# Patient Record
Sex: FEMALE | Race: WHITE | NOT HISPANIC OR LATINO
[De-identification: names, ages, dates, MRNs, and addresses within clinical notes are randomized per-mention and may not be internally consistent; named-entity substitution may affect disease eponyms.]

---

## 2017-07-19 ENCOUNTER — APPOINTMENT (OUTPATIENT)
Dept: ORTHOPEDIC SURGERY | Facility: CLINIC | Age: 67
End: 2017-07-19

## 2017-07-19 VITALS
SYSTOLIC BLOOD PRESSURE: 167 MMHG | DIASTOLIC BLOOD PRESSURE: 83 MMHG | WEIGHT: 175 LBS | BODY MASS INDEX: 31.4 KG/M2 | HEIGHT: 62.5 IN | HEART RATE: 65 BPM

## 2017-07-19 DIAGNOSIS — Z86.39 PERSONAL HISTORY OF OTHER ENDOCRINE, NUTRITIONAL AND METABOLIC DISEASE: ICD-10-CM

## 2017-07-19 DIAGNOSIS — Z87.39 PERSONAL HISTORY OF OTHER DISEASES OF THE MUSCULOSKELETAL SYSTEM AND CONNECTIVE TISSUE: ICD-10-CM

## 2017-07-19 DIAGNOSIS — Z86.79 PERSONAL HISTORY OF OTHER DISEASES OF THE CIRCULATORY SYSTEM: ICD-10-CM

## 2017-07-19 DIAGNOSIS — Z80.9 FAMILY HISTORY OF MALIGNANT NEOPLASM, UNSPECIFIED: ICD-10-CM

## 2017-07-19 DIAGNOSIS — G56.03 CARPAL TUNNEL SYNDROM,BILATERAL UPPER LIMBS: ICD-10-CM

## 2017-07-19 RX ORDER — ATORVASTATIN CALCIUM 10 MG/1
10 TABLET, FILM COATED ORAL
Qty: 90 | Refills: 0 | Status: ACTIVE | COMMUNITY
Start: 2017-06-03

## 2017-07-19 RX ORDER — CEFUROXIME AXETIL 500 MG/1
500 TABLET ORAL
Qty: 14 | Refills: 0 | Status: DISCONTINUED | COMMUNITY
Start: 2017-04-03

## 2017-07-19 RX ORDER — LEVOFLOXACIN 750 MG/1
750 TABLET, FILM COATED ORAL
Qty: 7 | Refills: 0 | Status: DISCONTINUED | COMMUNITY
Start: 2017-04-01

## 2017-07-19 RX ORDER — ALBUTEROL SULFATE 90 UG/1
108 (90 BASE) AEROSOL, METERED RESPIRATORY (INHALATION)
Qty: 18 | Refills: 0 | Status: DISCONTINUED | COMMUNITY
Start: 2017-04-03

## 2017-07-25 ENCOUNTER — EMERGENCY (EMERGENCY)
Facility: HOSPITAL | Age: 67
LOS: 1 days | Discharge: HOME | End: 2017-07-25
Admitting: INTERNAL MEDICINE
Payer: MEDICARE

## 2017-07-25 PROCEDURE — 99285 EMERGENCY DEPT VISIT HI MDM: CPT

## 2017-07-25 PROCEDURE — 74177 CT ABD & PELVIS W/CONTRAST: CPT | Mod: 26

## 2017-07-25 PROCEDURE — 93010 ELECTROCARDIOGRAM REPORT: CPT

## 2017-07-26 PROCEDURE — 93005 ELECTROCARDIOGRAM TRACING: CPT

## 2017-07-26 PROCEDURE — 99284 EMERGENCY DEPT VISIT MOD MDM: CPT | Mod: 25

## 2017-07-26 PROCEDURE — 96361 HYDRATE IV INFUSION ADD-ON: CPT

## 2017-07-26 PROCEDURE — 96375 TX/PRO/DX INJ NEW DRUG ADDON: CPT

## 2017-07-26 PROCEDURE — 85027 COMPLETE CBC AUTOMATED: CPT

## 2017-07-26 PROCEDURE — 87086 URINE CULTURE/COLONY COUNT: CPT

## 2017-07-26 PROCEDURE — 80076 HEPATIC FUNCTION PANEL: CPT

## 2017-07-26 PROCEDURE — 85730 THROMBOPLASTIN TIME PARTIAL: CPT

## 2017-07-26 PROCEDURE — 83690 ASSAY OF LIPASE: CPT

## 2017-07-26 PROCEDURE — 82150 ASSAY OF AMYLASE: CPT

## 2017-07-26 PROCEDURE — 81003 URINALYSIS AUTO W/O SCOPE: CPT

## 2017-07-26 PROCEDURE — 74177 CT ABD & PELVIS W/CONTRAST: CPT

## 2017-07-26 PROCEDURE — 82550 ASSAY OF CK (CPK): CPT

## 2017-07-26 PROCEDURE — 84484 ASSAY OF TROPONIN QUANT: CPT

## 2017-07-26 PROCEDURE — 80048 BASIC METABOLIC PNL TOTAL CA: CPT

## 2017-07-26 PROCEDURE — 96365 THER/PROPH/DIAG IV INF INIT: CPT | Mod: XU

## 2017-07-26 PROCEDURE — 85610 PROTHROMBIN TIME: CPT

## 2017-07-26 PROCEDURE — 82553 CREATINE MB FRACTION: CPT

## 2017-08-02 ENCOUNTER — APPOINTMENT (OUTPATIENT)
Dept: ORTHOPEDIC SURGERY | Facility: CLINIC | Age: 67
End: 2017-08-02

## 2017-10-29 ENCOUNTER — EMERGENCY (EMERGENCY)
Facility: HOSPITAL | Age: 67
LOS: 1 days | Discharge: HOME | End: 2017-10-29
Attending: EMERGENCY MEDICINE | Admitting: EMERGENCY MEDICINE
Payer: MEDICARE

## 2017-10-29 VITALS
DIASTOLIC BLOOD PRESSURE: 81 MMHG | WEIGHT: 169.98 LBS | HEART RATE: 87 BPM | SYSTOLIC BLOOD PRESSURE: 182 MMHG | OXYGEN SATURATION: 96 % | RESPIRATION RATE: 20 BRPM | TEMPERATURE: 98 F

## 2017-10-29 PROCEDURE — 99283 EMERGENCY DEPT VISIT LOW MDM: CPT | Mod: GC

## 2017-10-29 PROCEDURE — 99283 EMERGENCY DEPT VISIT LOW MDM: CPT

## 2017-10-29 RX ORDER — OXYCODONE HYDROCHLORIDE 5 MG/1
10 TABLET ORAL ONCE
Qty: 0 | Refills: 0 | Status: DISCONTINUED | OUTPATIENT
Start: 2017-10-29 | End: 2017-10-29

## 2017-10-29 RX ADMIN — OXYCODONE HYDROCHLORIDE 10 MILLIGRAM(S): 5 TABLET ORAL at 14:36

## 2017-10-29 NOTE — ED PROVIDER NOTE - PLAN OF CARE
Call your primary care doctor and pain management doctor today or tomorrow to schedule follow up appointment for within the next 3-5 days.  Continue taking your medications as prescribed.  Return to this Emergency Department if you experience worsening condition or for any other emergency.

## 2017-10-29 NOTE — ED PROVIDER NOTE - MEDICAL DECISION MAKING DETAILS
67F h/o chronic low back pain, p/w pain after running out of pain meds and after physical therapy. Will give 10mg Oxycodone now with a few percocet until she sees Pain Management. Imaging not warranted, as recent spine MRI (aug) and now new/different pain. Nate SANCHEZ: Patient is a 66 yo F with h/o chronic low back pain on 30 mg of oxycodone 4 times a day, presenting with request for pain medication after running out of medication. Patient has been taking additional pain pills secondary to increasing pain after being in physical therapy. No change in bladder or bowel habits. Denies radiation of pain at this time. No f/c/n/v. Patient has an appointment with her pain doctor in 2 days. Will give pain medication and limited number of pills until she can follow up. I-stop consistent with patient's history and use of pain medication as prescribed.

## 2017-10-29 NOTE — ED PROVIDER NOTE - OBJECTIVE STATEMENT
67F h/o Chronic back pain 2/2 lumbar spinal stenosis, Diverticulitis, HTN presents with worsening of her chronic lower back pain (for which she normally takes 30mg Oxycodone) after her new physical therapy which began last week after which she took some extra oxycodone pills for pain. She says that she ran out of the medication and will only see pain doctor on Tuesday. She denies any trauma, falls, fevers, chills, new numbness/tingling, urine/fecal incontinence.

## 2017-10-29 NOTE — ED PROVIDER NOTE - CARE PLAN
Principal Discharge DX:	Chronic bilateral low back pain with bilateral sciatica  Instructions for follow-up, activity and diet:	Call your primary care doctor and pain management doctor today or tomorrow to schedule follow up appointment for within the next 3-5 days.  Continue taking your medications as prescribed.  Return to this Emergency Department if you experience worsening condition or for any other emergency.

## 2017-10-29 NOTE — ED PROVIDER NOTE - EXTREMITY EXAM
Full strength, ROM, neuro intact. Straight leg positive L>R./no deformity, pain or tenderness, no restriction of movement

## 2017-10-29 NOTE — ED ADULT NURSE NOTE - OBJECTIVE STATEMENT
67 yr old female came in with back pain, which I a chronic condition but she ran out of pain meds no other issues. can walk no loss of urine or stool.

## 2019-05-22 PROBLEM — I10 ESSENTIAL (PRIMARY) HYPERTENSION: Chronic | Status: ACTIVE | Noted: 2017-10-29

## 2019-05-22 PROBLEM — M54.9 DORSALGIA, UNSPECIFIED: Chronic | Status: ACTIVE | Noted: 2017-10-29

## 2019-05-22 PROBLEM — K57.92 DIVERTICULITIS OF INTESTINE, PART UNSPECIFIED, WITHOUT PERFORATION OR ABSCESS WITHOUT BLEEDING: Chronic | Status: ACTIVE | Noted: 2017-10-29

## 2019-05-28 ENCOUNTER — APPOINTMENT (OUTPATIENT)
Dept: MRI IMAGING | Facility: HOSPITAL | Age: 69
End: 2019-05-28
Payer: MEDICARE

## 2019-05-28 ENCOUNTER — OUTPATIENT (OUTPATIENT)
Dept: OUTPATIENT SERVICES | Facility: HOSPITAL | Age: 69
LOS: 1 days | End: 2019-05-28
Payer: MEDICARE

## 2019-05-28 DIAGNOSIS — Z00.8 ENCOUNTER FOR OTHER GENERAL EXAMINATION: ICD-10-CM

## 2019-05-28 PROCEDURE — 72141 MRI NECK SPINE W/O DYE: CPT

## 2019-05-28 PROCEDURE — 70551 MRI BRAIN STEM W/O DYE: CPT

## 2019-05-28 PROCEDURE — 72141 MRI NECK SPINE W/O DYE: CPT | Mod: 26

## 2019-05-28 PROCEDURE — 70551 MRI BRAIN STEM W/O DYE: CPT | Mod: 26

## 2019-06-03 ENCOUNTER — APPOINTMENT (OUTPATIENT)
Dept: ORTHOPEDIC SURGERY | Facility: CLINIC | Age: 69
End: 2019-06-03
Payer: MEDICARE

## 2019-06-03 VITALS
DIASTOLIC BLOOD PRESSURE: 90 MMHG | SYSTOLIC BLOOD PRESSURE: 167 MMHG | BODY MASS INDEX: 31.94 KG/M2 | HEIGHT: 62.5 IN | WEIGHT: 178 LBS | HEART RATE: 80 BPM

## 2019-06-03 DIAGNOSIS — S14.129A CENTRAL CORD SYNDROME AT UNSPECIFIED LVL OF CERVICAL SPINAL CORD, INITIAL ENCOUNTER: ICD-10-CM

## 2019-06-03 PROCEDURE — 72050 X-RAY EXAM NECK SPINE 4/5VWS: CPT

## 2019-06-03 PROCEDURE — 99214 OFFICE O/P EST MOD 30 MIN: CPT

## 2019-06-03 NOTE — CONSULT LETTER
[Dear  ___] : Dear  [unfilled], [Consult Letter:] : I had the pleasure of evaluating your patient, [unfilled]. [FreeTextEntry2] : Dale Alvarado [FreeTextEntry1] : Thank you for this referral. I have enclosed my note for your review. Please feel free to contact my office if you have additional questions regarding this patient.\par \par Regards,\par Phong Adrian MD, FACS, FAAOS\par \par  of Orthopaedic Surgery\par [facility] [employer]\par Spinal Reconstruction Surgery\par Minimally Invasive Spinal Surgery\par [facility]

## 2019-06-03 NOTE — HISTORY OF PRESENT ILLNESS
[Worsening] : worsening [de-identified] : Patient is here today for evaluation on her bilateral arm numbness and bilateral hand numbness after pushing heavy case of water 3 weeks ago. Patient saw Neurologist had mri cervical spine xrays and emg ncv and referred to see spine orthopedist.\aristeo Works in an insurance agency, [employer] [de-identified] : typing

## 2019-06-03 NOTE — PHYSICAL EXAM
[Ataxic] : ataxic [Valentine's Sign] : positive Valentine's sign [Wide-Based] : wide-based [] : Motor: [___/5] : right ([unfilled]/5) [Motor Strength Upper Extremities] : left (5/5) [UE] : Sensory: Intact in bilateral upper extremities [3+] : left brachioradialis 3+ [Rad] : radial 2+ and symmetric bilaterally [Plantar Reflex Right Only] : absent on the right [Plantar Reflex Left Only] : absent on the left [DTR Reflexes Clonus Of Left Ankle (___ Beats)] : absent on the left [DTR Reflexes Clonus Of Right Ankle (___ Beats)] : absent on the right [de-identified] : 4 view cervical spine obtained today demonstrate no significant scoliosis the normal cervical lordosis. Grade 1 spinal listhesis seen at C4-5 and flexion with improvement in extension suggesting instability. Minimal anterolisthesis at C5-6 was stable between flexion-extension.\par __________________________\par \par EXAM: MR SPINE CERVICAL \par \par PROCEDURE DATE: 05/28/2019 \par \par INTERPRETATION: MRI cervical spine without contrast \par \par History bilateral upper extremity numbness \par \par There is no vertebral compression deformity or marrow edema or infiltration. \par \par The C2-3 disc is normal. There is moderate to severe facet arthropathy on \par the left resulting in moderate to severe foraminal stenosis without central \par canal stenosis or cord impingement \par \par The C3-4 disc is normal. There is severe facet arthropathy on the left \par resulting in severe foraminal stenosis without central canal stenosis or \par cord impingement \par \par The C4-5 disc space is maintained. There is mild anterolisthesis related to \par markedly severe bilateral facet arthropathy, worse on the right. There is \par uncovering of a diffuse dorsal disc bulge. These changes together result in \par mild ventral and dorsal spinal cord impingement with overall mild spinal \par stenosis. There is increased T2 signal in the cord at this level implying \par myelomalacia. There is severe bilateral foraminal stenosis \par \par The C5-6 disc space is maintained. There is minimal anterolisthesis related \par to severe degenerative change in the fused facets. Uncinate hypertrophy \par additionally contributes to moderate to severe bilateral foraminal stenosis, \par worse on the right without central canal stenosis or cord impingement \par \par The C6-7 disc is normal. There is uncinate and facet hypertrophy resulting \par in moderate left and mild right foraminal stenosis without central canal \par stenosis or cord impingement \par \par The C7-T1 disc is normal. There is moderate to severe bilateral facet \par arthropathy resulting in mild bilateral foraminal stenosis \par \par IMPRESSION: \par \par Spondylosis and degenerative spondylolisthesis notable for spinal stenosis, \par cord impingement and mild myelomalacia at C4-5. Multilevel foraminal \par stenosis as above without additional cord impingement \par \par JADE FONSECA M.D., ATTENDING RADIOLOGIST \par This document has been electronically signed. May 28 2019 2:37PM  [de-identified] : The pt is awake, alert and oriented to self, place and time, is comfortable and in no acute distress.positive Romberg sign noted. Can heel and toe walk without difficulty. Inspection of the neck, back and upper extremities bilaterally reveals no rashes or ecchymotic lesions. There is no obvious abnormal spinal curvature in the sagittal and coronal planes. No crepitus or instability noted with range of motion of bilateral upper extremities. No joint laxity noted in the upper and lower extremities bilaterally. No atrophy or abnormal movements noted in the upper or lower extremities. No tenderness over the cervical, thoracic, lumbar spine or in the paraspinal, or upper and lower extremity musculature. There is no swelling noted in the upper or lower extremities bilaterally. No cervical lymphadenopathy noted anteriorly.\par Cervical spine range of motion is pain free. Forward flexion is chin to chest, extension 30 degrees, rotation laterally 40 degrees bilaterally. Full range of motion of both shoulders. Negative Neer's sign and Hawkin's sign bilaterally. Negative Spurling's sign bilaterally. In the lumbar spine the patient can forward flex to mid tibia and extend 30°\par Negative Babinski sign and no clonus bilaterally in the upper or lower extremities.

## 2019-06-03 NOTE — DISCUSSION/SUMMARY
[Medication Risks Reviewed] : Medication risks reviewed [de-identified] : The patient presents with central cord syndrome with suggestion of instability at the C4-5 level with local myelomalacia seen at that level. I recommended surgical decompression and stabilization as soon as possible given the acute onset of symptoms a few weeks ago and some progression of neurologic deficit. She understands anterior cervical decompression fusion with stabilization at C4-5 right not be the only surgery she needs an posterior cervical decompression fusion and also have to be considered at this level.We will schedule surgery at her earliest convenience as soon as possible\par \par The patient was advised that based upon their clinical presentation and radiographic findings they meet inclusion criteria for spinal surgery to address their spinal pathology.\par The spectrum of treatments for their spinal condition were reviewed in detail. The goals, alternatives, risks and benefits of spinal surgery were reviewed in detail with the patient.  \par Benefits and risks of operative and nonoperative treatment for the patient's pathology were outlined at length with the patient.  Specifically, those risks are understood to be, but not limited to, bleeding, infection, fracture (both during surgery and after surgery), adjacent level disease, failure to heal (significantly increased by smoking), need for further surgery, failure of instrumentation (if used), recurrence of problem and symptoms, neurovascular injury, dural tears or leaks resulting in spinal fluid leakage and requiring additional invasive and non-invasive treatments, need for additional procedures, possible paralysis resulting in loss of use of arms, legs, bowel and bladder function as well as sexual dysfunction, and anesthetic risks including death. \par Available alternatives to surgery were also discussed with the patient. In addition, the patient further understands that there may be indicated need for somatosensory evoked potential monitoring, real-time EMG monitoring, and motor evoked potential monitoring during the procedure along with placement of needle electrodes. A neuromonitoring service will discuss the risks and benefits separately with the patient.\par The patient also understands that having a surgical procedure and being hospitalized carries risks in addition to the ones described above.\par \par The patient was advised that Dr. Adrian operates as a surgical team with his associate Dr. Taylor and/or with surgical Physician Assistants (PA)\par \par The patient was advised that they will require a medical preoperative risk evaluation by their PCP. Further medical subspecialty clearances such as cardiac may be indicated if felt needed by their PCP.\par \par The patient was advised he/she may call our office after careful consideration of their treatment options and further consultation with any other physician to begin the process of preoperative planning for elective spinal surgery at a time of their choosing. \par The patient verbalized an understanding of the procedure, its indications, and its common potential complications and attendant risks, and is willing to proceed. No guarantees were made with regard to a complete recovery. We will proceed in a timely manner after obtaining medical clearance.\par \par The patient was educated regarding their condition, treatment options as well as prescribed course of treatment. \par Risks and benefits as well as alternatives to the proposed treatment were also provided to the patient \par They were given the opportunity to have all their questions answered to their satisfaction.\par \par Vital signs were reviewed with the patient and the patient was instructed to followup with their primary care provider for further management.\par \par Healthy lifestyle recommendations were also made including a tobacco free lifestyle, proper diet, and weight control.

## 2019-06-03 NOTE — REASON FOR VISIT
[Follow-Up Visit] : a follow-up visit for [FreeTextEntry2] : Bilateral numbness bilateral arms and hands

## 2019-06-06 RX ORDER — ATORVASTATIN CALCIUM 80 MG/1
0 TABLET, FILM COATED ORAL
Qty: 0 | Refills: 0 | DISCHARGE

## 2019-06-06 RX ORDER — VERAPAMIL HCL 240 MG
0 CAPSULE, EXTENDED RELEASE PELLETS 24 HR ORAL
Qty: 0 | Refills: 0 | DISCHARGE

## 2019-06-06 RX ORDER — TIZANIDINE 4 MG/1
0 TABLET ORAL
Qty: 0 | Refills: 0 | DISCHARGE

## 2019-06-07 ENCOUNTER — OUTPATIENT (OUTPATIENT)
Dept: OUTPATIENT SERVICES | Facility: HOSPITAL | Age: 69
LOS: 1 days | End: 2019-06-07
Payer: MEDICARE

## 2019-06-07 VITALS
SYSTOLIC BLOOD PRESSURE: 135 MMHG | WEIGHT: 177.91 LBS | TEMPERATURE: 98 F | DIASTOLIC BLOOD PRESSURE: 77 MMHG | OXYGEN SATURATION: 98 % | HEIGHT: 62.5 IN

## 2019-06-07 DIAGNOSIS — Z98.891 HISTORY OF UTERINE SCAR FROM PREVIOUS SURGERY: Chronic | ICD-10-CM

## 2019-06-07 DIAGNOSIS — Z01.818 ENCOUNTER FOR OTHER PREPROCEDURAL EXAMINATION: ICD-10-CM

## 2019-06-07 DIAGNOSIS — S14.129A CENTRAL CORD SYNDROME AT UNSPECIFIED LEVEL OF CERVICAL SPINAL CORD, INITIAL ENCOUNTER: ICD-10-CM

## 2019-06-07 DIAGNOSIS — Z98.890 OTHER SPECIFIED POSTPROCEDURAL STATES: Chronic | ICD-10-CM

## 2019-06-07 DIAGNOSIS — M43.10 SPONDYLOLISTHESIS, SITE UNSPECIFIED: ICD-10-CM

## 2019-06-07 DIAGNOSIS — M50.30 OTHER CERVICAL DISC DEGENERATION, UNSPECIFIED CERVICAL REGION: ICD-10-CM

## 2019-06-07 DIAGNOSIS — G95.20 UNSPECIFIED CORD COMPRESSION: ICD-10-CM

## 2019-06-07 LAB
ALBUMIN SERPL ELPH-MCNC: 3.8 G/DL — SIGNIFICANT CHANGE UP (ref 3.3–5)
ALP SERPL-CCNC: 69 U/L — SIGNIFICANT CHANGE UP (ref 30–120)
ALT FLD-CCNC: 20 U/L DA — SIGNIFICANT CHANGE UP (ref 10–60)
ANION GAP SERPL CALC-SCNC: 5 MMOL/L — SIGNIFICANT CHANGE UP (ref 5–17)
APTT BLD: 25 SEC — LOW (ref 28.5–37)
AST SERPL-CCNC: 13 U/L — SIGNIFICANT CHANGE UP (ref 10–40)
BILIRUB SERPL-MCNC: 0.3 MG/DL — SIGNIFICANT CHANGE UP (ref 0.2–1.2)
BLD GP AB SCN SERPL QL: SIGNIFICANT CHANGE UP
BUN SERPL-MCNC: 38 MG/DL — HIGH (ref 7–23)
CALCIUM SERPL-MCNC: 9.3 MG/DL — SIGNIFICANT CHANGE UP (ref 8.4–10.5)
CHLORIDE SERPL-SCNC: 92 MMOL/L — LOW (ref 96–108)
CO2 SERPL-SCNC: 34 MMOL/L — HIGH (ref 22–31)
CREAT SERPL-MCNC: 0.9 MG/DL — SIGNIFICANT CHANGE UP (ref 0.5–1.3)
GLUCOSE SERPL-MCNC: 124 MG/DL — HIGH (ref 70–99)
HCT VFR BLD CALC: 41.5 % — SIGNIFICANT CHANGE UP (ref 34.5–45)
HGB BLD-MCNC: 14.1 G/DL — SIGNIFICANT CHANGE UP (ref 11.5–15.5)
INR BLD: 0.9 RATIO — SIGNIFICANT CHANGE UP (ref 0.88–1.16)
MCHC RBC-ENTMCNC: 30.1 PG — SIGNIFICANT CHANGE UP (ref 27–34)
MCHC RBC-ENTMCNC: 34 GM/DL — SIGNIFICANT CHANGE UP (ref 32–36)
MCV RBC AUTO: 88.7 FL — SIGNIFICANT CHANGE UP (ref 80–100)
NRBC # BLD: 0 /100 WBCS — SIGNIFICANT CHANGE UP (ref 0–0)
PLATELET # BLD AUTO: 439 K/UL — HIGH (ref 150–400)
POTASSIUM SERPL-MCNC: 4.8 MMOL/L — SIGNIFICANT CHANGE UP (ref 3.5–5.3)
POTASSIUM SERPL-SCNC: 4.8 MMOL/L — SIGNIFICANT CHANGE UP (ref 3.5–5.3)
PROT SERPL-MCNC: 7.3 G/DL — SIGNIFICANT CHANGE UP (ref 6–8.3)
PROTHROM AB SERPL-ACNC: 9.8 SEC — LOW (ref 10–12.9)
RBC # BLD: 4.68 M/UL — SIGNIFICANT CHANGE UP (ref 3.8–5.2)
RBC # FLD: 12.3 % — SIGNIFICANT CHANGE UP (ref 10.3–14.5)
SODIUM SERPL-SCNC: 131 MMOL/L — LOW (ref 135–145)
WBC # BLD: 18.65 K/UL — HIGH (ref 3.8–10.5)
WBC # FLD AUTO: 18.65 K/UL — HIGH (ref 3.8–10.5)

## 2019-06-07 PROCEDURE — 36415 COLL VENOUS BLD VENIPUNCTURE: CPT

## 2019-06-07 PROCEDURE — 86901 BLOOD TYPING SEROLOGIC RH(D): CPT

## 2019-06-07 PROCEDURE — 85610 PROTHROMBIN TIME: CPT

## 2019-06-07 PROCEDURE — 86900 BLOOD TYPING SEROLOGIC ABO: CPT

## 2019-06-07 PROCEDURE — 93010 ELECTROCARDIOGRAM REPORT: CPT

## 2019-06-07 PROCEDURE — G0463: CPT

## 2019-06-07 PROCEDURE — 80053 COMPREHEN METABOLIC PANEL: CPT

## 2019-06-07 PROCEDURE — 93005 ELECTROCARDIOGRAM TRACING: CPT

## 2019-06-07 PROCEDURE — 85730 THROMBOPLASTIN TIME PARTIAL: CPT

## 2019-06-07 PROCEDURE — 86850 RBC ANTIBODY SCREEN: CPT

## 2019-06-07 PROCEDURE — 85027 COMPLETE CBC AUTOMATED: CPT

## 2019-06-07 RX ORDER — OXYCODONE HYDROCHLORIDE 5 MG/1
0 TABLET ORAL
Qty: 0 | Refills: 0 | DISCHARGE

## 2019-06-07 NOTE — PROVIDER CONTACT NOTE (OTHER) - ASSESSMENT
The Spine Pre-Operative Education packet was given to the patient on 6/3/2019. The patient and I reviewed the information included in the packet. All her questions were answered and she gave a clear understanding of the instructions. She was advised to call the office at any time with further questions or concerns.

## 2019-06-07 NOTE — H&P PST ADULT - NSICDXPASTMEDICALHX_GEN_ALL_CORE_FT
PAST MEDICAL HISTORY:  Chronic back pain     Compressed cervical disc c4-c5    Diverticulitis     GERD (gastroesophageal reflux disease)     HTN (hypertension)     Hyperlipidemia

## 2019-06-07 NOTE — H&P PST ADULT - NSICDXPROBLEM_GEN_ALL_CORE_FT
PROBLEM DIAGNOSES  Problem: Cervical spinal cord compression  Assessment and Plan: anterior cervical decompression c4-c5 anterior cervical fusion and related procedures  medical clearances

## 2019-06-07 NOTE — H&P PST ADULT - HISTORY OF PRESENT ILLNESS
70 y/o female with neck pain for long time. C/O loss of sensation to hands, arm, with muscle weakness and loss of strength. Here for PST in NAD. On steroids at present that giving her some relief

## 2019-06-12 ENCOUNTER — TRANSCRIPTION ENCOUNTER (OUTPATIENT)
Age: 69
End: 2019-06-12

## 2019-06-12 NOTE — ASU PATIENT PROFILE, ADULT - PMH
Chronic back pain    Compressed cervical disc  c4-c5  Diverticulitis    GERD (gastroesophageal reflux disease)    HTN (hypertension)    Hyperlipidemia

## 2019-06-13 ENCOUNTER — APPOINTMENT (OUTPATIENT)
Dept: ORTHOPEDIC SURGERY | Facility: HOSPITAL | Age: 69
End: 2019-06-13

## 2019-06-13 ENCOUNTER — OUTPATIENT (OUTPATIENT)
Dept: OUTPATIENT SERVICES | Facility: HOSPITAL | Age: 69
LOS: 1 days | End: 2019-06-13
Payer: MEDICARE

## 2019-06-13 ENCOUNTER — RESULT REVIEW (OUTPATIENT)
Age: 69
End: 2019-06-13

## 2019-06-13 VITALS
OXYGEN SATURATION: 93 % | DIASTOLIC BLOOD PRESSURE: 62 MMHG | SYSTOLIC BLOOD PRESSURE: 122 MMHG | HEART RATE: 78 BPM | RESPIRATION RATE: 8 BRPM

## 2019-06-13 VITALS
OXYGEN SATURATION: 96 % | WEIGHT: 181.66 LBS | RESPIRATION RATE: 14 BRPM | DIASTOLIC BLOOD PRESSURE: 69 MMHG | SYSTOLIC BLOOD PRESSURE: 131 MMHG | TEMPERATURE: 98 F | HEART RATE: 84 BPM | HEIGHT: 62 IN

## 2019-06-13 DIAGNOSIS — M50.30 OTHER CERVICAL DISC DEGENERATION, UNSPECIFIED CERVICAL REGION: ICD-10-CM

## 2019-06-13 DIAGNOSIS — Z01.818 ENCOUNTER FOR OTHER PREPROCEDURAL EXAMINATION: ICD-10-CM

## 2019-06-13 DIAGNOSIS — M43.10 SPONDYLOLISTHESIS, SITE UNSPECIFIED: ICD-10-CM

## 2019-06-13 DIAGNOSIS — S14.129A CENTRAL CORD SYNDROME AT UNSPECIFIED LEVEL OF CERVICAL SPINAL CORD, INITIAL ENCOUNTER: ICD-10-CM

## 2019-06-13 DIAGNOSIS — Z98.891 HISTORY OF UTERINE SCAR FROM PREVIOUS SURGERY: Chronic | ICD-10-CM

## 2019-06-13 DIAGNOSIS — Z90.710 ACQUIRED ABSENCE OF BOTH CERVIX AND UTERUS: Chronic | ICD-10-CM

## 2019-06-13 DIAGNOSIS — Z98.890 OTHER SPECIFIED POSTPROCEDURAL STATES: Chronic | ICD-10-CM

## 2019-06-13 PROBLEM — M50.20 OTHER CERVICAL DISC DISPLACEMENT, UNSPECIFIED CERVICAL REGION: Chronic | Status: ACTIVE | Noted: 2019-06-07

## 2019-06-13 PROBLEM — K21.9 GASTRO-ESOPHAGEAL REFLUX DISEASE WITHOUT ESOPHAGITIS: Chronic | Status: ACTIVE | Noted: 2019-06-06

## 2019-06-13 PROBLEM — E78.5 HYPERLIPIDEMIA, UNSPECIFIED: Chronic | Status: ACTIVE | Noted: 2019-06-06

## 2019-06-13 LAB
CHLORIDE SERPL-SCNC: 95 MMOL/L — LOW (ref 96–108)
SODIUM SERPL-SCNC: 131 MMOL/L — LOW (ref 135–145)
WBC # BLD: 9.14 K/UL — SIGNIFICANT CHANGE UP (ref 3.8–10.5)
WBC # FLD AUTO: 9.14 K/UL — SIGNIFICANT CHANGE UP (ref 3.8–10.5)

## 2019-06-13 PROCEDURE — 22853 INSJ BIOMECHANICAL DEVICE: CPT | Mod: 82

## 2019-06-13 PROCEDURE — C1713: CPT

## 2019-06-13 PROCEDURE — 84295 ASSAY OF SERUM SODIUM: CPT

## 2019-06-13 PROCEDURE — 20937 SP BONE AGRFT MORSEL ADD-ON: CPT | Mod: 82

## 2019-06-13 PROCEDURE — 22551 ARTHRD ANT NTRBDY CERVICAL: CPT

## 2019-06-13 PROCEDURE — 88304 TISSUE EXAM BY PATHOLOGIST: CPT

## 2019-06-13 PROCEDURE — 76000 FLUOROSCOPY <1 HR PHYS/QHP: CPT

## 2019-06-13 PROCEDURE — 20931 SP BONE ALGRFT STRUCT ADD-ON: CPT

## 2019-06-13 PROCEDURE — 97161 PT EVAL LOW COMPLEX 20 MIN: CPT

## 2019-06-13 PROCEDURE — 85048 AUTOMATED LEUKOCYTE COUNT: CPT

## 2019-06-13 PROCEDURE — 22551 ARTHRD ANT NTRBDY CERVICAL: CPT | Mod: 82

## 2019-06-13 PROCEDURE — C1889: CPT

## 2019-06-13 PROCEDURE — 22845 INSERT SPINE FIXATION DEVICE: CPT | Mod: 82,59

## 2019-06-13 PROCEDURE — 36415 COLL VENOUS BLD VENIPUNCTURE: CPT

## 2019-06-13 PROCEDURE — 20939 BONE MARROW ASPIR BONE GRFG: CPT

## 2019-06-13 PROCEDURE — 88304 TISSUE EXAM BY PATHOLOGIST: CPT | Mod: 26

## 2019-06-13 PROCEDURE — 22845 INSERT SPINE FIXATION DEVICE: CPT | Mod: 59

## 2019-06-13 PROCEDURE — 20937 SP BONE AGRFT MORSEL ADD-ON: CPT

## 2019-06-13 PROCEDURE — 22853 INSJ BIOMECHANICAL DEVICE: CPT

## 2019-06-13 PROCEDURE — 82435 ASSAY OF BLOOD CHLORIDE: CPT

## 2019-06-13 RX ORDER — HYDROCODONE BITARTRATE 50 MG/1
0 CAPSULE, EXTENDED RELEASE ORAL
Qty: 0 | Refills: 0 | DISCHARGE

## 2019-06-13 RX ORDER — SODIUM CHLORIDE 9 MG/ML
1000 INJECTION, SOLUTION INTRAVENOUS
Refills: 0 | Status: DISCONTINUED | OUTPATIENT
Start: 2019-06-13 | End: 2019-06-14

## 2019-06-13 RX ORDER — APREPITANT 80 MG/1
40 CAPSULE ORAL ONCE
Refills: 0 | Status: COMPLETED | OUTPATIENT
Start: 2019-06-13 | End: 2019-06-13

## 2019-06-13 RX ORDER — IBUPROFEN 200 MG
3 TABLET ORAL
Qty: 0 | Refills: 0 | DISCHARGE

## 2019-06-13 RX ORDER — DIAZEPAM 5 MG
5 TABLET ORAL ONCE
Refills: 0 | Status: DISCONTINUED | OUTPATIENT
Start: 2019-06-13 | End: 2019-06-13

## 2019-06-13 RX ORDER — OXYCODONE HYDROCHLORIDE 5 MG/1
1 TABLET ORAL
Qty: 42 | Refills: 0
Start: 2019-06-13

## 2019-06-13 RX ORDER — TRANEXAMIC ACID 100 MG/ML
1000 INJECTION, SOLUTION INTRAVENOUS ONCE
Refills: 0 | Status: COMPLETED | OUTPATIENT
Start: 2019-06-13 | End: 2019-06-13

## 2019-06-13 RX ORDER — ACETAMINOPHEN 500 MG
1000 TABLET ORAL ONCE
Refills: 0 | Status: COMPLETED | OUTPATIENT
Start: 2019-06-13 | End: 2019-06-13

## 2019-06-13 RX ORDER — OXYCODONE HYDROCHLORIDE 5 MG/1
5 TABLET ORAL ONCE
Refills: 0 | Status: DISCONTINUED | OUTPATIENT
Start: 2019-06-13 | End: 2019-06-13

## 2019-06-13 RX ORDER — HYDROMORPHONE HYDROCHLORIDE 2 MG/ML
0.5 INJECTION INTRAMUSCULAR; INTRAVENOUS; SUBCUTANEOUS
Refills: 0 | Status: DISCONTINUED | OUTPATIENT
Start: 2019-06-13 | End: 2019-06-14

## 2019-06-13 RX ORDER — ONDANSETRON 8 MG/1
4 TABLET, FILM COATED ORAL ONCE
Refills: 0 | Status: DISCONTINUED | OUTPATIENT
Start: 2019-06-13 | End: 2019-06-14

## 2019-06-13 RX ORDER — TIZANIDINE 4 MG/1
2 TABLET ORAL
Qty: 30 | Refills: 0
Start: 2019-06-13 | End: 2019-06-21

## 2019-06-13 RX ORDER — CEFAZOLIN SODIUM 1 G
2000 VIAL (EA) INJECTION ONCE
Refills: 0 | Status: COMPLETED | OUTPATIENT
Start: 2019-06-13 | End: 2019-06-13

## 2019-06-13 RX ADMIN — HYDROMORPHONE HYDROCHLORIDE 0.5 MILLIGRAM(S): 2 INJECTION INTRAMUSCULAR; INTRAVENOUS; SUBCUTANEOUS at 14:57

## 2019-06-13 RX ADMIN — HYDROMORPHONE HYDROCHLORIDE 0.5 MILLIGRAM(S): 2 INJECTION INTRAMUSCULAR; INTRAVENOUS; SUBCUTANEOUS at 16:00

## 2019-06-13 RX ADMIN — SODIUM CHLORIDE 100 MILLILITER(S): 9 INJECTION, SOLUTION INTRAVENOUS at 16:15

## 2019-06-13 RX ADMIN — HYDROMORPHONE HYDROCHLORIDE 0.5 MILLIGRAM(S): 2 INJECTION INTRAMUSCULAR; INTRAVENOUS; SUBCUTANEOUS at 14:58

## 2019-06-13 RX ADMIN — HYDROMORPHONE HYDROCHLORIDE 0.5 MILLIGRAM(S): 2 INJECTION INTRAMUSCULAR; INTRAVENOUS; SUBCUTANEOUS at 14:27

## 2019-06-13 RX ADMIN — HYDROMORPHONE HYDROCHLORIDE 0.5 MILLIGRAM(S): 2 INJECTION INTRAMUSCULAR; INTRAVENOUS; SUBCUTANEOUS at 14:44

## 2019-06-13 RX ADMIN — APREPITANT 40 MILLIGRAM(S): 80 CAPSULE ORAL at 09:07

## 2019-06-13 RX ADMIN — Medication 5 MILLIGRAM(S): at 16:10

## 2019-06-13 RX ADMIN — HYDROMORPHONE HYDROCHLORIDE 0.5 MILLIGRAM(S): 2 INJECTION INTRAMUSCULAR; INTRAVENOUS; SUBCUTANEOUS at 14:15

## 2019-06-13 RX ADMIN — HYDROMORPHONE HYDROCHLORIDE 0.5 MILLIGRAM(S): 2 INJECTION INTRAMUSCULAR; INTRAVENOUS; SUBCUTANEOUS at 14:43

## 2019-06-13 RX ADMIN — HYDROMORPHONE HYDROCHLORIDE 0.5 MILLIGRAM(S): 2 INJECTION INTRAMUSCULAR; INTRAVENOUS; SUBCUTANEOUS at 15:45

## 2019-06-13 RX ADMIN — HYDROMORPHONE HYDROCHLORIDE 0.5 MILLIGRAM(S): 2 INJECTION INTRAMUSCULAR; INTRAVENOUS; SUBCUTANEOUS at 14:26

## 2019-06-13 NOTE — ASU DISCHARGE PLAN (ADULT/PEDIATRIC) - ASU DC SPECIAL INSTRUCTIONSFT
You have had a cervical spine fusion.  Some precautions are advised.    Wear your collar at all times unless told otherwise by your MD. Avoid twisting and bending of neck.  No heavy lifting.    DO NOT drive. You may be driven but wear your collar in the car.     You may start your diet with cool liquids and advance to a soft mechanical diet as tolerated.    In addition to your prescription pain medication you are advised to take 1000mg of acetaminophen (2 extra strength tylenol ) 3 times per day to reduce your need for narcotics.      The pain medications are likely to cause constipation.  This problem can be minimized by increasing your fruits and vegetables and fluid intake.  You may benefit from over-the-counter laxatives (such as senokot) and stool softeners.    Avoid getting your dressing wet- bath or careful showering is appropriate.  Change dressing if it become wet or loose using dry gauze and tape.  The steristrips should remain in place unless they are very loose at which time they can be removed.    Do not smoke or use tobacco products.  Do not use NSAID type drugs such as ibuprofen-(advil motrin) and naprosyn (aleve, naproxen). They can inhibit healing and stop the bones from fusing together.      Call your MD if you have  new numbness, weakness, severe headache, swelling or bleeding.

## 2019-06-13 NOTE — BRIEF OPERATIVE NOTE - NSICDXBRIEFPOSTOP_GEN_ALL_CORE_FT
POST-OP DIAGNOSIS:  Spondylolisthesis 13-Jun-2019 14:03:10 C4-5 Butch Corral  Central cord syndrome of cervical spinal cord 13-Jun-2019 14:02:48 C4-5 Butch Corral

## 2019-06-13 NOTE — BRIEF OPERATIVE NOTE - PRIMARY SURGEON
[FreeTextEntry1] : 10/17/18 - Left message returning call at 10AM.  A1c 5.7% - stable.  Lipids good. ASHISH Adrian

## 2019-06-13 NOTE — ASU DISCHARGE PLAN (ADULT/PEDIATRIC) - CARE PROVIDER_API CALL
Phong Adrian (MD)  Orthopaedic Surgery  [address], [address]  [place] [address]  Phone: [phone]  Fax: [phone]  Follow Up Time:

## 2019-06-13 NOTE — CHART NOTE - NSCHARTNOTEFT_GEN_A_CORE
Order received from ortho PA to provide Blanco J collar following surgery  Phila collar removed and replaced   Written and oral instructions given for was and care.  Follow up if needed        Tyson Stephens [employer]  [employer]

## 2019-06-13 NOTE — ASU DISCHARGE PLAN (ADULT/PEDIATRIC) - CALL YOUR DOCTOR IF YOU HAVE ANY OF THE FOLLOWING:
Fever greater than (need to indicate Fahrenheit or Celsius)/Bleeding that does not stop/Unable to urinate/Inability to tolerate liquids or foods

## 2019-06-13 NOTE — BRIEF OPERATIVE NOTE - NSICDXBRIEFPREOP_GEN_ALL_CORE_FT
PRE-OP DIAGNOSIS:  Spondylolisthesis 13-Jun-2019 14:02:02 C4-5 Butch Corral  Central cord syndrome of cervical spinal cord 13-Jun-2019 14:01:34 C4-5 Butch Corral

## 2019-06-18 ENCOUNTER — EMERGENCY (EMERGENCY)
Facility: HOSPITAL | Age: 69
LOS: 1 days | Discharge: ANOTHER HOSPITAL | End: 2019-06-18
Attending: EMERGENCY MEDICINE | Admitting: EMERGENCY MEDICINE
Payer: MEDICARE

## 2019-06-18 ENCOUNTER — INPATIENT (INPATIENT)
Facility: HOSPITAL | Age: 69
LOS: 2 days | Discharge: HOME | DRG: 552 | End: 2019-06-21
Attending: INTERNAL MEDICINE | Admitting: INTERNAL MEDICINE
Payer: MEDICARE

## 2019-06-18 VITALS
DIASTOLIC BLOOD PRESSURE: 77 MMHG | TEMPERATURE: 98 F | OXYGEN SATURATION: 96 % | RESPIRATION RATE: 16 BRPM | WEIGHT: 177.91 LBS | HEART RATE: 84 BPM | HEIGHT: 62.6 IN | SYSTOLIC BLOOD PRESSURE: 123 MMHG

## 2019-06-18 VITALS
RESPIRATION RATE: 18 BRPM | OXYGEN SATURATION: 98 % | TEMPERATURE: 98 F | SYSTOLIC BLOOD PRESSURE: 114 MMHG | HEART RATE: 86 BPM | DIASTOLIC BLOOD PRESSURE: 72 MMHG

## 2019-06-18 VITALS
HEART RATE: 94 BPM | WEIGHT: 177.91 LBS | RESPIRATION RATE: 18 BRPM | TEMPERATURE: 99 F | OXYGEN SATURATION: 95 % | DIASTOLIC BLOOD PRESSURE: 95 MMHG | SYSTOLIC BLOOD PRESSURE: 162 MMHG | HEIGHT: 62 IN

## 2019-06-18 DIAGNOSIS — Z90.710 ACQUIRED ABSENCE OF BOTH CERVIX AND UTERUS: Chronic | ICD-10-CM

## 2019-06-18 DIAGNOSIS — R10.32 LEFT LOWER QUADRANT PAIN: ICD-10-CM

## 2019-06-18 DIAGNOSIS — Z98.891 HISTORY OF UTERINE SCAR FROM PREVIOUS SURGERY: Chronic | ICD-10-CM

## 2019-06-18 LAB
ALBUMIN SERPL ELPH-MCNC: 3.3 G/DL — SIGNIFICANT CHANGE UP (ref 3.3–5)
ALP SERPL-CCNC: 76 U/L — SIGNIFICANT CHANGE UP (ref 40–120)
ALT FLD-CCNC: 27 U/L DA — SIGNIFICANT CHANGE UP (ref 10–45)
ANION GAP SERPL CALC-SCNC: 8 MMOL/L — SIGNIFICANT CHANGE UP (ref 5–17)
APTT BLD: 30.3 SEC — SIGNIFICANT CHANGE UP (ref 27.5–36.3)
AST SERPL-CCNC: 28 U/L — SIGNIFICANT CHANGE UP (ref 10–40)
BASOPHILS # BLD AUTO: 0.04 K/UL — SIGNIFICANT CHANGE UP (ref 0–0.2)
BASOPHILS NFR BLD AUTO: 0.5 % — SIGNIFICANT CHANGE UP (ref 0–2)
BILIRUB SERPL-MCNC: 0.5 MG/DL — SIGNIFICANT CHANGE UP (ref 0.2–1.2)
BUN SERPL-MCNC: 17 MG/DL — SIGNIFICANT CHANGE UP (ref 7–23)
CALCIUM SERPL-MCNC: 9.8 MG/DL — SIGNIFICANT CHANGE UP (ref 8.4–10.5)
CHLORIDE SERPL-SCNC: 94 MMOL/L — LOW (ref 96–108)
CO2 SERPL-SCNC: 29 MMOL/L — SIGNIFICANT CHANGE UP (ref 22–31)
CREAT SERPL-MCNC: 0.74 MG/DL — SIGNIFICANT CHANGE UP (ref 0.5–1.3)
EOSINOPHIL # BLD AUTO: 0.2 K/UL — SIGNIFICANT CHANGE UP (ref 0–0.5)
EOSINOPHIL NFR BLD AUTO: 2.4 % — SIGNIFICANT CHANGE UP (ref 0–6)
GLUCOSE SERPL-MCNC: 123 MG/DL — HIGH (ref 70–99)
HCT VFR BLD CALC: 37.9 % — SIGNIFICANT CHANGE UP (ref 34.5–45)
HGB BLD-MCNC: 12.8 G/DL — SIGNIFICANT CHANGE UP (ref 11.5–15.5)
IMM GRANULOCYTES NFR BLD AUTO: 0.6 % — SIGNIFICANT CHANGE UP (ref 0–1.5)
INR BLD: 1.02 RATIO — SIGNIFICANT CHANGE UP (ref 0.88–1.16)
LYMPHOCYTES # BLD AUTO: 1.94 K/UL — SIGNIFICANT CHANGE UP (ref 1–3.3)
LYMPHOCYTES # BLD AUTO: 23 % — SIGNIFICANT CHANGE UP (ref 13–44)
MCHC RBC-ENTMCNC: 29.7 PG — SIGNIFICANT CHANGE UP (ref 27–34)
MCHC RBC-ENTMCNC: 33.8 GM/DL — SIGNIFICANT CHANGE UP (ref 32–36)
MCV RBC AUTO: 87.9 FL — SIGNIFICANT CHANGE UP (ref 80–100)
MONOCYTES # BLD AUTO: 0.76 K/UL — SIGNIFICANT CHANGE UP (ref 0–0.9)
MONOCYTES NFR BLD AUTO: 9 % — SIGNIFICANT CHANGE UP (ref 2–14)
NEUTROPHILS # BLD AUTO: 5.44 K/UL — SIGNIFICANT CHANGE UP (ref 1.8–7.4)
NEUTROPHILS NFR BLD AUTO: 64.5 % — SIGNIFICANT CHANGE UP (ref 43–77)
NRBC # BLD: 0 /100 WBCS — SIGNIFICANT CHANGE UP (ref 0–0)
PLATELET # BLD AUTO: 294 K/UL — SIGNIFICANT CHANGE UP (ref 150–400)
POTASSIUM SERPL-MCNC: 4.4 MMOL/L — SIGNIFICANT CHANGE UP (ref 3.5–5.3)
POTASSIUM SERPL-SCNC: 4.4 MMOL/L — SIGNIFICANT CHANGE UP (ref 3.5–5.3)
PROT SERPL-MCNC: 7.7 G/DL — SIGNIFICANT CHANGE UP (ref 6–8.3)
PROTHROM AB SERPL-ACNC: 11.4 SEC — SIGNIFICANT CHANGE UP (ref 10–12.9)
RBC # BLD: 4.31 M/UL — SIGNIFICANT CHANGE UP (ref 3.8–5.2)
RBC # FLD: 12.3 % — SIGNIFICANT CHANGE UP (ref 10.3–14.5)
SODIUM SERPL-SCNC: 131 MMOL/L — LOW (ref 135–145)
WBC # BLD: 8.43 K/UL — SIGNIFICANT CHANGE UP (ref 3.8–10.5)
WBC # FLD AUTO: 8.43 K/UL — SIGNIFICANT CHANGE UP (ref 3.8–10.5)

## 2019-06-18 PROCEDURE — 36415 COLL VENOUS BLD VENIPUNCTURE: CPT

## 2019-06-18 PROCEDURE — 85610 PROTHROMBIN TIME: CPT

## 2019-06-18 PROCEDURE — 93971 EXTREMITY STUDY: CPT | Mod: 26,LT

## 2019-06-18 PROCEDURE — 99284 EMERGENCY DEPT VISIT MOD MDM: CPT

## 2019-06-18 PROCEDURE — 72125 CT NECK SPINE W/O DYE: CPT

## 2019-06-18 PROCEDURE — 72192 CT PELVIS W/O DYE: CPT

## 2019-06-18 PROCEDURE — 93971 EXTREMITY STUDY: CPT

## 2019-06-18 PROCEDURE — 99223 1ST HOSP IP/OBS HIGH 75: CPT | Mod: AI

## 2019-06-18 PROCEDURE — 99285 EMERGENCY DEPT VISIT HI MDM: CPT

## 2019-06-18 PROCEDURE — 72131 CT LUMBAR SPINE W/O DYE: CPT | Mod: 26

## 2019-06-18 PROCEDURE — 85730 THROMBOPLASTIN TIME PARTIAL: CPT

## 2019-06-18 PROCEDURE — 72131 CT LUMBAR SPINE W/O DYE: CPT

## 2019-06-18 PROCEDURE — 85027 COMPLETE CBC AUTOMATED: CPT

## 2019-06-18 PROCEDURE — 80053 COMPREHEN METABOLIC PANEL: CPT

## 2019-06-18 PROCEDURE — 72192 CT PELVIS W/O DYE: CPT | Mod: 26

## 2019-06-18 PROCEDURE — 99285 EMERGENCY DEPT VISIT HI MDM: CPT | Mod: 25

## 2019-06-18 PROCEDURE — 72125 CT NECK SPINE W/O DYE: CPT | Mod: 26

## 2019-06-18 RX ORDER — OXYCODONE HYDROCHLORIDE 5 MG/1
5 TABLET ORAL ONCE
Refills: 0 | Status: DISCONTINUED | OUTPATIENT
Start: 2019-06-18 | End: 2019-06-18

## 2019-06-18 RX ORDER — ALPRAZOLAM 0.25 MG
0.25 TABLET ORAL ONCE
Refills: 0 | Status: DISCONTINUED | OUTPATIENT
Start: 2019-06-18 | End: 2019-06-18

## 2019-06-18 RX ADMIN — Medication 0.25 MILLIGRAM(S): at 22:42

## 2019-06-18 RX ADMIN — OXYCODONE HYDROCHLORIDE 5 MILLIGRAM(S): 5 TABLET ORAL at 20:09

## 2019-06-18 RX ADMIN — OXYCODONE HYDROCHLORIDE 5 MILLIGRAM(S): 5 TABLET ORAL at 19:39

## 2019-06-18 NOTE — ED PROVIDER NOTE - PROGRESS NOTE DETAILS
ZORAIDA radiology Dr Rodriguez, regarding findings on CT pelvis.  Wrote the findings were on the Right, all findings are on the left side. He will change the results in the computer to the left side which is oscitant with her bone graft side.    US ng for DVT. CT's no acute findings. Pt unable to bear weight on her left side.   ZORAIDA Adrian who did the surgery, recommending pt be transferred to hospitalist over at [facility] where he will get neuro/ortho on board and possible additional imaging.    ZORAIDA cherry, will transfer patient at this time.   Pending call back from transfer center. Dr Sofie Carolina at [facility] accepted transfer to ED as transfer team unable to reach hospitalist for direct transfer. .

## 2019-06-18 NOTE — H&P ADULT - PROBLEM SELECTOR PLAN 5
IMPROVE VTE Individual Risk Assessment          RISK                                                          Points    [  ] Previous VTE                                                3  [  ] Thrombophilia                                             2  [  ] Lower limb paralysis                                    2        (unable to hold up >15 seconds)    [  ] Current Cancer                                             2         (within 6 months)  [ x ] Immobilization > 24 hrs (expected for pain)    1  [  ] ICU/CCU stay > 24 hours                            1  [ x ] Age > 60                                                    1    IMPROVE VTE Score 2.    **IMPROVE score of 2 in addition to the other risk factors not included in this scoring system, started on LMWH 40 mg sub Q daily for DVT prophylaxis.

## 2019-06-18 NOTE — ED ADULT NURSE NOTE - CHIEF COMPLAINT QUOTE
"I had cervical fusion by Dr. Adrian here at [facility] on 6/13/19. I have increased pain to my neck and low back and my arms and hands are still numb and I have been unable to bear weight since last night." Patient transferred her from [facility].

## 2019-06-18 NOTE — H&P ADULT - HISTORY OF PRESENT ILLNESS
This is a 68 y/o F with PMH of HTN, Dyslipidemia, Spinal stenosis, OA, GERD, and Obesity who was transferred from [facility] for intractable left hip & thigh pain. Patient had a cervical spinal fusion on Thursday, 6 days ago, was feeling better until last night when she found herself unable to weight bear because of severe left hip/groin pain when she stands, called her surgeon who recommended her to go to the ED, was seen at Health system ED, was transferee to [facility]. Patient also reports numbness in her both arms that she was having prior to the surgery, and pain in her upper back that she can't lye down. Denies numbness in lower extremities also denies any bladder or bowel control problems. This is a 70 y/o F with PMH of HTN, Dyslipidemia, Spinal stenosis, OA, GERD, and Obesity who was transferred from [facility] for intractable left hip pain. Patient had a cervical spinal fusion on Thursday, 6 days ago, was feeling better until last night when she found herself unable to weight bear because of severe left hip/groin pain when she stands, called her surgeon who recommended her to go to the ED, was seen at [facility] ED, was transferee to [facility]. Patient also reports numbness in her both arms that she was having prior to the surgery, and pain in her upper back that she can't lye down. Denies numbness in lower extremities also denies any bladder or bowel control problems.

## 2019-06-18 NOTE — H&P ADULT - PROBLEM SELECTOR PLAN 1
left hip & left groin, possibly related to the bone graft site, admitted to the hospital, pain control, orthopedics surgeon Dr. Adrian will see her in am.

## 2019-06-18 NOTE — ED ADULT TRIAGE NOTE - CHIEF COMPLAINT QUOTE
pt presents via ems for evaluation of left leg pain, numbness and difficulty weight-bearing since last night. pt reports having cervical fusion last thursday. denies any additional symptoms. PMHX: htn

## 2019-06-18 NOTE — ED ADULT NURSE NOTE - NSIMPLEMENTINTERV_GEN_ALL_ED
Implemented All Fall with Harm Risk Interventions:  Junior to call system. Call bell, personal items and telephone within reach. Instruct patient to call for assistance. Room bathroom lighting operational. Non-slip footwear when patient is off stretcher. Physically safe environment: no spills, clutter or unnecessary equipment. Stretcher in lowest position, wheels locked, appropriate side rails in place. Provide visual cue, wrist band, yellow gown, etc. Monitor gait and stability. Monitor for mental status changes and reorient to person, place, and time. Review medications for side effects contributing to fall risk. Reinforce activity limits and safety measures with patient and family. Provide visual clues: red socks.

## 2019-06-18 NOTE — ED ADULT NURSE NOTE - OBJECTIVE STATEMENT
pt reports she is s/p cervical spine fusion last thursday. she was home last night and was unable to bear weight on either leg. she was in touch with her surgeon who told her to go to the nearest ED. she was seen and examined @ [place] ED and transferred here

## 2019-06-18 NOTE — ED PROVIDER NOTE - CARE PLAN
Principal Discharge DX:	Left leg pain Principal Discharge DX:	Left leg pain  Assessment and plan of treatment:	You have accepted to be transferred to [facility] for continuation of care

## 2019-06-18 NOTE — ED PROVIDER NOTE - CLINICAL SUMMARY MEDICAL DECISION MAKING FREE TEXT BOX
pt with recent surgery, with left groin pain. Work up at other hospital negative and sent here TBA for further testing.

## 2019-06-18 NOTE — ED ADULT NURSE NOTE - INTEGUMENTARY WDL
Color consistent with ethnicity/race, warm, dry intact, resilient. saline lock in place to right A/C

## 2019-06-18 NOTE — ED ADULT NURSE NOTE - OBJECTIVE STATEMENT
Pt was BIBEMS for chief complaint of Difficulty baring weigh to Left Leg. Pt is s/p Cervical Spinal fusion for hx of spinal stenosis, surgery took place at [facility] 6/13/19. Pt notes that "A piece of bone was taken from my left hip and used for my neck surgery". Left hip surgical site clean dry intact, no purulent  drainage noted, no erythema or edema noted, dissipating hematoma noted superior to surgical site. Pt remains in C-Spine Collar, denies numbness, tingling, loss of sensation, loss of strength. All extremities mobile without limitation

## 2019-06-18 NOTE — H&P ADULT - ASSESSMENT
68 y/o F with PMH of HTN, Dyslipidemia, Spinal stenosis, OA, GERD, and Obesity transferred from [facility] for intractable left hip pain.

## 2019-06-18 NOTE — ED ADULT NURSE REASSESSMENT NOTE - NS ED NURSE REASSESS COMMENT FT1
Attempted to assist pt with ambulation to use restroom, pt unable to take more then 2 steps. Pt is unable to bear weight on LLE and complains of weakness and pain that radiated to left hip while standing and applying minimal weight to LLE. DUNIA Shepard at bedside and aware. Pt assisted back into stretched and place in comfortable position. Sidle rails up, call bell within reach,  at bedside, patient safety maintained, no s/s of distress noted at this time.

## 2019-06-18 NOTE — H&P ADULT - NSHPLABSRESULTS_GEN_ALL_CORE
-                          12.8   8.43  )-----------( 294      ( 18 Jun 2019 19:30 )             37.9            06-18    131<L>  |  94<L>  |  17  ----------------------------<  123<H>  4.4   |  29  |  0.74    Ca    9.8      18 Jun 2019 19:30    TPro  7.7  /  Alb  3.3  /  TBili  0.5  /  DBili  x   /  AST  28  /  ALT  27  /  AlkPhos  76  06-18        PT/INR - ( 18 Jun 2019 19:30 )   PT: 11.4 sec;   INR: 1.02 ratio        PTT - ( 18 Jun 2019 19:30 )  PTT:30.3 sec          CT OF THE BONY PELVIS WITHOUT CONTRAST.    CLINICAL INFORMATION: Recent cervical fusion with bone graft from the leg.  FINDINGS:  There is partially imaged advanced lower lumbar degenerative disc disease  and facet arthrosis. Please refer to the lumbar spine CT report performed   on  the same date. The graft there is mild bilateral hip osteoarthrosis with   mild joint space narrowing and small osteophyte formation. Mild   enthesopathic changes are seen at the greater trochanters bilaterally.   There is mild degeneration at the pubic symphysis.   There is a tubular appearing lucency within the anterior aspect of the   left iliac wing in the region of the sartorius origin with adjacent soft   tissue edema and intramuscular edema within the adjacent gluteus minimus   and gluteus medius muscles. There are punctate regions of adjacent soft   tissue mineralization. Adjacent subcutaneous soft tissue stranding/edema   is present as well. Correlation fora site of recent bone biopsy or bone   grafting is recommended as this does not have the typical appearance of   an acute avulsion injury of the iliac wing.  Mild aortobiiliac vascular calcification is present.  Colonic diverticulosis is present without diverticulitis.  IMPRESSION:  Tubular appearing lucency within the anterior aspect of the left iliac   wing in the region of the sartorius origin with adjacent soft tissue   edema and intramuscular edema within the adjacent gluteus minimus and   gluteus medius muscles. There are punctate regions of adjacent soft   tissue mineralization. Adjacent subcutaneous soft tissue stranding/edema   is present as well. Correlation for a site of recent bone biopsy or bone   grafting is recommended as this does not have the typical appearance of   an acute avulsion injury of the iliac wing        CT lumbar spine without contrast  Scoliosis and spondylosis without acute findings       CT CERVICAL SPINE    Postsurgical changes with residual spinal stenosis and underlying   spondylosis without acute findings       US DPLX LWR EXT VEINS LTD LT    No evidence of left lower extremity deep venous thrombosis.

## 2019-06-18 NOTE — ED PROVIDER NOTE - ATTENDING CONTRIBUTION TO CARE
[place] with DUNIA Ramirez. Pt here with c/o left lower ext/groin pain with weight bearing . Had recent cervical spinal fusion 1 week ago. Sent by surgeon with request for Lower extremity doppler to ro dvt, ct c spine, l/s spine and pelvis.  Pain controlled, took 2 oxycodone prior to visit. Pain worst only with weight bearance. Pt states that the bone graft was taken from the left side pelvis. However the CT reads right sided. Tried contacting Surgeon and Reading Radiologist to verify. I performed a face to face bedside interview with patient regarding history of present illness, review of symptoms and past medical history. I completed an independent physical exam.  I have discussed the patient's plan of care with Physician Assistant (PA). I agree with note as stated above, having amended the EMR as needed to reflect my findings.   This includes History of Present Illness, HIV, Past Medical/Surgical/Family/Social History, Allergies and Home Medications, Review of Systems, Physical Exam, and any Progress Notes during the time I functioned as the attending physician for this patient. Eval with DUNIA Ramirez. Pt here with c/o left lower ext/groin pain with weight bearing . Had recent cervical spinal fusion 1 week ago. Sent by surgeon with request for Lower extremity doppler to ro dvt, ct c spine, l/s spine and pelvis.  Pain controlled, took 2 oxycodone prior to visit. Pain worst only with weight bearance. Pt states that the bone graft was taken from the left side pelvis. However the CT reads right sided. Tried contacting Surgeon and Reading Radiologist to verify. Radiologist placed addendum with correction as it is LEFT side. Dr Adrian wants pt at [place] for MRI, neuro, and he will reassess and carrythrough. Transfer center contacted. DUNIA Ramirez will contact and Dr Stockton will receive signout as we are pending labs. I performed a face to face bedside interview with patient regarding history of present illness, review of symptoms and past medical history. I completed an independent physical exam.  I have discussed the patient's plan of care with Physician Assistant (PA). I agree with note as stated above, having amended the EMR as needed to reflect my findings.   This includes History of Present Illness, HIV, Past Medical/Surgical/Family/Social History, Allergies and Home Medications, Review of Systems, Physical Exam, and any Progress Notes during the time I functioned as the attending physician for this patient.

## 2019-06-18 NOTE — ED PROVIDER NOTE - NS_ ATTENDINGSCRIBEDETAILS _ED_A_ED_FT
Norbert Carolina MD - The scribe's documentation has been prepared under my direction and personally reviewed by me in its entirety. I confirm that the note above accurately reflects all work, treatment, procedures, and medical decision making performed by me.

## 2019-06-18 NOTE — H&P ADULT - NSICDXFAMILYHX_GEN_ALL_CORE_FT
FAMILY HISTORY:  FH: type 2 diabetes    Grandparent  Still living? Unknown  FH: breast cancer, Age at diagnosis: Age Unknown

## 2019-06-18 NOTE — H&P ADULT - NSHPREVIEWOFSYSTEMS_GEN_ALL_CORE
-    CONSTITUTIONAL: No fever, weight loss, or fatigue.  EYES: No eye pain, visual disturbances, or discharge.  ENMT:  No difficulty hearing, tinnitus, vertigo; No sinus or throat pain.  NECK: No pain or stiffness.	  RESPIRATORY: No cough, wheezing, or hemoptysis; No shortness of breath.  CARDIOVASCULAR: No chest pain, palpitations, dizziness, or leg swelling.  GASTROINTESTINAL: No abdominal pain, no nausea, vomiting, or hematemesis; No diarrhea or Change in bowel habits. No melena or hematochezia.  GENITOURINARY: No dysuria, frequency, hematuria, or incontinence.  NEUROLOGICAL: No headaches, no focal muscle weakness, (+) numbness both arms, no tremors.  SKIN: No itching, burning or rashes.  MUSCULOSKELETAL: No joint swelling or pain.  PSYCHIATRIC: No depression, anxiety, or agitation.  HEME/LYMPH: No easy bruising, bleeding gums, or nose bleed.  ALLERGY AND IMMUNOLOGIC: No hives or eczema.

## 2019-06-18 NOTE — ED ADULT TRIAGE NOTE - CHIEF COMPLAINT QUOTE
"I had cervical fusion by Dr. Adrian here at [place] on 6/13/19. I have increased pain to my neck and low back and my arms and hands are still numb and I have been unable to bear weight since last night." Patient transferred her from [facility].

## 2019-06-18 NOTE — ED ADULT NURSE REASSESSMENT NOTE - NS ED NURSE REASSESS COMMENT FT1
Received report from Vianey COTTO. Pt A&Ox3. Pt wearing spinal brace. Pt c/o neck pain 7/10. Ashley DUQUE notified, ordered Oxycodone 10mg & given. IV inserted into right AC, blood drawn & sent to lab. Pt is with family & eating dinner. Pt pending status on transfer. Safety maintained. Will continue to monitor. Received report from Celeste VANEGAS RN. Pt A&Ox3. Pt wearing spinal brace. Pt c/o neck pain 7/10. Ashley DUQUE notified, ordered Oxycodone 10mg & given. IV inserted into right AC, blood drawn & sent to lab. Pt is with family & eating dinner. Pt pending status on transfer. Safety maintained. Will continue to monitor.

## 2019-06-18 NOTE — ED PROVIDER NOTE - CLINICAL SUMMARY MEDICAL DECISION MAKING FREE TEXT BOX
Lt lower ext groin pain with weight bearing, recent cerivical spinal fusion 1 week ago, Sent by sg for le doppler to ro dvt, ct c spine, l/s spine and pelvis.  Pain controlled, took 2 oxycodone prior to visit.

## 2019-06-18 NOTE — ED PROVIDER NOTE - OBJECTIVE STATEMENT
68 yo female, pmh htn, high cholesterol, Gerd, spinal stenosis, recent cervical spinal fusion 1 week ago @ [facility] w/ Dr. Adrian. , presents to the ER co left lower extremity pain since yesterday, located  in the groin, which is present with weight bearing only.  Denies any numbness, tingling or weakness to the extremities, swelling, or pain, fevers or chills,  or pain to any other part of the LE, c pains, sob or other complaints .   Took 2 oxycodone prior to ER visit so pain controlled.  Dr Adrian called, requesting LE doppler, CT c-spine, L/S spine and pelvis. 68 yo female, pmh htn, high cholesterol, Gerd, spinal stenosis, recent cervical spinal fusion 1 week ago @ [facility] w/ Dr. Adrian. , presents to the ER co left lower extremity pain since yesterday, located  in the groin, which is present with weight bearing only.  Denies any numbness, tingling or weakness to the extremities, swelling, or pain, fevers or chills,  or pain to any other part of the LE, c pains, sob or other complaints .   Took 2 oxycodone prior to ER visit so pain controlled.  Dr Adrian called, requesting LE doppler, CT c-spine, L/S spine and pelvis.  Bone graft was done on left

## 2019-06-18 NOTE — H&P ADULT - NSHPPHYSICALEXAM_GEN_ALL_CORE
-    Vital Signs Last 24 Hrs  T(C): 36.8 (18 Jun 2019 22:00), Max: 37 (18 Jun 2019 14:30)  T(F): 98.3 (18 Jun 2019 22:00), Max: 98.6 (18 Jun 2019 14:30)  HR: 84 (18 Jun 2019 22:00) (84 - 94)  BP: 123/77 (18 Jun 2019 22:00) (114/72 - 162/95)  BP(mean): --  RR: 16 (18 Jun 2019 22:00) (16 - 18)  SpO2: 96% (18 Jun 2019 22:00) (95% - 98%)          PHYSICAL EXAM:  	  GENERAL: NAD, well-groomed, well-developed, obese.  HEAD:  Atraumatic, Norm cephalic.  EYES: PERRLA, conjunctiva clear.  ENMT: no nasal discharge, no dane-pharyngeal erythema or exudates, MMM.   NECK: Supple, No JVD, (+) surgical incision.  NERVOUS SYSTEM:  Alert & oriented X3, doesn't cooperate for a neuro exam because of pain, (stating that too many people already examined her).  CHEST/LUNG: Fair air entry B/L, no rales, rhonchi, or wheezing.  HEART: Normal S1 & S2, grade I/VI TOYA over 1st aortic area, no propagation, no extra sounds.  ABDOMEN: Soft, obese, non-tender, non-distended; bowel sounds present, no palpable masses or organomegaly.  EXTREMITIES:  No clubbing, cyanosis, or edema.  VASCULAR: 2+ radial, brachial pulses B/L.  SKIN: No rashes or lesions.  PSYCH: normal affect & behavior.

## 2019-06-18 NOTE — ED ADULT NURSE NOTE - FAMILY HISTORY
Grandparent  Still living? Unknown  FH: breast cancer, Age at diagnosis: Age Unknown     Father  Still living? Unknown  FH: type 2 diabetes, Age at diagnosis: Age Unknown

## 2019-06-19 DIAGNOSIS — Z29.9 ENCOUNTER FOR PROPHYLACTIC MEASURES, UNSPECIFIED: ICD-10-CM

## 2019-06-19 DIAGNOSIS — E78.5 HYPERLIPIDEMIA, UNSPECIFIED: ICD-10-CM

## 2019-06-19 DIAGNOSIS — K21.9 GASTRO-ESOPHAGEAL REFLUX DISEASE WITHOUT ESOPHAGITIS: ICD-10-CM

## 2019-06-19 DIAGNOSIS — I10 ESSENTIAL (PRIMARY) HYPERTENSION: ICD-10-CM

## 2019-06-19 DIAGNOSIS — E87.1 HYPO-OSMOLALITY AND HYPONATREMIA: ICD-10-CM

## 2019-06-19 DIAGNOSIS — R52 PAIN, UNSPECIFIED: ICD-10-CM

## 2019-06-19 LAB
ANION GAP SERPL CALC-SCNC: 2 MMOL/L — LOW (ref 5–17)
BUN SERPL-MCNC: 16 MG/DL — SIGNIFICANT CHANGE UP (ref 7–23)
CALCIUM SERPL-MCNC: 9.5 MG/DL — SIGNIFICANT CHANGE UP (ref 8.4–10.5)
CHLORIDE SERPL-SCNC: 95 MMOL/L — LOW (ref 96–108)
CO2 SERPL-SCNC: 32 MMOL/L — HIGH (ref 22–31)
CREAT SERPL-MCNC: 0.64 MG/DL — SIGNIFICANT CHANGE UP (ref 0.5–1.3)
GLUCOSE SERPL-MCNC: 120 MG/DL — HIGH (ref 70–99)
HCV AB S/CO SERPL IA: 0.3 S/CO — SIGNIFICANT CHANGE UP (ref 0–0.99)
HCV AB SERPL-IMP: SIGNIFICANT CHANGE UP
POTASSIUM SERPL-MCNC: 3.7 MMOL/L — SIGNIFICANT CHANGE UP (ref 3.5–5.3)
POTASSIUM SERPL-SCNC: 3.7 MMOL/L — SIGNIFICANT CHANGE UP (ref 3.5–5.3)
SODIUM SERPL-SCNC: 129 MMOL/L — LOW (ref 135–145)

## 2019-06-19 PROCEDURE — 99233 SBSQ HOSP IP/OBS HIGH 50: CPT

## 2019-06-19 RX ORDER — OXYCODONE HYDROCHLORIDE 5 MG/1
10 TABLET ORAL EVERY 4 HOURS
Refills: 0 | Status: DISCONTINUED | OUTPATIENT
Start: 2019-06-19 | End: 2019-06-21

## 2019-06-19 RX ORDER — HYDROMORPHONE HYDROCHLORIDE 2 MG/ML
0.5 INJECTION INTRAMUSCULAR; INTRAVENOUS; SUBCUTANEOUS
Refills: 0 | Status: DISCONTINUED | OUTPATIENT
Start: 2019-06-19 | End: 2019-06-21

## 2019-06-19 RX ORDER — LOSARTAN POTASSIUM 100 MG/1
100 TABLET, FILM COATED ORAL DAILY
Refills: 0 | Status: DISCONTINUED | OUTPATIENT
Start: 2019-06-19 | End: 2019-06-21

## 2019-06-19 RX ORDER — PANTOPRAZOLE SODIUM 20 MG/1
40 TABLET, DELAYED RELEASE ORAL
Refills: 0 | Status: DISCONTINUED | OUTPATIENT
Start: 2019-06-19 | End: 2019-06-21

## 2019-06-19 RX ORDER — DOCUSATE SODIUM 100 MG
100 CAPSULE ORAL THREE TIMES A DAY
Refills: 0 | Status: DISCONTINUED | OUTPATIENT
Start: 2019-06-19 | End: 2019-06-21

## 2019-06-19 RX ORDER — OXYCODONE HYDROCHLORIDE 5 MG/1
5 TABLET ORAL EVERY 4 HOURS
Refills: 0 | Status: DISCONTINUED | OUTPATIENT
Start: 2019-06-19 | End: 2019-06-21

## 2019-06-19 RX ORDER — ATORVASTATIN CALCIUM 80 MG/1
10 TABLET, FILM COATED ORAL AT BEDTIME
Refills: 0 | Status: DISCONTINUED | OUTPATIENT
Start: 2019-06-19 | End: 2019-06-21

## 2019-06-19 RX ORDER — GABAPENTIN 400 MG/1
300 CAPSULE ORAL AT BEDTIME
Refills: 0 | Status: DISCONTINUED | OUTPATIENT
Start: 2019-06-19 | End: 2019-06-21

## 2019-06-19 RX ORDER — ENOXAPARIN SODIUM 100 MG/ML
40 INJECTION SUBCUTANEOUS DAILY
Refills: 0 | Status: DISCONTINUED | OUTPATIENT
Start: 2019-06-19 | End: 2019-06-21

## 2019-06-19 RX ORDER — HYDROCHLOROTHIAZIDE 25 MG
25 TABLET ORAL DAILY
Refills: 0 | Status: DISCONTINUED | OUTPATIENT
Start: 2019-06-19 | End: 2019-06-19

## 2019-06-19 RX ORDER — LANOLIN ALCOHOL/MO/W.PET/CERES
5 CREAM (GRAM) TOPICAL AT BEDTIME
Refills: 0 | Status: DISCONTINUED | OUTPATIENT
Start: 2019-06-19 | End: 2019-06-21

## 2019-06-19 RX ADMIN — HYDROMORPHONE HYDROCHLORIDE 0.5 MILLIGRAM(S): 2 INJECTION INTRAMUSCULAR; INTRAVENOUS; SUBCUTANEOUS at 02:01

## 2019-06-19 RX ADMIN — HYDROMORPHONE HYDROCHLORIDE 0.5 MILLIGRAM(S): 2 INJECTION INTRAMUSCULAR; INTRAVENOUS; SUBCUTANEOUS at 14:26

## 2019-06-19 RX ADMIN — OXYCODONE HYDROCHLORIDE 10 MILLIGRAM(S): 5 TABLET ORAL at 06:18

## 2019-06-19 RX ADMIN — HYDROMORPHONE HYDROCHLORIDE 0.5 MILLIGRAM(S): 2 INJECTION INTRAMUSCULAR; INTRAVENOUS; SUBCUTANEOUS at 10:30

## 2019-06-19 RX ADMIN — Medication 25 MILLIGRAM(S): at 05:50

## 2019-06-19 RX ADMIN — HYDROMORPHONE HYDROCHLORIDE 0.5 MILLIGRAM(S): 2 INJECTION INTRAMUSCULAR; INTRAVENOUS; SUBCUTANEOUS at 14:57

## 2019-06-19 RX ADMIN — LOSARTAN POTASSIUM 100 MILLIGRAM(S): 100 TABLET, FILM COATED ORAL at 05:50

## 2019-06-19 RX ADMIN — HYDROMORPHONE HYDROCHLORIDE 0.5 MILLIGRAM(S): 2 INJECTION INTRAMUSCULAR; INTRAVENOUS; SUBCUTANEOUS at 22:03

## 2019-06-19 RX ADMIN — HYDROMORPHONE HYDROCHLORIDE 0.5 MILLIGRAM(S): 2 INJECTION INTRAMUSCULAR; INTRAVENOUS; SUBCUTANEOUS at 09:58

## 2019-06-19 RX ADMIN — HYDROMORPHONE HYDROCHLORIDE 0.5 MILLIGRAM(S): 2 INJECTION INTRAMUSCULAR; INTRAVENOUS; SUBCUTANEOUS at 21:45

## 2019-06-19 RX ADMIN — ATORVASTATIN CALCIUM 10 MILLIGRAM(S): 80 TABLET, FILM COATED ORAL at 21:40

## 2019-06-19 RX ADMIN — Medication 100 MILLIGRAM(S): at 13:26

## 2019-06-19 RX ADMIN — Medication 100 MILLIGRAM(S): at 05:50

## 2019-06-19 RX ADMIN — Medication 5 MILLIGRAM(S): at 21:40

## 2019-06-19 RX ADMIN — Medication 100 MILLIGRAM(S): at 21:40

## 2019-06-19 RX ADMIN — OXYCODONE HYDROCHLORIDE 10 MILLIGRAM(S): 5 TABLET ORAL at 05:48

## 2019-06-19 RX ADMIN — GABAPENTIN 300 MILLIGRAM(S): 400 CAPSULE ORAL at 21:40

## 2019-06-19 RX ADMIN — ENOXAPARIN SODIUM 40 MILLIGRAM(S): 100 INJECTION SUBCUTANEOUS at 11:32

## 2019-06-19 RX ADMIN — HYDROMORPHONE HYDROCHLORIDE 0.5 MILLIGRAM(S): 2 INJECTION INTRAMUSCULAR; INTRAVENOUS; SUBCUTANEOUS at 01:31

## 2019-06-19 RX ADMIN — Medication 24 MILLIGRAM(S): at 09:53

## 2019-06-19 RX ADMIN — PANTOPRAZOLE SODIUM 40 MILLIGRAM(S): 20 TABLET, DELAYED RELEASE ORAL at 05:50

## 2019-06-19 NOTE — CONSULT NOTE ADULT - SUBJECTIVE AND OBJECTIVE BOX
Patient is a 69y old  Female who presents with a chief complaint of Severe left groin pain. (2019 10:04)      HPI: This is a 68 y/o F with PMH of HTN, Dyslipidemia, Spinal stenosis, OA, GERD, and Obesity who was transferred from [facility] for intractable left hip pain. Patient had a cervical spinal fusion on Thursday, 6 days ago, was feeling better until last night when she found herself unable to weight bear because of severe left hip/groin pain when she stands, called her surgeon who recommended her to go to the ED, was seen at [facility] ED, was transferee to [facility]. Patient also reports numbness in her both arms that she was having prior to the surgery, and pain in her upper back that she can't lye down. Denies numbness in lower extremities also denies any bladder or bowel control problems. (2019 23:46)  No fall or LOC.  No bladder or bowel dysfunction.  Pt does have h/o chr Low back pain.    PAST MEDICAL & SURGICAL HISTORY:  Compressed cervical disc: c4-c5  GERD (gastroesophageal reflux disease)  Hyperlipidemia  HTN (hypertension)  Diverticulitis  Chronic back pain  H/O: hysterectomy: partial   H/O: : x ,    MEDICATIONS  (STANDING):  atorvastatin 10 milliGRAM(s) Oral at bedtime  docusate sodium 100 milliGRAM(s) Oral three times a day  enoxaparin Injectable 40 milliGRAM(s) SubCutaneous daily  gabapentin 300 milliGRAM(s) Oral at bedtime  losartan 100 milliGRAM(s) Oral daily  methylPREDNISolone   Oral   pantoprazole    Tablet 40 milliGRAM(s) Oral before breakfast    MEDICATIONS  (PRN):  HYDROmorphone  Injectable 0.5 milliGRAM(s) IV Push every 3 hours PRN Severe Pain (7 - 10)  melatonin 5 milliGRAM(s) Oral at bedtime PRN Insomnia  oxyCODONE    IR 10 milliGRAM(s) Oral every 4 hours PRN Moderate Pain (4 - 6)  oxyCODONE    IR 5 milliGRAM(s) Oral every 4 hours PRN Mild Pain (1 - 3)    Allergies    No Known Allergies    SOCIAL HISTORY:    Pt smokes about 4 cig/day  Pt drinks 2 glasses of wine daily          FAMILY HISTORY:  FH: breast cancer (Grandparent)  FH: type 2 diabetes      REVIEW OF SYSTEMS:    CONSTITUTIONAL: No fever  EYES: No eye pain,   ENMT:  No sinus or throat pain  NECK: No pain or stiffness  RESPIRATORY: No cough, No hemoptysis; No shortness of breath  CARDIOVASCULAR: No acute chest pain, palpitations,  or leg swelling  GASTROINTESTINAL: No abdominal pain. No nausea, vomiting, or hematemesis;  No melena or hematochezia.  GENITOURINARY: No  hematuria, or incontinence  MUSCULOSKELETAL: Had C Spine Sx a week ago. C/o left groin pain  SKIN: No itching, rashes, or lesions   LYMPH NODES: No enlarged glands  NEUROLOGICAL: No headaches, memory loss,   PSYCHIATRIC: No depression, anxiety, mood swings, or difficulty sleeping  ENDOCRINE: No heat or cold intolerance;   HEME/LYMPH: No easy bruising, or bleeding gums  Allergy/Immunology. No medication allergy. No seasonal allergies.    PHYSICAL EXAM:  Vital Signs Last 24 Hrs  T(F): 98 (19 @ 07:33)  HR: 77 (19 @ 07:33)  BP: 100/69 (19 @ 07:33)  RR: 17 (19 @ 07:33)    GENERAL: NAD, well-groomed, well-developed  HEAD:  Atraumatic, Normocephalic  EYES: EOMI, PERRLA, conjunctiva and sclera clear  NECK: Supple, No JVD, thyroid non-palpable    On Neurological Examination:    Mental Status - Pt is alert, awake, oriented X3. Higher functions are intact. Follows commands well and able to answer questions appropriately.    Speech -  Normal. Pt has no aphasia.    Cranial Nerves - Pupils 3 mm equal and reactive to light, extraocular eye movements intact. Pt has no visual field deficit. Pt has no facial asymmetry. Tongue - is in midline.    Motor Exam - 4 plus/5 all over, No drift. No shaking or tremors. Muscle tone - is normal all over. Moves all extremities equally. No asymmetry is seen.      Sensory Exam - Pt withdraws all extremities equally on stimulation. No asymmetry seen. No complaints of tingling, numbness.    Gait - Couldn't be tested sec to left groin pain, which only occurs with weight bearing.     Deep tendon Reflexes - 2 plus all over.    Coordination - Fine finger movements are normal on both sides. Finger to nose is also normal on both sides.      Neck Supple -  Yes.    LABS:                        12.8   8.43  )-----------( 294      ( 2019 19:30 )             37.9     -    129<L>  |  95<L>  |  16  ----------------------------<  120<H>  3.7   |  32<H>  |  0.64    Ca    9.5      2019 05:58    TPro  7.7  /  Alb  3.3  /  TBili  0.5  /  DBili  x   /  AST  28  /  ALT  27  /  AlkPhos  76  06    PT/INR - ( 2019 19:30 )   PT: 11.4 sec;   INR: 1.02 ratio      PTT - ( 2019 19:30 )  PTT:30.3 sec    RADIOLOGY & ADDITIONAL STUDIES:    < from: CT Pelvis Bony Only No Cont (19 @ 15:59) >    Tubular appearing lucency within the anterior aspect of the left iliac wing in the region of the sartorius origin with adjacent soft tissue edema and intramuscular edema within the adjacent gluteus minimus and gluteus medius muscles. There are punctate regions of adjacent soft tissue mineralization. Adjacent subcutaneous soft tissue stranding/edema is present as well.    < end of copied text >      < from: CT Lumbar Spine No Cont (19 @ 15:59) >  IMPRESSION:    Scoliosis and spondylosis without acute findings      < end of copied text >

## 2019-06-19 NOTE — PROGRESS NOTE ADULT - SUBJECTIVE AND OBJECTIVE BOX
SUBJECTIVE: Patient seen and examined. Resting comfortably in bed. No c/o pain at rest.  Pain (0-10):       OBJECTIVE:     Vital Signs Last 24 Hrs  T(C): 36.7 (19 Jun 2019 07:33), Max: 37 (18 Jun 2019 14:30)  T(F): 98 (19 Jun 2019 07:33), Max: 98.6 (18 Jun 2019 14:30)  HR: 77 (19 Jun 2019 07:33) (77 - 94)  BP: 100/69 (19 Jun 2019 07:33) (100/69 - 162/95)  RR: 17 (19 Jun 2019 07:33) (16 - 18)  SpO2: 97% (19 Jun 2019 07:33) (95% - 98%)               Spine          Dressing:  clean/dry/intact           Sensation:  intact to light touch           Motor exam:           Upper extremity                 Bi(C5)  WE(C6)  EE(C7)   FF(C8)                                                R         5/5        5/5        5/5       5/5                                               L          5/5        5/5        5/5       5/5          Lower extremity             HF(L2)   KE(L3)    TA(L4)   EHL(L5)  GS(S1)                                                 R        5/5        5/5        5/5       5/5         5/5                                               L         5/5        5/5       5/5       5/5          5/5           Calves supple and NT                                              2+ xx pulses          SCDs in place             LABS:                        12.8   8.43  )-----------( 294      ( 18 Jun 2019 19:30 )             37.9     06-19    129<L>  |  95<L>  |  16  ----------------------------<  120<H>  3.7   |  32<H>  |  0.64    Ca    9.5      19 Jun 2019 05:58    TPro  7.7  /  Alb  3.3  /  TBili  0.5  /  DBili  x   /  AST  28  /  ALT  27  /  AlkPhos  76  06-18    PT/INR - ( 18 Jun 2019 19:30 )   PT: 11.4 sec;   INR: 1.02 ratio         PTT - ( 18 Jun 2019 19:30 )  PTT:30.3 sec          A/P :  69y Female, stable,  s/p                POD #   -    Discussed with Dr. Adrian who reviewed all imaging studies.  Groin pain likely a result of L2/3 stenosis in combination with positional nerve irritation. Awaiting neurology consult.  -    Pain control: add medrol dosepak, Gabapentin 300mg qhs  -    DVT ppx: SCDs    -    Encourage Incentive Spirometry  -    Physical Therapy  -    Occupational Therapy  -    Weight bearing status: WBAT  -    Will obtain MRI lumbar spine as outpatient  -    Discussed with Dr. Paulson  -    Discharge Plan: SUBJECTIVE: Patient seen and examined. Resting comfortably in bed. No c/o pain at rest.  Pain (0-10):       OBJECTIVE:     Vital Signs Last 24 Hrs  T(C): 36.7 (19 Jun 2019 07:33), Max: 37 (18 Jun 2019 14:30)  T(F): 98 (19 Jun 2019 07:33), Max: 98.6 (18 Jun 2019 14:30)  HR: 77 (19 Jun 2019 07:33) (77 - 94)  BP: 100/69 (19 Jun 2019 07:33) (100/69 - 162/95)  RR: 17 (19 Jun 2019 07:33) (16 - 18)  SpO2: 97% (19 Jun 2019 07:33) (95% - 98%)           Cervical Spine          Dressing:  clean/dry/intact           Sensation:  grossly diminished to light touch in bilateral upper extremities         Left hip/pelvis: dressing removed, steristrips in place, nontender to palpation. No pain with active or passive hip ROM, no pain with axial loading         Motor exam:           Upper extremity  grossly 5-/5 motor bilaterally          Lower extremity             HF(L2)   KE(L3)    TA(L4)   EHL(L5)  GS(S1)                                       R        5/5        5/5        5/5       5/5         5/5                                     L         5/5        5/5       5/5       5/5          5/5           Calves supple and NT                                              2+ DP pulses          SCDs in place             LABS:                        12.8   8.43  )-----------( 294      ( 18 Jun 2019 19:30 )             37.9     06-19    129<L>  |  95<L>  |  16  ----------------------------<  120<H>  3.7   |  32<H>  |  0.64    Ca    9.5      19 Jun 2019 05:58    TPro  7.7  /  Alb  3.3  /  TBili  0.5  /  DBili  x   /  AST  28  /  ALT  27  /  AlkPhos  76  06-18    PT/INR - ( 18 Jun 2019 19:30 )   PT: 11.4 sec;   INR: 1.02 ratio         PTT - ( 18 Jun 2019 19:30 )  PTT:30.3 sec          A/P :  69y Female, with sudden onset of Left groin pain 5 days s/p Anterior Cervical Discectomy & Fusion C4/5 POD # 6  -    Discussed with Dr. Adrian who reviewed all imaging studies.  Groin pain likely a result of L2/3 stenosis in combination with positional nerve irritation.   -    Pain control: add medrol dosepak, Gabapentin 300mg qhs  -    DVT ppx: SCDs    -    Encourage Incentive Spirometry  -    Physical Therapy  -    Occupational Therapy  -    Weight bearing status: WBAT  -    Will obtain MRI lumbar spine, cervical spine (with contrast), pelvis  -    Discussed with Dr. Paulson  -    Discharge Plan: pending MRI results SUBJECTIVE: Patient seen and examined. Resting comfortably in bed. No c/o pain at rest. Was transferred from [facility] last night, unable to weight bear secondary to severe left groin pain. PA at [facility] discussed with Dr. Adrian who advised patient should be transferred to [place] for further evaluation.  Pain (0-10):  9 with WB      OBJECTIVE:     Vital Signs Last 24 Hrs  T(C): 36.7 (19 Jun 2019 07:33), Max: 37 (18 Jun 2019 14:30)  T(F): 98 (19 Jun 2019 07:33), Max: 98.6 (18 Jun 2019 14:30)  HR: 77 (19 Jun 2019 07:33) (77 - 94)  BP: 100/69 (19 Jun 2019 07:33) (100/69 - 162/95)  RR: 17 (19 Jun 2019 07:33) (16 - 18)  SpO2: 97% (19 Jun 2019 07:33) (95% - 98%)           Cervical Spine          Dressing:  clean/dry/intact           Sensation:  grossly diminished to light touch in bilateral upper extremities         Left hip/pelvis: dressing removed, steristrips in place, nontender to palpation. No pain with active or passive hip ROM, no pain with axial loading         Motor exam:           Upper extremity  grossly 5-/5 motor bilaterally          Lower extremity             HF(L2)   KE(L3)    TA(L4)   EHL(L5)  GS(S1)                                       R        5/5        5/5        5/5       5/5         5/5                                     L         5/5        5/5       5/5       5/5          5/5           Calves supple and NT                                              2+ DP pulses          SCDs in place             LABS:                        12.8   8.43  )-----------( 294      ( 18 Jun 2019 19:30 )             37.9     06-19    129<L>  |  95<L>  |  16  ----------------------------<  120<H>  3.7   |  32<H>  |  0.64    Ca    9.5      19 Jun 2019 05:58    TPro  7.7  /  Alb  3.3  /  TBili  0.5  /  DBili  x   /  AST  28  /  ALT  27  /  AlkPhos  76  06-18    PT/INR - ( 18 Jun 2019 19:30 )   PT: 11.4 sec;   INR: 1.02 ratio         PTT - ( 18 Jun 2019 19:30 )  PTT:30.3 sec          A/P :  69y Female, with sudden onset of Left groin pain 5 days s/p Anterior Cervical Discectomy & Fusion C4/5 POD # 6  -    Discussed with Dr. Adrian who reviewed all imaging studies.  Groin pain likely a result of L2/3 stenosis in combination with positional nerve irritation.   -    Pain control: add medrol dosepak, Gabapentin 300mg qhs  -    DVT ppx: SCDs    -    Encourage Incentive Spirometry  -    Physical Therapy  -    Occupational Therapy  -    Weight bearing status: WBAT  -    Will obtain MRI lumbar spine, cervical spine (with contrast), pelvis  -    Discussed with Dr. Paulson  -    Discharge Plan: pending MRI results

## 2019-06-19 NOTE — PROGRESS NOTE ADULT - PROBLEM SELECTOR PLAN 6
**IMPROVE score of 2 in addition to the other risk factors not included in this scoring system, started on LMWH 40 mg sub Q daily for DVT prophylaxis.

## 2019-06-19 NOTE — CONSULT NOTE ADULT - ASSESSMENT
Admitted with left Groin pain  CT Pelvis showed - Tubular appearing lucency within the anterior aspect of the left iliac wing in the region of the sartorius origin with adjacent soft tissue edema and intramuscular edema within the adjacent gluteus minimus and gluteus medius muscles. There are punctate regions of adjacent soft tissue mineralization. Adjacent subcutaneous soft tissue stranding/edema is present as well.  Ortho F/up.  Pain meds.  Steroids.  Malignancy w/up.  PT when pain is tolerable.  Fall/safety precautions.  Counseling was done for smoking cessation and not to drink on daily basis.  D/w Pt.   Would follow.

## 2019-06-19 NOTE — PROGRESS NOTE ADULT - PROBLEM SELECTOR PLAN 1
- Left Groin   - likely musculoskeletal  - still with significant pain with standing up   - medrol dose mustapha and gabapentin added per Ortho Spine recs  - PT/OT eval and treat  - Neuro eval

## 2019-06-20 ENCOUNTER — TRANSCRIPTION ENCOUNTER (OUTPATIENT)
Age: 69
End: 2019-06-20

## 2019-06-20 ENCOUNTER — OUTPATIENT (OUTPATIENT)
Dept: OUTPATIENT SERVICES | Facility: HOSPITAL | Age: 69
LOS: 1 days | End: 2019-06-20
Payer: COMMERCIAL

## 2019-06-20 DIAGNOSIS — N17.9 ACUTE KIDNEY FAILURE, UNSPECIFIED: ICD-10-CM

## 2019-06-20 DIAGNOSIS — Z90.710 ACQUIRED ABSENCE OF BOTH CERVIX AND UTERUS: Chronic | ICD-10-CM

## 2019-06-20 DIAGNOSIS — R10.32 LEFT LOWER QUADRANT PAIN: ICD-10-CM

## 2019-06-20 DIAGNOSIS — Z98.891 HISTORY OF UTERINE SCAR FROM PREVIOUS SURGERY: Chronic | ICD-10-CM

## 2019-06-20 LAB
ANION GAP SERPL CALC-SCNC: 8 MMOL/L — SIGNIFICANT CHANGE UP (ref 5–17)
BUN SERPL-MCNC: 35 MG/DL — HIGH (ref 7–23)
CALCIUM SERPL-MCNC: 9.4 MG/DL — SIGNIFICANT CHANGE UP (ref 8.4–10.5)
CHLORIDE SERPL-SCNC: 93 MMOL/L — LOW (ref 96–108)
CO2 SERPL-SCNC: 29 MMOL/L — SIGNIFICANT CHANGE UP (ref 22–31)
CREAT SERPL-MCNC: 1.52 MG/DL — HIGH (ref 0.5–1.3)
GLUCOSE SERPL-MCNC: 120 MG/DL — HIGH (ref 70–99)
HCT VFR BLD CALC: 36.4 % — SIGNIFICANT CHANGE UP (ref 34.5–45)
HGB BLD-MCNC: 12.2 G/DL — SIGNIFICANT CHANGE UP (ref 11.5–15.5)
MCHC RBC-ENTMCNC: 29.8 PG — SIGNIFICANT CHANGE UP (ref 27–34)
MCHC RBC-ENTMCNC: 33.5 GM/DL — SIGNIFICANT CHANGE UP (ref 32–36)
MCV RBC AUTO: 89 FL — SIGNIFICANT CHANGE UP (ref 80–100)
NRBC # BLD: 0 /100 WBCS — SIGNIFICANT CHANGE UP (ref 0–0)
PLATELET # BLD AUTO: 324 K/UL — SIGNIFICANT CHANGE UP (ref 150–400)
POTASSIUM SERPL-MCNC: 4 MMOL/L — SIGNIFICANT CHANGE UP (ref 3.5–5.3)
POTASSIUM SERPL-SCNC: 4 MMOL/L — SIGNIFICANT CHANGE UP (ref 3.5–5.3)
RBC # BLD: 4.09 M/UL — SIGNIFICANT CHANGE UP (ref 3.8–5.2)
RBC # FLD: 12.2 % — SIGNIFICANT CHANGE UP (ref 10.3–14.5)
SODIUM SERPL-SCNC: 130 MMOL/L — LOW (ref 135–145)
WBC # BLD: 9 K/UL — SIGNIFICANT CHANGE UP (ref 3.8–10.5)
WBC # FLD AUTO: 9 K/UL — SIGNIFICANT CHANGE UP (ref 3.8–10.5)

## 2019-06-20 PROCEDURE — 99232 SBSQ HOSP IP/OBS MODERATE 35: CPT | Mod: 24

## 2019-06-20 PROCEDURE — 72148 MRI LUMBAR SPINE W/O DYE: CPT | Mod: 26

## 2019-06-20 PROCEDURE — 72141 MRI NECK SPINE W/O DYE: CPT | Mod: 26

## 2019-06-20 PROCEDURE — 72148 MRI LUMBAR SPINE W/O DYE: CPT

## 2019-06-20 PROCEDURE — 72141 MRI NECK SPINE W/O DYE: CPT

## 2019-06-20 PROCEDURE — 72195 MRI PELVIS W/O DYE: CPT | Mod: 26

## 2019-06-20 PROCEDURE — 72195 MRI PELVIS W/O DYE: CPT

## 2019-06-20 PROCEDURE — 99233 SBSQ HOSP IP/OBS HIGH 50: CPT

## 2019-06-20 RX ORDER — SODIUM CHLORIDE 9 MG/ML
1000 INJECTION INTRAMUSCULAR; INTRAVENOUS; SUBCUTANEOUS
Refills: 0 | Status: DISCONTINUED | OUTPATIENT
Start: 2019-06-20 | End: 2019-06-21

## 2019-06-20 RX ADMIN — ENOXAPARIN SODIUM 40 MILLIGRAM(S): 100 INJECTION SUBCUTANEOUS at 13:43

## 2019-06-20 RX ADMIN — Medication 8 MILLIGRAM(S): at 21:31

## 2019-06-20 RX ADMIN — Medication 5 MILLIGRAM(S): at 22:16

## 2019-06-20 RX ADMIN — SODIUM CHLORIDE 75 MILLILITER(S): 9 INJECTION INTRAMUSCULAR; INTRAVENOUS; SUBCUTANEOUS at 13:45

## 2019-06-20 RX ADMIN — Medication 100 MILLIGRAM(S): at 21:30

## 2019-06-20 RX ADMIN — ATORVASTATIN CALCIUM 10 MILLIGRAM(S): 80 TABLET, FILM COATED ORAL at 21:30

## 2019-06-20 RX ADMIN — GABAPENTIN 300 MILLIGRAM(S): 400 CAPSULE ORAL at 21:30

## 2019-06-20 RX ADMIN — Medication 100 MILLIGRAM(S): at 13:42

## 2019-06-20 RX ADMIN — Medication 4 MILLIGRAM(S): at 06:04

## 2019-06-20 RX ADMIN — HYDROMORPHONE HYDROCHLORIDE 0.5 MILLIGRAM(S): 2 INJECTION INTRAMUSCULAR; INTRAVENOUS; SUBCUTANEOUS at 21:30

## 2019-06-20 RX ADMIN — PANTOPRAZOLE SODIUM 40 MILLIGRAM(S): 20 TABLET, DELAYED RELEASE ORAL at 06:04

## 2019-06-20 RX ADMIN — HYDROMORPHONE HYDROCHLORIDE 0.5 MILLIGRAM(S): 2 INJECTION INTRAMUSCULAR; INTRAVENOUS; SUBCUTANEOUS at 14:45

## 2019-06-20 RX ADMIN — HYDROMORPHONE HYDROCHLORIDE 0.5 MILLIGRAM(S): 2 INJECTION INTRAMUSCULAR; INTRAVENOUS; SUBCUTANEOUS at 22:05

## 2019-06-20 RX ADMIN — HYDROMORPHONE HYDROCHLORIDE 0.5 MILLIGRAM(S): 2 INJECTION INTRAMUSCULAR; INTRAVENOUS; SUBCUTANEOUS at 14:12

## 2019-06-20 RX ADMIN — HYDROMORPHONE HYDROCHLORIDE 0.5 MILLIGRAM(S): 2 INJECTION INTRAMUSCULAR; INTRAVENOUS; SUBCUTANEOUS at 18:12

## 2019-06-20 RX ADMIN — LOSARTAN POTASSIUM 100 MILLIGRAM(S): 100 TABLET, FILM COATED ORAL at 06:03

## 2019-06-20 RX ADMIN — HYDROMORPHONE HYDROCHLORIDE 0.5 MILLIGRAM(S): 2 INJECTION INTRAMUSCULAR; INTRAVENOUS; SUBCUTANEOUS at 18:45

## 2019-06-20 RX ADMIN — Medication 4 MILLIGRAM(S): at 17:38

## 2019-06-20 RX ADMIN — Medication 100 MILLIGRAM(S): at 06:03

## 2019-06-20 RX ADMIN — Medication 4 MILLIGRAM(S): at 13:42

## 2019-06-20 NOTE — PROGRESS NOTE ADULT - SUBJECTIVE AND OBJECTIVE BOX
Neurology Follow up note    NILENIRIA 69y Female with left groin pain    HPI: This is a 70 y/o F with PMH of HTN, Dyslipidemia, Spinal stenosis, OA, GERD, and Obesity who was transferred from [facility] for intractable left hip pain. Patient had a cervical spinal fusion on Thursday, 6 days ago, was feeling better until last night when she found herself unable to weight bear because of severe left hip/groin pain when she stands, called her surgeon who recommended her to go to the ED, was seen at [facility] ED, was transferee to [facility]. Patient also reports numbness in her both arms that she was having prior to the surgery, and pain in her upper back that she can't lye down. Denies numbness in lower extremities also denies any bladder or bowel control problems. (18 Jun 2019 23:46)    Interval History -    Patient is seen, chart was reviewed and case was discussed with the treatment team.  Lying on bed.  Has hard cervical collar in place.    Vital Signs Last 24 Hrs  T(C): 36.6 (20 Jun 2019 13:25), Max: 37.1 (20 Jun 2019 09:35)  T(F): 97.8 (20 Jun 2019 13:25), Max: 98.7 (20 Jun 2019 09:35)  HR: 80 (20 Jun 2019 13:25) (67 - 80)  BP: 104/68 (20 Jun 2019 13:25) (99/63 - 138/79)  BP(mean): --  RR: 17 (20 Jun 2019 13:25) (16 - 17)  SpO2: 96% (20 Jun 2019 13:25) (94% - 96%)    MEDICATIONS    atorvastatin 10 milliGRAM(s) Oral at bedtime  docusate sodium 100 milliGRAM(s) Oral three times a day  enoxaparin Injectable 40 milliGRAM(s) SubCutaneous daily  gabapentin 300 milliGRAM(s) Oral at bedtime  HYDROmorphone  Injectable 0.5 milliGRAM(s) IV Push every 3 hours PRN  losartan 100 milliGRAM(s) Oral daily  melatonin 5 milliGRAM(s) Oral at bedtime PRN  methylPREDNISolone 8 milliGRAM(s) Oral at bedtime  methylPREDNISolone 4 milliGRAM(s) Oral before breakfast  methylPREDNISolone 4 milliGRAM(s) Oral after lunch  methylPREDNISolone 4 milliGRAM(s) Oral after dinner  methylPREDNISolone   Oral   oxyCODONE    IR 10 milliGRAM(s) Oral every 4 hours PRN  oxyCODONE    IR 5 milliGRAM(s) Oral every 4 hours PRN  pantoprazole    Tablet 40 milliGRAM(s) Oral before breakfast  sodium chloride 0.9%. 1000 milliLiter(s) IV Continuous <Continuous>    Allergies    No Known Allergies    REVIEW OF SYSTEMS:    CONSTITUTIONAL: No fever  EYES: No eye pain,   ENMT:  No sinus or throat pain  NECK: No pain or stiffness  RESPIRATORY: No cough, No hemoptysis; No shortness of breath  CARDIOVASCULAR: No acute chest pain, palpitations,  or leg swelling  GASTROINTESTINAL: No abdominal pain. No nausea, vomiting, or hematemesis;  No melena or hematochezia.  GENITOURINARY: No  hematuria, or incontinence  MUSCULOSKELETAL: Had C Spine Sx a week ago. C/o left groin pain  SKIN: No itching, rashes, or lesions   LYMPH NODES: No enlarged glands  NEUROLOGICAL: No headaches, memory loss,   PSYCHIATRIC: No depression, anxiety, mood swings, or difficulty sleeping  ENDOCRINE: No heat or cold intolerance;   HEME/LYMPH: No easy bruising, or bleeding gums  Allergy/Immunology. No medication allergy. No seasonal allergies.    PHYSICAL EXAM:    GENERAL: NAD, well-groomed, well-developed  HEAD:  Atraumatic, Normocephalic  EYES: EOMI, PERRLA, conjunctiva and sclera clear  NECK: Supple, No JVD, thyroid non-palpable    On Neurological Examination:    Mental Status - Pt is alert, awake, oriented X3. Higher functions are intact. Follows commands well and able to answer questions appropriately.    Speech -  Normal. Pt has no aphasia.    Cranial Nerves - Pupils 3 mm equal and reactive to light, extraocular eye movements intact. Pt has no visual field deficit. Pt has no facial asymmetry. Tongue - is in midline.    Motor Exam - 4 plus/5 all over, No drift. No shaking or tremors. Muscle tone - is normal all over.      Sensory Exam - Pt withdraws all extremities equally on stimulation. No asymmetry seen. No complaints of tingling, numbness.    Gait - Pt is able to walk some what. Pain is better.     Deep tendon Reflexes - 2 plus all over.    Coordination - Fine finger movements are normal on both sides. Finger to nose is also normal on both sides.      Neck Supple -  Yes.    LABS:    CBC Full  -  ( 20 Jun 2019 07:53 )  WBC Count : 9.00 K/uL  RBC Count : 4.09 M/uL  Hemoglobin : 12.2 g/dL  Hematocrit : 36.4 %  Platelet Count - Automated : 324 K/uL  Mean Cell Volume : 89.0 fl  Mean Cell Hemoglobin : 29.8 pg  Mean Cell Hemoglobin Concentration : 33.5 gm/dL    06-20    130<L>  |  93<L>  |  35<H>  ----------------------------<  120<H>  4.0   |  29  |  1.52<H>    Ca    9.4      20 Jun 2019 07:53    TPro  7.7  /  Alb  3.3  /  TBili  0.5  /  DBili  x   /  AST  28  /  ALT  27  /  AlkPhos  76  06-18    RADIOLOGY & ADDITIONAL STUDIES:    < from: MR Pelvis No Cont (06.20.19 @ 11:07) >  There is edema and questionable fluid present in the soft tissues   surrounding the left iliac wing and lower left flank subcutaneous   tissues, seen   on image 16 of series 5 and image 9 of series 7. This might be secondary   to   prior trauma however infection/osteomyelitis in the iliac wing is in the   differential diagnosis. There appears to be a small amount of bone marrow   edema-like low T1 and high T2 signal within the marrow of the anterior   left   iliac wing on image 9 of series 7. This marrow edema is not confirmed on   the   coronal T2 sequence, series 10, however. A bone scan might be of   additional   diagnostic value if thought clinically indicated. Recommend clinical   correlation for any history of trauma to this left side iliac wing or   muscle   strain in the left gluteus minimus and iliacus musculature.     < end of copied text >      < from: MR Lumbar Spine No Cont (06.20.19 @ 11:41) >  IMPRESSION: Multilevel degenerative changes from L1/L2-L5/S1 as detailed   above, most severe at L4/L5.  At L4/L5, there is a posterior disc bulge   and left greater than right facet arthropathy resulting in very severe   narrowing of the left neural foramen with compression upon the exiting   left L4 nerve root. Additional findings as above.    < end of copied text >  < from: MR Lumbar Spine No Cont (06.20.19 @ 11:41) >  IMPRESSION: Multilevel degenerative changes from L1/L2-L5/S1 as detailed   above, most severe at L4/L5.  At L4/L5, there is a posterior disc bulge   and left greater than right facet arthropathy resulting in very severe   narrowing of the left neural foramen with compression upon the exiting   left L4 nerve root. Additional findings as above.    < end of copied text >    < from: MR Cervical Spine No Cont (06.20.19 @ 11:58) >  IMPRESSION: Status post ACDF at C4 and C5 with placement of an   intervertebral disc spacer. T2 hyperintense signal within the cervical   cord at the C4/C5 level, as seen on prior exam, compatible with stable   myelomalacia. Rest of the degenerative changes as above.    < end of copied text >    < from: CT Pelvis Bony Only No Cont (06.18.19 @ 15:59) >    Tubular appearing lucency within the anterior aspect of the left iliac wing in the region of the sartorius origin with adjacent soft tissue edema and intramuscular edema within the adjacent gluteus minimus and gluteus medius muscles. There are punctate regions of adjacent soft tissue mineralization. Adjacent subcutaneous soft tissue stranding/edema is present as well.    < end of copied text >    < from: CT Lumbar Spine No Cont (06.18.19 @ 15:59) >  IMPRESSION:    Scoliosis and spondylosis without acute findings      < end of copied text >

## 2019-06-20 NOTE — PROGRESS NOTE ADULT - SUBJECTIVE AND OBJECTIVE BOX
INTERVAL HPI/OVERNIGHT EVENTS:   Patient seen and examined.  Pain in left groin has improved, but still present with ambulation.  No fevers, chills, sweats, dizziness, HA, changes in vision, cp, palpitations, sob, persistent cough, n/v/d, abd pain, dysuria, or calf pain.   Has been urinating and having BMs regularly since her c-spine surgery last Thurs.     REVIEW OF SYSTEMS:  See HPI,  all others negative    PHYSICAL EXAM:  Vital Signs Last 24 Hrs  T(C): 37.1 (2019 09:35), Max: 37.1 (2019 09:35)  T(F): 98.7 (2019 09:35), Max: 98.7 (2019 09:35)  HR: 70 (2019 09:35) (67 - 81)  BP: 99/63 (2019 09:35) (99/63 - 138/79)  BP(mean): --  RR: 17 (2019 09:35) (16 - 17)  SpO2: 94% (2019 09:35) (94% - 98%)    GENERAL: NAD, well-groomed, well-developed, awake, alert, oriented x 3, fluent and coherent speech  EYES: EOMI, PERRLA, conjunctiva and sclera clear  ENMT: No tonsillar erythema, exudates, or enlargement; Moist mucous membranes, No lesions  NECK: Supple, No JVD, No Cervical LAD, No thyromegaly, No thyroid nodules felt, patient put on c-collar before trying to get up out of bed  NERVOUS SYSTEM:  Good concentration; Moving all 4 extremities; No gross sensory deficits, No facial droop  CHEST/LUNG: Clear to auscultation bilaterally; No rales, rhonchi, wheezing, or rubs  HEART: Regular rate and rhythm; No murmurs, rubs, or gallops  ABDOMEN: Soft, Nontender, obese, Bowel sounds present, No palpable masses or organomegaly, No bruits  EXTREMITIES:  2+ Peripheral Pulses, No clubbing, cyanosis, or edema  INCISION: dressing c/d/i on ant neck and LLQ (bone graft punch site)    Diagnostic Testin.2   9.00  )-----------( 324      ( 2019 07:53 )             36.4     06-20    130<L>  |  93<L>  |  35<H>  ----------------------------<  120<H>  4.0   |  29  |  1.52<H>    Ca    9.4      2019 07:53    TPro  7.7  /  Alb  3.3  /  TBili  0.5  /  DBili  x   /  AST  28  /  ALT  27  /  AlkPhos  76  06-18

## 2019-06-20 NOTE — DISCHARGE NOTE NURSING/CASE MANAGEMENT/SOCIAL WORK - NSDCDPATPORTLINK_GEN_ALL_CORE
You can access the [facility], offered by Health system, by registering with the following website: [email or web address]

## 2019-06-20 NOTE — PROGRESS NOTE ADULT - PROBLEM SELECTOR PLAN 1
- Left Groin   - likely musculoskeletal  - CT ABD findings noted, likely related to graft extraction site  - still with significant pain with standing up   - medrol dose mustapha and gabapentin added per Ortho Spine recs  - PT/OT   - Neuro eval appreciated  - MRI without contrast today

## 2019-06-20 NOTE — PROGRESS NOTE ADULT - PROBLEM SELECTOR PLAN 3
- IVF  - check urine osm  - ? dehydration, though patient denies decrease in PO intake, no contrast given recently, no obvious culprit meds

## 2019-06-20 NOTE — PHYSICAL THERAPY INITIAL EVALUATION ADULT - PERTINENT HX OF CURRENT PROBLEM, REHAB EVAL
transferred from [facility] for intractable left hip pain. Patient had a cervical spinal fusion on Thursday, 6 days ago, was feeling better until last night when she found herself unable to weight bear because of severe left hip/groin pain when she stands, called her surgeon who recommended her to go to the ED, was seen at [facility] ED, was transferee to [facility]

## 2019-06-21 ENCOUNTER — TRANSCRIPTION ENCOUNTER (OUTPATIENT)
Age: 69
End: 2019-06-21

## 2019-06-21 VITALS
SYSTOLIC BLOOD PRESSURE: 162 MMHG | TEMPERATURE: 98 F | OXYGEN SATURATION: 95 % | RESPIRATION RATE: 18 BRPM | DIASTOLIC BLOOD PRESSURE: 83 MMHG | HEART RATE: 81 BPM

## 2019-06-21 LAB
ANION GAP SERPL CALC-SCNC: 5 MMOL/L — SIGNIFICANT CHANGE UP (ref 5–17)
BUN SERPL-MCNC: 41 MG/DL — HIGH (ref 7–23)
CALCIUM SERPL-MCNC: 9 MG/DL — SIGNIFICANT CHANGE UP (ref 8.4–10.5)
CHLORIDE SERPL-SCNC: 99 MMOL/L — SIGNIFICANT CHANGE UP (ref 96–108)
CO2 SERPL-SCNC: 31 MMOL/L — SIGNIFICANT CHANGE UP (ref 22–31)
CREAT SERPL-MCNC: 1.2 MG/DL — SIGNIFICANT CHANGE UP (ref 0.5–1.3)
GLUCOSE SERPL-MCNC: 116 MG/DL — HIGH (ref 70–99)
HCT VFR BLD CALC: 35.1 % — SIGNIFICANT CHANGE UP (ref 34.5–45)
HGB BLD-MCNC: 11.7 G/DL — SIGNIFICANT CHANGE UP (ref 11.5–15.5)
MCHC RBC-ENTMCNC: 29.9 PG — SIGNIFICANT CHANGE UP (ref 27–34)
MCHC RBC-ENTMCNC: 33.3 GM/DL — SIGNIFICANT CHANGE UP (ref 32–36)
MCV RBC AUTO: 89.8 FL — SIGNIFICANT CHANGE UP (ref 80–100)
NRBC # BLD: 0 /100 WBCS — SIGNIFICANT CHANGE UP (ref 0–0)
OSMOLALITY UR: 302 MOSM/KG — SIGNIFICANT CHANGE UP (ref 50–1200)
PLATELET # BLD AUTO: 342 K/UL — SIGNIFICANT CHANGE UP (ref 150–400)
POTASSIUM SERPL-MCNC: 5.2 MMOL/L — SIGNIFICANT CHANGE UP (ref 3.5–5.3)
POTASSIUM SERPL-SCNC: 5.2 MMOL/L — SIGNIFICANT CHANGE UP (ref 3.5–5.3)
RBC # BLD: 3.91 M/UL — SIGNIFICANT CHANGE UP (ref 3.8–5.2)
RBC # FLD: 12.4 % — SIGNIFICANT CHANGE UP (ref 10.3–14.5)
SODIUM SERPL-SCNC: 135 MMOL/L — SIGNIFICANT CHANGE UP (ref 135–145)
WBC # BLD: 8.84 K/UL — SIGNIFICANT CHANGE UP (ref 3.8–10.5)
WBC # FLD AUTO: 8.84 K/UL — SIGNIFICANT CHANGE UP (ref 3.8–10.5)

## 2019-06-21 PROCEDURE — 36415 COLL VENOUS BLD VENIPUNCTURE: CPT

## 2019-06-21 PROCEDURE — 99285 EMERGENCY DEPT VISIT HI MDM: CPT

## 2019-06-21 PROCEDURE — 83935 ASSAY OF URINE OSMOLALITY: CPT

## 2019-06-21 PROCEDURE — 86803 HEPATITIS C AB TEST: CPT

## 2019-06-21 PROCEDURE — 80048 BASIC METABOLIC PNL TOTAL CA: CPT

## 2019-06-21 PROCEDURE — 85027 COMPLETE CBC AUTOMATED: CPT

## 2019-06-21 PROCEDURE — 99239 HOSP IP/OBS DSCHRG MGMT >30: CPT

## 2019-06-21 PROCEDURE — 97161 PT EVAL LOW COMPLEX 20 MIN: CPT

## 2019-06-21 RX ORDER — GABAPENTIN 400 MG/1
1 CAPSULE ORAL
Qty: 30 | Refills: 1
Start: 2019-06-21

## 2019-06-21 RX ORDER — OXYCODONE HYDROCHLORIDE 5 MG/1
1 TABLET ORAL
Qty: 20 | Refills: 0
Start: 2019-06-21

## 2019-06-21 RX ADMIN — OXYCODONE HYDROCHLORIDE 10 MILLIGRAM(S): 5 TABLET ORAL at 00:36

## 2019-06-21 RX ADMIN — PANTOPRAZOLE SODIUM 40 MILLIGRAM(S): 20 TABLET, DELAYED RELEASE ORAL at 06:03

## 2019-06-21 RX ADMIN — Medication 4 MILLIGRAM(S): at 12:02

## 2019-06-21 RX ADMIN — Medication 100 MILLIGRAM(S): at 06:03

## 2019-06-21 RX ADMIN — Medication 4 MILLIGRAM(S): at 06:06

## 2019-06-21 RX ADMIN — LOSARTAN POTASSIUM 100 MILLIGRAM(S): 100 TABLET, FILM COATED ORAL at 06:03

## 2019-06-21 RX ADMIN — ENOXAPARIN SODIUM 40 MILLIGRAM(S): 100 INJECTION SUBCUTANEOUS at 12:01

## 2019-06-21 RX ADMIN — OXYCODONE HYDROCHLORIDE 10 MILLIGRAM(S): 5 TABLET ORAL at 00:17

## 2019-06-21 NOTE — PROGRESS NOTE ADULT - PROBLEM SELECTOR PLAN 1
- Left Groin   - likely musculoskeletal + lumbar radiculopathy  - CT ABD findings noted, likely related to graft extraction site  - medrol dose mustapha and gabapentin added per Ortho Spine recs  - PT/OT   - Neuro f/u appreciated  - MRI results reviewed

## 2019-06-21 NOTE — DISCHARGE NOTE PROVIDER - NSDCCPCAREPLAN_GEN_ALL_CORE_FT
PRINCIPAL DISCHARGE DIAGNOSIS  Diagnosis: Groin pain, left  Assessment and Plan of Treatment: - musculoskeletal and also related to lumber nerve root compression  - gabpentin, medrol, Oxycodone PRN  - Taper oxycodone down and off as soon as possible to avoid risk of addiction, do not drive or operate machinery for at least 4 hours after taking a dose of oxycodone  - follow up with Dr. Adrian for follow-up of your neck surgery and to evaluation options for your lumbar nerve root compression      SECONDARY DISCHARGE DIAGNOSES  Diagnosis: LUCHO (acute kidney injury)  Assessment and Plan of Treatment: - drink at 6-8, 8 oz glasses of water daily  - avoid taking NSAIDS such as motrin/aleve/Advil, etc

## 2019-06-21 NOTE — PROGRESS NOTE ADULT - SUBJECTIVE AND OBJECTIVE BOX
INTERVAL HPI/OVERNIGHT EVENTS:   Patient seen and examined.  Pain in left groin has improved significantly, but still present with activity and certain movements at hip.  No fevers, chills, sweats, dizziness, HA, changes in vision, cp, palpitations, sob, persistent cough, n/v/d, abd pain, dysuria, or calf pain.   Has been urinating and having BMs regularly since her c-spine surgery last Thurs.  Eager to be discharged, able to ambulate to bathroom with walker.     REVIEW OF SYSTEMS:  See HPI,  all others negative    PHYSICAL EXAM:  Vital Signs Last 24 Hrs  T(C): 36.7 (2019 09:35), Max: 36.7 (2019 17:26)  T(F): 98 (2019 09:35), Max: 98.1 (2019 17:26)  HR: 81 (2019 09:35) (69 - 81)  BP: 162/83 (2019 09:35) (93/56 - 175/85)  BP(mean): --  RR: 18 (2019 09:35) (17 - 18)  SpO2: 95% (2019 09:35) (94% - 98%)    GENERAL: NAD, well-groomed, well-developed, awake, alert, oriented x 3, fluent and coherent speech  EYES: EOMI, PERRLA, conjunctiva and sclera clear  ENMT: No tonsillar erythema, exudates, or enlargement; Moist mucous membranes, No lesions  NECK: Supple, No JVD, No Cervical LAD, No thyromegaly, No thyroid nodules felt   NERVOUS SYSTEM:  Good concentration; Moving all 4 extremities; No gross sensory deficits, No facial droop  CHEST/LUNG: Clear to auscultation bilaterally; No rales, rhonchi, wheezing, or rubs  HEART: Regular rate and rhythm; No murmurs, rubs, or gallops  ABDOMEN: Soft, Nontender, obese, Bowel sounds present, No palpable masses or organomegaly, No bruits  EXTREMITIES:  2+ Peripheral Pulses, No clubbing, cyanosis, or edema  INCISION:  c/d/i on ant neck and LLQ (bone graft punch site)    Diagnostic Testin.7   8.84  )-----------( 342      ( 2019 08:03 )             35.1     06-    135  |  99  |  41<H>  ----------------------------<  116<H>  5.2   |  31  |  1.20    Ca    9.0      2019 08:03        < from: MR Lumbar Spine No Cont (19 @ 11:41) >  IMPRESSION: Multilevel degenerative changes from L1/L2-L5/S1 as detailed   above, most severe at L4/L5.  At L4/L5, there is a posterior disc bulge   and left greater than right facet arthropathy resulting in very severe   narrowing of the left neural foramen with compression upon the exiting   left L4 nerve root. Additional findings as above.      < end of copied text >    < from: MR Cervical Spine No Cont (19 @ 11:58) >  IMPRESSION: Status post ACDF at C4 and C5 with placement of an   intervertebral disc spacer. T2 hyperintense signal within the cervical   cord at the C4/C5 level, as seen on prior exam, compatible with stable   myelomalacia. Rest of the degenerative changes as above.      IMPRESSION:   1. There isedema and questionable fluid present in the soft tissues   surrounding the left iliac wing and lower left flank subcutaneous   tissues, seen   on image 16 of series 5 and image 9 of series 7. This might be secondary   to   prior trauma however infection/osteomyelitis in the iliac wing is in the   differential diagnosis. There appears to be a small amount of bone marrow   edema-like low T1 and high T2 signal within the marrow of the anterior   left   iliac wing on image 9 of series 7. This marrow edema is not confirmed on   the   coronal T2 sequence, series 10, however. A bone scan might be of   additional   diagnostic value if thought clinically indicated. Recommend clinical   correlation for any history of trauma to this left side iliac wingor   muscle   strain in the left gluteus minimus and iliacus musculature.     2. Chronic degenerative discovertebral disease in the lumbar spine with   levoscoliosis at L2/L3.     3. Both hip joints and the sacroiliac joints appear normal.    4. Moderately severe distal colonic diverticulosis is present, with no   definite   evidence of acute diverticulitis.      See final report for MRI of the pelvis below:    Clinical information: Left groin pain.    MRI of the pelvis is performed on a 1.5 Carmelina magnet utilizing   multiplanar multisequence technique.    There is a small amount of fluid and edema extending along the left iliac   fossa, along the iliacus musculature. In addition, there is interstitial   edema including increased signal intensity in both T1 and T2-weighted   images involving the left gluteus minimus musculature.  The intramuscular   skeletal architecture is preserved.  No localized, encapsulated fluid collection is noted.  Findings likely reflect changes of subacute degeneration.    There is probable tendinosis involving gluteus medius and minimus tendon   insertion on the greater trochanter. No full-thickness tear or   significant trochanteric bursitis is noted.    The sacroiliac joints are intact.    The contours of the bilateral femoral heads are preserved without   evidence of subchondral fracture or osteonecrosis.  No significant hip joint effusion is noted.    Common hamstring tendon insertions on the ischial tuberosity are intact.    There are multilevel degenerative changes of the visualized lower lumbar   spine, see separate dictated report for MRI of the lumbar spine from   2019.

## 2019-06-21 NOTE — PROGRESS NOTE ADULT - ASSESSMENT
Admitted with left Groin pain  CT Pelvis showed - Tubular appearing lucency within the anterior aspect of the left iliac wing in the region of the sartorius origin with adjacent soft tissue edema and intramuscular edema within the adjacent gluteus minimus and gluteus medius muscles. There are punctate regions of adjacent soft tissue mineralization. Adjacent subcutaneous soft tissue stranding/edema is present as well.  MRI C spine/L spine and Pelvis -  Reoports reviewed.  Ortho F/up.  Pain meds.  Steroids.  PT when pain is tolerable.  Fall/safety precautions.  Counseling was done for smoking cessation and not to drink on daily basis.  D/w Pt.   D/w covering nurse.  Pt is able to walk some what.  Would follow.
68 y/o F with PMH of HTN, Dyslipidemia, Spinal stenosis, OA, GERD, and Obesity transferred from [facility] for intractable left hip pain.
70 y/o F with PMH of HTN, Dyslipidemia, Spinal stenosis, OA, GERD, and Obesity transferred from [facility] for intractable left hip pain.
70 y/o F with PMH of HTN, Dyslipidemia, Spinal stenosis, OA, GERD, and Obesity transferred from [facility] for intractable left hip pain.

## 2019-06-21 NOTE — DISCHARGE NOTE PROVIDER - HOSPITAL COURSE
HPI:    This is a 70 y/o F with PMH of HTN, Dyslipidemia, Spinal stenosis, OA, GERD, and Obesity who was transferred from [facility] for intractable left hip pain. Patient had a cervical spinal fusion on Thursday, 6 days ago, was feeling better until last night when she found herself unable to weight bear because of severe left hip/groin pain when she stands, called her surgeon who recommended her to go to the ED, was seen at [facility] ED, was transferee to [facility]. Patient also reports numbness in her both arms that she was having prior to the surgery, and pain in her upper back that she can't lye down. Denies numbness in lower extremities also denies any bladder or bowel control problems. (18 Jun 2019 23:46)        Problem/Plan - 1:    ·  Problem: Intractable pain.  Plan: - Left Groin     - likely musculoskeletal + lumbar radiculopathy    - CT ABD findings noted, likely related to graft extraction site    - medrol dose mustapha and gabapentin added per Ortho Spine recs    - PT/OT     - Neuro f/u appreciated    - MRI results reviewed.          Problem/Plan - 2:    ·  Problem: Hyponatremia.  Plan: - resolved s/p IVF.          Problem/Plan - 3:    ·  Problem: LUCHO (acute kidney injury).  Plan: - ? dehydration, though patient denies decrease in PO intake, no contrast given recently, no obvious culprit meds    - resolved.

## 2019-06-21 NOTE — PROGRESS NOTE ADULT - REASON FOR ADMISSION
Severe left groin pain.

## 2019-06-21 NOTE — PROGRESS NOTE ADULT - PROBLEM SELECTOR PLAN 3
- ? dehydration, though patient denies decrease in PO intake, no contrast given recently, no obvious culprit meds  - resolved

## 2019-06-21 NOTE — DISCHARGE NOTE PROVIDER - CARE PROVIDER_API CALL
Phong Adrian (MD)  Orthopaedic Surgery  [address], [address]  [place] [address]  Phone: [phone]  Fax: [phone]  Follow Up Time:

## 2019-06-26 ENCOUNTER — APPOINTMENT (OUTPATIENT)
Dept: ORTHOPEDIC SURGERY | Facility: CLINIC | Age: 69
End: 2019-06-26
Payer: MEDICARE

## 2019-06-26 VITALS — HEIGHT: 62.5 IN | WEIGHT: 178 LBS | BODY MASS INDEX: 31.94 KG/M2

## 2019-06-26 DIAGNOSIS — M48.062 SPINAL STENOSIS, LUMBAR REGION WITH NEUROGENIC CLAUDICATION: ICD-10-CM

## 2019-06-26 DIAGNOSIS — S76.012A STRAIN OF MUSCLE, FASCIA AND TENDON OF LEFT HIP, INITIAL ENCOUNTER: ICD-10-CM

## 2019-06-26 PROCEDURE — 99024 POSTOP FOLLOW-UP VISIT: CPT

## 2019-06-26 PROCEDURE — 72040 X-RAY EXAM NECK SPINE 2-3 VW: CPT

## 2019-06-26 NOTE — HISTORY OF PRESENT ILLNESS
[___ Weeks Post Op] : [unfilled] weeks post op [10] : the patient reports pain that is 10/10 in severity [Clean/Dry/Intact] : clean, dry and intact [Slow Progress] : is progressing slowly [Negative Nicolas's] : maneuvers demonstrated a negative Nicolas's sign [Vascular Intact] : ~T peripheral vascular exam normal [Steri-Strips Removed & Replaced] : steri-strips removed and replaced [Adequate Pain Control] : has adequate pain control [No Sign of Infection] : is showing no signs of infection [No Work] : not to work [Limited ADLs] : to participate in activities of daily living with limitations [No Sports] : not to participate in sports [Limited Housework] : to do housework with limitations [Erythema] : not erythematous [de-identified] : ACDF 6/13/19 [Dehiscence] : not dehisced [Swelling] : not swollen [Discharge] : absent of discharge [de-identified] : Patient is here today for her first post operative office visit on her neck surgery. Overall patient states in a lot of left leg pain limited walking. [de-identified] : AP lateral image cervical spine demonstrates intracervical plate with interbody cage at C4-5 into position. The implant loosening or migration. Normal cervical lordosis and no significant scoliosis.\par \par EXAM: MR SPINE CERVICAL \par PROCEDURE DATE: 06/20/2019 \par \par INTERPRETATION: Exam Date: 6/20/2019 11:58 AM \par \par MR cervical without gadolinium \par \par CLINICAL INFORMATION: s/p ACDF \par \par TECHNIQUE: Sagittal T1-weighted,T2-weighted, and inversion recovery images, \par and axial T1 and T2-weighted gradient-echo images of the cervical spine were \par obtained. \par \par FINDINGS: Comparison is made to MRI cervical spine of 5/28/2019. \par \par Reidentified is ACDF at C4 and C5 with placement of an intervertebral disc \par spacer at C4/C5. \par \par At C2/C3, there is no significant degenerative disc disease. \par \par At C3/C4, there is left greater than right facet arthropathy resulting in \par narrowing of the left neural foramen without significant central spinal \par canal stenosis. \par \par At C4/C5, there is an intervertebral disc spacer present. There is bilateral \par facet arthropathy resulting in marked narrowing of the bilateral neural \par foramen and mild to moderate central spinal canal stenosis. T2 hyperintense \par signal within the cervical cord at the C4/C5 level, as seen on prior exam, \par compatible with stable myelomalacia. \par \par At C5/C6, is a posterior disc bulge and bilateral uncovertebral spurring and \par facet arthropathy resulting in narrowing of the bilateral neural foramen and \par mild central spinal canal stenosis. \par \par At C6/C7, there is a posterior disc bulge, uncovertebral spurring and facet \par arthropathy, resulting in mild narrowing of the bilateral neural foramen and \par no significant central spinal canal stenosis. \par \par At C7/T1, there is bilateral facet arthropathy resulting in mild narrowing \par of the bilateral neural foramen. \par \par The cervical cord maintains intact morphology No cord signal intensity \par abnormality or focal cord lesion is appreciated. The cervical medullary \par junction remains intact. \par \par \par IMPRESSION: Status post ACDF at C4 and C5 with placement of an \par intervertebral disc spacer. T2 hyperintense signal within the cervical cord \par at the C4/C5 level, as seen on prior exam, compatible with stable \par myelomalacia. Rest of the degenerative changes as above. \par \par ADWOA TOURE M.D., ATTENDING RADIOLOGIST \par This document has been electronically signed. Jun 20 2019 12:22PM \par \par _________\par \par \par \par \par EXAM: MR PELVIS \par \par \par *** ADDENDUM 06/21/2019 *** \par \par Additional clinical information of recent cervical fusion with bone donor \par graft is obtained. \par In addition, comparison with CT scanning of the pelvis 6/18/2019. \par \par There is slight irregularity at the anterior left iliac wing, corresponding \par to the bony irregularity noted on CT scan. \par There is adjacent edema extending along the left iliacus fossa and gluteus \par minimus musculature. \par Findings may reflect reactive changes related to recent bone graft donor \par site. \par \par \par *** END OF ADDENDUM 06/21/2019 *** \par \par \par PROCEDURE DATE: 06/20/2019 \par \par \par \par INTERPRETATION: [facility] RADI[facility]IST PRELIMINARY REPORT \par \par EXAM: \par  MR Pelvis Without Contrast \par \par EXAM DATE/TIME: \par  6/20/2019 10:29 AM \par \par CLINICAL HISTORY: \par  69 years old, female; Hip pain and pelvic pain; Bilateral; Patient HX: Pain \par pelvis hips S/P surg c/spine; Additional info: This is a [facility] PT \par 1 \par east. [phone] \par \par TECHNIQUE: \par  Imaging protocol: Magnetic resonance images of the pelvis without \par intravenous \par contrast. \par \par COMPARISON: \par  No relevant prior studies available. \par \par FINDINGS: \par  Intraperitoneal space: No free fluid. \par  Stomach and bowel: Moderately severe distal colonic diverticulosis is \par present, \par with no definite evidence of acute diverticulitis. \par  Reproductive: Prior hysterectomy. \par  Bones/joints: Chronic degenerative discovertebral disease in the lumbar \par spine \par with levoscoliosis at L2/L3. Both hip joints and the sacroiliac joints \par appear \par normal. The sacrococcygeal spinal alignment is normal. The presacral soft \par tissues are normal. \par  Soft tissues: There is edema and questionable fluid present in the soft \par tissues surrounding the left iliac wing and lower left flank subcutaneous \par tissues, seen on image 16 of series 5 and image 9 of series 7. This might be \par secondary to prior trauma however infection/osteomyelitis in the iliac wing \par is \par in the differential diagnosis. There appears to be a small amount of bone \par marrow edema-like low T1 and high T2 signal within the marrow of the \par anterior \par left iliac wing on image 9 of series 7. This marrow edema is not confirmed \par on \par the coronal T2 sequence, series 10, however. A bone scan might be of \par additional \par diagnostic value if thought clinically indicated. Recommend clinical \par correlation for any history of trauma to this left side iliac wing or muscle \par strain in the left gluteus minimus and iliacus musculature. \par \par IMPRESSION: \par 1. There is edema and questionable fluid present in the soft tissues \par surrounding the left iliac wing and lower left flank subcutaneous tissues, \par seen \par on image 16 of series 5 and image 9 of series 7. This might be secondary to \par prior trauma however infection/osteomyelitis in the iliac wing is in the \par differential diagnosis. There appears to be a small amount of bone marrow \par edema-like low T1 and high T2 signal within the marrow of the anterior left \par iliac wing on image 9 of series 7. This marrow edema is not confirmed on the \par coronal T2 sequence, series 10, however. A bone scan might be of additional \par diagnostic value if thought clinically indicated. Recommend clinical \par correlation for any history of trauma to this left side iliac wing or muscle \par strain in the left gluteus minimus and iliacus musculature. \par \par 2. Chronic degenerative discovertebral disease in the lumbar spine with \par levoscoliosis at L2/L3. \par \par 3. Both hip joints and the sacroiliac joints appear normal. \par \par 4. Moderately severe distal colonic diverticulosis is present, with no \par definite \par evidence of acute diverticulitis. \par \par \par See final report for MRI of the pelvis below: \par \par Clinical information: Left groin pain. \par \par MRI of the pelvis is performed on a 1.5 Carmelina magnet utilizing multiplanar \par multisequence technique. \par \par There is a small amount of fluid and edema extending along the left iliac \par fossa, along the iliacus musculature. In addition, there is interstitial \par edema including increased signal intensity in both T1 and T2-weighted images \par involving the left gluteus minimus musculature. The intramuscular skeletal \par architecture is preserved. \par No localized, encapsulated fluid collection is noted. \par Findings likely reflect changes of subacute degeneration. \par \par There is probable tendinosis involving gluteus medius and minimus tendon \par insertion on the greater trochanter. No full-thickness tear or significant \par trochanteric bursitis is noted. \par \par The sacroiliac joints are intact. \par \par The contours of the bilateral femoral heads are preserved without evidence \par of subchondral fracture or osteonecrosis. \par No significant hip joint effusion is noted. \par \par Common hamstring tendon insertions on the ischial tuberosity are intact. \par \par There are multilevel degenerative changes of the visualized lower lumbar \par spine, see separate dictated report for MRI of the lumbar spine from \par 6/20/2019. \par \par Impression: \par \par Findings as discussed, likely reflecting changes secondary to subacute \par muscle denervation. \par Myositis from infection is considered less likely. \par \par This study was preliminary reported by [facility]. \par \par \par \par \par \par \par \par \par \par \par \par ***Please see the addendum at the top of this report. It may contain \par additional important information or changes.**** \par \par \par \par \par JAMES GARCIA M.D., ATTENDING RADIOLOGIST \par This document has been electronically signed. Jun 21 2019 8:37AM \par Addend: JAMES GARCIA M.D., ATTENDING RADIOLOGIST \par This addendum was electronically signed on: Jun 21 2019 9:06AM.  [de-identified] : Special Tests: positive Valentine's sign. \par Motor: Elbow Flexion (C5, C6) - right (4/5) and left (4/5). Extension at Elbow (C6, C7, C8) - right (5/5) and left (5/5). Extension at Wrist (C6, C7, C8) - right (4/5) and left (4/5).  (C7, C8, T1) - right (4/5) and left (4/5). Finger ABduction (C8, T1) - right (5/5) and left (5/5). Opposition of Thumb C8, T1) - right (5/5) and left (5/5). Sensory: Intact in bilateral upper extremities. DTRs: right biceps 3+, left biceps 3+, right triceps 3+, left triceps 3+, right brachioradialis 3+ and left brachioradialis 3+. Babinski reflex is absent on the right and absent on the left. Ankle Clonus is absent on the right and absent on the left. Pulses: radial 2+ and symmetric bilaterally. \par Seen with her . Seen with a rolling walker. No groin pain with internal/external rotation bilaterally. Negative straight leg raise bilaterally.\par  [de-identified] : The patient reports acute onset of left leg pain along the groin and difficulty walking approximately 4 days after anterior cervical decompression discectomy and fusion at the C4-5 level. Her acute presentation is likely related to the changes seen on the MRI and CT scan of the pelvis with area of the bone graft site and edema seen within the gluteus medius. She does have severe spinal stenosis at the L4-5 level and lesser so also L3-4 and L2-3. However her pain pattern is suggestive of musculoskeletal and pelvic pain rather than a lumbar radicular pain because she does not have any radicular symptoms or deficits on exam. She does not have nerve tension signs in his 5 out of 5 motor strength in left lower extremity with intact reflexes. Based on her clinical evaluation I. recommended observation for her pelvic pain. If her symptoms persist consideration can be made for lumbar decompression to I suspect that her primary etiology is her pelvis rather than the lumbar spine at this time.\par \par Recommended followup in 4 weeks for repeat x-rays of the cervical spine. If there is improvement of her pelvic pain and ambulation in the lower extremities we may consider surgical intervention for the cervical spine the posterior compression and instrumentation C4-5 level given the residual posterior based stenosis and cord compression at that level.\par \par The patient was educated regarding their condition, treatment options as well as prescribed course of treatment. \par Risks and benefits as well as alternatives to the proposed treatment were also provided to the patient \par They were given the opportunity to have all their questions answered to their satisfaction.\par \par Vital signs were reviewed with the patient and the patient was instructed to followup with their primary care provider for further management.\par \par Healthy lifestyle recommendations were also made including a tobacco free lifestyle, proper diet, and weight control.\par \par Prescribed her Naprosyn at this time for symptomatic relief.

## 2019-07-01 ENCOUNTER — CHART COPY (OUTPATIENT)
Age: 69
End: 2019-07-01

## 2019-07-08 ENCOUNTER — OUTPATIENT (OUTPATIENT)
Dept: OUTPATIENT SERVICES | Facility: HOSPITAL | Age: 69
LOS: 1 days | End: 2019-07-08
Payer: MEDICARE

## 2019-07-08 VITALS
HEIGHT: 62 IN | OXYGEN SATURATION: 99 % | SYSTOLIC BLOOD PRESSURE: 148 MMHG | RESPIRATION RATE: 16 BRPM | WEIGHT: 181.44 LBS | TEMPERATURE: 98 F | DIASTOLIC BLOOD PRESSURE: 81 MMHG | HEART RATE: 82 BPM

## 2019-07-08 DIAGNOSIS — Z98.890 OTHER SPECIFIED POSTPROCEDURAL STATES: Chronic | ICD-10-CM

## 2019-07-08 DIAGNOSIS — M41.50 OTHER SECONDARY SCOLIOSIS, SITE UNSPECIFIED: ICD-10-CM

## 2019-07-08 DIAGNOSIS — Z87.39 PERSONAL HISTORY OF OTHER DISEASES OF THE MUSCULOSKELETAL SYSTEM AND CONNECTIVE TISSUE: Chronic | ICD-10-CM

## 2019-07-08 DIAGNOSIS — E66.9 OBESITY, UNSPECIFIED: Chronic | ICD-10-CM

## 2019-07-08 DIAGNOSIS — Z01.818 ENCOUNTER FOR OTHER PREPROCEDURAL EXAMINATION: ICD-10-CM

## 2019-07-08 DIAGNOSIS — M48.062 SPINAL STENOSIS, LUMBAR REGION WITH NEUROGENIC CLAUDICATION: ICD-10-CM

## 2019-07-08 DIAGNOSIS — Z98.891 HISTORY OF UTERINE SCAR FROM PREVIOUS SURGERY: Chronic | ICD-10-CM

## 2019-07-08 DIAGNOSIS — M43.10 SPONDYLOLISTHESIS, SITE UNSPECIFIED: ICD-10-CM

## 2019-07-08 DIAGNOSIS — M50.90 CERVICAL DISC DISORDER, UNSPECIFIED, UNSPECIFIED CERVICAL REGION: ICD-10-CM

## 2019-07-08 DIAGNOSIS — Z90.710 ACQUIRED ABSENCE OF BOTH CERVIX AND UTERUS: Chronic | ICD-10-CM

## 2019-07-08 LAB
ALBUMIN SERPL ELPH-MCNC: 3.9 G/DL — SIGNIFICANT CHANGE UP (ref 3.3–5)
ALP SERPL-CCNC: 126 U/L — HIGH (ref 30–120)
ALT FLD-CCNC: 21 U/L DA — SIGNIFICANT CHANGE UP (ref 10–60)
ANION GAP SERPL CALC-SCNC: 4 MMOL/L — LOW (ref 5–17)
APTT BLD: 29.1 SEC — SIGNIFICANT CHANGE UP (ref 28.5–37)
AST SERPL-CCNC: 17 U/L — SIGNIFICANT CHANGE UP (ref 10–40)
BILIRUB SERPL-MCNC: 0.4 MG/DL — SIGNIFICANT CHANGE UP (ref 0.2–1.2)
BLD GP AB SCN SERPL QL: SIGNIFICANT CHANGE UP
BUN SERPL-MCNC: 23 MG/DL — SIGNIFICANT CHANGE UP (ref 7–23)
CALCIUM SERPL-MCNC: 9.7 MG/DL — SIGNIFICANT CHANGE UP (ref 8.4–10.5)
CHLORIDE SERPL-SCNC: 92 MMOL/L — LOW (ref 96–108)
CO2 SERPL-SCNC: 31 MMOL/L — SIGNIFICANT CHANGE UP (ref 22–31)
CREAT SERPL-MCNC: 0.88 MG/DL — SIGNIFICANT CHANGE UP (ref 0.5–1.3)
GLUCOSE SERPL-MCNC: 136 MG/DL — HIGH (ref 70–99)
HCT VFR BLD CALC: 35.7 % — SIGNIFICANT CHANGE UP (ref 34.5–45)
HGB BLD-MCNC: 12.4 G/DL — SIGNIFICANT CHANGE UP (ref 11.5–15.5)
INR BLD: 1 RATIO — SIGNIFICANT CHANGE UP (ref 0.88–1.16)
MCHC RBC-ENTMCNC: 30 PG — SIGNIFICANT CHANGE UP (ref 27–34)
MCHC RBC-ENTMCNC: 34.7 GM/DL — SIGNIFICANT CHANGE UP (ref 32–36)
MCV RBC AUTO: 86.4 FL — SIGNIFICANT CHANGE UP (ref 80–100)
NRBC # BLD: 0 /100 WBCS — SIGNIFICANT CHANGE UP (ref 0–0)
PLATELET # BLD AUTO: 374 K/UL — SIGNIFICANT CHANGE UP (ref 150–400)
POTASSIUM SERPL-MCNC: 5.2 MMOL/L — SIGNIFICANT CHANGE UP (ref 3.5–5.3)
POTASSIUM SERPL-SCNC: 5.2 MMOL/L — SIGNIFICANT CHANGE UP (ref 3.5–5.3)
PROT SERPL-MCNC: 7.5 G/DL — SIGNIFICANT CHANGE UP (ref 6–8.3)
PROTHROM AB SERPL-ACNC: 10.9 SEC — SIGNIFICANT CHANGE UP (ref 10–12.9)
RBC # BLD: 4.13 M/UL — SIGNIFICANT CHANGE UP (ref 3.8–5.2)
RBC # FLD: 13.1 % — SIGNIFICANT CHANGE UP (ref 10.3–14.5)
SODIUM SERPL-SCNC: 127 MMOL/L — LOW (ref 135–145)
WBC # BLD: 9.09 K/UL — SIGNIFICANT CHANGE UP (ref 3.8–10.5)
WBC # FLD AUTO: 9.09 K/UL — SIGNIFICANT CHANGE UP (ref 3.8–10.5)

## 2019-07-08 PROCEDURE — 85730 THROMBOPLASTIN TIME PARTIAL: CPT

## 2019-07-08 PROCEDURE — 80053 COMPREHEN METABOLIC PANEL: CPT

## 2019-07-08 PROCEDURE — G0463: CPT

## 2019-07-08 PROCEDURE — 36415 COLL VENOUS BLD VENIPUNCTURE: CPT

## 2019-07-08 PROCEDURE — 85610 PROTHROMBIN TIME: CPT

## 2019-07-08 PROCEDURE — 86900 BLOOD TYPING SEROLOGIC ABO: CPT

## 2019-07-08 PROCEDURE — 85027 COMPLETE CBC AUTOMATED: CPT

## 2019-07-08 PROCEDURE — 86901 BLOOD TYPING SEROLOGIC RH(D): CPT

## 2019-07-08 PROCEDURE — 86850 RBC ANTIBODY SCREEN: CPT

## 2019-07-08 RX ORDER — ATORVASTATIN CALCIUM 80 MG/1
10 TABLET, FILM COATED ORAL
Qty: 0 | Refills: 0 | DISCHARGE

## 2019-07-08 RX ORDER — LANOLIN ALCOHOL/MO/W.PET/CERES
2 CREAM (GRAM) TOPICAL
Qty: 0 | Refills: 0 | DISCHARGE

## 2019-07-08 NOTE — H&P PST ADULT - NSICDXPROBLEM_GEN_ALL_CORE_FT
PROBLEM DIAGNOSES  Problem: Disorder of intervertebral disc of cervical spine  Assessment and Plan: Posterior Cervical Laminectomy/Fusion C4-5. Posterior Cervical Instrumentation,  Allogrraft/Autograft and all Related Procedures on 7/23/19. PROBLEM DIAGNOSES  Problem: Disorder of intervertebral disc of cervical spine  Assessment and Plan: Posterior Cervical Laminectomy/Fusion C4-5. Posterior Cervical Instrumentation,  Allograft/Autograft and all Related Procedures on 7/23/19.

## 2019-07-08 NOTE — H&P PST ADULT - NSICDXFAMILYHX_GEN_ALL_CORE_FT
FAMILY HISTORY:  FH: breast cancer  FH: type 2 diabetes FAMILY HISTORY:  Family history of breast cancer in mother, mother-   Family hx of alcoholism, mother -  FH: prostate cancer, brother- A- age 78  FH: type 2 diabetes, brother- A- age 73  FH: type 2 diabetes, father-

## 2019-07-08 NOTE — H&P PST ADULT - HISTORY OF PRESENT ILLNESS
68yo female patient with 68yo female patient who states that since 5/5/19 she was moving a heavy case of water and the next day she had numbness in both arms. She saw a neurologist who did an MRI and advised her to see an orthopedic surgeon. She has an Anterior Cervical Laminectomy in June of this year. She still has numbness in both arms and she was told that a Posterior Cervical Laminectomy is going to be necessary. She has very slight degree of pain in this area. She is taking Naproxen for pain in her left him s/p bone graft with relief. She presents today for PSTs. 70yo female patient who states that since 5/5/19 she was moving a heavy case of water and the next day she had numbness in both arms. She saw a neurologist who did an MRI and advised her to see an orthopedic surgeon. She has an Anterior Cervical Laminectomy in June of this year. She still has numbness in both arms and she was told that a Posterior Cervical Laminectomy is going to be necessary. She has very slight degree of pain in this area, not requiring medication.  She is taking Naproxen for pain in her left hip s/p bone graft,  with relief. She presents today for PSTs.

## 2019-07-08 NOTE — H&P PST ADULT - VENOUS THROMBOEMBOLISM FOR WOMEN ONLY
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

## 2019-07-08 NOTE — H&P PST ADULT - ASSESSMENT
70yo female patient scheduled for surgery on 7/23/19. She will obtain Medical Clearance from her PMD. She will be NPO as per Anesthesia and will take Prilosec and Valsartan/HCTZ on AM of surgery with a sip of water. All pre-op instructions reviewed with patient. 68yo female patient scheduled for surgery on 7/23/19. She will obtain Medical Clearance from her PMD. She will be NPO as per Anesthesia and will take Prilosec and Valsartan/HCTZ on AM of surgery with a sip of water. She will hold Naprosyn starting tomorrow.  All pre-op instructions reviewed with patient.

## 2019-07-11 ENCOUNTER — CHART COPY (OUTPATIENT)
Age: 69
End: 2019-07-11

## 2019-07-17 ENCOUNTER — APPOINTMENT (OUTPATIENT)
Dept: ORTHOPEDIC SURGERY | Facility: CLINIC | Age: 69
End: 2019-07-17

## 2019-07-18 NOTE — PROVIDER CONTACT NOTE (OTHER) - ASSESSMENT
The spine pre-operative education packet was mailed to the patient on 7/2/19. The patient reviewed the information included in the packet. She called the office and we reviewed the information. All her questions were answered and she gave a clear understanding of the instructions. She was advised to call the office at any time with further questions or concerns.

## 2019-07-19 RX ORDER — APREPITANT 80 MG/1
40 CAPSULE ORAL ONCE
Refills: 0 | Status: COMPLETED | OUTPATIENT
Start: 2019-07-23 | End: 2019-07-23

## 2019-07-19 RX ORDER — CEFAZOLIN SODIUM 1 G
2000 VIAL (EA) INJECTION ONCE
Refills: 0 | Status: COMPLETED | OUTPATIENT
Start: 2019-07-23 | End: 2019-07-23

## 2019-07-19 RX ORDER — ACETAMINOPHEN 500 MG
1000 TABLET ORAL ONCE
Refills: 0 | Status: COMPLETED | OUTPATIENT
Start: 2019-07-23 | End: 2019-07-23

## 2019-07-22 ENCOUNTER — TRANSCRIPTION ENCOUNTER (OUTPATIENT)
Age: 69
End: 2019-07-22

## 2019-07-23 ENCOUNTER — APPOINTMENT (OUTPATIENT)
Dept: ORTHOPEDIC SURGERY | Facility: HOSPITAL | Age: 69
End: 2019-07-23

## 2019-07-23 ENCOUNTER — RESULT REVIEW (OUTPATIENT)
Age: 69
End: 2019-07-23

## 2019-07-23 ENCOUNTER — INPATIENT (INPATIENT)
Facility: HOSPITAL | Age: 69
LOS: 0 days | Discharge: HOME | DRG: 472 | End: 2019-07-24
Attending: ORTHOPAEDIC SURGERY | Admitting: ORTHOPAEDIC SURGERY
Payer: MEDICARE

## 2019-07-23 VITALS
HEIGHT: 68.9 IN | TEMPERATURE: 98 F | DIASTOLIC BLOOD PRESSURE: 72 MMHG | OXYGEN SATURATION: 97 % | WEIGHT: 181.44 LBS | SYSTOLIC BLOOD PRESSURE: 154 MMHG | HEART RATE: 81 BPM | RESPIRATION RATE: 24 BRPM

## 2019-07-23 DIAGNOSIS — Z98.890 OTHER SPECIFIED POSTPROCEDURAL STATES: Chronic | ICD-10-CM

## 2019-07-23 DIAGNOSIS — E66.9 OBESITY, UNSPECIFIED: Chronic | ICD-10-CM

## 2019-07-23 DIAGNOSIS — Z87.39 PERSONAL HISTORY OF OTHER DISEASES OF THE MUSCULOSKELETAL SYSTEM AND CONNECTIVE TISSUE: Chronic | ICD-10-CM

## 2019-07-23 DIAGNOSIS — M43.10 SPONDYLOLISTHESIS, SITE UNSPECIFIED: ICD-10-CM

## 2019-07-23 DIAGNOSIS — Z98.891 HISTORY OF UTERINE SCAR FROM PREVIOUS SURGERY: Chronic | ICD-10-CM

## 2019-07-23 DIAGNOSIS — M41.50 OTHER SECONDARY SCOLIOSIS, SITE UNSPECIFIED: ICD-10-CM

## 2019-07-23 DIAGNOSIS — Z90.710 ACQUIRED ABSENCE OF BOTH CERVIX AND UTERUS: Chronic | ICD-10-CM

## 2019-07-23 PROCEDURE — 22840 INSERT SPINE FIXATION DEVICE: CPT | Mod: 82

## 2019-07-23 PROCEDURE — 88304 TISSUE EXAM BY PATHOLOGIST: CPT | Mod: 26

## 2019-07-23 PROCEDURE — 99222 1ST HOSP IP/OBS MODERATE 55: CPT

## 2019-07-23 PROCEDURE — 22600 ARTHRD PST TQ 1NTRSPC CRV: CPT | Mod: 58

## 2019-07-23 PROCEDURE — 63045 LAM FACETEC & FORAMOT CRV: CPT | Mod: 58

## 2019-07-23 PROCEDURE — 63045 LAM FACETEC & FORAMOT CRV: CPT | Mod: 82,58

## 2019-07-23 PROCEDURE — 22600 ARTHRD PST TQ 1NTRSPC CRV: CPT | Mod: 82,58

## 2019-07-23 PROCEDURE — 22840 INSERT SPINE FIXATION DEVICE: CPT

## 2019-07-23 PROCEDURE — 88311 DECALCIFY TISSUE: CPT | Mod: 26

## 2019-07-23 RX ORDER — ACETAMINOPHEN 500 MG
1000 TABLET ORAL EVERY 8 HOURS
Refills: 0 | Status: DISCONTINUED | OUTPATIENT
Start: 2019-07-24 | End: 2019-07-24

## 2019-07-23 RX ORDER — DOCUSATE SODIUM 100 MG
100 CAPSULE ORAL THREE TIMES A DAY
Refills: 0 | Status: DISCONTINUED | OUTPATIENT
Start: 2019-07-23 | End: 2019-07-24

## 2019-07-23 RX ORDER — OXYCODONE HYDROCHLORIDE 5 MG/1
5 TABLET ORAL
Refills: 0 | Status: DISCONTINUED | OUTPATIENT
Start: 2019-07-23 | End: 2019-07-24

## 2019-07-23 RX ORDER — CEFAZOLIN SODIUM 1 G
2000 VIAL (EA) INJECTION EVERY 8 HOURS
Refills: 0 | Status: COMPLETED | OUTPATIENT
Start: 2019-07-24 | End: 2019-07-24

## 2019-07-23 RX ORDER — ATORVASTATIN CALCIUM 80 MG/1
10 TABLET, FILM COATED ORAL AT BEDTIME
Refills: 0 | Status: DISCONTINUED | OUTPATIENT
Start: 2019-07-23 | End: 2019-07-24

## 2019-07-23 RX ORDER — PANTOPRAZOLE SODIUM 20 MG/1
40 TABLET, DELAYED RELEASE ORAL
Refills: 0 | Status: DISCONTINUED | OUTPATIENT
Start: 2019-07-23 | End: 2019-07-24

## 2019-07-23 RX ORDER — DIAZEPAM 5 MG
5 TABLET ORAL EVERY 8 HOURS
Refills: 0 | Status: DISCONTINUED | OUTPATIENT
Start: 2019-07-23 | End: 2019-07-24

## 2019-07-23 RX ORDER — ACETAMINOPHEN 500 MG
1000 TABLET ORAL EVERY 6 HOURS
Refills: 0 | Status: DISCONTINUED | OUTPATIENT
Start: 2019-07-24 | End: 2019-07-24

## 2019-07-23 RX ORDER — SODIUM CHLORIDE 9 MG/ML
1000 INJECTION, SOLUTION INTRAVENOUS
Refills: 0 | Status: DISCONTINUED | OUTPATIENT
Start: 2019-07-23 | End: 2019-07-24

## 2019-07-23 RX ORDER — SODIUM CHLORIDE 9 MG/ML
1000 INJECTION, SOLUTION INTRAVENOUS
Refills: 0 | Status: DISCONTINUED | OUTPATIENT
Start: 2019-07-23 | End: 2019-07-23

## 2019-07-23 RX ORDER — HYDROMORPHONE HYDROCHLORIDE 2 MG/ML
0.5 INJECTION INTRAMUSCULAR; INTRAVENOUS; SUBCUTANEOUS
Refills: 0 | Status: DISCONTINUED | OUTPATIENT
Start: 2019-07-23 | End: 2019-07-23

## 2019-07-23 RX ORDER — SENNA PLUS 8.6 MG/1
2 TABLET ORAL AT BEDTIME
Refills: 0 | Status: DISCONTINUED | OUTPATIENT
Start: 2019-07-23 | End: 2019-07-24

## 2019-07-23 RX ORDER — MAGNESIUM HYDROXIDE 400 MG/1
30 TABLET, CHEWABLE ORAL EVERY 12 HOURS
Refills: 0 | Status: DISCONTINUED | OUTPATIENT
Start: 2019-07-23 | End: 2019-07-24

## 2019-07-23 RX ORDER — GABAPENTIN 400 MG/1
300 CAPSULE ORAL AT BEDTIME
Refills: 0 | Status: DISCONTINUED | OUTPATIENT
Start: 2019-07-23 | End: 2019-07-24

## 2019-07-23 RX ORDER — ONDANSETRON 8 MG/1
4 TABLET, FILM COATED ORAL ONCE
Refills: 0 | Status: DISCONTINUED | OUTPATIENT
Start: 2019-07-23 | End: 2019-07-23

## 2019-07-23 RX ORDER — LOSARTAN POTASSIUM 100 MG/1
100 TABLET, FILM COATED ORAL DAILY
Refills: 0 | Status: DISCONTINUED | OUTPATIENT
Start: 2019-07-25 | End: 2019-07-24

## 2019-07-23 RX ORDER — HYDROMORPHONE HYDROCHLORIDE 2 MG/ML
0.5 INJECTION INTRAMUSCULAR; INTRAVENOUS; SUBCUTANEOUS
Refills: 0 | Status: DISCONTINUED | OUTPATIENT
Start: 2019-07-23 | End: 2019-07-24

## 2019-07-23 RX ORDER — OXYCODONE HYDROCHLORIDE 5 MG/1
10 TABLET ORAL
Refills: 0 | Status: DISCONTINUED | OUTPATIENT
Start: 2019-07-23 | End: 2019-07-24

## 2019-07-23 RX ADMIN — APREPITANT 40 MILLIGRAM(S): 80 CAPSULE ORAL at 14:08

## 2019-07-23 RX ADMIN — HYDROMORPHONE HYDROCHLORIDE 0.5 MILLIGRAM(S): 2 INJECTION INTRAMUSCULAR; INTRAVENOUS; SUBCUTANEOUS at 19:15

## 2019-07-23 RX ADMIN — HYDROMORPHONE HYDROCHLORIDE 0.5 MILLIGRAM(S): 2 INJECTION INTRAMUSCULAR; INTRAVENOUS; SUBCUTANEOUS at 19:27

## 2019-07-23 RX ADMIN — HYDROMORPHONE HYDROCHLORIDE 0.5 MILLIGRAM(S): 2 INJECTION INTRAMUSCULAR; INTRAVENOUS; SUBCUTANEOUS at 21:24

## 2019-07-23 RX ADMIN — HYDROMORPHONE HYDROCHLORIDE 0.5 MILLIGRAM(S): 2 INJECTION INTRAMUSCULAR; INTRAVENOUS; SUBCUTANEOUS at 20:08

## 2019-07-23 RX ADMIN — ATORVASTATIN CALCIUM 10 MILLIGRAM(S): 80 TABLET, FILM COATED ORAL at 21:52

## 2019-07-23 RX ADMIN — HYDROMORPHONE HYDROCHLORIDE 0.5 MILLIGRAM(S): 2 INJECTION INTRAMUSCULAR; INTRAVENOUS; SUBCUTANEOUS at 20:25

## 2019-07-23 RX ADMIN — HYDROMORPHONE HYDROCHLORIDE 0.5 MILLIGRAM(S): 2 INJECTION INTRAMUSCULAR; INTRAVENOUS; SUBCUTANEOUS at 19:43

## 2019-07-23 RX ADMIN — HYDROMORPHONE HYDROCHLORIDE 0.5 MILLIGRAM(S): 2 INJECTION INTRAMUSCULAR; INTRAVENOUS; SUBCUTANEOUS at 20:21

## 2019-07-23 RX ADMIN — Medication 100 MILLIGRAM(S): at 21:52

## 2019-07-23 RX ADMIN — HYDROMORPHONE HYDROCHLORIDE 0.5 MILLIGRAM(S): 2 INJECTION INTRAMUSCULAR; INTRAVENOUS; SUBCUTANEOUS at 23:51

## 2019-07-23 RX ADMIN — HYDROMORPHONE HYDROCHLORIDE 0.5 MILLIGRAM(S): 2 INJECTION INTRAMUSCULAR; INTRAVENOUS; SUBCUTANEOUS at 19:45

## 2019-07-23 RX ADMIN — SODIUM CHLORIDE 100 MILLILITER(S): 9 INJECTION, SOLUTION INTRAVENOUS at 23:11

## 2019-07-23 RX ADMIN — SODIUM CHLORIDE 100 MILLILITER(S): 9 INJECTION, SOLUTION INTRAVENOUS at 19:28

## 2019-07-23 RX ADMIN — GABAPENTIN 300 MILLIGRAM(S): 400 CAPSULE ORAL at 21:52

## 2019-07-23 RX ADMIN — SENNA PLUS 2 TABLET(S): 8.6 TABLET ORAL at 21:52

## 2019-07-23 NOTE — CONSULT NOTE ADULT - ASSESSMENT
70 y/o F with PMH of HTN, Dyslipidemia, Spinal stenosis, OA, GERD, and Obesity s/p anterior cervical laminectomy(june 2019) s/p posterior cervical laminectomy POD #0  1. S/P Posterior Cervical Laminectomy POD #0 - surgical management by primary team.  Pain medications as indicated. PT Eval.   2. HTN - continue losartan with hold parameters  3. HLD - continue statin  4. OA - continue naproxen PRN  5. GERD - continue PPI   6. DVT proph - SCDs.  Ambulate as tolerated.

## 2019-07-24 ENCOUNTER — TRANSCRIPTION ENCOUNTER (OUTPATIENT)
Age: 69
End: 2019-07-24

## 2019-07-24 VITALS
OXYGEN SATURATION: 99 % | SYSTOLIC BLOOD PRESSURE: 144 MMHG | TEMPERATURE: 98 F | DIASTOLIC BLOOD PRESSURE: 85 MMHG | RESPIRATION RATE: 16 BRPM | HEART RATE: 71 BPM

## 2019-07-24 LAB
ANION GAP SERPL CALC-SCNC: 5 MMOL/L — SIGNIFICANT CHANGE UP (ref 5–17)
BASOPHILS # BLD AUTO: 0.01 K/UL — SIGNIFICANT CHANGE UP (ref 0–0.2)
BASOPHILS NFR BLD AUTO: 0.1 % — SIGNIFICANT CHANGE UP (ref 0–2)
BUN SERPL-MCNC: 16 MG/DL — SIGNIFICANT CHANGE UP (ref 7–23)
CALCIUM SERPL-MCNC: 9 MG/DL — SIGNIFICANT CHANGE UP (ref 8.4–10.5)
CHLORIDE SERPL-SCNC: 96 MMOL/L — SIGNIFICANT CHANGE UP (ref 96–108)
CO2 SERPL-SCNC: 28 MMOL/L — SIGNIFICANT CHANGE UP (ref 22–31)
CREAT SERPL-MCNC: 0.86 MG/DL — SIGNIFICANT CHANGE UP (ref 0.5–1.3)
EOSINOPHIL # BLD AUTO: 0 K/UL — SIGNIFICANT CHANGE UP (ref 0–0.5)
EOSINOPHIL NFR BLD AUTO: 0 % — SIGNIFICANT CHANGE UP (ref 0–6)
GLUCOSE SERPL-MCNC: 131 MG/DL — HIGH (ref 70–99)
HCT VFR BLD CALC: 32.5 % — LOW (ref 34.5–45)
HGB BLD-MCNC: 10.8 G/DL — LOW (ref 11.5–15.5)
IMM GRANULOCYTES NFR BLD AUTO: 0.5 % — SIGNIFICANT CHANGE UP (ref 0–1.5)
LYMPHOCYTES # BLD AUTO: 1.09 K/UL — SIGNIFICANT CHANGE UP (ref 1–3.3)
LYMPHOCYTES # BLD AUTO: 11.4 % — LOW (ref 13–44)
MCHC RBC-ENTMCNC: 29 PG — SIGNIFICANT CHANGE UP (ref 27–34)
MCHC RBC-ENTMCNC: 33.2 GM/DL — SIGNIFICANT CHANGE UP (ref 32–36)
MCV RBC AUTO: 87.1 FL — SIGNIFICANT CHANGE UP (ref 80–100)
MONOCYTES # BLD AUTO: 0.27 K/UL — SIGNIFICANT CHANGE UP (ref 0–0.9)
MONOCYTES NFR BLD AUTO: 2.8 % — SIGNIFICANT CHANGE UP (ref 2–14)
NEUTROPHILS # BLD AUTO: 8.15 K/UL — HIGH (ref 1.8–7.4)
NEUTROPHILS NFR BLD AUTO: 85.2 % — HIGH (ref 43–77)
NRBC # BLD: 0 /100 WBCS — SIGNIFICANT CHANGE UP (ref 0–0)
PLATELET # BLD AUTO: 352 K/UL — SIGNIFICANT CHANGE UP (ref 150–400)
POTASSIUM SERPL-MCNC: 4.6 MMOL/L — SIGNIFICANT CHANGE UP (ref 3.5–5.3)
POTASSIUM SERPL-SCNC: 4.6 MMOL/L — SIGNIFICANT CHANGE UP (ref 3.5–5.3)
RBC # BLD: 3.73 M/UL — LOW (ref 3.8–5.2)
RBC # FLD: 13.5 % — SIGNIFICANT CHANGE UP (ref 10.3–14.5)
SODIUM SERPL-SCNC: 129 MMOL/L — LOW (ref 135–145)
WBC # BLD: 9.57 K/UL — SIGNIFICANT CHANGE UP (ref 3.8–10.5)
WBC # FLD AUTO: 9.57 K/UL — SIGNIFICANT CHANGE UP (ref 3.8–10.5)

## 2019-07-24 PROCEDURE — 72040 X-RAY EXAM NECK SPINE 2-3 VW: CPT | Mod: 26

## 2019-07-24 PROCEDURE — 99233 SBSQ HOSP IP/OBS HIGH 50: CPT

## 2019-07-24 RX ORDER — DIPHENHYDRAMINE HCL 50 MG
2 CAPSULE ORAL
Qty: 0 | Refills: 0 | DISCHARGE

## 2019-07-24 RX ORDER — DIAZEPAM 5 MG
1 TABLET ORAL
Qty: 21 | Refills: 0
Start: 2019-07-24 | End: 2019-07-30

## 2019-07-24 RX ORDER — OXYCODONE HYDROCHLORIDE 5 MG/1
2 TABLET ORAL
Qty: 56 | Refills: 0
Start: 2019-07-24 | End: 2019-07-30

## 2019-07-24 RX ORDER — SENNA PLUS 8.6 MG/1
2 TABLET ORAL
Qty: 0 | Refills: 0 | DISCHARGE
Start: 2019-07-24

## 2019-07-24 RX ORDER — ACETAMINOPHEN 500 MG
2 TABLET ORAL
Qty: 0 | Refills: 0 | DISCHARGE
Start: 2019-07-24 | End: 2019-08-06

## 2019-07-24 RX ORDER — DOCUSATE SODIUM 100 MG
1 CAPSULE ORAL
Qty: 0 | Refills: 0 | DISCHARGE
Start: 2019-07-24

## 2019-07-24 RX ADMIN — Medication 400 MILLIGRAM(S): at 06:56

## 2019-07-24 RX ADMIN — Medication 100 MILLIGRAM(S): at 00:03

## 2019-07-24 RX ADMIN — Medication 1000 MILLIGRAM(S): at 01:30

## 2019-07-24 RX ADMIN — HYDROMORPHONE HYDROCHLORIDE 0.5 MILLIGRAM(S): 2 INJECTION INTRAMUSCULAR; INTRAVENOUS; SUBCUTANEOUS at 03:35

## 2019-07-24 RX ADMIN — OXYCODONE HYDROCHLORIDE 10 MILLIGRAM(S): 5 TABLET ORAL at 09:15

## 2019-07-24 RX ADMIN — HYDROMORPHONE HYDROCHLORIDE 0.5 MILLIGRAM(S): 2 INJECTION INTRAMUSCULAR; INTRAVENOUS; SUBCUTANEOUS at 03:16

## 2019-07-24 RX ADMIN — HYDROMORPHONE HYDROCHLORIDE 0.5 MILLIGRAM(S): 2 INJECTION INTRAMUSCULAR; INTRAVENOUS; SUBCUTANEOUS at 00:05

## 2019-07-24 RX ADMIN — Medication 400 MILLIGRAM(S): at 01:00

## 2019-07-24 RX ADMIN — Medication 100 MILLIGRAM(S): at 05:31

## 2019-07-24 RX ADMIN — Medication 100 MILLIGRAM(S): at 08:13

## 2019-07-24 RX ADMIN — OXYCODONE HYDROCHLORIDE 10 MILLIGRAM(S): 5 TABLET ORAL at 08:17

## 2019-07-24 RX ADMIN — Medication 1000 MILLIGRAM(S): at 07:33

## 2019-07-24 RX ADMIN — PANTOPRAZOLE SODIUM 40 MILLIGRAM(S): 20 TABLET, DELAYED RELEASE ORAL at 05:31

## 2019-07-24 NOTE — DISCHARGE NOTE PROVIDER - CARE PROVIDER_API CALL
Phong Adrian (MD)  Orthopaedic Surgery  [address], [address]  [place] [address]  Phone: [phone]  Fax: [phone]  Follow Up Time: 2 weeks

## 2019-07-24 NOTE — DISCHARGE NOTE PROVIDER - NSDCCPCAREPLAN_GEN_ALL_CORE_FT
PRINCIPAL DISCHARGE DIAGNOSIS  Diagnosis: Cervical spinal stenosis  Assessment and Plan of Treatment: Avoid twisting and bending of neck.  DO NOT drive. You may start your diet with cool liquids and advance to a regular diet as tolerated.  No heavy lifting.  Avoid getting your dressing wet- bath or careful showering.  Change dressing if it become wet or loose using dry gauze and tape.  The steristrips should remain in place unless they are very loose at which time they can be removed.  Call your MD if you have  new numbness, weakness, severe headache, swelling or bleeding.

## 2019-07-24 NOTE — PROGRESS NOTE ADULT - ASSESSMENT
70 y/o F with PMH of HTN, Dyslipidemia, Spinal stenosis, OA, GERD, and Obesity s/p anterior cervical laminectomy(june 2019) s/p posterior cervical laminectomy POD #0  1. S/P Posterior Cervical Laminectomy - surgical management by primary team.  Pain medications as indicated. PT Eval.   2. HTN - continue losartan with hold parameters  3. HLD - continue statin  4. OA - continue naproxen PRN  5. GERD - continue PPI   6. DVT proph - SCDs.  Ambulate as tolerated.    7.Hyponatremia- improving. check BMP in 3-5 days.    Plan of care was discuss with patient, all questions were answered, seems understand and in agreement.

## 2019-07-24 NOTE — DISCHARGE NOTE PROVIDER - NSDCCPTREATMENT_GEN_ALL_CORE_FT
PRINCIPAL PROCEDURE  Procedure: Decompression of cervical spine with fusion  Findings and Treatment: PSF C4/5

## 2019-07-24 NOTE — DISCHARGE NOTE NURSING/CASE MANAGEMENT/SOCIAL WORK - NSDCDPATPORTLINK_GEN_ALL_CORE
You can access the [facility], offered by [facility], by registering with the following website: [email or web address]

## 2019-07-24 NOTE — PHYSICAL THERAPY INITIAL EVALUATION ADULT - ADDITIONAL COMMENTS
pt lives with her , has 8 OSVALDO with bilateral handrail, has a rolling walker and shower chair. Was not using assistive device prior to surgery.

## 2019-07-24 NOTE — OCCUPATIONAL THERAPY INITIAL EVALUATION ADULT - ADDITIONAL COMMENTS
Lives with her . Daughter nearby to assist if needed also. 8 steps to enter with handrail. Tub with showerchair in the bathroom. pt has a comfort height toilet . Pt has a RW if needed

## 2019-07-24 NOTE — DISCHARGE NOTE PROVIDER - HOSPITAL COURSE
This patient was admitted to [facility] with a history of cervical stenosis.  Patient went to Pre-Surgical Testing at [facility] and was medically cleared to undergo elective procedure. Patient underwent C4/5 decompression and posterior fusion by Dr. Phong Adrian on 7/23/19. Procedure was well tolerated.  No operative or michael-operative complications arose during patients hospital course.  Patient received antibiotic according to SCIP guidelines for infection prevention. SCDs were used for DVT prophylaxis.  Anesthesia, Medical Hospitalist, Physical Therapy and Occupational Therapy were consulted. Patient is stable for discharge with a good prognosis.  Appropriate discharge instructions and medications are provided in this document.

## 2019-07-24 NOTE — DISCHARGE NOTE PROVIDER - INSTRUCTIONS
no change in diet  Pain medicine has been prescribed for you, as needed, and it often causes constipation.    For Constipation :   • Increase your water intake. Drink at least 8 glasses of water daily.  • Try adding fiber to your diet by eating fruits, vegetables and foods that are rich in grains.  • If you do experience constipation, you may take an over-the-counter stool softener/laxative such as Colace, Senokot or  Milk of Magnesia.

## 2019-07-24 NOTE — PROGRESS NOTE ADULT - SUBJECTIVE AND OBJECTIVE BOX
PatientDIPIETRO, PATRICIAMEDICATIONS  (PRN):  diazepam    Tablet 5 milliGRAM(s) Oral every 8 hours PRN muscle spasms  HYDROmorphone  Injectable 0.5 milliGRAM(s) IV Push every 3 hours PRN Severe Pain (7 - 10)  magnesium hydroxide Suspension 30 milliLiter(s) Oral every 12 hours PRN Constipation  oxyCODONE    IR 5 milliGRAM(s) Oral every 3 hours PRN Mild Pain (1 - 3)  oxyCODONE    IR 10 milliGRAM(s) Oral every 3 hours PRN Moderate Pain (4 - 6)      s/p     Cervical Laminectomy,  Posterior Cervical Fusion  C4-5  POD# 1  Alert  Oriented   Preoperative left arm/hand sensation improving,  right hand sensation improving, BLE without complaint  c/o     moderate surgical site pain      mild headache    No nausea    chest pain      shortness of breath    chest pain  Vital Signs Last 24 Hrs  T(C): 36.6 (24 Jul 2019 03:15), Max: 36.9 (23 Jul 2019 21:20)  T(F): 97.9 (24 Jul 2019 03:15), Max: 98.4 (23 Jul 2019 21:20)  HR: 77 (24 Jul 2019 03:15) (65 - 84)  BP: 148/80 (24 Jul 2019 03:15) (148/80 - 161/89)  BP(mean): --  RR: 18 (24 Jul 2019 03:15) (10 - 24)  SpO2: 99% (24 Jul 2019 03:15) (96% - 100%)  HEALTH ISSUES - PROBLEM Dx:                              10.8   9.57  )-----------( 352      ( 24 Jul 2019 07:17 )             32.5         I&O's Detail    23 Jul 2019 07:01  -  24 Jul 2019 07:00  --------------------------------------------------------  IN:    IV PiggyBack: 350 mL    lactated ringers.: 900 mL    lactated ringers.: 1650 mL    Oral Fluid: 240 mL  Total IN: 3140 mL    OUT:    Accordian: 20 mL  Total OUT: 20 mL    Total NET: 3120 mL          Physical Exam  Surgical Site Dressing clean and dry     NO active drainage  Motor  EHL/Plantarflexion/Dorsiflexion right and left present                /  wrist flexion and extension/ biceps/  triceps  left and right present                Hemovac removed	        Assessment and Plan  s/p   Cervical Laminectomy  Posterior Crevical Fusion  C4-5  Orthopedically stable  Continue PT/OT  Pain management  PRN  narcotics  PCA  Medical care per hospitalist  Possible home discharge today

## 2019-07-31 ENCOUNTER — CHART COPY (OUTPATIENT)
Age: 69
End: 2019-07-31

## 2019-08-05 ENCOUNTER — APPOINTMENT (OUTPATIENT)
Dept: ORTHOPEDIC SURGERY | Facility: CLINIC | Age: 69
End: 2019-08-05
Payer: MEDICARE

## 2019-08-05 VITALS
SYSTOLIC BLOOD PRESSURE: 134 MMHG | HEART RATE: 87 BPM | BODY MASS INDEX: 31.94 KG/M2 | WEIGHT: 178 LBS | DIASTOLIC BLOOD PRESSURE: 81 MMHG | HEIGHT: 62.5 IN

## 2019-08-05 DIAGNOSIS — M41.50 OTHER SECONDARY SCOLIOSIS, SITE UNSPECIFIED: ICD-10-CM

## 2019-08-05 DIAGNOSIS — M43.10 SPONDYLOLISTHESIS, SITE UNSPECIFIED: ICD-10-CM

## 2019-08-05 PROCEDURE — 72040 X-RAY EXAM NECK SPINE 2-3 VW: CPT

## 2019-08-05 PROCEDURE — 99024 POSTOP FOLLOW-UP VISIT: CPT

## 2019-08-05 NOTE — HISTORY OF PRESENT ILLNESS
[___ Weeks Post Op] : [unfilled] weeks post op [5] : the patient reports pain that is 5/10 in severity [Healed] : healed [Erythema] : not erythematous [Discharge] : absent of discharge [Swelling] : not swollen [Dehiscence] : not dehisced [Vascular Intact] : ~T peripheral vascular exam normal [Negative Nicolas's] : maneuvers demonstrated a negative Nicolas's sign [Doing Well] : is doing well [Excellent Pain Control] : has excellent pain control [No Sign of Infection] : is showing no signs of infection [Steri-Strips Removed & Replaced] : steri-strips removed and replaced [Sutures Removed] : sutures were removed [Limited ADLs] : to participate in activities of daily living with limitations [No Work] : not to work [Limited Housework] : to do housework with limitations [No Sports] : not to participate in sports [de-identified] : s/p posterior cervical laminectomy fusion C4-5, posterior cervical instrumentation c4-5 7/23/19.  [de-identified] : pt presents here today for first postoperative visit. Pt reports still has some heaviness in left arm, slightly less numbness in bilateral hands. Reports some pain at incision site 5-7/10 in intensity.  [de-identified] : L. Pete 4+ out of 5 strength of bilateral upper extremities. S1 biceps triceps record reflexes bilaterally. Residual numbness or her hands bilaterally along the middle 3 fingers.Cannot tandem walk in a straight line. Seen with a soft cervical collar. [de-identified] : AP lateral of cervical spine demonstrates anterior cervical plate and interbody cage at the C4-5 level in good position. Posterior cervical instrumentation with lateral mass screws at C4-5 also noted with laminectomy is not clearly seen because of the overlying instrumentation on the x-rays. Normal cervical lordosis. No obvious fractures. [de-identified] : Patient is doing well with improvement of her arm symptoms compared to prior to her surgery. She has less weakness but continues to report some numbness along the hands bilaterally. Also has some residual gait instability but does not report any incontinence.\par \par We discussed the option of physical therapy for her cervical spine pathology and recent surgeries but she would like to avoid that. Also recommended physical therapy for her gait imbalance which she would like to avoid that also. Prescribed her baclofen and diclofenac for her neck pain but also for her low back symptoms. She has fairly significant disc degeneration and spinal stenosis and lumbar spine which will need to be addressed separately.\par \par The patient reports smoking 2 cigarettes a day and recommended smoking cessation as well for her.\par \par The patient was educated regarding their condition, treatment options as well as prescribed course of treatment. \par Risks and benefits as well as alternatives to the proposed treatment were also provided to the patient \par They were given the opportunity to have all their questions answered to their satisfaction.\par \par Vital signs were reviewed with the patient and the patient was instructed to followup with their primary care provider for further management.\par \par Healthy lifestyle recommendations were also made including a tobacco free lifestyle, proper diet, and weight control.

## 2019-08-12 NOTE — PROGRESS NOTE ADULT - SUBJECTIVE AND OBJECTIVE BOX
PREOPERATIVE HISTORY AND PHYSICAL  Chief Complaint   Patient presents with   • Pre-Op Exam     H&P for surgery on 8-20-19 at [facility] with Dr. Krueger for Left Inguinal Hernia Repair        This is Lia Manriquez MA acting as a scribe for Dr. Aly Cruz      SURGEON:  Dr. Darius Krueger MD    PROCEDURE:  Left inguinal hernia repair    DATE OF PROCEDURE:  8/20/2019    FACILITY:  [facility] MAIN OR    HISTORY OF PRESENT ILLNESS:  The patient is a 85 year old male, who is being evaluated preoperatively at the request of Dr. Darius Meyers MD  The patient has had the presence of the left inguinal hernia for over 1-2 months with it being larger in size and also having to \"push it back\" in occasionally. The patient denies any bowel changes, pain, or physical restrictions with this. The patient does have elevated blood pressure in the office today. He denies any chest pain, palpitations, lower extremity edema, or shortness of breath.     ALLERGIES:    ALLERGIES:  No Known Allergies    CURRENT MEDICATIONS:    Current Outpatient Medications   Medication Sig Dispense Refill   • tamsulosin (FLOMAX) 0.4 MG Cap Take 0.4 mg by mouth daily after a meal.       No current facility-administered medications for this visit.        MEDICAL HISTORY:    History reviewed. No pertinent past medical history.    SURGICAL HISTORY:    Past Surgical History:   Procedure Laterality Date   • Hemorrhoid surgery         FAMILY HISTORY:    Family History   Problem Relation Age of Onset   • Patient is unaware of any medical problems Mother    • Patient is unaware of any medical problems Father        SOCIAL HISTORY:    Social History     Tobacco Use   • Smoking status: Current Every Day Smoker     Packs/day: 1.00     Types: Cigarettes     Start date: 1/1/1956   • Smokeless tobacco: Never Used   Substance Use Topics   • Alcohol use: Yes     Comment: rare   • Drug use: Never       REVIEW OF SYSTEMS:  Constitutional:  Denies fevers.  Denies chills.   Denies tiredness or malaise.   Eyes:  Denies change in visual acuity.  Denies eye pain.  Denies eye burning. Denies eye itching.   Immunologic:  Denies hives, seasonal allergies.   HENT:  Denies sinus problems.  Denies ear infections.  Denies nasal congestion.  Denies nose bleeding.  Denies gingival bleeding.  Denies sore throat.   Respiratory:  Denies cough, shortness of breath.  Denies history of problems with intubation or anesthesia.  Cardiovascular:  Denies chest pain, edema.   Gastrointestinal:  Denies abdominal pain, nausea, vomiting, bloody or dark stools, diarrhea.  Genitourinary:  Denies urine retention, painful urination, urinary frequency, blood in urine or nocturia.   Musculoskeletal:  Denies back pain, neck pain, joint pain or leg swelling.   Integument:  Denies rash, itching.   Neurologic:  Denies headache, focal weakness or sensory changes.   Endocrine:  Denies polyuria, polydipsia or temperature intolerance.   Lymphatic:  Denies swollen glands, weight loss.  All other systems reviewed and negative.    PHYSICAL EXAM    VITAL SIGNS:    Vitals:    08/12/19 1445   BP: (!) 150/94   Pulse: 80   Temp: 97.7 °F (36.5 °C)   TempSrc: Tympanic   SpO2: 99%   Weight: 59 kg   Height: 5' 3.5\" (1.613 m)     Constitutional:  A+O (Alert and oriented) x3.  In NAD (no acute distress).  HENT:  Normocephalic.  Atraumatic.  Bilateral external ears normal. Oropharynx moist.  No oral exudates.  No tonsillar or uvular edema.  Nose normal.   Neck:  Normal range of motion.  No tenderness.  Supple.  No stridor.    Eyes:  PERRL (Pupils equal, round, reactive to light), EOMI (extraocular movements intact).  Conjunctivae normal.  No discharge.    Cardiovascular:  Normal rate.  Normal rhythm.  No murmurs, gallops, or rubs.    Respiratory:  No respiratory distress.  Normal breath sounds.  No rales.  No wheezing.    Gastrointestinal:  Bowel sounds normal.  Soft.  No tenderness.  No masses.  No pulsatile masses.    Integument:  Warm.   Orthopedic Spine Progress Note      POST OPERATIVE DAY #: 7  STATUS POST: ACDF with decompression C4-5, admitted with left leg pain and groin pain and difficulty walking on 6/18/19	    SUBJECTIVE: Patient seen and examined.     Pain (0-10): 5  Current Pain Management: [x ] Po Analgesics [x ] IM /IV Anagesics     Vital Signs Last 24 Hrs  T(C): 36.6 (20 Jun 2019 13:25), Max: 37.1 (20 Jun 2019 09:35)  T(F): 97.8 (20 Jun 2019 13:25), Max: 98.7 (20 Jun 2019 09:35)  HR: 80 (20 Jun 2019 13:25) (67 - 80)  BP: 104/68 (20 Jun 2019 13:25) (99/63 - 138/79)  BP(mean): --  RR: 17 (20 Jun 2019 13:25) (16 - 17)  SpO2: 96% (20 Jun 2019 13:25) (94% - 96%)  I&O's Detail    20 Jun 2019 07:01  -  20 Jun 2019 16:56  --------------------------------------------------------  IN:    Oral Fluid: 400 mL  Total IN: 400 mL    OUT:  Total OUT: 0 mL    Total NET: 400 mL    OBJECTIVE:         Wound /Dressing: clean dry intact, cervical collar in place  Neurological: A/O x               Sensation: [x] intact to light touch           Motor exam: [  ]          [ x] Upper extremity 4+/5 arm strength bilaterally           [x] Lower ext.  at least 4+/5 strength bilateral legs             Tension Signs: - SLR             RADIOLOGY & ADDITIONAL STUDIES:               < from: MR Cervical Spine No Cont (06.20.19 @ 11:58) >  EXAM:  MR SPINE CERVICAL                            PROCEDURE DATE:  06/20/2019          INTERPRETATION:  Exam Date: 6/20/2019 11:58 AM    MR cervical  without gadolinium     CLINICAL INFORMATION:  s/p ACDF    TECHNIQUE:  Sagittal T1-weighted,T2-weighted, and inversion recovery   images, and axial T1 and T2-weighted gradient-echo images of the cervical   spine were obtained.    FINDINGS: Comparison is made to MRI cervical spine of 5/28/2019.    Reidentified is ACDF at C4 and C5 with placement of an intervertebral   disc spacer at C4/C5.     At C2/C3, there is no significant degenerative disc disease.    At C3/C4, there is left greater than right facet arthropathy resulting in   narrowing of the left neural foramen without significant central spinal   canal stenosis.    At C4/C5, there is an intervertebral disc spacer present. There is   bilateral facet arthropathy resulting in marked narrowing of the   bilateral neural foramen and mild to moderate central spinal canal   stenosis. T2 hyperintense signal within the cervical cord at the C4/C5   level, as seen on prior exam, compatible with stable myelomalacia.    At C5/C6, is a posterior disc bulge and bilateral uncovertebral spurring   and facet arthropathy resulting in narrowing of the bilateral neural   foramen and mild central spinal canal stenosis.    At C6/C7, there is a posterior disc bulge, uncovertebral spurring and   facet arthropathy, resulting in mild narrowing of the bilateral neural   foramen and no significant central spinal canal stenosis.    At C7/T1, there is bilateral facet arthropathy resulting in mild   narrowing of the bilateral neural foramen.    The cervical cord maintains intact morphology  No cord signal intensity   abnormality or focal cord lesion is appreciated.  The cervical medullary   junction remains intact.      IMPRESSION: Status post ACDF at C4 and C5 with placement of an   intervertebral disc spacer. T2 hyperintense signal within the cervical   cord at the C4/C5 level, as seen on prior exam, compatible with stable   myelomalacia. Rest of the degenerative changes as above.    ADWOA TOURE M.D., ATTENDING RADIOLOGIST  This document has been electronically signed. Jun 20 2019 12:22PM      < end of copied text >    < from: MR Lumbar Spine No Cont (06.20.19 @ 11:41) >  EXAM:  MR SPINE LUMBAR                            PROCEDURE DATE:  06/20/2019          INTERPRETATION:  Exam Date: 6/20/2019 11:41 AM    MR lumbar without gadolinium    CLINICAL INFORMATION:   Severe left groin pain.    TECHNIQUE:   Sagittal and axial T1-weighted images, sagittal inversion   recovery images, and sagittal and axial T1-weighted and T2-weighted   images of the lumbar spine were obtained.     FINDINGS: Comparison is made to CT lumbar of 6/18/2019.    There is marked levoscoliosis of the lumbar spine. Additionally, there is   grade 1 retrolisthesis of L2 on L3 and L3 on L4. Scattered marrow signal   changes throughout the lumbar spine, most suggestive of sequela of   degenerative change. No acute fracture is identified.    Multilevel degenerative changes as follows:    At L1/L2, there is a small posterior disc bulge, and right greater than   left facet arthropathy resulting in mild narrowing of the right neural   foramen and mild central spinal canal stenosis.    At L2/L3, there is a posterior disc bulge bilateral facet arthropathy and   dorsal epidural fat resulting in severe stenosis of the thecal sac and   mild narrowing of the bilateral neural foramen.    At L3/L4, there is a posterior disc bulge, bilateral facet arthropathy,   and dorsal epidural fat superimposed upon the levoscoliosis resulting in   moderate narrowing of bilateral neural foramen and moderate narrowing of   the thecal sac.    At L4/L5, there is a posterior disc bulge and left greater than right   facet arthropathy resulting in very severe narrowing of the left neural   foramen with compression upon the exiting left L4 nerve root. Mild right   foraminal narrowing and mild narrowing of the thecal sac.    At L5/S1, there is a very small posterior disc bulge asymmetric to the   left resulting in minimal narrowing of the left neural foramen and no   significant right foraminal or central spinal canal stenosis.    The distal cord maintains intact morphology.  Distal cord signal   intensity is preserved.  The conus is normally positioned at the T12   level.  Nerve roots of the cauda equina appear intact.        IMPRESSION: Multilevel degenerative changes from L1/L2-L5/S1 as detailed   above, most severe at L4/L5.  At L4/L5, there is a posterior disc bulge   and left greater than right facet arthropathy resulting in very severe   narrowing of the left neural foramen with compression upon the exiting   left L4 nerve root. Additional findings as above.    ADWOA TOURE M.D., ATTENDING RADIOLOGIST  This document has been electronically signed. Jun 20 2019 11:57AM    < end of copied text >      < from: MR Pelvis No Cont (06.20.19 @ 11:07) >    ******PRELIMINARY REPORT******    ******PRELIMINARY REPORT******          EXAM:  MR PELVIS                            PROCEDURE DATE:  06/20/2019    ******PRELIMINARY REPORT******    ******PRELIMINARY REPORT******              INTERPRETATION:  VRADRADIOLOGIST PRELIMINARY REPORT    EXAM:    MR Pelvis Without Contrast     EXAM DATE/TIME:    6/20/2019 10:29 AM     CLINICAL HISTORY:    69 years old, female; Hip pain and pelvic pain; Bilateral; Patient HX:   Pain   pelvis hips S/P surg c/spine; Additional info: This is a [facility]   PT 1   east. [phone]     TECHNIQUE:    Imaging protocol: Magnetic resonance images of the pelvis without   intravenous   contrast.     COMPARISON:    No relevant prior studies available.     FINDINGS:   Intraperitoneal space: No free fluid.    Stomach and bowel: Moderately severe distal colonic diverticulosis is   present,   with no definite evidence of acute diverticulitis.    Reproductive: Prior hysterectomy.    Bones/joints: Chronic degenerative discovertebral disease in the lumbar   spine   with levoscoliosis at L2/L3. Both hip joints and the sacroiliac joints   appear   normal. The sacrococcygeal spinal alignment is normal. The presacral soft   tissues are normal.    Soft tissues: There is edema and questionable fluid present in the soft   tissues surrounding the left iliac wing and lower left flank subcutaneous   tissues, seen on image 16 of series 5 and image 9 of series 7. This might   be   secondary to prior trauma however infection/osteomyelitis in the iliac   wing is   in the differential diagnosis. There appears to be a small amount of bone   marrow edema-like low T1 and high T2 signal within the marrow of the   anterior   left iliac wing on image 9 of series 7. This marrow edema is not   confirmed on   the coronal T2 sequence, series 10, however. A bone scan might be of   additional   diagnostic value if thought clinically indicated. Recommend clinical   correlation for any history of trauma to this left side iliac wing or   muscle   strain in the left gluteus minimus and iliacus musculature.     IMPRESSION:   1. There is edema and questionable fluid present in the soft tissues   surrounding the left iliac wing and lower left flank subcutaneous   tissues, seen   on image 16 of series 5 and image 9 of series 7. This might be secondary   to   prior trauma however infection/osteomyelitis in the iliac wing is in the   differential diagnosis. There appears to be a small amount of bone marrow   edema-like low T1 and high T2 signal within the marrow of the anterior   left   iliac wing on image 9 of series 7. This marrow edema is not confirmed on   the   coronal T2 sequence, series 10, however. A bone scan might be of   additional   diagnostic value if thought clinically indicated. Recommend clinical   correlation for any history of trauma to this left side iliac wing or   muscle   strain in the left gluteus minimus and iliacus musculature.     2. Chronic degenerative discovertebral disease in the lumbar spine with   levoscoliosis at L2/L3.     3. Both hip joints and the sacroiliac joints appear normal.    4. Moderately severe distal colonic diverticulosis is present, with no   definite   evidence of acute diverticulitis.      ******PRELIMINARY REPORT******    ******PRELIMINARY REPORT******          JACOB BRAY     < end of copied text >                                    LABS:                        12.2   9.00  )-----------( 324      ( 20 Jun 2019 07:53 )             36.4     06-20    130<L>  |  93<L>  |  35<H>  ----------------------------<  120<H>  4.0   |  29  |  1.52<H>    Ca    9.4      20 Jun 2019 07:53    TPro  7.7  /  Alb  3.3  /  TBili  0.5  /  DBili  x   /  AST  28  /  ALT  27  /  AlkPhos  76  06-18    PT/INR - ( 18 Jun 2019 19:30 )   PT: 11.4 sec;   INR: 1.02 ratio         PTT - ( 18 Jun 2019 19:30 )  PTT:30.3 sec    A/P :  69y Female   s/p:  ACDF C4-5 for myelopathy with acute onset left groin and left leg pain consistent with likely lumbar radiculopathy  -    Pain control  -    Mobilization with rolling walker  -    Medrol dosepack, gabapentin  -    may d/c home with f/u in 1 week as previously scheduled.  _    MRI pelvis findings likely related to left iliac crest bone graft, no clinical suspicion for infection  -    Weight bearing status: as toleraed  -    Dispo: Home Dry.  No erythema.  No rash.    Musculoskeletal:  Intact distal pulses.  No edema.  No tenderness.  No cyanosis.  No clubbing.  Good range of motion in all major joints.  No tenderness to palpation or major deformities noted.  Back - no tenderness.    Neurologic:  Alert and oriented x3.  Normal motor function.  Normal sensory function.  No focal deficits noted.        ASSESSMENT    85 year old male with planned surgery as above.          PLAN    1. Left inguinal hernia. The patient was sent for an EKG, and to check his CBC and CMP.  2. Essential Hypertension. Blood Pressure is elevated. The patient was educated on the importance of BP control. We did start the patient on Losartan 50 mg daily.   3. Patient is at low-risk scheduled  for a low-risk procedure therefore he is medically cleared to go ahead with the procedure          On 8/12/2019, Lia WILLINGHAM MA scribed the services personally performed by Aly Cruz MD.    The documentation recorded by the scribe accurately and completely reflects the service(s) I personally performed and the decisions made by me.           CC:  Dr. Darius Meyers MD

## 2019-09-09 ENCOUNTER — APPOINTMENT (OUTPATIENT)
Dept: ORTHOPEDIC SURGERY | Facility: CLINIC | Age: 69
End: 2019-09-09
Payer: MEDICARE

## 2019-09-09 VITALS
SYSTOLIC BLOOD PRESSURE: 135 MMHG | DIASTOLIC BLOOD PRESSURE: 77 MMHG | HEART RATE: 90 BPM | WEIGHT: 178 LBS | BODY MASS INDEX: 31.94 KG/M2 | HEIGHT: 62.5 IN

## 2019-09-09 DIAGNOSIS — M48.02 SPINAL STENOSIS, CERVICAL REGION: ICD-10-CM

## 2019-09-09 PROCEDURE — 72040 X-RAY EXAM NECK SPINE 2-3 VW: CPT

## 2019-09-09 PROCEDURE — 99024 POSTOP FOLLOW-UP VISIT: CPT

## 2019-09-19 PROCEDURE — 97161 PT EVAL LOW COMPLEX 20 MIN: CPT

## 2019-09-19 PROCEDURE — 88304 TISSUE EXAM BY PATHOLOGIST: CPT

## 2019-09-19 PROCEDURE — 72040 X-RAY EXAM NECK SPINE 2-3 VW: CPT

## 2019-09-19 PROCEDURE — 76000 FLUOROSCOPY <1 HR PHYS/QHP: CPT

## 2019-09-19 PROCEDURE — 85027 COMPLETE CBC AUTOMATED: CPT

## 2019-09-19 PROCEDURE — 80048 BASIC METABOLIC PNL TOTAL CA: CPT

## 2019-09-19 PROCEDURE — C1713: CPT

## 2019-09-19 PROCEDURE — 88311 DECALCIFY TISSUE: CPT

## 2019-09-19 PROCEDURE — 36415 COLL VENOUS BLD VENIPUNCTURE: CPT

## 2019-09-19 PROCEDURE — C1889: CPT

## 2019-09-19 PROCEDURE — 97165 OT EVAL LOW COMPLEX 30 MIN: CPT

## 2019-10-14 ENCOUNTER — CHART COPY (OUTPATIENT)
Age: 69
End: 2019-10-14

## 2019-11-13 ENCOUNTER — APPOINTMENT (OUTPATIENT)
Dept: ORTHOPEDIC SURGERY | Facility: CLINIC | Age: 69
End: 2019-11-13
Payer: MEDICARE

## 2019-11-13 VITALS — DIASTOLIC BLOOD PRESSURE: 85 MMHG | HEART RATE: 92 BPM | SYSTOLIC BLOOD PRESSURE: 145 MMHG

## 2019-11-13 PROCEDURE — 72040 X-RAY EXAM NECK SPINE 2-3 VW: CPT

## 2019-11-13 PROCEDURE — 99214 OFFICE O/P EST MOD 30 MIN: CPT

## 2019-11-14 NOTE — PHYSICAL EXAM
[Ataxic] : ataxic [Valentine's Sign] : positive Valentine's sign [] : Motor: [___/5] : left ([unfilled]/5) [UE] : Sensory: Intact in bilateral upper extremities [3+] : left brachioradialis 3+ [Rad] : radial 2+ and symmetric bilaterally [Wide-Based] : not wide-based [Motor Strength Upper Extremities] : left (weak) [Plantar Reflex Right Only] : absent on the right [Plantar Reflex Left Only] : absent on the left [DTR Reflexes Clonus Of Right Ankle (___ Beats)] : absent on the right [DTR Reflexes Clonus Of Left Ankle (___ Beats)] : absent on the left [de-identified] : The pt is awake, alert and oriented to self, place and time, is comfortable and in no acute distress. Improving Romberg sign noted. Can heel and toe walk without difficulty. Inspection of the neck, back and upper extremities bilaterally reveals no rashes or ecchymotic lesions. There is no obvious abnormal spinal curvature in the sagittal and coronal planes. No crepitus or instability noted with range of motion of bilateral upper extremities. No joint laxity noted in the upper and lower extremities bilaterally. No atrophy or abnormal movements noted in the upper or lower extremities. No tenderness over the cervical, thoracic, lumbar spine or in the paraspinal, or upper and lower extremity musculature. There is no swelling noted in the upper or lower extremities bilaterally. No cervical lymphadenopathy noted anteriorly.\par Cervical spine range of motion is pain free. Forward flexion is 45 degrees, extension 20 degrees, rotation laterally 30 degrees bilaterally. Full range of motion of both shoulders. Negative Neer's sign and Hawkin's sign bilaterally. Negative Spurling's sign bilaterally. In the lumbar spine the patient can forward flex to mid tibia and extend 30°\par Negative Babinski sign and no clonus bilaterally in the upper or lower extremities. [de-identified] : healed anterior cervical and posterior cervical incisions [de-identified] : AP lateral of cervical spine demonstrates anterior cervical plate and interbody cage at the C4-5 level in good position. Posterior cervical instrumentation with lateral mass screws at C4-5 also noted with laminectomy is not clearly seen because of the overlying instrumentation on the x-rays. Normal cervical lordosis. No obvious fractures.  No interval change from prior xrays. No new acute findings

## 2019-11-14 NOTE — REASON FOR VISIT
[Follow-Up Visit] : a follow-up visit for [FreeTextEntry2] : s/p cervical laminectomy/fusion 7/23/19.

## 2019-11-14 NOTE — DISCUSSION/SUMMARY
[Medication Risks Reviewed] : Medication risks reviewed [de-identified] : Pt is doing well with improvements of neck back and arm symptoms. Still reports some ataxia in her gait. Reinforced need for physical therapy if possible and self directed exercises. I will see her back in 6 months from surgery ~Jan or Feb 2020. She is please with outcome of surgery.No new complaints.

## 2019-11-14 NOTE — HISTORY OF PRESENT ILLNESS
[Improving] : improving [3] : a current pain level of 3/10 [Sitting] : sitting [Daily] : ~He/She~ states the symptoms seem to be occuring daily [Rest] : relieved by rest [Prolonged Sitting] : worsened by prolonged sitting [de-identified] : Patient presents here today for a evaluation,Patient complaint of spasms on the right side of her neck only when working, Numbness in her bilateral hands is improving. No recently known injuries. Patient mentions as soon as she feels her spasm return her pain medication relief pain for a couple hours, Patient reports noticing ongoing improvements. \par Could not do PT. \par Returned to work 9/16/19. Down to 1 tramadol a day

## 2019-12-12 ENCOUNTER — CHART COPY (OUTPATIENT)
Age: 69
End: 2019-12-12

## 2020-01-01 ENCOUNTER — LABORATORY RESULT (OUTPATIENT)
Age: 70
End: 2020-01-01

## 2020-01-01 ENCOUNTER — APPOINTMENT (OUTPATIENT)
Dept: HEMATOLOGY ONCOLOGY | Facility: CLINIC | Age: 70
End: 2020-01-01
Payer: MEDICARE

## 2020-01-01 ENCOUNTER — APPOINTMENT (OUTPATIENT)
Dept: INFUSION THERAPY | Facility: HOSPITAL | Age: 70
End: 2020-01-01

## 2020-01-01 ENCOUNTER — APPOINTMENT (OUTPATIENT)
Dept: CT IMAGING | Facility: HOSPITAL | Age: 70
End: 2020-01-01
Payer: MEDICARE

## 2020-01-01 ENCOUNTER — OUTPATIENT (OUTPATIENT)
Dept: OUTPATIENT SERVICES | Facility: HOSPITAL | Age: 70
LOS: 1 days | Discharge: HOME | End: 2020-01-01

## 2020-01-01 ENCOUNTER — OUTPATIENT (OUTPATIENT)
Dept: OUTPATIENT SERVICES | Facility: HOSPITAL | Age: 70
LOS: 1 days | End: 2020-01-01
Payer: MEDICARE

## 2020-01-01 ENCOUNTER — APPOINTMENT (OUTPATIENT)
Dept: RADIATION ONCOLOGY | Facility: CLINIC | Age: 70
End: 2020-01-01

## 2020-01-01 ENCOUNTER — RESULT REVIEW (OUTPATIENT)
Age: 70
End: 2020-01-01

## 2020-01-01 ENCOUNTER — APPOINTMENT (OUTPATIENT)
Dept: RADIATION ONCOLOGY | Facility: CLINIC | Age: 70
End: 2020-01-01
Payer: MEDICARE

## 2020-01-01 ENCOUNTER — NON-APPOINTMENT (OUTPATIENT)
Age: 70
End: 2020-01-01

## 2020-01-01 ENCOUNTER — APPOINTMENT (OUTPATIENT)
Dept: THORACIC SURGERY | Facility: CLINIC | Age: 70
End: 2020-01-01
Payer: MEDICARE

## 2020-01-01 ENCOUNTER — APPOINTMENT (OUTPATIENT)
Dept: NUCLEAR MEDICINE | Facility: IMAGING CENTER | Age: 70
End: 2020-01-01
Payer: MEDICARE

## 2020-01-01 ENCOUNTER — APPOINTMENT (OUTPATIENT)
Dept: CT IMAGING | Facility: HOSPITAL | Age: 70
End: 2020-01-01

## 2020-01-01 VITALS
DIASTOLIC BLOOD PRESSURE: 94 MMHG | RESPIRATION RATE: 17 BRPM | HEART RATE: 94 BPM | OXYGEN SATURATION: 99 % | HEIGHT: 62.48 IN | WEIGHT: 142.42 LBS | TEMPERATURE: 97.9 F | BODY MASS INDEX: 25.55 KG/M2 | SYSTOLIC BLOOD PRESSURE: 157 MMHG

## 2020-01-01 VITALS
OXYGEN SATURATION: 99 % | RESPIRATION RATE: 16 BRPM | DIASTOLIC BLOOD PRESSURE: 92 MMHG | HEART RATE: 83 BPM | WEIGHT: 137 LBS | SYSTOLIC BLOOD PRESSURE: 163 MMHG | TEMPERATURE: 97.8 F | BODY MASS INDEX: 24.58 KG/M2 | HEIGHT: 62.5 IN

## 2020-01-01 DIAGNOSIS — E66.9 OBESITY, UNSPECIFIED: Chronic | ICD-10-CM

## 2020-01-01 DIAGNOSIS — C15.9 MALIGNANT NEOPLASM OF ESOPHAGUS, UNSPECIFIED: ICD-10-CM

## 2020-01-01 DIAGNOSIS — Z90.710 ACQUIRED ABSENCE OF BOTH CERVIX AND UTERUS: Chronic | ICD-10-CM

## 2020-01-01 DIAGNOSIS — Z98.891 HISTORY OF UTERINE SCAR FROM PREVIOUS SURGERY: Chronic | ICD-10-CM

## 2020-01-01 DIAGNOSIS — Z98.890 OTHER SPECIFIED POSTPROCEDURAL STATES: Chronic | ICD-10-CM

## 2020-01-01 DIAGNOSIS — E86.0 DEHYDRATION: ICD-10-CM

## 2020-01-01 DIAGNOSIS — Z78.9 OTHER SPECIFIED HEALTH STATUS: ICD-10-CM

## 2020-01-01 DIAGNOSIS — Z87.39 PERSONAL HISTORY OF OTHER DISEASES OF THE MUSCULOSKELETAL SYSTEM AND CONNECTIVE TISSUE: Chronic | ICD-10-CM

## 2020-01-01 DIAGNOSIS — Z00.8 ENCOUNTER FOR OTHER GENERAL EXAMINATION: ICD-10-CM

## 2020-01-01 DIAGNOSIS — M50.30 OTHER CERVICAL DISC DEGENERATION, UNSPECIFIED CERVICAL REGION: ICD-10-CM

## 2020-01-01 DIAGNOSIS — R63.4 ABNORMAL WEIGHT LOSS: ICD-10-CM

## 2020-01-01 LAB
ALBUMIN SERPL ELPH-MCNC: 4.4 G/DL
ALBUMIN SERPL ELPH-MCNC: 4.4 G/DL
ALBUMIN SERPL ELPH-MCNC: 4.5 G/DL
ALP BLD-CCNC: 100 U/L
ALP BLD-CCNC: 107 U/L
ALP BLD-CCNC: 99 U/L
ALT SERPL-CCNC: 7 U/L
ALT SERPL-CCNC: 9 U/L
ALT SERPL-CCNC: 9 U/L
ANION GAP SERPL CALC-SCNC: 13 MMOL/L
ANION GAP SERPL CALC-SCNC: 14 MMOL/L
ANION GAP SERPL CALC-SCNC: 9 MMOL/L
AST SERPL-CCNC: 14 U/L
AST SERPL-CCNC: 14 U/L
AST SERPL-CCNC: 15 U/L
BASOPHILS # BLD AUTO: 0.02 K/UL
BASOPHILS # BLD AUTO: 0.03 K/UL
BASOPHILS # BLD AUTO: 0.03 K/UL — SIGNIFICANT CHANGE UP (ref 0–0.2)
BASOPHILS NFR BLD AUTO: 0.3 %
BASOPHILS NFR BLD AUTO: 0.4 %
BASOPHILS NFR BLD AUTO: 0.4 % — SIGNIFICANT CHANGE UP (ref 0–2)
BILIRUB SERPL-MCNC: 0.2 MG/DL
BILIRUB SERPL-MCNC: 0.2 MG/DL
BILIRUB SERPL-MCNC: 0.3 MG/DL
BUN SERPL-MCNC: 19 MG/DL
BUN SERPL-MCNC: 21 MG/DL
BUN SERPL-MCNC: 21 MG/DL
CALCIUM SERPL-MCNC: 9.7 MG/DL
CALCIUM SERPL-MCNC: 9.7 MG/DL
CALCIUM SERPL-MCNC: 9.9 MG/DL
CHLORIDE SERPL-SCNC: 102 MMOL/L
CHLORIDE SERPL-SCNC: 98 MMOL/L
CHLORIDE SERPL-SCNC: 99 MMOL/L
CO2 SERPL-SCNC: 23 MMOL/L
CO2 SERPL-SCNC: 24 MMOL/L
CO2 SERPL-SCNC: 25 MMOL/L
CREAT SERPL-MCNC: 0.95 MG/DL
CREAT SERPL-MCNC: 0.97 MG/DL
CREAT SERPL-MCNC: 1 MG/DL
EOSINOPHIL # BLD AUTO: 0.14 K/UL — SIGNIFICANT CHANGE UP (ref 0–0.5)
EOSINOPHIL # BLD AUTO: 0.23 K/UL
EOSINOPHIL # BLD AUTO: 0.28 K/UL
EOSINOPHIL NFR BLD AUTO: 1.8 % — SIGNIFICANT CHANGE UP (ref 0–6)
EOSINOPHIL NFR BLD AUTO: 3.4 %
EOSINOPHIL NFR BLD AUTO: 3.9 %
GLUCOSE SERPL-MCNC: 112 MG/DL
GLUCOSE SERPL-MCNC: 95 MG/DL
GLUCOSE SERPL-MCNC: 96 MG/DL
HCT VFR BLD CALC: 33.2 % — LOW (ref 34.5–45)
HCT VFR BLD CALC: 39.7 %
HCT VFR BLD CALC: 39.9 %
HGB BLD-MCNC: 10.6 G/DL — LOW (ref 11.5–15.5)
HGB BLD-MCNC: 12.7 G/DL
HGB BLD-MCNC: 13 G/DL
IMM GRANULOCYTES NFR BLD AUTO: 0.1 %
IMM GRANULOCYTES NFR BLD AUTO: 0.3 %
IMM GRANULOCYTES NFR BLD AUTO: 1.9 % — HIGH (ref 0–1.5)
LYMPHOCYTES # BLD AUTO: 1.2 K/UL
LYMPHOCYTES # BLD AUTO: 1.2 K/UL
LYMPHOCYTES # BLD AUTO: 1.52 K/UL — SIGNIFICANT CHANGE UP (ref 1–3.3)
LYMPHOCYTES # BLD AUTO: 19.5 % — SIGNIFICANT CHANGE UP (ref 13–44)
LYMPHOCYTES NFR BLD AUTO: 16.7 %
LYMPHOCYTES NFR BLD AUTO: 17.9 %
MAGNESIUM SERPL-MCNC: 2.1 MG/DL
MAGNESIUM SERPL-MCNC: 2.2 MG/DL
MAN DIFF?: NORMAL
MAN DIFF?: NORMAL
MCHC RBC-ENTMCNC: 26.2 PG — LOW (ref 27–34)
MCHC RBC-ENTMCNC: 27.7 PG
MCHC RBC-ENTMCNC: 29.1 PG
MCHC RBC-ENTMCNC: 31.9 GM/DL — LOW (ref 32–36)
MCHC RBC-ENTMCNC: 32 GM/DL
MCHC RBC-ENTMCNC: 32.6 GM/DL
MCV RBC AUTO: 82 FL — SIGNIFICANT CHANGE UP (ref 80–100)
MCV RBC AUTO: 86.5 FL
MCV RBC AUTO: 89.3 FL
MONOCYTES # BLD AUTO: 0.79 K/UL — SIGNIFICANT CHANGE UP (ref 0–0.9)
MONOCYTES # BLD AUTO: 0.93 K/UL
MONOCYTES # BLD AUTO: 0.96 K/UL
MONOCYTES NFR BLD AUTO: 10.1 % — SIGNIFICANT CHANGE UP (ref 2–14)
MONOCYTES NFR BLD AUTO: 13.3 %
MONOCYTES NFR BLD AUTO: 13.9 %
NEUTROPHILS # BLD AUTO: 4.3 K/UL
NEUTROPHILS # BLD AUTO: 4.72 K/UL
NEUTROPHILS # BLD AUTO: 5.17 K/UL — SIGNIFICANT CHANGE UP (ref 1.8–7.4)
NEUTROPHILS NFR BLD AUTO: 64.2 %
NEUTROPHILS NFR BLD AUTO: 65.6 %
NEUTROPHILS NFR BLD AUTO: 66.3 % — SIGNIFICANT CHANGE UP (ref 43–77)
NRBC # BLD: 0 /100 WBCS — SIGNIFICANT CHANGE UP (ref 0–0)
PLATELET # BLD AUTO: 318 K/UL
PLATELET # BLD AUTO: 338 K/UL
PLATELET # BLD AUTO: 448 K/UL — HIGH (ref 150–400)
POTASSIUM SERPL-SCNC: 4.9 MMOL/L
POTASSIUM SERPL-SCNC: 5.2 MMOL/L
POTASSIUM SERPL-SCNC: 5.5 MMOL/L
POTASSIUM SERPL-SCNC: 5.8 MMOL/L
PROT SERPL-MCNC: 6.5 G/DL
PROT SERPL-MCNC: 6.5 G/DL
PROT SERPL-MCNC: 6.8 G/DL
RBC # BLD: 4.05 M/UL — SIGNIFICANT CHANGE UP (ref 3.8–5.2)
RBC # BLD: 4.47 M/UL
RBC # BLD: 4.59 M/UL
RBC # FLD: 13.8 %
RBC # FLD: 16.2 %
RBC # FLD: 24.4 % — HIGH (ref 10.3–14.5)
SODIUM SERPL-SCNC: 134 MMOL/L
SODIUM SERPL-SCNC: 136 MMOL/L
SODIUM SERPL-SCNC: 136 MMOL/L
WBC # BLD: 7.8 K/UL — SIGNIFICANT CHANGE UP (ref 3.8–10.5)
WBC # FLD AUTO: 6.7 K/UL
WBC # FLD AUTO: 7.2 K/UL
WBC # FLD AUTO: 7.8 K/UL — SIGNIFICANT CHANGE UP (ref 3.8–10.5)

## 2020-01-01 PROCEDURE — 99214 OFFICE O/P EST MOD 30 MIN: CPT | Mod: 95

## 2020-01-01 PROCEDURE — 99213 OFFICE O/P EST LOW 20 MIN: CPT

## 2020-01-01 PROCEDURE — 71260 CT THORAX DX C+: CPT | Mod: 26

## 2020-01-01 PROCEDURE — 78815 PET IMAGE W/CT SKULL-THIGH: CPT | Mod: 26,PS

## 2020-01-01 PROCEDURE — 99442: CPT | Mod: GC,95

## 2020-01-01 PROCEDURE — 74177 CT ABD & PELVIS W/CONTRAST: CPT

## 2020-01-01 PROCEDURE — 74177 CT ABD & PELVIS W/CONTRAST: CPT | Mod: 26

## 2020-01-01 PROCEDURE — 71260 CT THORAX DX C+: CPT

## 2020-01-01 PROCEDURE — 99214 OFFICE O/P EST MOD 30 MIN: CPT

## 2020-01-01 PROCEDURE — 93306 TTE W/DOPPLER COMPLETE: CPT

## 2020-01-01 PROCEDURE — 99213 OFFICE O/P EST LOW 20 MIN: CPT | Mod: 95

## 2020-01-01 PROCEDURE — 78815 PET IMAGE W/CT SKULL-THIGH: CPT

## 2020-01-01 PROCEDURE — 82565 ASSAY OF CREATININE: CPT

## 2020-01-01 PROCEDURE — A9552: CPT

## 2020-01-01 PROCEDURE — 99205 OFFICE O/P NEW HI 60 MIN: CPT

## 2020-01-01 PROCEDURE — 93306 TTE W/DOPPLER COMPLETE: CPT | Mod: 26

## 2020-01-01 PROCEDURE — 99204 OFFICE O/P NEW MOD 45 MIN: CPT | Mod: 95

## 2020-01-01 PROCEDURE — 99024 POSTOP FOLLOW-UP VISIT: CPT | Mod: GC

## 2020-01-01 RX ORDER — LOSARTAN POTASSIUM 100 MG/1
TABLET, FILM COATED ORAL
Refills: 0 | Status: DISCONTINUED | COMMUNITY
End: 2020-01-01

## 2020-01-01 RX ORDER — IBUPROFEN 200 MG/1
200 TABLET, COATED ORAL
Refills: 0 | Status: ACTIVE | COMMUNITY

## 2020-01-01 RX ORDER — LIDOCAINE HYDROCHLORIDE 20 MG/ML
2 SOLUTION ORAL; TOPICAL
Qty: 300 | Refills: 0 | Status: ACTIVE | COMMUNITY
Start: 2020-01-01

## 2020-01-01 RX ORDER — TRAMADOL HYDROCHLORIDE 50 MG/1
50 TABLET, COATED ORAL
Qty: 30 | Refills: 0 | Status: DISCONTINUED | COMMUNITY
Start: 2020-04-09 | End: 2020-01-01

## 2020-01-01 RX ORDER — OXYCODONE HYDROCHLORIDE 5 MG/5ML
5 SOLUTION ORAL
Qty: 400 | Refills: 0 | Status: DISCONTINUED | COMMUNITY
Start: 2020-05-05 | End: 2020-01-01

## 2020-01-01 RX ORDER — GABAPENTIN 250 MG/5ML
300 SOLUTION ORAL 3 TIMES DAILY
Qty: 120 | Refills: 0 | Status: DISCONTINUED | COMMUNITY
Start: 2020-04-28 | End: 2020-01-01

## 2020-01-01 RX ORDER — PROCHLORPERAZINE MALEATE 10 MG/1
10 TABLET ORAL
Qty: 30 | Refills: 1 | Status: DISCONTINUED | COMMUNITY
Start: 2020-04-02 | End: 2020-01-01

## 2020-01-01 RX ORDER — DEXAMETHASONE 4 MG/1
4 TABLET ORAL
Qty: 50 | Refills: 0 | Status: DISCONTINUED | COMMUNITY
Start: 2020-04-02 | End: 2020-01-01

## 2020-01-01 RX ORDER — OXYCODONE 10 MG/1
10 TABLET ORAL
Qty: 56 | Refills: 0 | Status: DISCONTINUED | COMMUNITY
Start: 2020-05-12 | End: 2020-01-01

## 2020-01-01 RX ORDER — DIPHENHYDRAMINE HYDROCHLORIDE AND LIDOCAINE HYDROCHLORIDE AND ALUMINUM HYDROXIDE AND MAGNESIUM HYDRO
KIT
Qty: 300 | Refills: 2 | Status: DISCONTINUED | COMMUNITY
Start: 2020-01-01 | End: 2020-01-01

## 2020-01-01 RX ORDER — SUCRALFATE 1 G/10ML
1 SUSPENSION ORAL 3 TIMES DAILY
Qty: 1 | Refills: 0 | Status: DISCONTINUED | COMMUNITY
Start: 2020-04-21 | End: 2020-01-01

## 2020-01-01 RX ORDER — FENTANYL 25 UG/H
25 PATCH, EXTENDED RELEASE TRANSDERMAL
Qty: 10 | Refills: 0 | Status: DISCONTINUED | COMMUNITY
Start: 2020-05-18 | End: 2020-01-01

## 2020-01-01 RX ORDER — SUCRALFATE 1 G/10ML
1 SUSPENSION ORAL 3 TIMES DAILY
Qty: 600 | Refills: 5 | Status: DISCONTINUED | COMMUNITY
Start: 2020-01-01 | End: 2020-01-01

## 2020-01-01 RX ORDER — OXYCODONE 10 MG/1
10 TABLET ORAL EVERY 4 HOURS
Qty: 60 | Refills: 0 | Status: COMPLETED | COMMUNITY
Start: 2020-01-01 | End: 2020-01-01

## 2020-01-01 RX ORDER — DIPHENHYDRAMINE HYDROCHLORIDE AND LIDOCAINE HYDROCHLORIDE AND ALUMINUM HYDROXIDE AND MAGNESIUM HYDRO
KIT
Qty: 300 | Refills: 2 | Status: DISCONTINUED | COMMUNITY
Start: 2020-04-28 | End: 2020-01-01

## 2020-01-01 RX ORDER — PANTOPRAZOLE 40 MG/1
40 TABLET, DELAYED RELEASE ORAL
Qty: 30 | Refills: 0 | Status: DISCONTINUED | COMMUNITY
Start: 2020-04-14 | End: 2020-01-01

## 2020-01-01 RX ORDER — GABAPENTIN 300 MG/1
300 CAPSULE ORAL 3 TIMES DAILY
Qty: 90 | Refills: 0 | Status: DISCONTINUED | COMMUNITY
Start: 2020-05-12 | End: 2020-01-01

## 2020-01-01 RX ORDER — FLUCONAZOLE 200 MG/1
200 TABLET ORAL
Qty: 22 | Refills: 0 | Status: DISCONTINUED | COMMUNITY
Start: 2020-01-01 | End: 2020-01-01

## 2020-01-01 RX ORDER — LOSARTAN POTASSIUM 100 MG/1
100 TABLET, FILM COATED ORAL
Refills: 0 | Status: ACTIVE | COMMUNITY

## 2020-01-10 ENCOUNTER — CHART COPY (OUTPATIENT)
Age: 70
End: 2020-01-10

## 2020-03-13 ENCOUNTER — APPOINTMENT (OUTPATIENT)
Dept: CT IMAGING | Facility: HOSPITAL | Age: 70
End: 2020-03-13
Payer: MEDICARE

## 2020-03-13 ENCOUNTER — OUTPATIENT (OUTPATIENT)
Dept: OUTPATIENT SERVICES | Facility: HOSPITAL | Age: 70
LOS: 1 days | End: 2020-03-13
Payer: MEDICARE

## 2020-03-13 DIAGNOSIS — E66.9 OBESITY, UNSPECIFIED: Chronic | ICD-10-CM

## 2020-03-13 DIAGNOSIS — Z00.8 ENCOUNTER FOR OTHER GENERAL EXAMINATION: ICD-10-CM

## 2020-03-13 DIAGNOSIS — Z98.891 HISTORY OF UTERINE SCAR FROM PREVIOUS SURGERY: Chronic | ICD-10-CM

## 2020-03-13 DIAGNOSIS — Z87.39 PERSONAL HISTORY OF OTHER DISEASES OF THE MUSCULOSKELETAL SYSTEM AND CONNECTIVE TISSUE: Chronic | ICD-10-CM

## 2020-03-13 DIAGNOSIS — Z98.890 OTHER SPECIFIED POSTPROCEDURAL STATES: Chronic | ICD-10-CM

## 2020-03-13 DIAGNOSIS — Z90.710 ACQUIRED ABSENCE OF BOTH CERVIX AND UTERUS: Chronic | ICD-10-CM

## 2020-03-13 PROCEDURE — 74177 CT ABD & PELVIS W/CONTRAST: CPT | Mod: 26

## 2020-03-13 PROCEDURE — 71250 CT THORAX DX C-: CPT | Mod: 26

## 2020-03-13 PROCEDURE — 71250 CT THORAX DX C-: CPT

## 2020-03-13 PROCEDURE — 74177 CT ABD & PELVIS W/CONTRAST: CPT

## 2020-03-16 ENCOUNTER — APPOINTMENT (OUTPATIENT)
Dept: ORTHOPEDIC SURGERY | Facility: CLINIC | Age: 70
End: 2020-03-16
Payer: MEDICARE

## 2020-03-16 VITALS — HEART RATE: 92 BPM | DIASTOLIC BLOOD PRESSURE: 84 MMHG | SYSTOLIC BLOOD PRESSURE: 143 MMHG

## 2020-03-16 DIAGNOSIS — S14.129S: ICD-10-CM

## 2020-03-16 DIAGNOSIS — Z98.1 ARTHRODESIS STATUS: ICD-10-CM

## 2020-03-16 DIAGNOSIS — G56.02 CARPAL TUNNEL SYNDROME, LEFT UPPER LIMB: ICD-10-CM

## 2020-03-16 PROCEDURE — 99214 OFFICE O/P EST MOD 30 MIN: CPT

## 2020-03-16 NOTE — HISTORY OF PRESENT ILLNESS
[Stable] : stable [6] : a current pain level of 6/10 [Bending] : worsened by bending [Prolonged Sitting] : worsened by prolonged sitting [Prolonged Standing] : worsened by prolonged standing [de-identified] : Patient presents here today for re evaluation of neck pain s/p cervical fusion/laminectomy DOS 7/23/19, patient reports pain level today is 6/10 in intensity and worsens while at work ( patient is a ), currently takes tramadol and Advil with some relief, reports numbness and tingling on bilateral hands and arms at times. [de-identified] : while at work

## 2020-03-16 NOTE — DISCUSSION/SUMMARY
[Medication Risks Reviewed] : Medication risks reviewed [de-identified] : Pt is doing well with improvements of neck back and arm symptoms. Still reports some ataxia in her gait. Reinforced need for physical therapy if possible and self directed exercises.  She has reported some dysphasia at this time and I recommended further evaluation by the gastroenterologist who is ordered additional testing.  She was given tramadol for pain relief as needed.  We discussed the option of MRI of the cervical spine with and without contrast if her symptoms persisted in the left hand.  Also recommended consideration of nerve conduction studies by a neurologist for further evaluation once her dysphasia has been diagnosed and managed.\par \par The patient was educated regarding their condition, treatment options as well as prescribed course of treatment. \par Risks and benefits as well as alternatives to the proposed treatment were also provided to the patient \par They were given the opportunity to have all their questions answered to their satisfaction.\par \par Vital signs were reviewed with the patient and the patient was instructed to followup with their primary care provider for further management.\par \par Healthy lifestyle recommendations were also made including a tobacco free lifestyle, proper diet, and weight control.

## 2020-03-16 NOTE — REASON FOR VISIT
[Follow-Up Visit] : a follow-up visit for [FreeTextEntry2] : S/P Cervical laminectomy/fusion 7/23/19, neck pain

## 2020-03-16 NOTE — PHYSICAL EXAM
[Ataxic] : ataxic [Valentine's Sign] : positive Valentine's sign [] : Motor: [___/5] : left ([unfilled]/5) [Motor Strength Upper Extremities] : left (5/5) [UE] : Sensory: Intact in bilateral upper extremities [3+] : left brachioradialis 3+ [Rad] : radial 2+ and symmetric bilaterally [Wide-Based] : not wide-based [Plantar Reflex Right Only] : absent on the right [Plantar Reflex Left Only] : absent on the left [DTR Reflexes Clonus Of Right Ankle (___ Beats)] : absent on the right [DTR Reflexes Clonus Of Left Ankle (___ Beats)] : absent on the left [de-identified] : The pt is awake, alert and oriented to self, place and time, is comfortable and in no acute distress. Improving Romberg sign noted. Can heel and toe walk without difficulty. Inspection of the neck, back and upper extremities bilaterally reveals no rashes or ecchymotic lesions. There is no obvious abnormal spinal curvature in the sagittal and coronal planes. No crepitus or instability noted with range of motion of bilateral upper extremities. No joint laxity noted in the upper and lower extremities bilaterally. No atrophy or abnormal movements noted in the upper or lower extremities. No tenderness over the cervical, thoracic, lumbar spine or in the paraspinal, or upper and lower extremity musculature. There is no swelling noted in the upper or lower extremities bilaterally. No cervical lymphadenopathy noted anteriorly.\par Cervical spine range of motion is pain free. Forward flexion is 45 degrees, extension 20 degrees, rotation laterally 30 degrees bilaterally. Full range of motion of both shoulders. Negative Neer's sign and Hawkin's sign bilaterally. Negative Spurling's sign bilaterally. In the lumbar spine the patient can forward flex to mid tibia and extend 30°\par Negative Babinski sign and no clonus bilaterally in the upper or lower extremities. [de-identified] : healed anterior cervical and posterior cervical incisions

## 2020-03-17 ENCOUNTER — OUTPATIENT (OUTPATIENT)
Dept: OUTPATIENT SERVICES | Facility: HOSPITAL | Age: 70
LOS: 1 days | Discharge: HOME | End: 2020-03-17
Payer: MEDICARE

## 2020-03-17 DIAGNOSIS — E66.9 OBESITY, UNSPECIFIED: Chronic | ICD-10-CM

## 2020-03-17 DIAGNOSIS — Z98.891 HISTORY OF UTERINE SCAR FROM PREVIOUS SURGERY: Chronic | ICD-10-CM

## 2020-03-17 DIAGNOSIS — Z98.890 OTHER SPECIFIED POSTPROCEDURAL STATES: Chronic | ICD-10-CM

## 2020-03-17 DIAGNOSIS — Z87.39 PERSONAL HISTORY OF OTHER DISEASES OF THE MUSCULOSKELETAL SYSTEM AND CONNECTIVE TISSUE: Chronic | ICD-10-CM

## 2020-03-17 DIAGNOSIS — Z90.710 ACQUIRED ABSENCE OF BOTH CERVIX AND UTERUS: Chronic | ICD-10-CM

## 2020-03-17 DIAGNOSIS — C15.9 MALIGNANT NEOPLASM OF ESOPHAGUS, UNSPECIFIED: ICD-10-CM

## 2020-03-18 ENCOUNTER — APPOINTMENT (OUTPATIENT)
Dept: NUCLEAR MEDICINE | Facility: CLINIC | Age: 70
End: 2020-03-18
Payer: MEDICARE

## 2020-03-18 ENCOUNTER — OUTPATIENT (OUTPATIENT)
Dept: OUTPATIENT SERVICES | Facility: HOSPITAL | Age: 70
LOS: 1 days | End: 2020-03-18
Payer: MEDICARE

## 2020-03-18 DIAGNOSIS — Z98.890 OTHER SPECIFIED POSTPROCEDURAL STATES: Chronic | ICD-10-CM

## 2020-03-18 DIAGNOSIS — Z87.39 PERSONAL HISTORY OF OTHER DISEASES OF THE MUSCULOSKELETAL SYSTEM AND CONNECTIVE TISSUE: Chronic | ICD-10-CM

## 2020-03-18 DIAGNOSIS — Z00.8 ENCOUNTER FOR OTHER GENERAL EXAMINATION: ICD-10-CM

## 2020-03-18 DIAGNOSIS — E66.9 OBESITY, UNSPECIFIED: Chronic | ICD-10-CM

## 2020-03-18 DIAGNOSIS — Z90.710 ACQUIRED ABSENCE OF BOTH CERVIX AND UTERUS: Chronic | ICD-10-CM

## 2020-03-18 DIAGNOSIS — Z98.891 HISTORY OF UTERINE SCAR FROM PREVIOUS SURGERY: Chronic | ICD-10-CM

## 2020-03-18 PROCEDURE — A9552: CPT

## 2020-03-18 PROCEDURE — 78815 PET IMAGE W/CT SKULL-THIGH: CPT | Mod: 26,PI

## 2020-03-18 PROCEDURE — 78815 PET IMAGE W/CT SKULL-THIGH: CPT

## 2020-03-19 ENCOUNTER — APPOINTMENT (OUTPATIENT)
Dept: HEMATOLOGY ONCOLOGY | Facility: CLINIC | Age: 70
End: 2020-03-19
Payer: MEDICARE

## 2020-03-19 VITALS
SYSTOLIC BLOOD PRESSURE: 170 MMHG | BODY MASS INDEX: 29.36 KG/M2 | RESPIRATION RATE: 18 BRPM | TEMPERATURE: 98.7 F | WEIGHT: 163.14 LBS | DIASTOLIC BLOOD PRESSURE: 93 MMHG | HEART RATE: 98 BPM | OXYGEN SATURATION: 99 %

## 2020-03-19 DIAGNOSIS — Z92.89 PERSONAL HISTORY OF OTHER MEDICAL TREATMENT: ICD-10-CM

## 2020-03-19 DIAGNOSIS — F17.200 NICOTINE DEPENDENCE, UNSPECIFIED, UNCOMPLICATED: ICD-10-CM

## 2020-03-19 DIAGNOSIS — Z78.9 OTHER SPECIFIED HEALTH STATUS: ICD-10-CM

## 2020-03-19 DIAGNOSIS — Z80.3 FAMILY HISTORY OF MALIGNANT NEOPLASM OF BREAST: ICD-10-CM

## 2020-03-19 PROCEDURE — 99205 OFFICE O/P NEW HI 60 MIN: CPT

## 2020-03-19 RX ORDER — OXYCODONE HYDROCHLORIDE 15 MG/1
15 TABLET ORAL
Qty: 120 | Refills: 0 | Status: DISCONTINUED | COMMUNITY
Start: 2017-03-17 | End: 2020-03-19

## 2020-03-19 RX ORDER — NAPROXEN 500 MG/1
500 TABLET ORAL
Qty: 30 | Refills: 0 | Status: DISCONTINUED | COMMUNITY
Start: 2019-06-26 | End: 2020-03-19

## 2020-03-19 RX ORDER — METAXALONE 800 MG/1
800 TABLET ORAL 3 TIMES DAILY
Qty: 20 | Refills: 0 | Status: DISCONTINUED | COMMUNITY
Start: 2020-03-10 | End: 2020-03-19

## 2020-03-19 RX ORDER — PREDNISONE 50 MG/1
TABLET ORAL
Refills: 0 | Status: DISCONTINUED | COMMUNITY
End: 2020-03-19

## 2020-03-19 RX ORDER — OXYCODONE 20 MG/1
20 TABLET ORAL
Qty: 120 | Refills: 0 | Status: DISCONTINUED | COMMUNITY
Start: 2017-06-30 | End: 2020-03-19

## 2020-03-19 RX ORDER — HYDROCHLOROTHIAZIDE 50 MG/1
50 TABLET ORAL
Qty: 90 | Refills: 0 | Status: DISCONTINUED | COMMUNITY
Start: 2017-06-10 | End: 2020-03-19

## 2020-03-19 RX ORDER — TRAMADOL HYDROCHLORIDE 50 MG/1
50 TABLET, COATED ORAL
Qty: 30 | Refills: 0 | Status: DISCONTINUED | COMMUNITY
Start: 2020-02-10 | End: 2020-03-19

## 2020-03-19 RX ORDER — TRAMADOL HYDROCHLORIDE 50 MG/1
50 TABLET, COATED ORAL EVERY 6 HOURS
Qty: 40 | Refills: 0 | Status: DISCONTINUED | COMMUNITY
Start: 2019-09-09 | End: 2020-03-19

## 2020-03-19 RX ORDER — OXYCODONE 5 MG/1
5 TABLET ORAL
Qty: 30 | Refills: 0 | Status: DISCONTINUED | COMMUNITY
Start: 2019-07-31 | End: 2020-03-19

## 2020-03-19 RX ORDER — GABAPENTIN 100 MG/1
100 CAPSULE ORAL
Qty: 120 | Refills: 0 | Status: DISCONTINUED | COMMUNITY
Start: 2017-06-30 | End: 2020-03-19

## 2020-03-19 RX ORDER — DICLOFENAC SODIUM 50 MG/1
50 TABLET, DELAYED RELEASE ORAL
Qty: 30 | Refills: 2 | Status: DISCONTINUED | COMMUNITY
Start: 2019-08-05 | End: 2020-03-19

## 2020-03-19 RX ORDER — BACLOFEN 10 MG/1
10 TABLET ORAL 3 TIMES DAILY
Qty: 30 | Refills: 0 | Status: DISCONTINUED | COMMUNITY
Start: 2019-08-05 | End: 2020-03-19

## 2020-03-19 RX ORDER — OXYCODONE HYDROCHLORIDE 20 MG/1
20 TABLET, FILM COATED, EXTENDED RELEASE ORAL
Qty: 60 | Refills: 0 | Status: DISCONTINUED | COMMUNITY
Start: 2017-06-30 | End: 2020-03-19

## 2020-03-19 RX ORDER — TRAMADOL HYDROCHLORIDE 50 MG/1
50 TABLET, COATED ORAL
Qty: 30 | Refills: 0 | Status: DISCONTINUED | COMMUNITY
Start: 2019-11-13 | End: 2020-03-19

## 2020-03-19 RX ORDER — VERAPAMIL HYDROCHLORIDE 40 MG/1
40 TABLET ORAL
Qty: 270 | Refills: 0 | Status: DISCONTINUED | COMMUNITY
Start: 2017-06-03 | End: 2020-03-19

## 2020-03-19 NOTE — RESULTS/DATA
[FreeTextEntry1] : esophageal mass biopsy-infiltrating moderately to poorly differentiated adenocarcinoma\par \par PET/CT- \par \par IMPRESSION: \par \par 1. FDG avid distal esophageal soft tissue thickening, consistent with known \par malignancy. \par \par 2. FDG avid gastrohepatic ligaments lymph nodes, probably metastatic. \par \par 3. FDG avid subcarinal lymph node probably metastatic. \par \par \par CT C/A/P- \par \par IMPRESSION: \par \par Wall thickening of the distal esophagus/hiatal hernia with gastrohepatic and \par mediastinal lymphadenopathy highly concerning for malignancy. \par \par Nodularity and lesion involving the left adrenal gland, slightly increased \par since prior exam 7/25/2020. \par \par Asymmetric wall thickening of the right bladder, slightly increased since \par prior exam; an underlying lesion is not definitively excluded. \par \par Asymmetric thickening of the left levator ani muscle which is not \par significantly changed since 7/25/2017 and may be related to chronic \par granulation tissue and/or postsurgical changes. \par \par 4. Minimally FDG avid subtle linear left upper lobe pulmonary infiltrate, \par nonspecific, new since CT March 13, 2020. \par \par \par \par

## 2020-03-19 NOTE — PHYSICAL EXAM
[Normal] : affect appropriate [de-identified] : scattered rhonchi/upper airway congestion [de-identified] : no dominant mass or nipple discharge

## 2020-03-19 NOTE — REVIEW OF SYSTEMS
[Patient Intake Form Reviewed] : Patient intake form was reviewed [Negative] : Allergic/Immunologic [Dysphagia] : dysphagia

## 2020-03-19 NOTE — ASSESSMENT
[FreeTextEntry1] : #1) esophageal cancer-reviewed scan results with patient and her , her diagnosis and treatment options. She will have an EBUS for further staging of her primary tumor. \par Would consider chemotherapy/radiation to start. Patient has consultation with thoracic surgeon for opinion, as well as will see radiation oncologist\par Potential chemotherapy options with benefits/side effects reviewed. To consider weekly Taxol/Carboplatin with radiation-potential side effects explained including but not limited to hypersensitivity reaction, neuro toxicity.\par Pending the above, will finalize oncology treatment plans.\par Note-discussed possibility of requiring G-tube for nutritional support pending course.\par #2) infiltrate on PET/CT scan with pulmonary symptoms-antibiotics ordered for possible pneumonia. Patient has no fever, or shortness of breath currently. If increased symptoms/concerns, would need to go to the emergency department.\par #3)? bladder abnormality on CAT scan-no reported abnormality on followup PET/CT scan. Further decisions regarding possible evaluation, pending esophageal management decisions.\par Smoking cessation efforts encouraged.\par Patient and her  were given the opportunity to ask questions. Their questions have been answered to their apparent satisfaction at this time.

## 2020-03-19 NOTE — CONSULT LETTER
[Dear  ___] : Dear  [unfilled], [Courtesy Letter:] : I had the pleasure of seeing your patient, [unfilled], in my office today. [Please see my note below.] : Please see my note below. [Consult Closing:] : Thank you very much for allowing me to participate in the care of this patient.  If you have any questions, please do not hesitate to contact me. [Sincerely,] : Sincerely, [DrLiyah  ___] : Dr. BARAHONA [FreeTextEntry3] : Dilma Kennedy M.D.

## 2020-03-19 NOTE — HISTORY OF PRESENT ILLNESS
[Disease: _____________________] : Disease: [unfilled] [N: ___] : N[unfilled] [de-identified] : Had C-spine surgeries 6/2019 and 7/2019-soon thereafter had difficulty swallowing which patient attributed to her neck surgeries. However, the feeling of something "sticking in her throat" when she ate or drank, gradually got worse. She is only able to tolerate liquids and tiny bits of solid foods now. Has lost 14 pounds in last few months.\par +Moderate to severe reflux. No vomiting.\par No abdominal pain. No CP, SOB. Patient has had a mild cough/pulmonary congestion. No fevers. No bleeding, melena.\par Upper endoscopy (Dr. Chew) showed a suspicious appearing lesion at the distal esophagus at 38 cm, circumferential, unable to traverse with the scope. Esophageal mass biopsy revealed infiltrating, moderately to poorly differentiated adenocarcinoma. CT scan chest/abdomen/pelvis showed wall thickening of the distal esophagus/hiatal hernia with gastrohepatic and mediastinal lymphadenopathy, highly concerning for malignancy. Asymmetric wall thickening of the right bladder slightly increased since prior exam. PET/CT scan showed FDG avid distal esophageal soft tissue thickening, consistent with known malignancy. FDG avid gastrohepatic ligaments, lymph nodes, probably metastatic. FDG avid subcarinal lymph node, probably metastatic. Minimally FDG avid subtle linear left upper lobe pulmonary infiltrate, nonspecific, new since 3/13/20.\par Patient has endobronchial ultrasound scheduled 3/26/20 (Dr. Lorenzana) as well as consultation with thoracic surgeon (Dr. Weaver). [de-identified] : Adenocarcinoma

## 2020-03-19 NOTE — REASON FOR VISIT
[Initial Consultation] : an initial consultation [Spouse] : spouse [FreeTextEntry2] : esophageal cancer

## 2020-03-23 ENCOUNTER — OUTPATIENT (OUTPATIENT)
Dept: OUTPATIENT SERVICES | Facility: HOSPITAL | Age: 70
LOS: 1 days | Discharge: HOME | End: 2020-03-23
Payer: MEDICARE

## 2020-03-23 DIAGNOSIS — Z98.891 HISTORY OF UTERINE SCAR FROM PREVIOUS SURGERY: Chronic | ICD-10-CM

## 2020-03-23 DIAGNOSIS — Z90.710 ACQUIRED ABSENCE OF BOTH CERVIX AND UTERUS: Chronic | ICD-10-CM

## 2020-03-23 DIAGNOSIS — E66.9 OBESITY, UNSPECIFIED: Chronic | ICD-10-CM

## 2020-03-23 DIAGNOSIS — Z87.39 PERSONAL HISTORY OF OTHER DISEASES OF THE MUSCULOSKELETAL SYSTEM AND CONNECTIVE TISSUE: Chronic | ICD-10-CM

## 2020-03-23 DIAGNOSIS — Z98.890 OTHER SPECIFIED POSTPROCEDURAL STATES: Chronic | ICD-10-CM

## 2020-03-25 ENCOUNTER — RESULT REVIEW (OUTPATIENT)
Age: 70
End: 2020-03-25

## 2020-03-25 PROCEDURE — 88321 CONSLTJ&REPRT SLD PREP ELSWR: CPT

## 2020-03-25 NOTE — HISTORY OF PRESENT ILLNESS
[FreeTextEntry1] : Ms. SUSAN DOWD, 69 year old female, ...smoker, w/ hx of ....., who presented\par \par EGD on 3/8/20 showed suspicious appearing, obstructing lesion at distal esophagus at 38 cm, circumferential. Path of esophagus mass revealed fragments of infiltrating mod to poorly-diff AdenoCA, no Montoya;s esophagus present. \par \par CT C/A/P on 3/13/20:\par - a subcarinal node, 3.6 cm is inseparable from the esophagus (2:64)\par - a pretracheal node, 1.5 cm (2:54)\par - Lt adrenal nodule, 1.1 cm (2:123), previously 9 mm\par - subcentimeter hypoattenuating lesion in Lt kidney\par - increasing diffuse asymmetric wall thickening involving Rt lateral bladder\par - a 1.3 cm Rt adnexal cyst\par - moderate hiatal hernia with wall thickening involving the distal esophagus/hiatal hernia with enlarged LN, e.g.largest one 2.4 cm\par - colonic diverticulosis\par - asymmetric wall thickening involving Lt levator ani and inseparable from rectum and Levator 9 \par \par PET/CT on 3/18/20:\par - 3.4 cm subcarinal LN, SUV=17.3 \par - 1.5 cm pretracheal LN, SUV=3.2\par - new Lt anterior upper lobe infiltrate with linear morphology, SUV=3.1\par - non-FDG avid thickening Lt adrenal gland\par - FDG avid gastrohepatic nodes, 2.1 cm and 1.6 cm, SUV=12.7\par - circumferential soft tissue thickening distal esophagus extending into the hiatal hernia, SUV=37.3\par - high density colonic diverticulosis, probably inspissated contrast material \par - stable 1.3 cm non-FDG avid Rt adnexal cyst\par \par Pt presents today for CT Sx consultation, referred by \par \par

## 2020-03-25 NOTE — CONSULT LETTER
[Dear  ___] : Dear  [unfilled], [Consult Letter:] : I had the pleasure of evaluating your patient, [unfilled]. [( Thank you for referring [unfilled] for consultation for _____ )] : Thank you for referring [unfilled] for consultation for [unfilled] [Please see my note below.] : Please see my note below. [Consult Closing:] : Thank you very much for allowing me to participate in the care of this patient.  If you have any questions, please do not hesitate to contact me. [Sincerely,] : Sincerely, [FreeTextEntry2] : Dr. Mark Chew  [FreeTextEntry3] : Dale Weaver MD, FACS \par , Division of Thoracic Surgery \par [facility] \par Chief, Thoracic Surgery \par [facility] \par Department of Cardiovascular & Thoracic Surgery \par  \par [facility] [employer] [facility] \par \par

## 2020-03-25 NOTE — DATA REVIEWED
[FreeTextEntry1] : EGD on 3/8/20 showed suspicious appearing, obstructing lesion at distal esophagus at 38 cm, circumferential. Path of esophagus mass revealed fragments of infiltrating mod to poorly-diff AdenoCA, no Montoya;s esophagus present. \par \par CT C/A/P on 3/13/20:\par - a subcarinal node, 3.6 cm is inseparable from the esophagus (2:64)\par - a pretracheal node, 1.5 cm (2:54)\par - Lt adrenal nodule, 1.1 cm (2:123), previously 9 mm\par - subcentimeter hypoattenuating lesion in Lt kidney\par - increasing diffuse asymmetric wall thickening involving Rt lateral bladder\par - a 1.3 cm Rt adnexal cyst\par - moderate hiatal hernia with wall thickening involving the distal esophagus/hiatal hernia with enlarged LN, e.g.largest one 2.4 cm\par - colonic diverticulosis\par - asymmetric wall thickening involving Lt levator ani and inseparable from rectum and Levator 9 \par \par PET/CT on 3/18/20:\par - 3.4 cm subcarinal LN, SUV=17.3 \par - 1.5 cm pretracheal LN, SUV=3.2\par - new Lt anterior upper lobe infiltrate with linear morphology, SUV=3.1\par - non-FDG avid thickening Lt adrenal gland\par - FDG avid gastrohepatic nodes, 2.1 cm and 1.6 cm, SUV=12.7\par - circumferential soft tissue thickening distal esophagus extending into the hiatal hernia, SUV=37.3\par - high density colonic diverticulosis, probably inspissated contrast material \par - stable 1.3 cm non-FDG avid Rt adnexal cyst

## 2020-03-26 ENCOUNTER — APPOINTMENT (OUTPATIENT)
Dept: GASTROENTEROLOGY | Facility: HOSPITAL | Age: 70
End: 2020-03-26

## 2020-03-27 ENCOUNTER — APPOINTMENT (OUTPATIENT)
Dept: RADIATION ONCOLOGY | Facility: CLINIC | Age: 70
End: 2020-03-27

## 2020-03-27 DIAGNOSIS — F17.200 NICOTINE DEPENDENCE, UNSPECIFIED, UNCOMPLICATED: ICD-10-CM

## 2020-03-27 DIAGNOSIS — Z87.891 PERSONAL HISTORY OF NICOTINE DEPENDENCE: ICD-10-CM

## 2020-03-27 PROCEDURE — 77263 THER RADIOLOGY TX PLNG CPLX: CPT

## 2020-03-27 RX ORDER — AZITHROMYCIN 200 MG/5ML
200 POWDER, FOR SUSPENSION ORAL
Qty: 37.5 | Refills: 0 | Status: COMPLETED | COMMUNITY
Start: 2020-03-19 | End: 2020-03-19

## 2020-03-27 NOTE — VITALS
[Maximal Pain Intensity: 5/10] : 5/10 [Least Pain Intensity: 0/10] : 0/10 [Pain Description/Quality: ___] : Pain description/quality: [unfilled] [Pain Duration: ___] : Pain duration: [unfilled] [Pain Location: ___] : Pain Location: [unfilled] [Pain Interferes with ADLs] : Pain interferes with activities of daily living. [OTC] : OTC [80: Normal activity with effort; some signs or symptoms of disease.] : 80: Normal activity with effort; some signs or symptoms of disease.  [ECOG Performance Status: 1 - Restricted in physically strenuous activity but ambulatory and able to carry out work of a light or sedentary nature] : Performance Status: 1 - Restricted in physically strenuous activity but ambulatory and able to carry out work of a light or sedentary nature, e.g., light house work, office work

## 2020-03-31 ENCOUNTER — NON-APPOINTMENT (OUTPATIENT)
Age: 70
End: 2020-03-31

## 2020-03-31 PROCEDURE — 77333 RADIATION TREATMENT AID(S): CPT | Mod: 26

## 2020-04-01 ENCOUNTER — APPOINTMENT (OUTPATIENT)
Dept: THORACIC SURGERY | Facility: CLINIC | Age: 70
End: 2020-04-01

## 2020-04-02 ENCOUNTER — APPOINTMENT (OUTPATIENT)
Dept: HEMATOLOGY ONCOLOGY | Facility: CLINIC | Age: 70
End: 2020-04-02
Payer: MEDICARE

## 2020-04-02 PROCEDURE — 99214 OFFICE O/P EST MOD 30 MIN: CPT | Mod: 95

## 2020-04-02 NOTE — ASSESSMENT
[FreeTextEntry1] : #1) esophageal cancer-plan concurrent RT/chemo, followed by reassessment of disease status for further management decisions. Patient has given verbal consent to weekly Taxol/Carboplatin with radiation-potential side effects explained including but not limited to hypersensitivity reaction, neuro toxicity, N/V, myelosuppression. Anti-emetic ordered for PRN use, along with decadron pre-med.\par #2) Pneumonia-no related symptoms currently. F/U chest imaging planned post RT/chemo.\par #3)? bladder abnormality on CAT scan-reviewed with patient-no  symptoms presently. If symptoms develop, recommend  evaluation, which we are deferring for now in light of esophageal cancer treatment necessary and COVID19 pandemic.\par Patient was given the opportunity to ask questions. Her questions have been answered to her apparent satisfaction at this time.

## 2020-04-02 NOTE — HISTORY OF PRESENT ILLNESS
[Home] : at home, [unfilled] , at the time of the visit. [Other Location: e.g. Home (Enter Location, City,State)___] : at [unfilled] [Patient] : the patient [Self] : self [Disease: _____________________] : Disease: [unfilled] [N: ___] : N[unfilled] [de-identified] : Had C-spine surgeries 6/2019 and 7/2019-soon thereafter had difficulty swallowing which patient attributed to her neck surgeries. However, the feeling of something "sticking in her throat" when she ate or drank, gradually got worse. She is only able to tolerate liquids and tiny bits of solid foods now. Has lost 14 pounds in last few months.\par +Moderate to severe reflux. No vomiting.\par No abdominal pain. No CP, SOB. Patient has had a mild cough/pulmonary congestion. No fevers. No bleeding, melena.\par Upper endoscopy (Dr. Chew) showed a suspicious appearing lesion at the distal esophagus at 38 cm, circumferential, unable to traverse with the scope. Esophageal mass biopsy revealed infiltrating, moderately to poorly differentiated adenocarcinoma. CT scan chest/abdomen/pelvis showed wall thickening of the distal esophagus/hiatal hernia with gastrohepatic and mediastinal lymphadenopathy, highly concerning for malignancy. Asymmetric wall thickening of the right bladder slightly increased since prior exam. PET/CT scan showed FDG avid distal esophageal soft tissue thickening, consistent with known malignancy. FDG avid gastrohepatic ligaments, lymph nodes, probably metastatic. FDG avid subcarinal lymph node, probably metastatic. Minimally FDG avid subtle linear left upper lobe pulmonary infiltrate, nonspecific, new since 3/13/20.\par  [de-identified] : Adenocarcinoma [de-identified] : Telehealth visit secondary to COVID19 crisis.\par Completed antibiotic course for possible pneumonia on prior imaging. Denies cough, fever, SOB.\par EBUS has been deferred secondary to COVID19.\par Saw Dr. Nguyen in consultation, and radiation treatment planning in progress.\par +Dysphagia to solids. Able to tolerate liquids and soft foods.\par No N/V/abdominal pain. No bleeding. No  symptoms.

## 2020-04-06 PROCEDURE — 77470 SPECIAL RADIATION TREATMENT: CPT | Mod: 26

## 2020-04-09 PROCEDURE — 77300 RADIATION THERAPY DOSE PLAN: CPT | Mod: 26

## 2020-04-09 PROCEDURE — 77338 DESIGN MLC DEVICE FOR IMRT: CPT | Mod: 26

## 2020-04-09 PROCEDURE — 77301 RADIOTHERAPY DOSE PLAN IMRT: CPT | Mod: 26

## 2020-04-09 PROCEDURE — 77293 RESPIRATOR MOTION MGMT SIMUL: CPT | Mod: 26

## 2020-04-13 ENCOUNTER — RESULT REVIEW (OUTPATIENT)
Age: 70
End: 2020-04-13

## 2020-04-13 ENCOUNTER — APPOINTMENT (OUTPATIENT)
Dept: HEMATOLOGY ONCOLOGY | Facility: CLINIC | Age: 70
End: 2020-04-13

## 2020-04-13 VITALS
DIASTOLIC BLOOD PRESSURE: 76 MMHG | TEMPERATURE: 97.6 F | RESPIRATION RATE: 17 BRPM | HEART RATE: 82 BPM | SYSTOLIC BLOOD PRESSURE: 118 MMHG | WEIGHT: 162.04 LBS | OXYGEN SATURATION: 98 % | BODY MASS INDEX: 29.16 KG/M2

## 2020-04-13 LAB
ALBUMIN SERPL ELPH-MCNC: 4 G/DL
ALP BLD-CCNC: 75 U/L
ALT SERPL-CCNC: 10 U/L
ANION GAP SERPL CALC-SCNC: 12 MMOL/L
AST SERPL-CCNC: 14 U/L
BASOPHILS # BLD AUTO: 0.04 K/UL — SIGNIFICANT CHANGE UP (ref 0–0.2)
BASOPHILS NFR BLD AUTO: 0.4 % — SIGNIFICANT CHANGE UP (ref 0–2)
BILIRUB SERPL-MCNC: 0.2 MG/DL
BUN SERPL-MCNC: 14 MG/DL
CALCIUM SERPL-MCNC: 9.5 MG/DL
CHLORIDE SERPL-SCNC: 98 MMOL/L
CO2 SERPL-SCNC: 24 MMOL/L
CREAT SERPL-MCNC: 0.92 MG/DL
EOSINOPHIL # BLD AUTO: 0.15 K/UL — SIGNIFICANT CHANGE UP (ref 0–0.5)
EOSINOPHIL NFR BLD AUTO: 1.5 % — SIGNIFICANT CHANGE UP (ref 0–6)
GLUCOSE SERPL-MCNC: 133 MG/DL
HCT VFR BLD CALC: 25.5 % — LOW (ref 34.5–45)
HGB BLD-MCNC: 7.7 G/DL — LOW (ref 11.5–15.5)
IMM GRANULOCYTES NFR BLD AUTO: 0.6 % — SIGNIFICANT CHANGE UP (ref 0–1.5)
LYMPHOCYTES # BLD AUTO: 1.85 K/UL — SIGNIFICANT CHANGE UP (ref 1–3.3)
LYMPHOCYTES # BLD AUTO: 18.7 % — SIGNIFICANT CHANGE UP (ref 13–44)
MAGNESIUM SERPL-MCNC: 2 MG/DL
MCHC RBC-ENTMCNC: 24.1 PG — LOW (ref 27–34)
MCHC RBC-ENTMCNC: 30.2 GM/DL — LOW (ref 32–36)
MCV RBC AUTO: 79.7 FL — LOW (ref 80–100)
MONOCYTES # BLD AUTO: 0.98 K/UL — HIGH (ref 0–0.9)
MONOCYTES NFR BLD AUTO: 9.9 % — SIGNIFICANT CHANGE UP (ref 2–14)
NEUTROPHILS # BLD AUTO: 6.8 K/UL — SIGNIFICANT CHANGE UP (ref 1.8–7.4)
NEUTROPHILS NFR BLD AUTO: 68.9 % — SIGNIFICANT CHANGE UP (ref 43–77)
NRBC # BLD: 0 /100 WBCS — SIGNIFICANT CHANGE UP (ref 0–0)
PLATELET # BLD AUTO: 415 K/UL — HIGH (ref 150–400)
POTASSIUM SERPL-SCNC: 4.8 MMOL/L
PROT SERPL-MCNC: 6.6 G/DL
RBC # BLD: 3.2 M/UL — LOW (ref 3.8–5.2)
RBC # FLD: 14.7 % — HIGH (ref 10.3–14.5)
SODIUM SERPL-SCNC: 135 MMOL/L
WBC # BLD: 9.88 K/UL — SIGNIFICANT CHANGE UP (ref 3.8–10.5)
WBC # FLD AUTO: 9.88 K/UL — SIGNIFICANT CHANGE UP (ref 3.8–10.5)

## 2020-04-13 PROCEDURE — 93010 ELECTROCARDIOGRAM REPORT: CPT

## 2020-04-14 ENCOUNTER — LABORATORY RESULT (OUTPATIENT)
Age: 70
End: 2020-04-14

## 2020-04-14 ENCOUNTER — APPOINTMENT (OUTPATIENT)
Dept: INFUSION THERAPY | Facility: HOSPITAL | Age: 70
End: 2020-04-14

## 2020-04-14 DIAGNOSIS — K21.9 GASTRO-ESOPHAGEAL REFLUX DISEASE W/OUT ESOPHAGITIS: ICD-10-CM

## 2020-04-14 LAB
HAV IGM SER QL: NONREACTIVE
HBV CORE IGM SER QL: NONREACTIVE
HBV SURFACE AG SER QL: NONREACTIVE
HCV AB SER QL: NONREACTIVE
HCV S/CO RATIO: 0.23 S/CO

## 2020-04-14 PROCEDURE — 77387B: CUSTOM | Mod: 26

## 2020-04-14 PROCEDURE — 77427 RADIATION TX MANAGEMENT X5: CPT

## 2020-04-15 ENCOUNTER — APPOINTMENT (OUTPATIENT)
Dept: INFUSION THERAPY | Facility: HOSPITAL | Age: 70
End: 2020-04-15

## 2020-04-15 DIAGNOSIS — Z51.11 ENCOUNTER FOR ANTINEOPLASTIC CHEMOTHERAPY: ICD-10-CM

## 2020-04-15 DIAGNOSIS — R11.2 NAUSEA WITH VOMITING, UNSPECIFIED: ICD-10-CM

## 2020-04-15 PROCEDURE — 77387B: CUSTOM | Mod: 26

## 2020-04-16 ENCOUNTER — OUTPATIENT (OUTPATIENT)
Dept: OUTPATIENT SERVICES | Facility: HOSPITAL | Age: 70
LOS: 1 days | Discharge: HOME | End: 2020-04-16

## 2020-04-16 ENCOUNTER — OUTPATIENT (OUTPATIENT)
Dept: OUTPATIENT SERVICES | Facility: HOSPITAL | Age: 70
LOS: 1 days | End: 2020-04-16
Payer: MEDICARE

## 2020-04-16 VITALS
OXYGEN SATURATION: 90 % | BODY MASS INDEX: 29.53 KG/M2 | TEMPERATURE: 97.88 F | DIASTOLIC BLOOD PRESSURE: 69 MMHG | HEIGHT: 62.5 IN | WEIGHT: 164.57 LBS | SYSTOLIC BLOOD PRESSURE: 106 MMHG | HEART RATE: 95 BPM | RESPIRATION RATE: 17 BRPM

## 2020-04-16 DIAGNOSIS — Z87.39 PERSONAL HISTORY OF OTHER DISEASES OF THE MUSCULOSKELETAL SYSTEM AND CONNECTIVE TISSUE: Chronic | ICD-10-CM

## 2020-04-16 DIAGNOSIS — E66.9 OBESITY, UNSPECIFIED: Chronic | ICD-10-CM

## 2020-04-16 DIAGNOSIS — Z51.89 ENCOUNTER FOR OTHER SPECIFIED AFTERCARE: ICD-10-CM

## 2020-04-16 DIAGNOSIS — Z98.891 HISTORY OF UTERINE SCAR FROM PREVIOUS SURGERY: Chronic | ICD-10-CM

## 2020-04-16 DIAGNOSIS — C15.9 MALIGNANT NEOPLASM OF ESOPHAGUS, UNSPECIFIED: ICD-10-CM

## 2020-04-16 DIAGNOSIS — Z98.890 OTHER SPECIFIED POSTPROCEDURAL STATES: Chronic | ICD-10-CM

## 2020-04-16 DIAGNOSIS — Z90.710 ACQUIRED ABSENCE OF BOTH CERVIX AND UTERUS: Chronic | ICD-10-CM

## 2020-04-16 PROCEDURE — 77387B: CUSTOM | Mod: 26

## 2020-04-16 PROCEDURE — 86901 BLOOD TYPING SEROLOGIC RH(D): CPT

## 2020-04-16 PROCEDURE — 86850 RBC ANTIBODY SCREEN: CPT

## 2020-04-16 PROCEDURE — 86923 COMPATIBILITY TEST ELECTRIC: CPT

## 2020-04-16 PROCEDURE — 86900 BLOOD TYPING SEROLOGIC ABO: CPT

## 2020-04-16 RX ORDER — MELOXICAM 15 MG/1
15 TABLET ORAL DAILY
Qty: 30 | Refills: 0 | Status: DISCONTINUED | COMMUNITY
Start: 2017-07-19 | End: 2020-04-16

## 2020-04-16 RX ORDER — CEFDINIR 250 MG/5ML
250 POWDER, FOR SUSPENSION ORAL
Qty: 120 | Refills: 0 | Status: DISCONTINUED | COMMUNITY
Start: 2020-03-19 | End: 2020-04-16

## 2020-04-16 NOTE — REVIEW OF SYSTEMS
[Constipation: Grade 0] : Constipation: Grade 0 [Diarrhea: Grade 0] : Diarrhea: Grade 0 [Dysphagia: Grade 2 - Symptomatic and altered eating/swallowing] : Dysphagia: Grade 2 - Symptomatic and altered eating/swallowing [Esophagitis: Grade 2 - Symptomatic; altered eating/swallowing;  oral supplements indicated] : Esophagitis: Grade 2 - Symptomatic; altered eating/swallowing;  oral supplements indicated [Nausea: Grade 0] : Nausea: Grade 0 [Vomiting: Grade 0] : Vomiting: Grade 0 [Cough: Grade 0] : Cough: Grade 0 [Dyspnea: Grade 0] : Dyspnea: Grade 0 [FreeTextEntry6] : [facility] : baseline [Fatigue: Grade 1 - Fatigue relieved by rest] : Fatigue: Grade 1 - Fatigue relieved by rest [Pneumonitis: Grade 0] : Pneumonitis: Grade 0 [Dermatitis Radiation: Grade 0] : Dermatitis Radiation: Grade 0

## 2020-04-16 NOTE — DISEASE MANAGEMENT
[Clinical] : TNM Stage: c [TAMANNA] : TAMANNA [TTNM] : x [NTNM] : 2 [MTNM] : 0 [de-identified] : 3 tx/540cGy [de-identified] : 25tx/4500cGy [de-identified] : esophagus

## 2020-04-16 NOTE — HISTORY OF PRESENT ILLNESS
[FreeTextEntry1] : Bridgett Boyd is a 70 year old woman who was recently diagnosed with esophageal cancer. Receiving neoadjuvant chemoradiation\par \par 4/16/2020\par -Tolerating tx\par -No complaints of actual pain except for heat burn for which she is on Protonix. \par -Has dysphagia that is unchanged and is able to eat soft foods and liquids, similar to at diagnosis.  Weight is stable.  \par -Denies N/V.

## 2020-04-16 NOTE — VITALS
[Maximal Pain Intensity: 9/10] : 9/10 [Least Pain Intensity: 3/10] : 3/10 [Pain Description/Quality: ___] : Pain description/quality: [unfilled] [Pain Duration: ___] : Pain duration: [unfilled] [Pain Location: ___] : Pain Location: [unfilled] [Pain Interferes with ADLs] : Pain interferes with activities of daily living. [OTC] : OTC [NSAID/Non-Opioid] : NSAID/Non-Opioid [80: Normal activity with effort; some signs or symptoms of disease.] : 80: Normal activity with effort; some signs or symptoms of disease.  [ECOG Performance Status: 1 - Restricted in physically strenuous activity but ambulatory and able to carry out work of a light or sedentary nature] : Performance Status: 1 - Restricted in physically strenuous activity but ambulatory and able to carry out work of a light or sedentary nature, e.g., light house work, office work

## 2020-04-17 ENCOUNTER — APPOINTMENT (OUTPATIENT)
Dept: INFUSION THERAPY | Facility: HOSPITAL | Age: 70
End: 2020-04-17

## 2020-04-17 PROCEDURE — 77387B: CUSTOM | Mod: 26

## 2020-04-20 DIAGNOSIS — Z51.89 ENCOUNTER FOR OTHER SPECIFIED AFTERCARE: ICD-10-CM

## 2020-04-20 DIAGNOSIS — R11.2 NAUSEA WITH VOMITING, UNSPECIFIED: ICD-10-CM

## 2020-04-20 PROCEDURE — 77427 RADIATION TX MANAGEMENT X5: CPT

## 2020-04-20 PROCEDURE — 77014: CPT | Mod: 26

## 2020-04-21 ENCOUNTER — RESULT REVIEW (OUTPATIENT)
Age: 70
End: 2020-04-21

## 2020-04-21 ENCOUNTER — LABORATORY RESULT (OUTPATIENT)
Age: 70
End: 2020-04-21

## 2020-04-21 ENCOUNTER — APPOINTMENT (OUTPATIENT)
Dept: HEMATOLOGY ONCOLOGY | Facility: CLINIC | Age: 70
End: 2020-04-21
Payer: MEDICARE

## 2020-04-21 ENCOUNTER — APPOINTMENT (OUTPATIENT)
Dept: INFUSION THERAPY | Facility: HOSPITAL | Age: 70
End: 2020-04-21

## 2020-04-21 VITALS — SYSTOLIC BLOOD PRESSURE: 138 MMHG | HEART RATE: 80 BPM | DIASTOLIC BLOOD PRESSURE: 79 MMHG | TEMPERATURE: 208.58 F

## 2020-04-21 DIAGNOSIS — J18.9 PNEUMONIA, UNSPECIFIED ORGANISM: ICD-10-CM

## 2020-04-21 LAB
BASOPHILS # BLD AUTO: 0.03 K/UL — SIGNIFICANT CHANGE UP (ref 0–0.2)
BASOPHILS NFR BLD AUTO: 0.3 % — SIGNIFICANT CHANGE UP (ref 0–2)
EOSINOPHIL # BLD AUTO: 0.01 K/UL — SIGNIFICANT CHANGE UP (ref 0–0.5)
EOSINOPHIL NFR BLD AUTO: 0.1 % — SIGNIFICANT CHANGE UP (ref 0–6)
HCT VFR BLD CALC: 32.1 % — LOW (ref 34.5–45)
HGB BLD-MCNC: 10.2 G/DL — LOW (ref 11.5–15.5)
IMM GRANULOCYTES NFR BLD AUTO: 1.6 % — HIGH (ref 0–1.5)
LYMPHOCYTES # BLD AUTO: 0.15 K/UL — LOW (ref 1–3.3)
LYMPHOCYTES # BLD AUTO: 1.7 % — LOW (ref 13–44)
MCHC RBC-ENTMCNC: 24.9 PG — LOW (ref 27–34)
MCHC RBC-ENTMCNC: 31.8 GM/DL — LOW (ref 32–36)
MCV RBC AUTO: 78.5 FL — LOW (ref 80–100)
MONOCYTES # BLD AUTO: 0.09 K/UL — SIGNIFICANT CHANGE UP (ref 0–0.9)
MONOCYTES NFR BLD AUTO: 1 % — LOW (ref 2–14)
NEUTROPHILS # BLD AUTO: 8.19 K/UL — HIGH (ref 1.8–7.4)
NEUTROPHILS NFR BLD AUTO: 95.3 % — HIGH (ref 43–77)
NRBC # BLD: 0 /100 WBCS — SIGNIFICANT CHANGE UP (ref 0–0)
PLATELET # BLD AUTO: 407 K/UL — HIGH (ref 150–400)
RBC # BLD: 4.09 M/UL — SIGNIFICANT CHANGE UP (ref 3.8–5.2)
RBC # FLD: 16 % — HIGH (ref 10.3–14.5)
WBC # BLD: 8.61 K/UL — SIGNIFICANT CHANGE UP (ref 3.8–10.5)
WBC # FLD AUTO: 8.61 K/UL — SIGNIFICANT CHANGE UP (ref 3.8–10.5)

## 2020-04-21 PROCEDURE — 99214 OFFICE O/P EST MOD 30 MIN: CPT

## 2020-04-21 PROCEDURE — 77387B: CUSTOM | Mod: 26

## 2020-04-21 NOTE — ASSESSMENT
[FreeTextEntry1] : #1) esophageal cancer-patient consents to chemotherapy as planned. Radiation per radiation oncology.\par #2) recent pneumonia-no related symptoms currently. F/U chest imaging planned post RT/chemo.\par #3) anemia-hemoglobin increased post PRBC transfusion. Continue to monitor CBC closely. No overt bleeding noted clinically at present.\par #4) nausea-transient, temporally related to chemo. PRN anti-emetics.\par Patient was given the opportunity to ask questions. Her questions have been answered to her apparent satisfaction at this time.

## 2020-04-21 NOTE — HISTORY OF PRESENT ILLNESS
[N: ___] : N[unfilled] [Disease: _____________________] : Disease: [unfilled] [de-identified] : Had C-spine surgeries 6/2019 and 7/2019-soon thereafter had difficulty swallowing which patient attributed to her neck surgeries. However, the feeling of something "sticking in her throat" when she ate or drank, gradually got worse. She is only able to tolerate liquids and tiny bits of solid foods now. Has lost 14 pounds in last few months.\par +Moderate to severe reflux. No vomiting.\par No abdominal pain. No CP, SOB. Patient has had a mild cough/pulmonary congestion. No fevers. No bleeding, melena.\par Upper endoscopy (Dr. Chew) showed a suspicious appearing lesion at the distal esophagus at 38 cm, circumferential, unable to traverse with the scope. Esophageal mass biopsy revealed infiltrating, moderately to poorly differentiated adenocarcinoma. CT scan chest/abdomen/pelvis showed wall thickening of the distal esophagus/hiatal hernia with gastrohepatic and mediastinal lymphadenopathy, highly concerning for malignancy. Asymmetric wall thickening of the right bladder slightly increased since prior exam. PET/CT scan showed FDG avid distal esophageal soft tissue thickening, consistent with known malignancy. FDG avid gastrohepatic ligaments, lymph nodes, probably metastatic. FDG avid subcarinal lymph node, probably metastatic. Minimally FDG avid subtle linear left upper lobe pulmonary infiltrate, nonspecific, new since 3/13/20.\par 4/2020-Began Carboplatin/Taxol along with radiation. [de-identified] : Adenocarcinoma [de-identified] : Sixth day of RT. Had mild nausea post last chemo which passed on its own quickly. Still with good appetite. Able to tolerate liquids/ensure and soft foods.\par No c/o CP, cough, SOB, fevers or abdominal pain. No bleeding.\par \par

## 2020-04-21 NOTE — VITALS
[Maximal Pain Intensity: 6/10] : 6/10 [Pain Description/Quality: ___] : Pain description/quality: [unfilled] [Least Pain Intensity: 2/10] : 2/10 [Pain Duration: ___] : Pain duration: [unfilled] [Pain Interferes with ADLs] : Pain interferes with activities of daily living. [Pain Location: ___] : Pain Location: [unfilled] [OTC] : OTC [NSAID/Non-Opioid] : NSAID/Non-Opioid [ECOG Performance Status: 1 - Restricted in physically strenuous activity but ambulatory and able to carry out work of a light or sedentary nature] : Performance Status: 1 - Restricted in physically strenuous activity but ambulatory and able to carry out work of a light or sedentary nature, e.g., light house work, office work [80: Normal activity with effort; some signs or symptoms of disease.] : 80: Normal activity with effort; some signs or symptoms of disease.

## 2020-04-21 NOTE — OB HISTORY
[Menopause Age: ____] : age at menopause was [unfilled] [Post-Menopause, No Sxs] : post-menopausal, currently without symptoms [___] :  Spontaneous: [unfilled]

## 2020-04-22 DIAGNOSIS — Z51.11 ENCOUNTER FOR ANTINEOPLASTIC CHEMOTHERAPY: ICD-10-CM

## 2020-04-22 PROCEDURE — 77387B: CUSTOM | Mod: 26

## 2020-04-23 VITALS
HEART RATE: 80 BPM | TEMPERATURE: 208.22 F | WEIGHT: 164.02 LBS | DIASTOLIC BLOOD PRESSURE: 78 MMHG | SYSTOLIC BLOOD PRESSURE: 136 MMHG | OXYGEN SATURATION: 100 % | BODY MASS INDEX: 29.52 KG/M2 | RESPIRATION RATE: 18 BRPM

## 2020-04-23 PROCEDURE — 77387B: CUSTOM | Mod: 26

## 2020-04-23 NOTE — VITALS
[Least Pain Intensity: 4/10] : 4/10 [Maximal Pain Intensity: 8/10] : 8/10 [Pain Description/Quality: ___] : Pain description/quality: [unfilled] [Pain Duration: ___] : Pain duration: [unfilled] [Pain Location: ___] : Pain Location: [unfilled] [Pain Interferes with ADLs] : Pain interferes with activities of daily living. [OTC] : OTC [80: Normal activity with effort; some signs or symptoms of disease.] : 80: Normal activity with effort; some signs or symptoms of disease.

## 2020-04-24 PROCEDURE — 77387B: CUSTOM | Mod: 26

## 2020-04-24 NOTE — DISEASE MANAGEMENT
[Clinical] : TNM Stage: c [TAMANNA] : TAMANNA [TTNM] : x [NTNM] : 2 [MTNM] : 0 [de-identified] : 6tx/1080cGy [de-identified] : 25tx/4500cGy [de-identified] : esophagus

## 2020-04-24 NOTE — HISTORY OF PRESENT ILLNESS
[FreeTextEntry1] : Bridgett Boyd is a 70 year old woman who was recently diagnosed with esophageal cancer. Receiving neoadjuvant chemoradiation\par \par 4/16/2020\par -Tolerating tx\par -No complaints of actual pain except for heat burn for which she is on Protonix. \par -Has dysphagia that is unchanged and is able to eat soft foods and liquids, similar to at diagnosis.  Weight is stable.  \par -Denies N/V.\par \par 4/21 OTV: dysphagia stable. No chest pain, neck pain. Weight stable. \par \par 4/23/2020\par -Tolerating tx\par -Pt has dysphagia but stable ,mostly eating fluids and soft food.  2-3 cans per day of ensure. Eats soup and yoghourt\par -Pain level 8/10. Taking Carafate\par -No cough or SOB at this time\par \par

## 2020-04-24 NOTE — DISEASE MANAGEMENT
[Clinical] : TNM Stage: c [TAMANNA] : TAMANNA [TTNM] : x [de-identified] : 8tx/1440cGy [NTNM] : 2 [MTNM] : 0 [de-identified] : 25tx/4500cGy [de-identified] : esophagus

## 2020-04-24 NOTE — REVIEW OF SYSTEMS
[Constipation: Grade 0] : Constipation: Grade 0 [Diarrhea: Grade 0] : Diarrhea: Grade 0 [Dysphagia: Grade 2 - Symptomatic and altered eating/swallowing] : Dysphagia: Grade 2 - Symptomatic and altered eating/swallowing [Nausea: Grade 0] : Nausea: Grade 0 [Esophagitis: Grade 2 - Symptomatic; altered eating/swallowing;  oral supplements indicated] : Esophagitis: Grade 2 - Symptomatic; altered eating/swallowing;  oral supplements indicated [Vomiting: Grade 0] : Vomiting: Grade 0 [Cough: Grade 0] : Cough: Grade 0 [Fatigue: Grade 1 - Fatigue relieved by rest] : Fatigue: Grade 1 - Fatigue relieved by rest [Dyspnea: Grade 0] : Dyspnea: Grade 0 [Pneumonitis: Grade 0] : Pneumonitis: Grade 0 [Dermatitis Radiation: Grade 0] : Dermatitis Radiation: Grade 0 [FreeTextEntry6] : baseline [FreeTextEntry7] : baseline

## 2020-04-24 NOTE — HISTORY OF PRESENT ILLNESS
[FreeTextEntry1] : Bridgett Boyd is a 70 year old woman who was recently diagnosed with esophageal cancer. Receiving neoadjuvant chemoradiation\par \par 4/21 OTV: dysphagia stable. No chest pain, neck pain. Weight stable. \par \par 4/16/2020\par -Tolerating tx\par -No complaints of actual pain except for heat burn for which she is on Protonix. \par -Has dysphagia that is unchanged and is able to eat soft foods and liquids, similar to at diagnosis.  Weight is stable.  \par -Denies N/V.

## 2020-04-24 NOTE — REVIEW OF SYSTEMS
[Constipation: Grade 0] : Constipation: Grade 0 [Esophagitis: Grade 2 - Symptomatic; altered eating/swallowing;  oral supplements indicated] : Esophagitis: Grade 2 - Symptomatic; altered eating/swallowing;  oral supplements indicated [Diarrhea: Grade 0] : Diarrhea: Grade 0 [Dysphagia: Grade 2 - Symptomatic and altered eating/swallowing] : Dysphagia: Grade 2 - Symptomatic and altered eating/swallowing [Nausea: Grade 0] : Nausea: Grade 0 [Vomiting: Grade 0] : Vomiting: Grade 0 [Fatigue: Grade 1 - Fatigue relieved by rest] : Fatigue: Grade 1 - Fatigue relieved by rest [Cough: Grade 0] : Cough: Grade 0 [Dyspnea: Grade 0] : Dyspnea: Grade 0 [Pneumonitis: Grade 0] : Pneumonitis: Grade 0 [Hoarseness: Grade 0] : Hoarseness: Grade 0 [Dermatitis Radiation: Grade 0] : Dermatitis Radiation: Grade 0 [FreeTextEntry7] : baseline [FreeTextEntry6] : baseline

## 2020-04-27 PROCEDURE — 77014: CPT | Mod: 26

## 2020-04-28 ENCOUNTER — LABORATORY RESULT (OUTPATIENT)
Age: 70
End: 2020-04-28

## 2020-04-28 ENCOUNTER — RESULT REVIEW (OUTPATIENT)
Age: 70
End: 2020-04-28

## 2020-04-28 ENCOUNTER — APPOINTMENT (OUTPATIENT)
Dept: INFUSION THERAPY | Facility: HOSPITAL | Age: 70
End: 2020-04-28

## 2020-04-28 VITALS
SYSTOLIC BLOOD PRESSURE: 120 MMHG | HEART RATE: 109 BPM | BODY MASS INDEX: 29.01 KG/M2 | OXYGEN SATURATION: 99 % | DIASTOLIC BLOOD PRESSURE: 91 MMHG | RESPIRATION RATE: 18 BRPM | TEMPERATURE: 206.6 F | WEIGHT: 161.16 LBS

## 2020-04-28 LAB
BASOPHILS # BLD AUTO: 0 K/UL — SIGNIFICANT CHANGE UP (ref 0–0.2)
BASOPHILS NFR BLD AUTO: 0 % — SIGNIFICANT CHANGE UP (ref 0–2)
EOSINOPHIL # BLD AUTO: 0 K/UL — SIGNIFICANT CHANGE UP (ref 0–0.5)
EOSINOPHIL NFR BLD AUTO: 0 % — SIGNIFICANT CHANGE UP (ref 0–6)
HCT VFR BLD CALC: 33 % — LOW (ref 34.5–45)
HGB BLD-MCNC: 10.8 G/DL — LOW (ref 11.5–15.5)
LYMPHOCYTES # BLD AUTO: 0.05 K/UL — LOW (ref 1–3.3)
LYMPHOCYTES # BLD AUTO: 2 % — LOW (ref 13–44)
MCHC RBC-ENTMCNC: 25.7 PG — LOW (ref 27–34)
MCHC RBC-ENTMCNC: 32.7 GM/DL — SIGNIFICANT CHANGE UP (ref 32–36)
MCV RBC AUTO: 78.6 FL — LOW (ref 80–100)
MONOCYTES # BLD AUTO: 0.18 K/UL — SIGNIFICANT CHANGE UP (ref 0–0.9)
MONOCYTES NFR BLD AUTO: 7 % — SIGNIFICANT CHANGE UP (ref 2–14)
NEUTROPHILS # BLD AUTO: 2.33 K/UL — SIGNIFICANT CHANGE UP (ref 1.8–7.4)
NEUTROPHILS NFR BLD AUTO: 91 % — HIGH (ref 43–77)
NRBC # BLD: 0 /100 — SIGNIFICANT CHANGE UP (ref 0–0)
NRBC # BLD: SIGNIFICANT CHANGE UP /100 WBCS (ref 0–0)
PLAT MORPH BLD: NORMAL — SIGNIFICANT CHANGE UP
PLATELET # BLD AUTO: 312 K/UL — SIGNIFICANT CHANGE UP (ref 150–400)
RBC # BLD: 4.2 M/UL — SIGNIFICANT CHANGE UP (ref 3.8–5.2)
RBC # FLD: 17.8 % — HIGH (ref 10.3–14.5)
RBC BLD AUTO: SIGNIFICANT CHANGE UP
WBC # BLD: 2.56 K/UL — LOW (ref 3.8–10.5)
WBC # FLD AUTO: 2.56 K/UL — LOW (ref 3.8–10.5)

## 2020-04-28 PROCEDURE — 77387B: CUSTOM | Mod: 26

## 2020-04-28 PROCEDURE — 77427 RADIATION TX MANAGEMENT X5: CPT

## 2020-04-28 RX ORDER — OMEPRAZOLE 10 MG/1
1 CAPSULE, DELAYED RELEASE ORAL
Qty: 0 | Refills: 0 | DISCHARGE

## 2020-04-28 NOTE — REVIEW OF SYSTEMS
[Constipation: Grade 0] : Constipation: Grade 0 [Diarrhea: Grade 0] : Diarrhea: Grade 0 [Dysphagia: Grade 2 - Symptomatic and altered eating/swallowing] : Dysphagia: Grade 2 - Symptomatic and altered eating/swallowing [Nausea: Grade 0] : Nausea: Grade 0 [Esophagitis: Grade 2 - Symptomatic; altered eating/swallowing;  oral supplements indicated] : Esophagitis: Grade 2 - Symptomatic; altered eating/swallowing;  oral supplements indicated [Vomiting: Grade 0] : Vomiting: Grade 0 [Fatigue: Grade 1 - Fatigue relieved by rest] : Fatigue: Grade 1 - Fatigue relieved by rest [Cough: Grade 1 - Mild symptoms; nonprescription intervention indicated] : Cough: Grade 1 - Mild symptoms; nonprescription intervention indicated [Hoarseness: Grade 0] : Hoarseness: Grade 0 [Dyspnea: Grade 0] : Dyspnea: Grade 0 [Pneumonitis: Grade 0] : Pneumonitis: Grade 0 [Dermatitis Radiation: Grade 0] : Dermatitis Radiation: Grade 0 [FreeTextEntry6] : baseline [FreeTextEntry7] : baseline [FreeTextEntry1] : dry

## 2020-04-28 NOTE — HISTORY OF PRESENT ILLNESS
[FreeTextEntry1] : Bridgett Boyd is a 70 year old woman who was recently diagnosed with esophageal cancer. Receiving neoadjuvant chemoradiation\par \par 4/16/2020\par -Tolerating tx\par -No complaints of actual pain except for heat burn for which she is on Protonix. \par -Has dysphagia that is unchanged and is able to eat soft foods and liquids, similar to at diagnosis.  Weight is stable.  \par -Denies N/V.\par \par 4/21 OTV: dysphagia stable. No chest pain, neck pain. Weight stable. \par \par 4/23/2020\par -Tolerating tx\par -Pt has dysphagia but stable ,mostly eating fluids and soft food.  2-3 cans per day of ensure. Eats soup and yoghourt\par -Pain level 8/10. Taking Carafate\par -No cough or SOB at this time\par \par 4/28/2020\par -Tolerating tx\par -Having increase of pain mid esophagus 10/10 at present time  in spite of using Carafate \par -tolerating chemo ,going today\par -has lost approx 3 pounds\par -Occasionally experiences nausea and vomiting when her stomach is empty.\par \par \par \par

## 2020-04-28 NOTE — VITALS
[Maximal Pain Intensity: 10/10] : 10/10 [Least Pain Intensity: 7/10] : 7/10 [Pain Description/Quality: ___] : Pain description/quality: [unfilled] [Pain Duration: ___] : Pain duration: [unfilled] [Pain Location: ___] : Pain Location: [unfilled] [Pain Interferes with ADLs] : Pain interferes with activities of daily living. [80: Normal activity with effort; some signs or symptoms of disease.] : 80: Normal activity with effort; some signs or symptoms of disease.  [OTC] : OTC [ECOG Performance Status: 1 - Restricted in physically strenuous activity but ambulatory and able to carry out work of a light or sedentary nature] : Performance Status: 1 - Restricted in physically strenuous activity but ambulatory and able to carry out work of a light or sedentary nature, e.g., light house work, office work

## 2020-04-29 PROCEDURE — 77387B: CUSTOM | Mod: 26

## 2020-04-30 PROCEDURE — 77387B: CUSTOM | Mod: 26

## 2020-05-01 VITALS
SYSTOLIC BLOOD PRESSURE: 138 MMHG | RESPIRATION RATE: 18 BRPM | OXYGEN SATURATION: 100 % | BODY MASS INDEX: 29.45 KG/M2 | HEIGHT: 62.5 IN | HEART RATE: 88 BPM | TEMPERATURE: 98.96 F | DIASTOLIC BLOOD PRESSURE: 80 MMHG | WEIGHT: 164.13 LBS

## 2020-05-01 PROCEDURE — 77387B: CUSTOM | Mod: 26

## 2020-05-01 NOTE — VITALS
[Maximal Pain Intensity: 10/10] : 10/10 [Least Pain Intensity: 5/10] : 5/10 [Opioid] : opioid [ECOG Performance Status: 0 - Fully active, able to carry on all pre-disease performance without restriction] : Performance Status: 0 - Fully active, able to carry on all pre-disease performance without restriction

## 2020-05-04 PROCEDURE — 77014: CPT | Mod: 26

## 2020-05-05 ENCOUNTER — LABORATORY RESULT (OUTPATIENT)
Age: 70
End: 2020-05-05

## 2020-05-05 ENCOUNTER — APPOINTMENT (OUTPATIENT)
Dept: INFUSION THERAPY | Facility: HOSPITAL | Age: 70
End: 2020-05-05

## 2020-05-05 ENCOUNTER — RESULT REVIEW (OUTPATIENT)
Age: 70
End: 2020-05-05

## 2020-05-05 VITALS
SYSTOLIC BLOOD PRESSURE: 114 MMHG | RESPIRATION RATE: 18 BRPM | OXYGEN SATURATION: 100 % | WEIGHT: 162.59 LBS | DIASTOLIC BLOOD PRESSURE: 82 MMHG | HEART RATE: 105 BPM | BODY MASS INDEX: 29.26 KG/M2

## 2020-05-05 LAB
BASOPHILS # BLD AUTO: 0.03 K/UL — SIGNIFICANT CHANGE UP (ref 0–0.2)
BASOPHILS NFR BLD AUTO: 0.8 % — SIGNIFICANT CHANGE UP (ref 0–2)
EOSINOPHIL # BLD AUTO: 0.01 K/UL — SIGNIFICANT CHANGE UP (ref 0–0.5)
EOSINOPHIL NFR BLD AUTO: 0.3 % — SIGNIFICANT CHANGE UP (ref 0–6)
HCT VFR BLD CALC: 32.4 % — LOW (ref 34.5–45)
HGB BLD-MCNC: 10.9 G/DL — LOW (ref 11.5–15.5)
IMM GRANULOCYTES NFR BLD AUTO: 1.4 % — SIGNIFICANT CHANGE UP (ref 0–1.5)
LYMPHOCYTES # BLD AUTO: 0.1 K/UL — LOW (ref 1–3.3)
LYMPHOCYTES # BLD AUTO: 2.8 % — LOW (ref 13–44)
MCHC RBC-ENTMCNC: 26.2 PG — LOW (ref 27–34)
MCHC RBC-ENTMCNC: 33.6 GM/DL — SIGNIFICANT CHANGE UP (ref 32–36)
MCV RBC AUTO: 77.9 FL — LOW (ref 80–100)
MONOCYTES # BLD AUTO: 0.22 K/UL — SIGNIFICANT CHANGE UP (ref 0–0.9)
MONOCYTES NFR BLD AUTO: 6.2 % — SIGNIFICANT CHANGE UP (ref 2–14)
NEUTROPHILS # BLD AUTO: 3.13 K/UL — SIGNIFICANT CHANGE UP (ref 1.8–7.4)
NEUTROPHILS NFR BLD AUTO: 88.5 % — HIGH (ref 43–77)
NRBC # BLD: 0 /100 WBCS — SIGNIFICANT CHANGE UP (ref 0–0)
PLATELET # BLD AUTO: 236 K/UL — SIGNIFICANT CHANGE UP (ref 150–400)
RBC # BLD: 4.16 M/UL — SIGNIFICANT CHANGE UP (ref 3.8–5.2)
RBC # FLD: 19.5 % — HIGH (ref 10.3–14.5)
WBC # BLD: 3.54 K/UL — LOW (ref 3.8–10.5)
WBC # FLD AUTO: 3.54 K/UL — LOW (ref 3.8–10.5)

## 2020-05-05 PROCEDURE — 77427 RADIATION TX MANAGEMENT X5: CPT

## 2020-05-05 PROCEDURE — 77387B: CUSTOM | Mod: 26

## 2020-05-05 RX ORDER — OXYCODONE HYDROCHLORIDE 5 MG/5ML
5 SOLUTION ORAL 4 TIMES DAILY
Qty: 400 | Refills: 0 | Status: DISCONTINUED | COMMUNITY
Start: 2020-04-28 | End: 2020-05-05

## 2020-05-05 NOTE — VITALS
[Maximal Pain Intensity: 10/10] : 10/10 [Pain Description/Quality: ___] : Pain description/quality: [unfilled] [Pain Location: ___] : Pain Location: [unfilled] [Pain Interferes with ADLs] : Pain interferes with activities of daily living. [80: Normal activity with effort; some signs or symptoms of disease.] : 80: Normal activity with effort; some signs or symptoms of disease.  [Least Pain Intensity: 7/10] : 7/10 [Pain Duration: ___] : Pain duration: [unfilled] [Opioid] : opioid [NSAID/Non-Opioid] : NSAID/Non-Opioid

## 2020-05-06 DIAGNOSIS — E86.0 DEHYDRATION: ICD-10-CM

## 2020-05-06 PROCEDURE — 77387B: CUSTOM | Mod: 26

## 2020-05-07 VITALS
RESPIRATION RATE: 18 BRPM | WEIGHT: 161.27 LBS | BODY MASS INDEX: 29.03 KG/M2 | OXYGEN SATURATION: 94 % | SYSTOLIC BLOOD PRESSURE: 128 MMHG | DIASTOLIC BLOOD PRESSURE: 77 MMHG

## 2020-05-07 PROCEDURE — 77387B: CUSTOM | Mod: 26

## 2020-05-07 NOTE — VITALS
[Least Pain Intensity: 4/10] : 4/10 [Maximal Pain Intensity: 10/10] : 10/10 [Pain Description/Quality: ___] : Pain description/quality: [unfilled] [Pain Duration: ___] : Pain duration: [unfilled] [Pain Location: ___] : Pain Location: [unfilled] [NSAID/Non-Opioid] : NSAID/Non-Opioid [Pain Interferes with ADLs] : Pain interferes with activities of daily living. [ECOG Performance Status: 0 - Fully active, able to carry on all pre-disease performance without restriction] : Performance Status: 0 - Fully active, able to carry on all pre-disease performance without restriction [80: Normal activity with effort; some signs or symptoms of disease.] : 80: Normal activity with effort; some signs or symptoms of disease.

## 2020-05-08 PROCEDURE — 77387B: CUSTOM | Mod: 26

## 2020-05-11 PROCEDURE — 77014: CPT | Mod: 26

## 2020-05-12 ENCOUNTER — LABORATORY RESULT (OUTPATIENT)
Age: 70
End: 2020-05-12

## 2020-05-12 ENCOUNTER — APPOINTMENT (OUTPATIENT)
Dept: INFUSION THERAPY | Facility: HOSPITAL | Age: 70
End: 2020-05-12

## 2020-05-12 ENCOUNTER — RESULT REVIEW (OUTPATIENT)
Age: 70
End: 2020-05-12

## 2020-05-12 ENCOUNTER — APPOINTMENT (OUTPATIENT)
Dept: HEMATOLOGY ONCOLOGY | Facility: CLINIC | Age: 70
End: 2020-05-12
Payer: MEDICARE

## 2020-05-12 VITALS
DIASTOLIC BLOOD PRESSURE: 81 MMHG | HEART RATE: 96 BPM | SYSTOLIC BLOOD PRESSURE: 127 MMHG | OXYGEN SATURATION: 99 % | RESPIRATION RATE: 18 BRPM | TEMPERATURE: 208.22 F

## 2020-05-12 VITALS
SYSTOLIC BLOOD PRESSURE: 138 MMHG | DIASTOLIC BLOOD PRESSURE: 83 MMHG | HEART RATE: 111 BPM | RESPIRATION RATE: 18 BRPM | TEMPERATURE: 97.7 F | OXYGEN SATURATION: 100 % | BODY MASS INDEX: 28.47 KG/M2 | WEIGHT: 158.18 LBS

## 2020-05-12 LAB
ANISOCYTOSIS BLD QL: SLIGHT — SIGNIFICANT CHANGE UP
BASOPHILS # BLD AUTO: 0 K/UL — SIGNIFICANT CHANGE UP (ref 0–0.2)
BASOPHILS NFR BLD AUTO: 0 % — SIGNIFICANT CHANGE UP (ref 0–2)
EOSINOPHIL # BLD AUTO: 0 K/UL — SIGNIFICANT CHANGE UP (ref 0–0.5)
EOSINOPHIL NFR BLD AUTO: 0 % — SIGNIFICANT CHANGE UP (ref 0–6)
HCT VFR BLD CALC: 32.1 % — LOW (ref 34.5–45)
HGB BLD-MCNC: 10.6 G/DL — LOW (ref 11.5–15.5)
HYPOCHROMIA BLD QL: SLIGHT — SIGNIFICANT CHANGE UP
LYMPHOCYTES # BLD AUTO: 0.21 K/UL — LOW (ref 1–3.3)
LYMPHOCYTES # BLD AUTO: 6 % — LOW (ref 13–44)
MACROCYTES BLD QL: SLIGHT — SIGNIFICANT CHANGE UP
MCHC RBC-ENTMCNC: 25.8 PG — LOW (ref 27–34)
MCHC RBC-ENTMCNC: 33 GM/DL — SIGNIFICANT CHANGE UP (ref 32–36)
MCV RBC AUTO: 78.1 FL — LOW (ref 80–100)
MONOCYTES # BLD AUTO: 0.14 K/UL — SIGNIFICANT CHANGE UP (ref 0–0.9)
MONOCYTES NFR BLD AUTO: 4 % — SIGNIFICANT CHANGE UP (ref 2–14)
NEUTROPHILS # BLD AUTO: 3.22 K/UL — SIGNIFICANT CHANGE UP (ref 1.8–7.4)
NEUTROPHILS NFR BLD AUTO: 90 % — HIGH (ref 43–77)
NRBC # BLD: 0 /100 — SIGNIFICANT CHANGE UP (ref 0–0)
NRBC # BLD: SIGNIFICANT CHANGE UP /100 WBCS (ref 0–0)
PLAT MORPH BLD: NORMAL — SIGNIFICANT CHANGE UP
PLATELET # BLD AUTO: 210 K/UL — SIGNIFICANT CHANGE UP (ref 150–400)
POLYCHROMASIA BLD QL SMEAR: SLIGHT — SIGNIFICANT CHANGE UP
RBC # BLD: 4.11 M/UL — SIGNIFICANT CHANGE UP (ref 3.8–5.2)
RBC # FLD: 19.3 % — HIGH (ref 10.3–14.5)
RBC BLD AUTO: ABNORMAL
WBC # BLD: 3.58 K/UL — LOW (ref 3.8–10.5)
WBC # FLD AUTO: 3.58 K/UL — LOW (ref 3.8–10.5)

## 2020-05-12 PROCEDURE — 99214 OFFICE O/P EST MOD 30 MIN: CPT

## 2020-05-12 PROCEDURE — 77387B: CUSTOM | Mod: 26

## 2020-05-12 PROCEDURE — 77427 RADIATION TX MANAGEMENT X5: CPT

## 2020-05-12 RX ORDER — GABAPENTIN 100 MG/1
100 CAPSULE ORAL 3 TIMES DAILY
Qty: 270 | Refills: 0 | Status: DISCONTINUED | COMMUNITY
Start: 2020-05-12 | End: 2020-05-12

## 2020-05-12 RX ORDER — GABAPENTIN 100 MG/1
100 CAPSULE ORAL 3 TIMES DAILY
Qty: 90 | Refills: 0 | Status: DISCONTINUED | COMMUNITY
Start: 2020-05-12 | End: 2020-05-12

## 2020-05-12 NOTE — HISTORY OF PRESENT ILLNESS
[Disease: _____________________] : Disease: [unfilled] [N: ___] : N[unfilled] [de-identified] : Had C-spine surgeries 6/2019 and 7/2019-soon thereafter had difficulty swallowing which patient attributed to her neck surgeries. However, the feeling of something "sticking in her throat" when she ate or drank, gradually got worse. She is only able to tolerate liquids and tiny bits of solid foods now. Has lost 14 pounds in last few months.\par +Moderate to severe reflux. No vomiting.\par No abdominal pain. No CP, SOB. Patient has had a mild cough/pulmonary congestion. No fevers. No bleeding, melena.\par Upper endoscopy (Dr. Chew) showed a suspicious appearing lesion at the distal esophagus at 38 cm, circumferential, unable to traverse with the scope. Esophageal mass biopsy revealed infiltrating, moderately to poorly differentiated adenocarcinoma. CT scan chest/abdomen/pelvis showed wall thickening of the distal esophagus/hiatal hernia with gastrohepatic and mediastinal lymphadenopathy, highly concerning for malignancy. Asymmetric wall thickening of the right bladder slightly increased since prior exam. PET/CT scan showed FDG avid distal esophageal soft tissue thickening, consistent with known malignancy. FDG avid gastrohepatic ligaments, lymph nodes, probably metastatic. FDG avid subcarinal lymph node, probably metastatic. Minimally FDG avid subtle linear left upper lobe pulmonary infiltrate, nonspecific, new since 3/13/20.\par 4/2020-Began Carboplatin/Taxol along with radiation. [de-identified] : Has 3 more radiation treatments left. +Dysphagia to solids. Able to tolerate liquids, though has a burning sensation when swallows. No associated nausea/vomiting. Difficulty clearing mucus at times. No abdominal/pelvic pain. No bleeding. No cough, shortness of breath or fevers.\par  [de-identified] : Adenocarcinoma

## 2020-05-12 NOTE — VITALS
[Maximal Pain Intensity: 10/10] : 10/10 [Least Pain Intensity: 5/10] : 5/10 [Pain Description/Quality: ___] : Pain description/quality: [unfilled] [Pain Duration: ___] : Pain duration: [unfilled] [Pain Location: ___] : Pain Location: [unfilled] [Pain Interferes with ADLs] : Pain interferes with activities of daily living. [Opioid] : opioid [80: Normal activity with effort; some signs or symptoms of disease.] : 80: Normal activity with effort; some signs or symptoms of disease.  [ECOG Performance Status: 1 - Restricted in physically strenuous activity but ambulatory and able to carry out work of a light or sedentary nature] : Performance Status: 1 - Restricted in physically strenuous activity but ambulatory and able to carry out work of a light or sedentary nature, e.g., light house work, office work

## 2020-05-12 NOTE — REVIEW OF SYSTEMS
[Negative] : Allergic/Immunologic [Dysphagia] : dysphagia [Odynophagia] : odynophagia [Shortness Of Breath] : no shortness of breath [Abdominal Pain] : no abdominal pain

## 2020-05-12 NOTE — ASSESSMENT
[FreeTextEntry1] : Esophageal cancer-clinically stable for chemotherapy today, to which patient consents. Radiation continues per Dr. Nguyen. IVF hydration planned.\par Anemia-exacerbated by ongoing treatment. No overt bleeding noted clinically present. Hemoglobin currently acceptable. Continue to monitor CBC.\par Patient was given the opportunity to ask questions. Her questions have been answered to her apparent satisfaction at this time.

## 2020-05-13 ENCOUNTER — APPOINTMENT (OUTPATIENT)
Dept: INFUSION THERAPY | Facility: HOSPITAL | Age: 70
End: 2020-05-13

## 2020-05-13 PROCEDURE — 77387B: CUSTOM | Mod: 26

## 2020-05-14 NOTE — REVIEW OF SYSTEMS
[Constipation: Grade 0] : Constipation: Grade 0 [Dysphagia: Grade 2 - Symptomatic and altered eating/swallowing] : Dysphagia: Grade 2 - Symptomatic and altered eating/swallowing [Diarrhea: Grade 0] : Diarrhea: Grade 0 [Esophagitis: Grade 2 - Symptomatic; altered eating/swallowing;  oral supplements indicated] : Esophagitis: Grade 2 - Symptomatic; altered eating/swallowing;  oral supplements indicated [Nausea: Grade 1 - Loss of appetite without alteration in eating habits] : Nausea: Grade 1 - Loss of appetite without alteration in eating habits [Vomiting: Grade 0] : Vomiting: Grade 0 [Fatigue: Grade 1 - Fatigue relieved by rest] : Fatigue: Grade 1 - Fatigue relieved by rest [Cough: Grade 1 - Mild symptoms; nonprescription intervention indicated] : Cough: Grade 1 - Mild symptoms; nonprescription intervention indicated [Dyspnea: Grade 0] : Dyspnea: Grade 0 [Hoarseness: Grade 0] : Hoarseness: Grade 0 [Pneumonitis: Grade 0] : Pneumonitis: Grade 0 [Dermatitis Radiation: Grade 0] : Dermatitis Radiation: Grade 0 [FreeTextEntry6] : baseline [FreeTextEntry7] : baseline [FreeTextEntry1] : dry

## 2020-05-14 NOTE — DISEASE MANAGEMENT
[Clinical] : TNM Stage: c [TAMANNA] : TAMANNA [NTNM] : 2 [TTNM] : x [MTNM] : 0 [de-identified] : 16x/4116cGy [de-identified] : 25tx/4500cGy [de-identified] : esophagus

## 2020-05-14 NOTE — DISEASE MANAGEMENT
[Clinical] : TNM Stage: c [TTNM] : x [NTNM] : 2 [MTNM] : 0 [TAMANNA] : TAMANNA [de-identified] : 18tx/3240cGy [de-identified] : 25tx / 4500cGy [de-identified] : esophagus

## 2020-05-14 NOTE — DISEASE MANAGEMENT
[Clinical] : TNM Stage: c [TTNM] : x [NTNM] : 2 [MTNM] : 0 [TAMANNA] : TAMANNA [de-identified] : 21tx/3780cGy [de-identified] : 25Tx / 4500cGy [de-identified] : esophagus

## 2020-05-14 NOTE — HISTORY OF PRESENT ILLNESS
[FreeTextEntry1] : Bridgett Boyd is a 70 year old woman who was recently diagnosed with esophageal cancer. Receiving neoadjuvant chemoradiation\par \par 4/16/2020\par -Tolerating tx\par -No complaints of actual pain except for heat burn for which she is on Protonix. \par -Has dysphagia that is unchanged and is able to eat soft foods and liquids, similar to at diagnosis.  Weight is stable.  \par -Denies N/V.\par \par 4/21 OTV: dysphagia stable. No chest pain, neck pain. Weight stable. \par \par 4/23/2020\par -Tolerating tx\par -Pt has dysphagia but stable ,mostly eating fluids and soft food.  2-3 cans per day of ensure. Eats soup and yoghourt\par -Pain level 8/10. Taking Carafate\par -No cough or SOB at this time\par \par 4/28/2020\par -Tolerating tx\par -Having increase of pain mid esophagus 10/10 at present time  in spite of using Carafate \par -tolerating chemo ,going today\par -has lost approx 3 pounds\par -Occasionally experiences nausea and vomiting when her stomach is empty.\par \par 5/1/2020\par -Tolerating tx\par -Has moderate dysphagia,using magic mixture  with good relief and liquid oxycodone\par -Has reflux taking protonix\par -got hydration on tuesday\par \par 5/5/2020\par -Tolerated tx\par -Increase in pain mid esophagus from 7-10/10 worse with swallowing\par -Has lost a little more than a pound this week\par -going for hydration today\par \par 5/7/2020\par -Tolerating tx\par -Still having dysphagia . Using carafate,magic mixture and gabapentin\par -Dry cough noted and no SOB at this time\par \par 5/12/2020\par -Tolerating tx\par -Still having a lot of pain and dysphsia-on liquid  oxycodone and magic mixture  .Pain 10/10 then goes down to 5/10 with meds.  Also noting N/V with liquids.  Prefers tabs for meds if possible.\par -Lost about 3 pounds this week .Will  call nutritionist.\par -Having chemo and hydration today\par \par \par \par

## 2020-05-14 NOTE — HISTORY OF PRESENT ILLNESS
[FreeTextEntry1] : Bridgett Boyd is a 70 year old woman who was recently diagnosed with esophageal cancer. Receiving neoadjuvant chemoradiation\par \par 4/16/2020\par -Tolerating tx\par -No complaints of actual pain except for heat burn for which she is on Protonix. \par -Has dysphagia that is unchanged and is able to eat soft foods and liquids, similar to at diagnosis.  Weight is stable.  \par -Denies N/V.\par \par 4/21 OTV: dysphagia stable. No chest pain, neck pain. Weight stable. \par \par 4/23/2020\par -Tolerating tx\par -Pt has dysphagia but stable ,mostly eating fluids and soft food.  2-3 cans per day of ensure. Eats soup and yoghourt\par -Pain level 8/10. Taking Carafate\par -No cough or SOB at this time\par \par 4/28/2020\par -Tolerating tx\par -Having increase of pain mid esophagus 10/10 at present time  in spite of using Carafate \par -tolerating chemo ,going today\par -has lost approx 3 pounds\par -Occasionally experiences nausea and vomiting when her stomach is empty.\par \par 5/1/2020\par -Tolerating tx\par -Has moderate dysphagia,using magic mixture  with good relief and liquid oxycodone\par -Has reflux taking protonix\par -got hydration on tuesday\par \par 5/5/2020\par -Tolerated tx\par -Increase in pain mid esophagus from 7-10/10 worse with swallowing\par -Has lost a little more than a pound this week\par -going for hydration today\par \par \par \par

## 2020-05-14 NOTE — HISTORY OF PRESENT ILLNESS
[FreeTextEntry1] : Bridgett Boyd is a 70 year old woman who was recently diagnosed with esophageal cancer. Receiving neoadjuvant chemoradiation\par \par 4/16/2020\par -Tolerating tx\par -No complaints of actual pain except for heat burn for which she is on Protonix. \par -Has dysphagia that is unchanged and is able to eat soft foods and liquids, similar to at diagnosis.  Weight is stable.  \par -Denies N/V.\par \par 4/21 OTV: dysphagia stable. No chest pain, neck pain. Weight stable. \par \par 4/23/2020\par -Tolerating tx\par -Pt has dysphagia but stable ,mostly eating fluids and soft food.  2-3 cans per day of ensure. Eats soup and yoghourt\par -Pain level 8/10. Taking Carafate\par -No cough or SOB at this time\par \par 4/28/2020\par -Tolerating tx\par -Having increase of pain mid esophagus 10/10 at present time  in spite of using Carafate \par -tolerating chemo ,going today\par -has lost approx 3 pounds\par -Occasionally experiences nausea and vomiting when her stomach is empty.\par \par 5/1/2020\par -Tolerating tx\par -Has moderate dysphagia,using magic mixture  with good relief and liquid oxycodone\par -Has reflux taking protonix\par -got hydration on tuesday\par \par \par \par

## 2020-05-14 NOTE — HISTORY OF PRESENT ILLNESS
[FreeTextEntry1] : Bridgett Boyd is a 70 year old woman who was recently diagnosed with esophageal cancer. Receiving neoadjuvant chemoradiation\par \par 4/16/2020\par -Tolerating tx\par -No complaints of actual pain except for heat burn for which she is on Protonix. \par -Has dysphagia that is unchanged and is able to eat soft foods and liquids, similar to at diagnosis.  Weight is stable.  \par -Denies N/V.\par \par 4/21 OTV: dysphagia stable. No chest pain, neck pain. Weight stable. \par \par 4/23/2020\par -Tolerating tx\par -Pt has dysphagia but stable ,mostly eating fluids and soft food.  2-3 cans per day of ensure. Eats soup and yoghourt\par -Pain level 8/10. Taking Carafate\par -No cough or SOB at this time\par \par 4/28/2020\par -Tolerating tx\par -Having increase of pain mid esophagus 10/10 at present time  in spite of using Carafate \par -tolerating chemo ,going today\par -has lost approx 3 pounds\par -Occasionally experiences nausea and vomiting when her stomach is empty.\par \par 5/1/2020\par -Tolerating tx\par -Has moderate dysphagia,using magic mixture  with good relief and liquid oxycodone\par -Has reflux taking protonix\par -got hydration on tuesday\par \par 5/5/2020\par -Tolerated tx\par -Increase in pain mid esophagus from 7-10/10 worse with swallowing\par -Has lost a little more than a pound this week\par -going for hydration today\par \par 5/7/2020\par -Tolerating tx\par -Still having dysphagia . Using carafate,magic mixture and gabapentin\par -Dry cough noted and no SOB at this time\par \par \par

## 2020-05-14 NOTE — REVIEW OF SYSTEMS
[Constipation: Grade 0] : Constipation: Grade 0 [Dysphagia: Grade 2 - Symptomatic and altered eating/swallowing] : Dysphagia: Grade 2 - Symptomatic and altered eating/swallowing [Diarrhea: Grade 0] : Diarrhea: Grade 0 [Esophagitis: Grade 2 - Symptomatic; altered eating/swallowing;  oral supplements indicated] : Esophagitis: Grade 2 - Symptomatic; altered eating/swallowing;  oral supplements indicated [Nausea: Grade 1 - Loss of appetite without alteration in eating habits] : Nausea: Grade 1 - Loss of appetite without alteration in eating habits [Fatigue: Grade 1 - Fatigue relieved by rest] : Fatigue: Grade 1 - Fatigue relieved by rest [Cough: Grade 1 - Mild symptoms; nonprescription intervention indicated] : Cough: Grade 1 - Mild symptoms; nonprescription intervention indicated [Dyspnea: Grade 0] : Dyspnea: Grade 0 [Hoarseness: Grade 0] : Hoarseness: Grade 0 [Pneumonitis: Grade 0] : Pneumonitis: Grade 0 [Dermatitis Radiation: Grade 0] : Dermatitis Radiation: Grade 0 [Vomiting: Grade 1 - 1 - 2 episodes ( by 5 minutes) in 24 hrs] : Vomiting: Grade 1 - 1 - 2 episodes ( by 5 minutes) in 24 hrs [FreeTextEntry6] : baseline [FreeTextEntry7] : baseline [FreeTextEntry1] : dry

## 2020-05-14 NOTE — DISEASE MANAGEMENT
[Clinical] : TNM Stage: c [TAMANNA] : TAMANNA [TTNM] : x [MTNM] : 0 [NTNM] : 2 [de-identified] : 14tx/2560cGy [de-identified] : 25tx/4500cGy [de-identified] : esophagus

## 2020-05-14 NOTE — REVIEW OF SYSTEMS
[Diarrhea: Grade 0] : Diarrhea: Grade 0 [Constipation: Grade 0] : Constipation: Grade 0 [Dysphagia: Grade 2 - Symptomatic and altered eating/swallowing] : Dysphagia: Grade 2 - Symptomatic and altered eating/swallowing [Esophagitis: Grade 2 - Symptomatic; altered eating/swallowing;  oral supplements indicated] : Esophagitis: Grade 2 - Symptomatic; altered eating/swallowing;  oral supplements indicated [Vomiting: Grade 0] : Vomiting: Grade 0 [Fatigue: Grade 1 - Fatigue relieved by rest] : Fatigue: Grade 1 - Fatigue relieved by rest [Cough: Grade 1 - Mild symptoms; nonprescription intervention indicated] : Cough: Grade 1 - Mild symptoms; nonprescription intervention indicated [Dyspnea: Grade 0] : Dyspnea: Grade 0 [Hoarseness: Grade 0] : Hoarseness: Grade 0 [Pneumonitis: Grade 0] : Pneumonitis: Grade 0 [Dermatitis Radiation: Grade 0] : Dermatitis Radiation: Grade 0 [Nausea: Grade 1 - Loss of appetite without alteration in eating habits] : Nausea: Grade 1 - Loss of appetite without alteration in eating habits [FreeTextEntry6] : baseline [FreeTextEntry7] : baseline [FreeTextEntry1] : dry

## 2020-05-14 NOTE — REVIEW OF SYSTEMS
[Constipation: Grade 0] : Constipation: Grade 0 [Esophagitis: Grade 2 - Symptomatic; altered eating/swallowing;  oral supplements indicated] : Esophagitis: Grade 2 - Symptomatic; altered eating/swallowing;  oral supplements indicated [Diarrhea: Grade 0] : Diarrhea: Grade 0 [Dysphagia: Grade 2 - Symptomatic and altered eating/swallowing] : Dysphagia: Grade 2 - Symptomatic and altered eating/swallowing [Vomiting: Grade 0] : Vomiting: Grade 0 [Nausea: Grade 1 - Loss of appetite without alteration in eating habits] : Nausea: Grade 1 - Loss of appetite without alteration in eating habits [Fatigue: Grade 1 - Fatigue relieved by rest] : Fatigue: Grade 1 - Fatigue relieved by rest [Cough: Grade 1 - Mild symptoms; nonprescription intervention indicated] : Cough: Grade 1 - Mild symptoms; nonprescription intervention indicated [Hoarseness: Grade 0] : Hoarseness: Grade 0 [Pneumonitis: Grade 0] : Pneumonitis: Grade 0 [Dyspnea: Grade 0] : Dyspnea: Grade 0 [Dermatitis Radiation: Grade 0] : Dermatitis Radiation: Grade 0 [FreeTextEntry6] : baseline [FreeTextEntry7] : baseline [FreeTextEntry1] : dry

## 2020-05-15 ENCOUNTER — OUTPATIENT (OUTPATIENT)
Dept: OUTPATIENT SERVICES | Facility: HOSPITAL | Age: 70
LOS: 1 days | Discharge: HOME | End: 2020-05-15

## 2020-05-15 VITALS
HEART RATE: 100 BPM | SYSTOLIC BLOOD PRESSURE: 101 MMHG | WEIGHT: 159.17 LBS | BODY MASS INDEX: 28.65 KG/M2 | RESPIRATION RATE: 18 BRPM | TEMPERATURE: 99.68 F | OXYGEN SATURATION: 97 % | DIASTOLIC BLOOD PRESSURE: 68 MMHG

## 2020-05-15 DIAGNOSIS — Z90.710 ACQUIRED ABSENCE OF BOTH CERVIX AND UTERUS: Chronic | ICD-10-CM

## 2020-05-15 DIAGNOSIS — Z87.39 PERSONAL HISTORY OF OTHER DISEASES OF THE MUSCULOSKELETAL SYSTEM AND CONNECTIVE TISSUE: Chronic | ICD-10-CM

## 2020-05-15 DIAGNOSIS — Z98.890 OTHER SPECIFIED POSTPROCEDURAL STATES: Chronic | ICD-10-CM

## 2020-05-15 DIAGNOSIS — E66.9 OBESITY, UNSPECIFIED: Chronic | ICD-10-CM

## 2020-05-15 DIAGNOSIS — C15.9 MALIGNANT NEOPLASM OF ESOPHAGUS, UNSPECIFIED: ICD-10-CM

## 2020-05-15 DIAGNOSIS — Z98.891 HISTORY OF UTERINE SCAR FROM PREVIOUS SURGERY: Chronic | ICD-10-CM

## 2020-05-15 PROCEDURE — 77387B: CUSTOM | Mod: 26

## 2020-05-15 NOTE — VITALS
[Least Pain Intensity: 4/10] : 4/10 [Maximal Pain Intensity: 10/10] : 10/10 [Pain Duration: ___] : Pain duration: [unfilled] [Pain Location: ___] : Pain Location: [unfilled] [Pain Description/Quality: ___] : Pain description/quality: [unfilled] [OTC] : OTC [Pain Interferes with ADLs] : Pain interferes with activities of daily living. [Opioid] : opioid [ECOG Performance Status: 1 - Restricted in physically strenuous activity but ambulatory and able to carry out work of a light or sedentary nature] : Performance Status: 1 - Restricted in physically strenuous activity but ambulatory and able to carry out work of a light or sedentary nature, e.g., light house work, office work [80: Normal activity with effort; some signs or symptoms of disease.] : 80: Normal activity with effort; some signs or symptoms of disease.

## 2020-05-18 VITALS
DIASTOLIC BLOOD PRESSURE: 80 MMHG | BODY MASS INDEX: 27.74 KG/M2 | OXYGEN SATURATION: 99 % | HEART RATE: 105 BPM | TEMPERATURE: 97.3 F | SYSTOLIC BLOOD PRESSURE: 120 MMHG | WEIGHT: 154.1 LBS | RESPIRATION RATE: 16 BRPM

## 2020-05-18 VITALS — DIASTOLIC BLOOD PRESSURE: 69 MMHG | SYSTOLIC BLOOD PRESSURE: 105 MMHG

## 2020-05-18 PROCEDURE — 77387B: CUSTOM | Mod: 26

## 2020-05-18 RX ORDER — TRAMADOL HYDROCHLORIDE 50 MG/1
50 TABLET, COATED ORAL
Qty: 30 | Refills: 0 | Status: DISCONTINUED | COMMUNITY
Start: 2020-03-16 | End: 2020-05-18

## 2020-05-18 NOTE — HISTORY OF PRESENT ILLNESS
[FreeTextEntry1] : Bridgett Boyd is a 70 year old woman who was recently diagnosed with esophageal cancer. Receiving neoadjuvant chemoradiation\par \par 4/16/2020\par -Tolerating tx\par -No complaints of actual pain except for heat burn for which she is on Protonix. \par -Has dysphagia that is unchanged and is able to eat soft foods and liquids, similar to at diagnosis.  Weight is stable.  \par -Denies N/V.\par \par 4/21 OTV: dysphagia stable. No chest pain, neck pain. Weight stable. \par \par 4/23/2020\par -Tolerating tx\par -Pt has dysphagia but stable ,mostly eating fluids and soft food.  2-3 cans per day of ensure. Eats soup and yoghourt\par -Pain level 8/10. Taking Carafate\par -No cough or SOB at this time\par \par 4/28/2020\par -Tolerating tx\par -Having increase of pain mid esophagus 10/10 at present time  in spite of using Carafate \par -tolerating chemo ,going today\par -has lost approx 3 pounds\par -Occasionally experiences nausea and vomiting when her stomach is empty.\par \par 5/1/2020\par -Tolerating tx\par -Has moderate dysphagia,using magic mixture  with good relief and liquid oxycodone\par -Has reflux taking protonix\par -got hydration on tuesday\par \par 5/5/2020\par -Tolerated tx\par -Increase in pain mid esophagus from 7-10/10 worse with swallowing\par -Has lost a little more than a pound this week\par -going for hydration today\par \par 5/7/2020\par -Tolerating tx\par -Still having dysphagia . Using carafate,magic mixture and gabapentin\par -Dry cough noted and no SOB at this time\par \par 5/12/2020\par -Tolerating tx\par -Still having a lot of pain and dysphsia-on liquid  oxycodone and magic mixture  .Pain 10/10 then goes down to 5/10 with meds.  Also noting N/V with liquids.  Prefers tabs for meds if possible.\par -Lost about 3 pounds this week .Will  call nutritionist.\par -Having chemo and hydration today\par \par 5/15/2020\par -Tolerating tx-will complete on tuesday\par -Discharge packet given with followup appt.\par -Dysphagia the same 10/10 and using magic mixture goes down to 4-5/10\par -Eating poorly,hardly any solids but weight has gone up a pound.-Had 2 liters of fluids wed at [facility]\par \par \par \par

## 2020-05-18 NOTE — DISEASE MANAGEMENT
[Clinical] : TNM Stage: c [TAMANNA] : TAMANNA [TTNM] : x [NTNM] : 2 [de-identified] : 23tx/4140cGy [MTNM] : 0 [de-identified] : 25Tx / 4500cGy [de-identified] : esophagus

## 2020-05-18 NOTE — REVIEW OF SYSTEMS
[Diarrhea: Grade 0] : Diarrhea: Grade 0 [Constipation: Grade 0] : Constipation: Grade 0 [Esophagitis: Grade 2 - Symptomatic; altered eating/swallowing;  oral supplements indicated] : Esophagitis: Grade 2 - Symptomatic; altered eating/swallowing;  oral supplements indicated [Nausea: Grade 1 - Loss of appetite without alteration in eating habits] : Nausea: Grade 1 - Loss of appetite without alteration in eating habits [Dysphagia: Grade 2 - Symptomatic and altered eating/swallowing] : Dysphagia: Grade 2 - Symptomatic and altered eating/swallowing [Vomiting: Grade 1 - 1 - 2 episodes ( by 5 minutes) in 24 hrs] : Vomiting: Grade 1 - 1 - 2 episodes ( by 5 minutes) in 24 hrs [Cough: Grade 1 - Mild symptoms; nonprescription intervention indicated] : Cough: Grade 1 - Mild symptoms; nonprescription intervention indicated [Fatigue: Grade 1 - Fatigue relieved by rest] : Fatigue: Grade 1 - Fatigue relieved by rest [Dyspnea: Grade 0] : Dyspnea: Grade 0 [Hoarseness: Grade 0] : Hoarseness: Grade 0 [Dermatitis Radiation: Grade 0] : Dermatitis Radiation: Grade 0 [Pneumonitis: Grade 0] : Pneumonitis: Grade 0 [FreeTextEntry6] : baseline [FreeTextEntry7] : baseline [FreeTextEntry1] : dry

## 2020-05-19 ENCOUNTER — APPOINTMENT (OUTPATIENT)
Dept: INFUSION THERAPY | Facility: HOSPITAL | Age: 70
End: 2020-05-19

## 2020-05-19 PROCEDURE — 77387B: CUSTOM | Mod: 26

## 2020-05-19 NOTE — HISTORY OF PRESENT ILLNESS
[Home] : at home, [unfilled] , at the time of the visit. [Hospital: ([facility]) ___] : at the hospital in  [Patient & Provider:  (Enter provider's Role) ____] : The patient, [unfilled] and [unfilled] ~ecp~  participated in the telehealth encounter [Spouse] : spouse [FreeTextEntry2] : Bridgett Boyd [FreeTextEntry3] : Self [FreeTextEntry1] : Bridgett Body presents for Radiation Oncology consultation for esophageal cancer.\par \par Her oncologic history began with progressive dysphagia following C-spine surgeries in 6-7/2019 associated with weight loss of about 15lbs. She was evaluated with upper endoscopy by Dr. Chew which showed a suspicious appearing lesion at the distal esophagus at 38 cm, circumferential, unable to traverse with the scope. Esophageal mass biopsy revealed infiltrating, moderately to poorly differentiated adenocarcinoma. CT scan chest/abdomen/pelvis 3/13/2020 showed wall thickening of the distal esophagus/hiatal hernia with gastrohepatic and mediastinal lymphadenopathy, highly concerning for malignancy. She underwent PET/CT scan 3/18/2020 which showed FDG avid distal esophageal soft tissue thickening, consistent with known malignancy. FDG avid gastrohepatic ligaments, lymph nodes, probably metastatic. FDG avid subcarinal lymph node, probably metastatic. Total number of suspicious LNs: 4. \par \par Dr. Kennedy referred her to us for evaluation of neoadjuvant CRT. Patient unable to get ultrasound initially scheduled 3/26/20 (Dr. Lorenzana) as well as consultation with thoracic surgeon (Dr. Weaver) because of COVID19 pandemic. However review with Dr. Weaver indicated to proceed with neoadjuvant therapy at this time.\par \par Today she reports dysphagia limiting oral intake to liquids and soft solids, which has been present for about 3 months but relatively stable for the last month.\par

## 2020-05-19 NOTE — REVIEW OF SYSTEMS
[Recent Change In Weight] : ~T recent weight change [Dysphagia: Grade 2 - Symptomatic and altered eating/swallowing] : Dysphagia: Grade 2 - Symptomatic and altered eating/swallowing [Esophagitis: Grade 2 - Symptomatic; altered eating/swallowing;  oral supplements indicated] : Esophagitis: Grade 2 - Symptomatic; altered eating/swallowing;  oral supplements indicated [Nausea: Grade 0] : Nausea: Grade 0 [Vomiting: Grade 0] : Vomiting: Grade 0 [Negative] : Allergic/Immunologic [FreeTextEntry2] : 15lb weight loss in last few months [FreeTextEntry7] : dysphagia and odynophagia, progressive. able to tolerate liquids and some solids

## 2020-05-20 ENCOUNTER — APPOINTMENT (OUTPATIENT)
Dept: INFUSION THERAPY | Facility: HOSPITAL | Age: 70
End: 2020-05-20

## 2020-05-21 DIAGNOSIS — E86.0 DEHYDRATION: ICD-10-CM

## 2020-05-27 NOTE — HISTORY OF PRESENT ILLNESS
[Home] : at home, [unfilled] , at the time of the visit. [Medical Office: ([facility])___] : at the medical office located in  [Verbal consent obtained from patient] : the patient, [unfilled] [Disease: _____________________] : Disease: [unfilled] [N: ___] : N[unfilled] [de-identified] : Had C-spine surgeries 6/2019 and 7/2019-soon thereafter had difficulty swallowing which patient attributed to her neck surgeries. However, the feeling of something "sticking in her throat" when she ate or drank, gradually got worse. She is only able to tolerate liquids and tiny bits of solid foods now. Has lost 14 pounds in last few months.\par +Moderate to severe reflux. No vomiting.\par No abdominal pain. No CP, SOB. Patient has had a mild cough/pulmonary congestion. No fevers. No bleeding, melena.\par Upper endoscopy (Dr. Chew) showed a suspicious appearing lesion at the distal esophagus at 38 cm, circumferential, unable to traverse with the scope. Esophageal mass biopsy revealed infiltrating, moderately to poorly differentiated adenocarcinoma. CT scan chest/abdomen/pelvis showed wall thickening of the distal esophagus/hiatal hernia with gastrohepatic and mediastinal lymphadenopathy, highly concerning for malignancy. Asymmetric wall thickening of the right bladder slightly increased since prior exam. PET/CT scan showed FDG avid distal esophageal soft tissue thickening, consistent with known malignancy. FDG avid gastrohepatic ligaments, lymph nodes, probably metastatic. FDG avid subcarinal lymph node, probably metastatic. Minimally FDG avid subtle linear left upper lobe pulmonary infiltrate, nonspecific, new since 3/13/20.\par 4/2020-Began Carboplatin/Taxol along with radiation, and completing 5/2020. [de-identified] : Adenocarcinoma [de-identified] : Telehealth/[employer] video visit due to COVID-19 pandemic.\par Completed RT approx. 10 days ago.\par Can tolerate fluids/soft foods in small amounts at a time.\par Taking oxycodone and using fentanyl patches for odynophagia which patient thinks may be getting gradually better.\par No abdominal/pelvic pain. No bleeding. No cough, shortness of breath or fevers.\par

## 2020-05-27 NOTE — ASSESSMENT
[FreeTextEntry1] : Esophageal cancer-clinically stable s/p concurrent chemo/radiation. F/U imaging ordered, and have recommended f/u evaluation with GI MD.\par Anemia-exacerbated by treatment. No overt bleeding reported at present. Most recent Hemoglobin available, acceptable. F/U lab work to be done.\par Patient was given the opportunity to ask questions. Her questions have been answered to her apparent satisfaction at this time.

## 2020-05-27 NOTE — PHYSICAL EXAM
[Restricted in physically strenuous activity but ambulatory and able to carry out work of a light or sedentary nature] : Status 1- Restricted in physically strenuous activity but ambulatory and able to carry out work of a light or sedentary nature, e.g., light house work, office work [Normal] : affect appropriate [de-identified] : breathing appeared unlabored [de-identified] : coloration appeared normal

## 2020-06-18 NOTE — ASSESSMENT
[FreeTextEntry1] : Esophageal cancer-clinically stable s/p concurrent chemo/radiation. F/U imaging ordered,   Pt forgot it was scheduled today and thought it was for 7.15.  \par \par Rescheduled imaging for Wednesday 7.17 and have recommended f/u evaluation with GI MD.\par \par Anemia-exacerbated by treatment. No overt bleeding reported at present. Most recent Hemoglobin stable over one month (10.6 g/dL).   Electrolytes stable with no repletion needed.  Increase protein and small frequent meals F/U lab work to be done.\par \par Nausea- Advised to take antiemetic and add PPI use. \par \par Will further evaluate with  EOD imaging. \par \par FOllow up RT Dr. Nguyen this week.  Follow up Dr. Shelby after imaging. \par \par Patient was given the opportunity to ask questions. Her questions have been answered to her apparent satisfaction at this time.

## 2020-06-18 NOTE — PHYSICAL EXAM
[Ambulatory and capable of all self care but unable to carry out any work activities] : Status 2- Ambulatory and capable of all self care but unable to carry out any work activities. Up and about more than 50% of waking hours [Normal] : normal appearance, no rash, nodules, vesicles, ulcers, erythema [de-identified] : non tender, no masses appreciated  [de-identified] : breathing appeared unlabored [de-identified] : PIV right hand

## 2020-06-18 NOTE — HISTORY OF PRESENT ILLNESS
[N: ___] : N[unfilled] [Disease: _____________________] : Disease: [unfilled] [Home] : at home, [unfilled] , at the time of the visit. [Medical Office: ([facility])___] : at the medical office located in  [Verbal consent obtained from patient] : the patient, [unfilled] [de-identified] : Had C-spine surgeries 6/2019 and 7/2019-soon thereafter had difficulty swallowing which patient attributed to her neck surgeries. However, the feeling of something "sticking in her throat" when she ate or drank, gradually got worse. She is only able to tolerate liquids and tiny bits of solid foods now. Has lost 14 pounds in last few months.\par +Moderate to severe reflux. No vomiting.\par No abdominal pain. No CP, SOB. Patient has had a mild cough/pulmonary congestion. No fevers. No bleeding, melena.\par Upper endoscopy (Dr. Chew) showed a suspicious appearing lesion at the distal esophagus at 38 cm, circumferential, unable to traverse with the scope. Esophageal mass biopsy revealed infiltrating, moderately to poorly differentiated adenocarcinoma. CT scan chest/abdomen/pelvis showed wall thickening of the distal esophagus/hiatal hernia with gastrohepatic and mediastinal lymphadenopathy, highly concerning for malignancy. Asymmetric wall thickening of the right bladder slightly increased since prior exam. PET/CT scan showed FDG avid distal esophageal soft tissue thickening, consistent with known malignancy. FDG avid gastrohepatic ligaments, lymph nodes, probably metastatic. FDG avid subcarinal lymph node, probably metastatic. Minimally FDG avid subtle linear left upper lobe pulmonary infiltrate, nonspecific, new since 3/13/20.\par 4/2020-Began Carboplatin/Taxol along with radiation, and completing 5/2020. [de-identified] : Adenocarcinoma [de-identified] : Onsite visit \par pt with complaints of persistent nausea\par Pt reported bilious emesis, but clarified clear mucosal sputum that she expectorates frequently\par She denies any other type of color or change in consistency\par She is eating small frequent meals but notes a persistent decrease in appetite and sites weight loss and persistent odynophagia. \par Completed RT approximately 10 days ago\par Can tolerate fluids/soft foods in small amounts at a time.\par Taking oxycodone and using fentanyl patches \par No abdominal/pelvic pain. No  overt bleeding, hemoptysis.  No cough, shortness of breath or fevers.\par \par

## 2020-07-09 PROBLEM — Z78.9 PATIENT HAS HEALTHCARE PROXY: Status: ACTIVE | Noted: 2020-01-01

## 2020-07-09 NOTE — HISTORY OF PRESENT ILLNESS
[Disease: _____________________] : Disease: [unfilled] [N: ___] : N[unfilled] [de-identified] : Had C-spine surgeries 6/2019 and 7/2019-soon thereafter had difficulty swallowing which patient attributed to her neck surgeries. However, the feeling of something "sticking in her throat" when she ate or drank, gradually got worse. She had lost 14 pounds in prior few months.\par Upper endoscopy (Dr. Chew) showed a suspicious appearing lesion at the distal esophagus at 38 cm, circumferential, unable to traverse with the scope. Esophageal mass biopsy revealed infiltrating, moderately to poorly differentiated adenocarcinoma. CT scan chest/abdomen/pelvis showed wall thickening of the distal esophagus/hiatal hernia with gastrohepatic and mediastinal lymphadenopathy, highly concerning for malignancy. Asymmetric wall thickening of the right bladder slightly increased since prior exam. PET/CT scan showed FDG avid distal esophageal soft tissue thickening, consistent with known malignancy. FDG avid gastrohepatic ligaments, lymph nodes, probably metastatic. FDG avid subcarinal lymph node, probably metastatic. Minimally FDG avid subtle linear left upper lobe pulmonary infiltrate, nonspecific, new since 3/13/20.\par 4/2020-Began Carboplatin/Taxol along with radiation, and completing 5/2020.\par 6/2020-CT scan chest/abdomen/pelvis–thickened mid to distal esophagus and masslike thickening of the gastric cardia.  Prior mediastinal adenopathy resolved.  Prior perigastric lymph nodes resolved except for point lymph nodes smaller than the prior study.  New patchy groundglass opacities with low-grade airspace disease in the lungs.  Prior subcarinal adenopathy resolved.  Stable asymmetrical right lateral bladder wall thickening.  Asymmetrical thickening of the left levator ani muscle unchanged. [de-identified] : Adenocarcinoma [de-identified] : Telehealth/[employer] video visit due to COVID-19 pandemic.\aristeo Still esophageal burning with difficulties tolerating PO solids. Has been using carafate, four 10 mg oxycodone tablets daily, and a fentanyl patch. She does feel this medication regimen relieves her discomfort.\aristeo Has appt. with RT MD Dr. Nguyen 8/13/20.\aristeo Had EGD (Dr. Chew)-told did not see obvious mass, though path from biopsy pending.\aristeo Has PET/CT scan scheduled today, and thoracic surgical appt. scheduled with Dr. Weaver.\par No abdominal/pelvic pain. No bleeding. No cough, shortness of breath or fevers.\aristeo Wishes for daughter Asha to be her HCP.\par

## 2020-07-09 NOTE — OB HISTORY
[Post-Menopause, No Sxs] : post-menopausal, currently without symptoms [Menopause Age: ____] : age at menopause was [unfilled] [___] :  Spontaneous: [unfilled]

## 2020-07-09 NOTE — RESULTS/DATA
[FreeTextEntry1] : 6/2020-CT scan chest/abdomen/pelvis–thickened mid to distal esophagus and masslike thickening of the gastric cardia.  Prior mediastinal adenopathy resolved.  Prior perigastric lymph nodes resolved except for point lymph nodes smaller than the prior study.  New patchy groundglass opacities with low-grade airspace disease in the lungs.  Prior subcarinal adenopathy resolved.  Stable asymmetrical right lateral bladder wall thickening.  Asymmetrical thickening of the left levator ani muscle unchanged.

## 2020-07-09 NOTE — ASSESSMENT
[FreeTextEntry1] : Esophageal cancer-clinically stable s/p concurrent chemo/radiation.  Esophageal pathology report pending following recent upper endoscopy.  PET/CT scan to be done as well.  Patient to follow-up with thoracic surgery for opinion regarding possible surgical intervention at this point pending further imaging results.\par Offered availability of palliative care consult should patient have continued difficulties with pain management now that radiation treatment has been completed.  Patient feels that her current pain medication regimen is adequate at this time.\par Anemia-exacerbated by treatment. No overt bleeding reported at present.  Follow-up lab work to be done.\par Suggest  input for history of bladder abnormality noted on CAT scan as soon as patient is able.\par Patient has no lower GI complaints currently/anal discomfort–should she develop symptoms, further GI evaluation warranted.\par Patient has requested that daughter Asha be her HCP-advised to bring in paperwork for our record.\par Patient and her daughter were given the opportunity to ask questions.  Their questions have been answered to their apparent satisfaction.  Patient is agreeable to recommended follow-up.\par \par \par \par

## 2020-07-09 NOTE — REASON FOR VISIT
[Follow-Up Visit] : a follow-up [Medical Office: ([facility])___] : at the medical office located in  [Home] : at home, [unfilled] , at the time of the visit. [Verbal consent obtained from patient] : the patient, [unfilled] [Other:____] : [unfilled] [FreeTextEntry2] : esophageal cancer

## 2020-07-09 NOTE — PHYSICAL EXAM
[Normal] : affect appropriate [de-identified] : coloration appeared normal [de-identified] : breathing appeared unlabored

## 2020-07-14 NOTE — REVIEW OF SYSTEMS
[Fatigue] : fatigue [Dysphagia] : dysphagia [Recent Change In Weight] : ~T recent weight change [Negative] : Psychiatric [Dysphagia: Grade 2 - Symptomatic and altered eating/swallowing] : Dysphagia: Grade 2 - Symptomatic and altered eating/swallowing [Esophagitis: Grade 2 - Symptomatic; altered eating/swallowing;  oral supplements indicated] : Esophagitis: Grade 2 - Symptomatic; altered eating/swallowing;  oral supplements indicated [FreeTextEntry7] : fullness, dysmotilty [FreeTextEntry9] : LBP

## 2020-07-14 NOTE — HISTORY OF PRESENT ILLNESS
[Home] : at home, [unfilled] , at the time of the visit. [Medical Office: ([facility])___] : at the medical office located in  [Verbal consent obtained from patient] : the patient, [unfilled] [FreeTextEntry1] : Ms. Hurst is a 71 y/o F whose oncologic history began with progressive dysphagia following C-spine surgeries in 6-7/2019 associated with weight loss of about 15lbs. She was evaluated with upper endoscopy by Dr. Chew which showed a suspicious appearing lesion at the distal esophagus at 38 cm, circumferential, unable to traverse with the scope. Esophageal mass biopsy revealed infiltrating, moderately to poorly differentiated adenocarcinoma. CT scan chest/abdomen/pelvis 3/13/2020 showed wall thickening of the distal esophagus/hiatal hernia with gastrohepatic and mediastinal lymphadenopathy, highly concerning for malignancy. She underwent PET/CT scan 3/18/2020 which showed FDG avid distal esophageal soft tissue thickening, consistent with known malignancy. FDG avid gastrohepatic ligaments, lymph nodes, probably metastatic. FDG avid subcarinal lymph node, probably metastatic. Total number of suspicious LNs: 4. \par \par Dr. Kennedy referred her to us for evaluation of neoadjuvant CRT. Patient unable to get ultrasound initially scheduled 3/26/20 (Dr. Lorenzana) as well as consultation with thoracic surgeon (Dr. Weaver) because of COVID19 pandemic. However review with Dr. Weaver indicated to proceed with neoadjuvant therapy at this time.\par \par She underwent chemoradiation, completing 45 Gy on 5/19/2020. She now presents for post-treatment evaluation. She continues to experience significant residual esophagitis and dysphagia and has required IV hydration. Losing weight due to poor oral intake, otherwise free of pain/discomfort. [FreeTextEntry4] : sara jimenez

## 2020-07-16 NOTE — PHYSICAL EXAM
[General Appearance - Alert] : alert [General Appearance - Well Developed] : well developed [General Appearance - In No Acute Distress] : in no acute distress [General Appearance - Well Nourished] : well nourished [Outer Ear] : the ears and nose were normal in appearance [Hearing Threshold Finger Rub Not [place]] : hearing was normal [Sclera] : the sclera and conjunctiva were normal [] : the neck was supple [Neck Cervical Mass (___cm)] : no neck mass was observed [Neck Appearance] : the appearance of the neck was normal [Respiration, Rhythm And Depth] : normal respiratory rhythm and effort [Auscultation Breath Sounds / Voice Sounds] : lungs were clear to auscultation bilaterally [Heart Rate And Rhythm] : heart rate was normal and rhythm regular [Examination Of The Chest] : the chest was normal in appearance [Heart Sounds] : normal S1 and S2 [Abdomen Soft] : soft [Abdomen Tenderness] : non-tender [Cervical Lymph Nodes Enlarged Anterior Bilaterally] : anterior cervical [Cervical Lymph Nodes Enlarged Posterior Bilaterally] : posterior cervical [No CVA Tenderness] : no ~M costovertebral angle tenderness [Supraclavicular Lymph Nodes Enlarged Bilaterally] : supraclavicular [Skin Color & Pigmentation] : normal skin color and pigmentation [No Focal Deficits] : no focal deficits [Abnormal Walk] : normal gait [Affect] : the affect was normal [Oriented To Time, Place, And Person] : oriented to person, place, and time [Impaired Insight] : insight and judgment were intact [FreeTextEntry1] : deferred

## 2020-07-16 NOTE — CONSULT LETTER
[Dear  ___] : Dear  [unfilled], [Consult Letter:] : I had the pleasure of evaluating your patient, [unfilled]. [Please see my note below.] : Please see my note below. [Consult Closing:] : Thank you very much for allowing me to participate in the care of this patient.  If you have any questions, please do not hesitate to contact me. [Sincerely,] : Sincerely, [FreeTextEntry2] : Dr. Mark Chew (GI)\par Dr. Lagos (PCP)\par Dr. Dilma Shelby (Hem/Onc)\par Dr. Nguyen (RadOnc) [FreeTextEntry3] : \par \par \par Dale Weaver MD, FACS \par , Division of Thoracic Surgery \par [facility] \par Chief, Thoracic Surgery \par [facility] \par Department of Cardiovascular & Thoracic Surgery \par  \par [employer] [employer] [facility]

## 2020-07-16 NOTE — HISTORY OF PRESENT ILLNESS
[FreeTextEntry1] : Ms. SUSAN DOWD is a 70 year old female presenting for initial consultation.  She was referred by her gastroenterologist Dr. Chew.  She was diagnosed with esophageal cancer in March 2020 s/p Chemo/RT (45 Gy) which she completed approximately May 19, 2020.  \par \par She recently quit smoking in March 2020 (hx 1 PPD x 50 years) and PMHx also includes HTN, anemia, GERD, degenerative cervical disc s/p cervical laminectomy x2 (2019). \par \par PET/CT on 7/10/20:\par - Decreased size and resolved FDG avidity subcarinal lymph node 1.6 x 0.8 cm (image 88) and pretracheal lymph node. \par - Minimally FDG avid patchy bilateral groundglass infiltrates slightly improved since June 18, 2020 ((SUV 2.8; image 110). Resolved anterior left upper lobe infiltrate. \par - Non-FDG avid thickening left adrenal gland. Right adrenal unremarkable. \par - Resolved gastrohepatic lymph nodes.\par -  Decreased extent and FDG avidity of soft tissue thickening distal esophagus extending into the hiatal hernia (SUV 5.5; image 112), previous SUV 37.3. FDG avid long segment of mid to distal esophageal \par thickening corresponding to findings seen on CT June 18, 2020 (SUV 9.3; image 95) proximal to the original esophageal mass. \par - Right adnexal cyst is not well seen due to adjacent bowel. \par \par She presents today with complaints of dysphagia and odynophagia (which she reports has improved), esophageal burning (since RT) and abdominal pain.  She admits to 40 lb weight loss since diagnosis, but reports that her weight is not stable.  She denies any fever, chills, cough, shortness of breath, chest pain, hemoptysis, or recent illness.

## 2020-07-16 NOTE — ASSESSMENT
[FreeTextEntry1] : 70 year old female presenting for initial consultation.  She was referred by her gastroenterologist Dr. Chew.  She was diagnosed with esophageal cancer in March 2020 s/p Chemo/RT which she completed approximately May 19, 2020.  \par \par She recently quit smoking in March 2020 (hx 1 PPD x 50 years) and PMHx also includes HTN, anemia, GERD, degenerative cervical disc s/p cervical laminectomy x2 (2019). \par \par PET/CT on 7/10/20:\par - Decreased size and resolved FDG avidity subcarinal lymph node 1.6 x 0.8 cm and pretracheal lymph node. \par - Minimally FDG avid patchy bilateral groundglass infiltrates slightly improved since June 18, 2020 ((SUV 2.8; image 110). Resolved anterior left upper lobe infiltrate. \par - Non-FDG avid thickening left adrenal gland. Right adrenal unremarkable. \par - Resolved gastrohepatic lymph nodes.\par -  Decreased extent and FDG avidity of soft tissue thickening distal esophagus extending into the hiatal hernia (SUV 5.5; image 112), previous SUV 37.3. FDG avid long segment of mid to distal esophageal \par thickening corresponding to findings seen on CT June 18, 2020 (SUV 9.3; image 95) proximal to the original esophageal mass. \par - Right adnexal cyst is not well seen due to adjacent bowel. \par \par \par Long discussion re the R/B of esophagectomy with the patient and her . They understand the pros and cons of surgery after induction chemo/RT  in the setting of  a very good response. This includes data on bi vs tri modality therapy the risks of surgery and  the chances of persistent occult disease despite a excellent radiologic response etc. \par I have reviewed the patient's medical records and diagnostic images during the time of this office visit, and I have made the following recommendation:\par I have recommended that she undergo laparoscopic robotic-assisted right VATS, possible open esophagogastrectomy.  The risks, benefits, and alternatives to the procedure were discussed with the patient and her  at length. She verbalized understanding, but would like to take some time to think this over.  She was instructed to call my office to schedule surgery.  Prior to surgery she will require medical clearance.  \par \par I personally performed the services described in the documentation, reviewed the documentation recorded by the scribe in my presence and it accurately and completely records my words and actions.\par \par I, Alexandra Anglin NP, am scribing for and the presence of Dr. Weaver, the following sections HISTORY OF PRESENT ILLNESS, PAST MEDICAL/FAMILY/SOCIAL HISTORY; REVIEW OF SYSTEMS; VITAL SIGNS; PHYSICAL EXAM; DISPOSITION.\par  \par

## 2020-07-16 NOTE — REVIEW OF SYSTEMS
[As Noted in HPI] : as noted in HPI [Abdominal Pain] : abdominal pain [Vomiting] : vomiting [Heartburn] : heartburn [Negative] : Endocrine [Diarrhea] : no diarrhea [Constipation] : no constipation [Melena] : no melena

## 2020-08-03 NOTE — ASSESSMENT
[FreeTextEntry1] : Esophageal cancer–clinically stable following concurrent chemo/radiation.  Discussed management options.  Patient has seen surgeon in consultation, has considered her options, and refuses surgery at this time.  She is agreeable to interval follow-up evaluations.  She expressed her understanding that her disease may not be as treatable should it recur/progress. Tentatively plan f/u imaging in 3 months.\par Patient will see palliative care team as scheduled, to assist in management of her persistent upper GI symptoms.\par Anemia–hemoglobin stable on most recent lab work available.  Interval follow-up lab work to be done.  No overt bleeding reported at present.\par Patient was given the opportunity to ask questions.  Her questions have been answered to her apparent satisfaction at this time.\par \par \par

## 2020-08-03 NOTE — REASON FOR VISIT
[Home] : at home, [unfilled] , at the time of the visit. [Verbal consent obtained from patient] : the patient, [unfilled] [Follow-Up Visit] : a follow-up [Medical Office: ([facility])___] : at the medical office located in

## 2020-08-03 NOTE — HISTORY OF PRESENT ILLNESS
[Disease: _____________________] : Disease: [unfilled] [N: ___] : N[unfilled] [de-identified] : Had C-spine surgeries 6/2019 and 7/2019-soon thereafter had difficulty swallowing which patient attributed to her neck surgeries. However, the feeling of something "sticking in her throat" when she ate or drank, gradually got worse. She had lost 14 pounds in prior few months.\par Upper endoscopy (Dr. Chew) showed a suspicious appearing lesion at the distal esophagus at 38 cm, circumferential, unable to traverse with the scope. Esophageal mass biopsy revealed infiltrating, moderately to poorly differentiated adenocarcinoma. CT scan chest/abdomen/pelvis showed wall thickening of the distal esophagus/hiatal hernia with gastrohepatic and mediastinal lymphadenopathy, highly concerning for malignancy. Asymmetric wall thickening of the right bladder slightly increased since prior exam. PET/CT scan showed FDG avid distal esophageal soft tissue thickening, consistent with known malignancy. FDG avid gastrohepatic ligaments, lymph nodes, probably metastatic. FDG avid subcarinal lymph node, probably metastatic. Minimally FDG avid subtle linear left upper lobe pulmonary infiltrate, nonspecific, new since 3/13/20.\par 4/2020-Began Carboplatin/Taxol along with radiation, and completing 5/2020.\par 6/2020-CT scan chest/abdomen/pelvis–thickened mid to distal esophagus and masslike thickening of the gastric cardia.  Prior mediastinal adenopathy resolved.  Prior perigastric lymph nodes resolved except for point lymph nodes smaller than the prior study.  New patchy groundglass opacities with low-grade airspace disease in the lungs.  Prior subcarinal adenopathy resolved.  Stable asymmetrical right lateral bladder wall thickening.  Asymmetrical thickening of the left levator ani muscle unchanged.  \par 7/2020–PET/CT scan showed considerably decreased extent and FDG avidity of soft tissue thickening distal esophagus.  Persistent mild FDG avidity may represent posttreatment inflammatory changes.  Resolved FDG avid mediastinal and gastrohepatic lymph nodes.  New linear segmental FDG avidity from the mid to distal esophagus, indeterminate.  Minimally FDG avid right lower lobe groundglass infiltrate, probably infectious/inflammatory, minimally improved since 6/2020 CT chest.\par 7/2020–EGD cardia/distal esophagus/proximal esophagus biopsies negative for malignancy.\par Saw Dr. Weaver in thoracic surgical consultation for consideration of esophagectomy-patient refused surgery. [de-identified] : Adenocarcinoma [de-identified] : Telehealth/Doximity video visit due to COVID-19 pandemic.\par Still with esophageal burning, though is eating and drinking well. Has appt. with palliative care MD scheduled.\par No abdominal/pelvic pain. No bleeding. No cough, shortness of breath or fevers. No N/V.\par \par

## 2020-08-03 NOTE — REVIEW OF SYSTEMS
[Negative] : Allergic/Immunologic [de-identified] : decreased feeling in left hand since neck surgery-no acute change

## 2020-08-03 NOTE — PHYSICAL EXAM
[Restricted in physically strenuous activity but ambulatory and able to carry out work of a light or sedentary nature] : Status 1- Restricted in physically strenuous activity but ambulatory and able to carry out work of a light or sedentary nature, e.g., light house work, office work [Normal] : affect appropriate [de-identified] : breathing appeared unlabored [de-identified] : coloration appeared normal

## 2020-08-03 NOTE — RESULTS/DATA
[FreeTextEntry1] : 7/2020–PET/CT scan showed considerably decreased extent and FDG avidity of soft tissue thickening distal esophagus.  Persistent mild FDG avidity may represent posttreatment inflammatory changes.  Resolved FDG avid mediastinal and gastrohepatic lymph nodes.  New linear segmental FDG avidity from the mid to distal esophagus, indeterminate.  Minimally FDG avid right lower lobe groundglass infiltrate, probably infectious/inflammatory, minimally improved since 6/2020 CT chest.

## 2020-08-13 NOTE — VITALS
[Maximal Pain Intensity: 8/10] : 8/10 [Pain Duration: ___] : Pain duration: [unfilled] [Pain Description/Quality: ___] : Pain description/quality: [unfilled] [Least Pain Intensity: 2/10] : 2/10 [Pain Location: ___] : Pain Location: [unfilled] [Pain Interferes with ADLs] : Pain interferes with activities of daily living. [Opioid] : opioid [80: Normal activity with effort; some signs or symptoms of disease.] : 80: Normal activity with effort; some signs or symptoms of disease.

## 2020-08-16 NOTE — REVIEW OF SYSTEMS
[Fatigue] : fatigue [Dysphagia] : dysphagia [Negative] : Heme/Lymph [Diarrhea: Grade 0] : Diarrhea: Grade 0 [Constipation: Grade 0] : Constipation: Grade 0 [Nausea: Grade 0] : Nausea: Grade 0 [Vomiting: Grade 0] : Vomiting: Grade 0 [Cough: Grade 0] : Cough: Grade 0 [Dyspnea: Grade 0] : Dyspnea: Grade 0 [Dysphagia: Grade 1 - Symptomatic, able to eat regular diet] : Dysphagia: Grade 1 - Symptomatic, able to eat regular diet [Esophagitis: Grade 2 - Symptomatic; altered eating/swallowing;  oral supplements indicated] : Esophagitis: Grade 2 - Symptomatic; altered eating/swallowing;  oral supplements indicated

## 2020-08-16 NOTE — HISTORY OF PRESENT ILLNESS
[Home] : at home, [unfilled] , at the time of the visit. [Medical Office: ([facility])___] : at the medical office located in  [Verbal consent obtained from patient] : the patient, [unfilled] [FreeTextEntry4] : Harvey Boudreaux RN [FreeTextEntry1] : Ms. Boyd is a 70yo woman with a [id number], likely Stage Roxana moderate to poorly differentiated adenocarcinoma with circumferential involvement of the lower esophagus 38cm from the incisors with several gastrohepatic and mediastinal enlarged and FGD avid lymph nodes, causing weight loss and limited oral intake. She was unable to receive complete staging with EUS due to COVID-19 outbreak.  She underwent chemoradiation, completing 45 Gy on 5/19/2020, with intent to consider resection subsequently.  \par \par She has since been seen in f/u and has had significant post-treatment esophagitis, requiring chronic narcotics for pain.  Diet has slowly been improving and weight is slightly increasing.  Due to the persistent esophagitis and for f/u staging she had on 7/1/20 endoscopy which showed radiation esophagitis.  Biopsy showed no malignancy.\par \par PET/CT on 7/10/20:\par -\par 1. Considerably decreased extent and FDG avidity of soft tissue thickening \par distal esophagus. Persistent mild FDG avidity may represent posttreatment \par inflammatory changes, residual disease or combination of these entities. \par \par 2. Resolved FDG avid mediastinal and gastrohepatic lymph nodes. \par \par 3. New, linear segmental FDG avidity from the mid to distal esophagus, \par proximal to the location of the distal esophageal mass, corresponding to \par findings on CT June 18, 2020, indeterminate. Given segmental distribution, \par main differential consideration is esophagitis secondary to radiation. \par Recurrent tumor is possible. Suggest further evaluation with upper endoscopy \par and possibly tissue sampling. \par \par 4. Minimally FDG avid right lower lobe groundglass infiltrate, probably \par infectious/inflammatory, minimally improved since CT chest June 18, 2020. \par \par \par She met with Dr. Weaver and had a Long discussion re the R/B of esophagectomy with the patient and her . She ultimately refused surgery.  She had follow up with med onc. and agreed to surveillance.\par \par Today she states that she is continuing to use magic mouthwash, which is helping. She takes it before she eats.  She denies odynophagia and dysphagia.  She has burning in the esophagus, worse during the night.  She has esophageal pain 8 out of 10, relieved with oxycodone and magic mouthwash. She takes Oxycodone 4 times a day.  She has an appointment with Dr. Baron Barker next week. She does not have a good appetite.  She regained 3-4 pounds since the end of RT. She continues to have a lack of energy. Continues to work at home. Denies diarrhea, constipation.  She has intermittent nausea in the morning.  \par \par

## 2020-08-17 NOTE — REASON FOR VISIT
[Initial Evaluation] : an initial evaluation [Other Location: e.g. Home (Enter Location, City,State)___] : at [unfilled] [Home] : at home, [unfilled] , at the time of the visit. [Verbal consent obtained from patient] : the patient, [unfilled]

## 2020-08-23 PROBLEM — R63.4 WEIGHT LOSS: Status: ACTIVE | Noted: 2020-01-01

## 2020-08-23 NOTE — DATA REVIEWED
[FreeTextEntry1] : PET/CT (7/2020) \par \par 1. Considerably decreased extent and FDG avidity of soft tissue thickening distal esophagus. Persistent mild FDG avidity may represent posttreatment inflammatory changes, residual disease or combination of these entities. \par \par 2. Resolved FDG avid mediastinal and gastrohepatic lymph nodes. \par \par 3. New, linear segmental FDG avidity from the mid to distal esophagus, proximal to the location of the distal esophageal mass, corresponding to findings on CT June 18, 2020, indeterminate. Given segmental distribution, main differential consideration is esophagitis secondary to radiation. Recurrent tumor is possible. Suggest further evaluation with upper endoscopy and possibly tissue sampling. \par \par 4. Minimally FDG avid right lower lobe groundglass infiltrate, probably infectious/inflammatory, minimally improved since CT chest June 18, 2020. \par

## 2020-08-23 NOTE — HISTORY OF PRESENT ILLNESS
[FreeTextEntry1] : 70yoF with esophageal cancer presents for initial palliative care visit, referred by Oncology.  \par PMH significant for HTN, anemia, cervical degenerative disc s/p cervical laminectomy x2 (2019), former smoker (1 PPD x50 years), h/o OUD (oxycodone, was on Suboxone until about 4 years ago)\par \par Patient began experiencing progressive dysphagia with associated weight loss of ~15 lbs following C-spine surgeries in 6-7/2019. She was evaluated with upper endoscopy by Dr. Chew which showed a suspicious appearing esophageal lesion. Esophageal mass biopsy revealed infiltrating, moderately to poorly differentiated adenocarcinoma. CT scan chest/abdomen/pelvis 3/13/2020 showed wall thickening of the distal esophagus/hiatal hernia with gastrohepatic and mediastinal lymphadenopathy, highly concerning for malignancy. \par \par Neoadjuvant treatment was recommended.  She completed chemoRT on 5/19/2020.Patient recently seen by [facility] for consideration of esophagectomy but patient has declined to proceed.\par \par Main issue for which she presents today is burning pain in her esophagus, particularly when she lies down and anytime she's eating. \par \par She uses Oxycodone IR 10mg PRN, uses about every 6 hours. She is concerned about continuing to require it because of her h/o OUD.  \par She is using magic mouth rinse solution every time \par sucralfate makes her feel nauseous \par had stopped taking prilosec about 3 weeks ago. \par sleeps propped up with pillows\par \par ROS:\par +occasional AM nausea/vomiting, builds up a lot of phlegm at night.\par ~40lb weight loss over the last 5 months\par +some fatigue\par Denies constipation, diarrhea\par \par Patient is , lives with her . She has two adult children. Works for an insurance agency, has been working from home. She is independent in her ADLs, drives herself. \par \par I-Stop Ref#: [id number]

## 2020-08-23 NOTE — ASSESSMENT
[FreeTextEntry1] : 70yoF with:\par \par 1. Esophageal cancer - s/p chemoRT.  Follow up with Med Onc. \par \par 2. Esophageal pain - Restart PPI - prilosec.  Trial of sucralfate pill rather than solution which has caused nausea. \par may c/w PRN Oxycodone 10mg for the time being, validating her concerns about its use, while reassuring her. \par \par 3. Weight loss - Counseled patient on calorie-dense nutritional supplement drinks.\par \par 4. Encounter for palliative care - Emotional support provided\par \par Follow up in 1 month, call sooner with questions or issues.\par \par

## 2020-09-15 NOTE — ASSESSMENT
[FreeTextEntry1] : Esophageal cancer–clinically stable following concurrent chemo/radiation.  Patient to have f/u imaging.\par Anemia–hemoglobin currently WNL.  Interval follow-up lab work to be done.  No overt bleeding reported at present.\par Patient was given the opportunity to ask questions.  Her questions have been answered to her apparent satisfaction at this time. She is agreeable to recommended f/u.\par \par \par

## 2020-09-15 NOTE — HISTORY OF PRESENT ILLNESS
[Disease: _____________________] : Disease: [unfilled] [N: ___] : N[unfilled] [de-identified] : Telehealth/Doximity video visit due to COVID-19 pandemic.\par Saw palliative care MD since last visit. Overall, swallowing is better, though still with some burning with swallowing-oxycodone and magic mouthwash do help with this symptom. Appetite improving.\par No abdominal/pelvic pain. No bleeding. No cough, shortness of breath or fevers. No N/V. Gradually increasing strength.\par \par  [de-identified] : Adenocarcinoma [de-identified] : Had C-spine surgeries 6/2019 and 7/2019-soon thereafter had difficulty swallowing which patient attributed to her neck surgeries. However, the feeling of something "sticking in her throat" when she ate or drank, gradually got worse. She had lost 14 pounds in prior few months.\par Upper endoscopy (Dr. Chew) showed a suspicious appearing lesion at the distal esophagus at 38 cm, circumferential, unable to traverse with the scope. Esophageal mass biopsy revealed infiltrating, moderately to poorly differentiated adenocarcinoma. CT scan chest/abdomen/pelvis showed wall thickening of the distal esophagus/hiatal hernia with gastrohepatic and mediastinal lymphadenopathy, highly concerning for malignancy. Asymmetric wall thickening of the right bladder slightly increased since prior exam. PET/CT scan showed FDG avid distal esophageal soft tissue thickening, consistent with known malignancy. FDG avid gastrohepatic ligaments, lymph nodes, probably metastatic. FDG avid subcarinal lymph node, probably metastatic. Minimally FDG avid subtle linear left upper lobe pulmonary infiltrate, nonspecific, new since 3/13/20.\par 4/2020-Began Carboplatin/Taxol along with radiation, and completing 5/2020.\par 6/2020-CT scan chest/abdomen/pelvis–thickened mid to distal esophagus and masslike thickening of the gastric cardia.  Prior mediastinal adenopathy resolved.  Prior perigastric lymph nodes resolved except for point lymph nodes smaller than the prior study.  New patchy groundglass opacities with low-grade airspace disease in the lungs.  Prior subcarinal adenopathy resolved.  Stable asymmetrical right lateral bladder wall thickening.  Asymmetrical thickening of the left levator ani muscle unchanged.  \par 7/2020–PET/CT scan showed considerably decreased extent and FDG avidity of soft tissue thickening distal esophagus.  Persistent mild FDG avidity may represent posttreatment inflammatory changes.  Resolved FDG avid mediastinal and gastrohepatic lymph nodes.  New linear segmental FDG avidity from the mid to distal esophagus, indeterminate.  Minimally FDG avid right lower lobe groundglass infiltrate, probably infectious/inflammatory, minimally improved since 6/2020 CT chest.\par 7/2020–EGD cardia/distal esophagus/proximal esophagus biopsies negative for malignancy.\par Saw Dr. Weaver in thoracic surgical consultation for consideration of esophagectomy-patient refused surgery.

## 2020-09-15 NOTE — CONSULT LETTER
[Dear  ___] : Dear  [unfilled], [Courtesy Letter:] : I had the pleasure of seeing your patient, [unfilled], in my office today. [Sincerely,] : Sincerely, [Please see my note below.] : Please see my note below. [Consult Closing:] : Thank you very much for allowing me to participate in the care of this patient.  If you have any questions, please do not hesitate to contact me. [DrLiyah  ___] : Dr. BARAHONA [FreeTextEntry3] : Dilma Kennedy MD

## 2020-09-15 NOTE — REASON FOR VISIT
[Home] : at home, [unfilled] , at the time of the visit. [Medical Office: ([facility])___] : at the medical office located in  [Verbal consent obtained from patient] : the patient, [unfilled] [Follow-Up Visit] : a follow-up [FreeTextEntry2] : esophageal cancer

## 2020-09-15 NOTE — PHYSICAL EXAM
[Restricted in physically strenuous activity but ambulatory and able to carry out work of a light or sedentary nature] : Status 1- Restricted in physically strenuous activity but ambulatory and able to carry out work of a light or sedentary nature, e.g., light house work, office work [Normal] : affect appropriate [de-identified] : coloration appeared normal [de-identified] : breathing appeared unlabored

## 2020-09-17 NOTE — HISTORY OF PRESENT ILLNESS
[FreeTextEntry1] : 70yoF with esophageal cancer presents for follow up visit. \par PMH significant for HTN, anemia, cervical degenerative disc s/p cervical laminectomy x2 (2019), former smoker (1 PPD x50 years), h/o OUD (oxycodone, was on Suboxone until about 4 years ago)\par \par Patient began experiencing progressive dysphagia with associated weight loss of ~15 lbs following C-spine surgeries in 6-7/2019. She was evaluated with upper endoscopy by Dr. Chew which showed a suspicious appearing esophageal lesion. Esophageal mass biopsy revealed infiltrating, moderately to poorly differentiated adenocarcinoma. CT scan chest/abdomen/pelvis 3/13/2020 showed wall thickening of the distal esophagus/hiatal hernia with gastrohepatic and mediastinal lymphadenopathy, highly concerning for malignancy. \par \par Neoadjuvant treatment was recommended.  She completed chemoRT on 5/19/2020.Patient recently seen by [facility] for consideration of esophagectomy but patient has declined to proceed.\par \par \par Interval Hx:\par Has restarted Prilosec since our last visit, helping mildly with her esophageal burning. Takes one sucralfate pill daily. She still has dysphagia, is swallowing a small amount of magic mouthrinse prior to meals. \par \par She continues to use Oxycodone, is using 20mg in the AM and 20mg at night. Helps her get through the night without waking up in pain. \par \par ROS:\par +occasional AM nausea/vomiting, builds up a lot of phlegm at night.\par ~40lb weight loss over the last 5 months - believes it has plateaued now (doesn't have a scale at home). 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 She has two adult children. Works for an insurance agency, has been working from home. She is independent in her ADLs, drives herself. \par \par I-Stop Ref#: [id number]

## 2020-09-17 NOTE — ASSESSMENT
[FreeTextEntry1] : 70yoF with:\par \par 1. Esophageal cancer - s/p chemoRT. Patient has declined surgery at this time. She will get follow up scan.  Follow up with Med Onc. \par \par 2. Esophageal pain - C/w prilosec, sucralfate . \par Discussed initiation of oxycodone wean.  She can cut down by 0.5 - 1 pill per week. \par \par 3. Encounter for palliative care - Emotional support provided\par \par Follow up in 1 month, call sooner with questions or issues.\par \par

## 2020-09-17 NOTE — REASON FOR VISIT
[Initial Evaluation] : an initial evaluation [Home] : at home, [unfilled] , at the time of the visit. [Other Location: e.g. Home (Enter Location, City,State)___] : at [unfilled] [Verbal consent obtained from patient] : the patient, [unfilled]

## 2020-10-22 PROBLEM — M50.30 DEGENERATIVE CERVICAL DISC: Status: ACTIVE | Noted: 2019-06-03

## 2020-11-02 NOTE — REASON FOR VISIT
[Follow-Up] : a follow-up visit [Home] : at home, [unfilled] , at the time of the visit. [Other Location: e.g. Home (Enter Location, City,State)___] : at [unfilled] [Verbal consent obtained from patient] : the patient, [unfilled]

## 2020-11-03 NOTE — ASSESSMENT
[FreeTextEntry1] : Esophageal cancer–status post chemotherapy/radiation. Patient declined surgery.  \par CT scan results reviewed with patient in detail (and copy of report given to her).  Clinically stable at present.  Have recommended she see urologist for bladder thickening.  She will also have an echocardiogram in light of new small pericardial effusion.  To follow-up with gastroenterology for next EGD.\par Anemia–most recent lab work available reviewed.  Hemoglobin WNL.  Interval follow-up lab work to be done.  No overt bleeding noted clinically at present.\par Patient was given the opportunity to ask questions.  Her questions have been answered to her apparent satisfaction at this time. She is agreeable to recommended f/u.\par \par \par

## 2020-11-03 NOTE — HISTORY OF PRESENT ILLNESS
[FreeTextEntry1] : 70yoF with esophageal cancer presents for follow up visit via Telemedicine. \par PMH significant for HTN, anemia, cervical degenerative disc s/p cervical laminectomy x2 (2019), former smoker (1 PPD x50 years), h/o OUD (oxycodone, was on Suboxone until about 4 years ago)\par \par Patient began experiencing progressive dysphagia with associated weight loss of ~15 lbs following C-spine surgeries in -2019. She was evaluated with upper endoscopy by Dr. Chew which showed a suspicious appearing esophageal lesion. Esophageal mass biopsy revealed infiltrating, moderately to poorly differentiated adenocarcinoma. CT scan chest/abdomen/pelvis 3/13/2020 showed wall thickening of the distal esophagus/hiatal hernia with gastrohepatic and mediastinal lymphadenopathy, highly concerning for malignancy. \par \par Neoadjuvant treatment was recommended.  She completed chemoRT on 2020.Patient recently seen by [facility] for consideration of esophagectomy but patient has declined to proceed.\par \par Interval History: Patient presents for follow-up via Telemedicine.  She reports that esophageal burning has improved with prilosec and sucralfate. Esophageal pain gets as high as 8/10, as low as 4/10.  She still has dysphagia, is swallowing a small amount of magic mouthrinse prior to meals. \par \par She has been having pain in her upper back, lower back, esophagus and in her breasts. The upper back pain is an aching pain.  \par She is now using three doses of Oxycodone 20mg to control her pain, with an additional dose of 10mg on occasion. \par She has tried using ibuprofen which does help at times with her low back pain. \par Acetaminophen does not help.  \par Recently had scan.\par Her brother  yesterday and she is leaving tomorrow to go to [place] for >1 week for the upcoming services. \par \par ROS:\par ~40lb weight loss over the last 5 months - believes it has plateaued now (doesn't have a scale at home). \par +some fatigue\par +sleeps propped up with pillows\par +chronic UE neuropathy due to cervical spine issues\par Denies dysgeusia, anxiety/depression, constipation, diarrhea.\par All other ROS as outlined or noncontributory. \par \par Patient is , lives with her . She has two adult children. Works for an insurance agency, has been working from home. She is independent in her ADLs, drives herself. \par \par I-Stop Ref#: [id number]

## 2020-11-03 NOTE — RESULTS/DATA
[FreeTextEntry1] : CT C/A/P-IMPRESSION:  Mid to distal esophageal wall thickening appears less prominent. A stable 3.2 cm masslike opacity is noted within the gastric cardia. A 9 x 9 mm perigastric lymph node appears decreased in size. Small pericardial effusion, new. Stable nodularity left adrenal gland. Stable thickening right lateral bladder wall.\par

## 2020-11-03 NOTE — ASSESSMENT
[FreeTextEntry1] : 70yoF with:\par \par 1. Esophageal cancer - s/p chemoRT. Patient has declined surgery at this time.  Follow up with Med Onc tomorrow to discuss scan results. . \par \par 2. Esophageal pain - C/w prilosec, sucralfate.\par \par 3. Chronic back pain 2/2 h/o spinal stenosis - \par Extensive discussion held with patient regarding the inappropriate usage of oxycodone for her back pain when it was our plan to taper OFF of it altogether as her esophageal pain improved.  Recommended that she make appt to see her orthopedist, get PT.  She will do so.  \par Short supply of oxycodone sent in so as to get patient through the next 1-2 weeks as she is attending her brother's  services out of town and it is not my intention to put her into opioid withdrawal. She is aware of and in agreement with this plan. \par Patient encouraged to try using medicinal cannabis that she was certified for at previous visit.  \par  \par 4. Encounter for palliative care - Reports her  is her HCP. Copy requested. Emotional support provided\par \par Patient can follow up if needed.

## 2020-11-03 NOTE — HISTORY OF PRESENT ILLNESS
[Disease: _____________________] : Disease: [unfilled] [N: ___] : N[unfilled] [de-identified] : Had C-spine surgeries 6/2019 and 7/2019-soon thereafter had difficulty swallowing which patient attributed to her neck surgeries. However, the feeling of something "sticking in her throat" when she ate or drank, gradually got worse. She had lost 14 pounds in prior few months.\par Upper endoscopy (Dr. Chew) showed a suspicious appearing lesion at the distal esophagus at 38 cm, circumferential, unable to traverse with the scope. Esophageal mass biopsy revealed infiltrating, moderately to poorly differentiated adenocarcinoma. CT scan chest/abdomen/pelvis showed wall thickening of the distal esophagus/hiatal hernia with gastrohepatic and mediastinal lymphadenopathy, highly concerning for malignancy. Asymmetric wall thickening of the right bladder slightly increased since prior exam. PET/CT scan showed FDG avid distal esophageal soft tissue thickening, consistent with known malignancy. FDG avid gastrohepatic ligaments, lymph nodes, probably metastatic. FDG avid subcarinal lymph node, probably metastatic. Minimally FDG avid subtle linear left upper lobe pulmonary infiltrate, nonspecific, new since 3/13/20.\par 4/2020-Began Carboplatin/Taxol along with radiation, and completing 5/2020.\par 6/2020-CT scan chest/abdomen/pelvis–thickened mid to distal esophagus and masslike thickening of the gastric cardia.  Prior mediastinal adenopathy resolved.  Prior perigastric lymph nodes resolved except for point lymph nodes smaller than the prior study.  New patchy groundglass opacities with low-grade airspace disease in the lungs.  Prior subcarinal adenopathy resolved.  Stable asymmetrical right lateral bladder wall thickening.  Asymmetrical thickening of the left levator ani muscle unchanged.  \par 7/2020–PET/CT scan showed considerably decreased extent and FDG avidity of soft tissue thickening distal esophagus.  Persistent mild FDG avidity may represent posttreatment inflammatory changes.  Resolved FDG avid mediastinal and gastrohepatic lymph nodes.  New linear segmental FDG avidity from the mid to distal esophagus, indeterminate.  Minimally FDG avid right lower lobe groundglass infiltrate, probably infectious/inflammatory, minimally improved since 6/2020 CT chest.\par 7/2020–EGD cardia/distal esophagus/proximal esophagus biopsies negative for malignancy.\par Saw Dr. Weaver in thoracic surgical consultation for consideration of esophagectomy-patient refused surgery.\par 10/2020-CT chest/abdomen/pelvis–mid to distal esophageal wall thickening appears less prominent.  Stable 3.2 cm masslike opacity within the gastric cardia.  9 mm perigastric lymph node appears decreased in size. [de-identified] : Adenocarcinoma [de-identified] : Saw palliative care MD since last visit-states is trying to be weaned off pain medication. Overall, swallowing is better, though still with some burning. Is eating.\par No abdominal/pelvic pain. No bleeding. No cough, shortness of breath or fevers. No N/V. Is working.\par \par

## 2020-11-03 NOTE — CONSULT LETTER
[Dear  ___] : Dear  [unfilled], [Courtesy Letter:] : I had the pleasure of seeing your patient, [unfilled], in my office today. [Please see my note below.] : Please see my note below. [Consult Closing:] : Thank you very much for allowing me to participate in the care of this patient.  If you have any questions, please do not hesitate to contact me. [Sincerely,] : Sincerely, [FreeTextEntry3] : Dilma Kennedy MD [DrLiyah  ___] : Dr. BARAHONA

## 2020-11-17 NOTE — HISTORY OF PRESENT ILLNESS
[FreeTextEntry1] : 70yoF with esophageal cancer presents for follow up visit. \par PMH significant for HTN, anemia, cervical degenerative disc s/p cervical laminectomy x2 (2019), former smoker (1 PPD x50 years), h/o OUD (oxycodone, was on Suboxone until about 4 years ago)\par \par Patient began experiencing progressive dysphagia with associated weight loss of ~15 lbs following C-spine surgeries in 6-7/2019. She was evaluated with upper endoscopy by Dr. Chew which showed a suspicious appearing esophageal lesion. Esophageal mass biopsy revealed infiltrating, moderately to poorly differentiated adenocarcinoma. CT scan chest/abdomen/pelvis 3/13/2020 showed wall thickening of the distal esophagus/hiatal hernia with gastrohepatic and mediastinal lymphadenopathy, highly concerning for malignancy. \par \par Neoadjuvant treatment was recommended.  She completed chemoRT on 5/19/2020.Patient recently seen by [facility] for consideration of esophagectomy but patient has declined to proceed.\par \par Interval History: Patient presents for follow-up via Telemedicine.  She reports that esophageal burning has improved with prilosec and sucralfate. Esophageal pain gets as high as 8/10, as low as 4/10.  She still has dysphagia, is swallowing a small amount of magic mouthrinse prior to meals. Reports that she has been using PRN oxycodone IR 20mg every 4 hours ATC which has increased from previous BID usage.  She acknowledges that she has been using this for her chronic back pain which gets as high as 50/10, as low as 4/10.  Has used PRN ibuprofen and tylenol without effect.  She has scans pending later this week.\par \par ROS:\par ~40lb weight loss over the last 5 months - believes it has plateaued now (doesn't have a scale at home). \par +some fatigue\par +sleeps propped up with pillows\par +chronic UE neuropathy due to cervical spine issues\par Denies dysgeusia, anxiety/depression, constipation, diarrhea.\par All other ROS as outlined or noncontributory. \par \par Patient is , lives with her . She has two adult children. Works for an insurance agency, has been working from home. She is independent in her ADLs, drives herself. \par \par I-Stop Ref#: [id number] \par * Of note,  confirmed with [facility] that oxycodone IR was dispensed on  9/10, 9/29, 10/16/20, as it is not reflective on ISTOP.  [facility] endorsed that Ithis has happened previously with ISTOP and that he will contact [facility].

## 2020-11-17 NOTE — ASSESSMENT
[FreeTextEntry1] : 70yoF with:\par \par 1. Esophageal cancer - s/p chemoRT. Patient has declined surgery at this time. Scans pending.  Follow up with Med Onc. \par \par 2. Esophageal pain - C/w prilosec, sucralfate.\par \par 3. Chronic back pain\par  - Patient's use of PRN oxycodone IR 20mg has increased to q4h due to back pain. She is aware that PRN oxycodone IR was intended for esophageal pain, not for chronic back pain.  Counseled that she will need to establish care with chronic pain management, as this is not related to active oncologic disease. Discussed expectations of oxycodone IR wean at this juncture.  Discussed incorporation of medical cannabis to assist with opioid weaning.   Will use medical cannabis for pain PRN.\par - Patient may benefit from incorporation of medical cannabis.  Medical cannabis certification completed today. Provided cannabis education, overview of state program, and discussed adverse effects in detail. Counseled that vaporized cannabis is not the preferred form due to fact that both short term and long term risks associated with vaporizing oils are not yet fully understood. Recommend starting with 1:1 THC:CBD formulation at low dose of THC (~2mg/dose). \par  \par 4. Encounter for palliative care - Reports her  is her HCP. Copy requested. Emotional support provided\par \par Follow up in 2 weeks, call sooner with questions or issues.

## 2021-01-01 ENCOUNTER — RESULT REVIEW (OUTPATIENT)
Age: 71
End: 2021-01-01

## 2021-01-01 ENCOUNTER — LABORATORY RESULT (OUTPATIENT)
Age: 71
End: 2021-01-01

## 2021-01-01 ENCOUNTER — APPOINTMENT (OUTPATIENT)
Dept: INFUSION THERAPY | Facility: HOSPITAL | Age: 71
End: 2021-01-01

## 2021-01-01 ENCOUNTER — INPATIENT (INPATIENT)
Facility: HOSPITAL | Age: 71
LOS: 9 days | End: 2021-05-24
Attending: STUDENT IN AN ORGANIZED HEALTH CARE EDUCATION/TRAINING PROGRAM | Admitting: STUDENT IN AN ORGANIZED HEALTH CARE EDUCATION/TRAINING PROGRAM
Payer: MEDICARE

## 2021-01-01 ENCOUNTER — APPOINTMENT (OUTPATIENT)
Dept: HEMATOLOGY ONCOLOGY | Facility: CLINIC | Age: 71
End: 2021-01-01
Payer: MEDICARE

## 2021-01-01 ENCOUNTER — NON-APPOINTMENT (OUTPATIENT)
Age: 71
End: 2021-01-01

## 2021-01-01 ENCOUNTER — OUTPATIENT (OUTPATIENT)
Dept: OUTPATIENT SERVICES | Facility: HOSPITAL | Age: 71
LOS: 1 days | End: 2021-01-01
Payer: MEDICARE

## 2021-01-01 ENCOUNTER — APPOINTMENT (OUTPATIENT)
Dept: RADIATION ONCOLOGY | Facility: CLINIC | Age: 71
End: 2021-01-01
Payer: MEDICARE

## 2021-01-01 ENCOUNTER — APPOINTMENT (OUTPATIENT)
Dept: CT IMAGING | Facility: HOSPITAL | Age: 71
End: 2021-01-01
Payer: MEDICARE

## 2021-01-01 ENCOUNTER — EMERGENCY (EMERGENCY)
Facility: HOSPITAL | Age: 71
LOS: 1 days | Discharge: HOME | End: 2021-01-01
Attending: INTERNAL MEDICINE | Admitting: INTERNAL MEDICINE
Payer: MEDICARE

## 2021-01-01 ENCOUNTER — APPOINTMENT (OUTPATIENT)
Dept: HEMATOLOGY ONCOLOGY | Facility: CLINIC | Age: 71
End: 2021-01-01

## 2021-01-01 ENCOUNTER — APPOINTMENT (OUTPATIENT)
Dept: INTERVENTIONAL RADIOLOGY/VASCULAR | Facility: CLINIC | Age: 71
End: 2021-01-01
Payer: MEDICARE

## 2021-01-01 ENCOUNTER — OUTPATIENT (OUTPATIENT)
Dept: OUTPATIENT SERVICES | Facility: HOSPITAL | Age: 71
LOS: 1 days | Discharge: HOME | End: 2021-01-01

## 2021-01-01 ENCOUNTER — OUTPATIENT (OUTPATIENT)
Dept: OUTPATIENT SERVICES | Facility: HOSPITAL | Age: 71
LOS: 1 days | Discharge: HOME | End: 2021-01-01
Payer: MEDICARE

## 2021-01-01 ENCOUNTER — APPOINTMENT (OUTPATIENT)
Dept: MRI IMAGING | Facility: HOSPITAL | Age: 71
End: 2021-01-01
Payer: MEDICARE

## 2021-01-01 ENCOUNTER — APPOINTMENT (OUTPATIENT)
Age: 71
End: 2021-01-01
Payer: MEDICARE

## 2021-01-01 VITALS
HEART RATE: 72 BPM | TEMPERATURE: 98 F | DIASTOLIC BLOOD PRESSURE: 83 MMHG | HEIGHT: 63 IN | RESPIRATION RATE: 24 BRPM | OXYGEN SATURATION: 93 % | SYSTOLIC BLOOD PRESSURE: 189 MMHG

## 2021-01-01 VITALS
TEMPERATURE: 97.4 F | HEART RATE: 79 BPM | WEIGHT: 154.32 LBS | OXYGEN SATURATION: 99 % | BODY MASS INDEX: 27.34 KG/M2 | DIASTOLIC BLOOD PRESSURE: 80 MMHG | HEIGHT: 63 IN | BODY MASS INDEX: 25.69 KG/M2 | SYSTOLIC BLOOD PRESSURE: 173 MMHG | HEIGHT: 63 IN | RESPIRATION RATE: 17 BRPM | WEIGHT: 145 LBS

## 2021-01-01 VITALS
WEIGHT: 154.1 LBS | HEART RATE: 84 BPM | TEMPERATURE: 97 F | OXYGEN SATURATION: 97 % | DIASTOLIC BLOOD PRESSURE: 88 MMHG | HEIGHT: 63 IN | SYSTOLIC BLOOD PRESSURE: 167 MMHG | RESPIRATION RATE: 18 BRPM

## 2021-01-01 VITALS
BODY MASS INDEX: 27.49 KG/M2 | SYSTOLIC BLOOD PRESSURE: 162 MMHG | HEART RATE: 79 BPM | TEMPERATURE: 96.6 F | RESPIRATION RATE: 16 BRPM | WEIGHT: 155.18 LBS | DIASTOLIC BLOOD PRESSURE: 77 MMHG | OXYGEN SATURATION: 95 %

## 2021-01-01 VITALS
DIASTOLIC BLOOD PRESSURE: 88 MMHG | SYSTOLIC BLOOD PRESSURE: 164 MMHG | WEIGHT: 154.21 LBS | HEART RATE: 79 BPM | TEMPERATURE: 96.8 F | BODY MASS INDEX: 27.32 KG/M2 | RESPIRATION RATE: 18 BRPM

## 2021-01-01 VITALS
HEART RATE: 122 BPM | RESPIRATION RATE: 28 BRPM | SYSTOLIC BLOOD PRESSURE: 104 MMHG | DIASTOLIC BLOOD PRESSURE: 58 MMHG | OXYGEN SATURATION: 88 %

## 2021-01-01 VITALS
RESPIRATION RATE: 18 BRPM | HEART RATE: 72 BPM | SYSTOLIC BLOOD PRESSURE: 122 MMHG | DIASTOLIC BLOOD PRESSURE: 71 MMHG | TEMPERATURE: 98.2 F

## 2021-01-01 VITALS — BODY MASS INDEX: 27.18 KG/M2 | WEIGHT: 153.44 LBS

## 2021-01-01 VITALS — RESPIRATION RATE: 18 BRPM | SYSTOLIC BLOOD PRESSURE: 162 MMHG | DIASTOLIC BLOOD PRESSURE: 88 MMHG | HEART RATE: 79 BPM

## 2021-01-01 DIAGNOSIS — R52 PAIN, UNSPECIFIED: ICD-10-CM

## 2021-01-01 DIAGNOSIS — E66.9 OBESITY, UNSPECIFIED: Chronic | ICD-10-CM

## 2021-01-01 DIAGNOSIS — Z87.39 PERSONAL HISTORY OF OTHER DISEASES OF THE MUSCULOSKELETAL SYSTEM AND CONNECTIVE TISSUE: Chronic | ICD-10-CM

## 2021-01-01 DIAGNOSIS — E87.1 HYPO-OSMOLALITY AND HYPONATREMIA: ICD-10-CM

## 2021-01-01 DIAGNOSIS — D64.9 ANEMIA, UNSPECIFIED: ICD-10-CM

## 2021-01-01 DIAGNOSIS — Z51.89 ENCOUNTER FOR OTHER SPECIFIED AFTERCARE: ICD-10-CM

## 2021-01-01 DIAGNOSIS — Z98.890 OTHER SPECIFIED POSTPROCEDURAL STATES: Chronic | ICD-10-CM

## 2021-01-01 DIAGNOSIS — Z29.9 ENCOUNTER FOR PROPHYLACTIC MEASURES, UNSPECIFIED: ICD-10-CM

## 2021-01-01 DIAGNOSIS — Z98.891 HISTORY OF UTERINE SCAR FROM PREVIOUS SURGERY: Chronic | ICD-10-CM

## 2021-01-01 DIAGNOSIS — C15.9 MALIGNANT NEOPLASM OF ESOPHAGUS, UNSPECIFIED: ICD-10-CM

## 2021-01-01 DIAGNOSIS — Z90.710 ACQUIRED ABSENCE OF BOTH CERVIX AND UTERUS: Chronic | ICD-10-CM

## 2021-01-01 DIAGNOSIS — G89.3 NEOPLASM RELATED PAIN (ACUTE) (CHRONIC): ICD-10-CM

## 2021-01-01 DIAGNOSIS — Z51.11 ENCOUNTER FOR ANTINEOPLASTIC CHEMOTHERAPY: ICD-10-CM

## 2021-01-01 DIAGNOSIS — D72.829 ELEVATED WHITE BLOOD CELL COUNT, UNSPECIFIED: ICD-10-CM

## 2021-01-01 DIAGNOSIS — M54.9 DORSALGIA, UNSPECIFIED: ICD-10-CM

## 2021-01-01 DIAGNOSIS — M89.9 DISORDER OF BONE, UNSPECIFIED: ICD-10-CM

## 2021-01-01 DIAGNOSIS — J69.0 PNEUMONITIS DUE TO INHALATION OF FOOD AND VOMIT: ICD-10-CM

## 2021-01-01 DIAGNOSIS — G89.29 DORSALGIA, UNSPECIFIED: ICD-10-CM

## 2021-01-01 DIAGNOSIS — Z45.2 ENCOUNTER FOR ADJUSTMENT AND MANAGEMENT OF VASCULAR ACCESS DEVICE: ICD-10-CM

## 2021-01-01 DIAGNOSIS — K22.8 OTHER SPECIFIED DISEASES OF ESOPHAGUS: ICD-10-CM

## 2021-01-01 DIAGNOSIS — Z91.89 OTHER SPECIFIED PERSONAL RISK FACTORS, NOT ELSEWHERE CLASSIFIED: ICD-10-CM

## 2021-01-01 DIAGNOSIS — R74.01 ELEVATION OF LEVELS OF LIVER TRANSAMINASE LEVELS: ICD-10-CM

## 2021-01-01 DIAGNOSIS — E86.0 DEHYDRATION: ICD-10-CM

## 2021-01-01 DIAGNOSIS — F43.20 ADJUSTMENT DISORDER, UNSPECIFIED: ICD-10-CM

## 2021-01-01 DIAGNOSIS — R22.2 LOCALIZED SWELLING, MASS AND LUMP, TRUNK: ICD-10-CM

## 2021-01-01 DIAGNOSIS — M48.9 SPONDYLOPATHY, UNSPECIFIED: ICD-10-CM

## 2021-01-01 DIAGNOSIS — I10 ESSENTIAL (PRIMARY) HYPERTENSION: ICD-10-CM

## 2021-01-01 DIAGNOSIS — R11.0 NAUSEA: ICD-10-CM

## 2021-01-01 DIAGNOSIS — C79.51 SECONDARY MALIGNANT NEOPLASM OF BONE: ICD-10-CM

## 2021-01-01 DIAGNOSIS — F41.9 ANXIETY DISORDER, UNSPECIFIED: ICD-10-CM

## 2021-01-01 DIAGNOSIS — R11.2 NAUSEA WITH VOMITING, UNSPECIFIED: ICD-10-CM

## 2021-01-01 DIAGNOSIS — B02.9 ZOSTER WITHOUT COMPLICATIONS: ICD-10-CM

## 2021-01-01 DIAGNOSIS — F43.21 ADJUSTMENT DISORDER WITH DEPRESSED MOOD: ICD-10-CM

## 2021-01-01 DIAGNOSIS — S22.070A WEDGE COMPRESSION FRACTURE OF T9-T10 VERTEBRA, INITIAL ENCOUNTER FOR CLOSED FRACTURE: ICD-10-CM

## 2021-01-01 DIAGNOSIS — Z51.5 ENCOUNTER FOR PALLIATIVE CARE: ICD-10-CM

## 2021-01-01 DIAGNOSIS — R35.0 FREQUENCY OF MICTURITION: ICD-10-CM

## 2021-01-01 DIAGNOSIS — R33.9 RETENTION OF URINE, UNSPECIFIED: ICD-10-CM

## 2021-01-01 DIAGNOSIS — S22.079A UNSPECIFIED FRACTURE OF T9-T10 VERTEBRA, INITIAL ENCOUNTER FOR CLOSED FRACTURE: ICD-10-CM

## 2021-01-01 DIAGNOSIS — E78.5 HYPERLIPIDEMIA, UNSPECIFIED: ICD-10-CM

## 2021-01-01 LAB
25(OH)D3 SERPL-MCNC: 10 NG/ML
ALBUMIN SERPL ELPH-MCNC: 3.7 G/DL — SIGNIFICANT CHANGE UP (ref 3.3–5)
ALBUMIN SERPL ELPH-MCNC: 3.7 G/DL — SIGNIFICANT CHANGE UP (ref 3.3–5)
ALBUMIN SERPL ELPH-MCNC: 3.8 G/DL — SIGNIFICANT CHANGE UP (ref 3.3–5)
ALBUMIN SERPL ELPH-MCNC: 4 G/DL — SIGNIFICANT CHANGE UP (ref 3.3–5)
ALBUMIN SERPL ELPH-MCNC: 4.2 G/DL — SIGNIFICANT CHANGE UP (ref 3.3–5)
ALBUMIN SERPL ELPH-MCNC: 4.2 G/DL — SIGNIFICANT CHANGE UP (ref 3.3–5)
ALBUMIN SERPL ELPH-MCNC: 4.3 G/DL
ALBUMIN SERPL ELPH-MCNC: 4.3 G/DL — SIGNIFICANT CHANGE UP (ref 3.3–5)
ALP BLD-CCNC: 124 U/L
ALP SERPL-CCNC: 116 U/L — SIGNIFICANT CHANGE UP (ref 40–120)
ALP SERPL-CCNC: 157 U/L — HIGH (ref 40–120)
ALP SERPL-CCNC: 161 U/L — HIGH (ref 40–120)
ALP SERPL-CCNC: 182 U/L — HIGH (ref 40–120)
ALP SERPL-CCNC: 193 U/L — HIGH (ref 40–120)
ALP SERPL-CCNC: 209 U/L — HIGH (ref 40–120)
ALP SERPL-CCNC: 235 U/L — HIGH (ref 40–120)
ALT FLD-CCNC: 19 U/L — SIGNIFICANT CHANGE UP (ref 10–45)
ALT FLD-CCNC: 21 U/L — SIGNIFICANT CHANGE UP (ref 4–33)
ALT FLD-CCNC: 23 U/L — SIGNIFICANT CHANGE UP (ref 4–33)
ALT FLD-CCNC: 25 U/L — SIGNIFICANT CHANGE UP (ref 4–33)
ALT FLD-CCNC: 25 U/L — SIGNIFICANT CHANGE UP (ref 4–33)
ALT FLD-CCNC: 31 U/L — SIGNIFICANT CHANGE UP (ref 4–33)
ALT FLD-CCNC: 35 U/L — HIGH (ref 4–33)
ALT SERPL-CCNC: 9 U/L
ANION GAP SERPL CALC-SCNC: 10 MMOL/L — SIGNIFICANT CHANGE UP (ref 7–14)
ANION GAP SERPL CALC-SCNC: 10 MMOL/L — SIGNIFICANT CHANGE UP (ref 7–14)
ANION GAP SERPL CALC-SCNC: 11 MMOL/L
ANION GAP SERPL CALC-SCNC: 11 MMOL/L — SIGNIFICANT CHANGE UP (ref 7–14)
ANION GAP SERPL CALC-SCNC: 11 MMOL/L — SIGNIFICANT CHANGE UP (ref 7–14)
ANION GAP SERPL CALC-SCNC: 12 MMOL/L — SIGNIFICANT CHANGE UP (ref 7–14)
ANION GAP SERPL CALC-SCNC: 13 MMOL/L — SIGNIFICANT CHANGE UP (ref 7–14)
ANION GAP SERPL CALC-SCNC: 19 MMOL/L — HIGH (ref 7–14)
ANION GAP SERPL CALC-SCNC: 8 MMOL/L — SIGNIFICANT CHANGE UP (ref 5–17)
ANION GAP SERPL CALC-SCNC: 9 MMOL/L
APPEARANCE UR: CLEAR — SIGNIFICANT CHANGE UP
APPEARANCE UR: CLEAR — SIGNIFICANT CHANGE UP
APTT BLD: 29.1 SEC
AST SERPL-CCNC: 17 U/L — SIGNIFICANT CHANGE UP (ref 10–40)
AST SERPL-CCNC: 18 U/L
AST SERPL-CCNC: 28 U/L — SIGNIFICANT CHANGE UP (ref 4–32)
AST SERPL-CCNC: 30 U/L — SIGNIFICANT CHANGE UP (ref 4–32)
AST SERPL-CCNC: 32 U/L — SIGNIFICANT CHANGE UP (ref 4–32)
AST SERPL-CCNC: 34 U/L — HIGH (ref 4–32)
AST SERPL-CCNC: 35 U/L — HIGH (ref 4–32)
AST SERPL-CCNC: 44 U/L — HIGH (ref 4–32)
BACTERIA # UR AUTO: NEGATIVE — SIGNIFICANT CHANGE UP
BASOPHILS # BLD AUTO: 0 K/UL — SIGNIFICANT CHANGE UP (ref 0–0.2)
BASOPHILS # BLD AUTO: 0.01 K/UL — SIGNIFICANT CHANGE UP (ref 0–0.2)
BASOPHILS # BLD AUTO: 0.01 K/UL — SIGNIFICANT CHANGE UP (ref 0–0.2)
BASOPHILS # BLD AUTO: 0.02 K/UL — SIGNIFICANT CHANGE UP (ref 0–0.2)
BASOPHILS # BLD AUTO: 0.03 K/UL
BASOPHILS # BLD AUTO: 0.03 K/UL — SIGNIFICANT CHANGE UP (ref 0–0.2)
BASOPHILS NFR BLD AUTO: 0 % — SIGNIFICANT CHANGE UP (ref 0–2)
BASOPHILS NFR BLD AUTO: 0.1 % — SIGNIFICANT CHANGE UP (ref 0–2)
BASOPHILS NFR BLD AUTO: 0.1 % — SIGNIFICANT CHANGE UP (ref 0–2)
BASOPHILS NFR BLD AUTO: 0.2 % — SIGNIFICANT CHANGE UP (ref 0–2)
BASOPHILS NFR BLD AUTO: 0.2 % — SIGNIFICANT CHANGE UP (ref 0–2)
BASOPHILS NFR BLD AUTO: 0.4 %
BILIRUB SERPL-MCNC: 0.2 MG/DL
BILIRUB SERPL-MCNC: 0.3 MG/DL — SIGNIFICANT CHANGE UP (ref 0.2–1.2)
BILIRUB SERPL-MCNC: 0.4 MG/DL — SIGNIFICANT CHANGE UP (ref 0.2–1.2)
BILIRUB SERPL-MCNC: 0.5 MG/DL — SIGNIFICANT CHANGE UP (ref 0.2–1.2)
BILIRUB SERPL-MCNC: 0.5 MG/DL — SIGNIFICANT CHANGE UP (ref 0.2–1.2)
BILIRUB SERPL-MCNC: 0.6 MG/DL — SIGNIFICANT CHANGE UP (ref 0.2–1.2)
BILIRUB UR-MCNC: NEGATIVE — SIGNIFICANT CHANGE UP
BILIRUB UR-MCNC: NEGATIVE — SIGNIFICANT CHANGE UP
BLD GP AB SCN SERPL QL: NEGATIVE — SIGNIFICANT CHANGE UP
BLD GP AB SCN SERPL QL: NEGATIVE — SIGNIFICANT CHANGE UP
BUN SERPL-MCNC: 18 MG/DL — SIGNIFICANT CHANGE UP (ref 7–23)
BUN SERPL-MCNC: 19 MG/DL — SIGNIFICANT CHANGE UP (ref 7–23)
BUN SERPL-MCNC: 20 MG/DL — SIGNIFICANT CHANGE UP (ref 7–23)
BUN SERPL-MCNC: 21 MG/DL — SIGNIFICANT CHANGE UP (ref 7–23)
BUN SERPL-MCNC: 21 MG/DL — SIGNIFICANT CHANGE UP (ref 7–23)
BUN SERPL-MCNC: 22 MG/DL
BUN SERPL-MCNC: 23 MG/DL
BUN SERPL-MCNC: 25 MG/DL — HIGH (ref 7–23)
BUN SERPL-MCNC: 26 MG/DL — HIGH (ref 7–23)
BUN SERPL-MCNC: 27 MG/DL — HIGH (ref 7–23)
BUN SERPL-MCNC: 32 MG/DL — HIGH (ref 7–23)
CALCIUM SERPL-MCNC: 8.5 MG/DL — SIGNIFICANT CHANGE UP (ref 8.4–10.5)
CALCIUM SERPL-MCNC: 8.6 MG/DL — SIGNIFICANT CHANGE UP (ref 8.4–10.5)
CALCIUM SERPL-MCNC: 8.7 MG/DL — SIGNIFICANT CHANGE UP (ref 8.4–10.5)
CALCIUM SERPL-MCNC: 8.7 MG/DL — SIGNIFICANT CHANGE UP (ref 8.4–10.5)
CALCIUM SERPL-MCNC: 8.9 MG/DL — SIGNIFICANT CHANGE UP (ref 8.4–10.5)
CALCIUM SERPL-MCNC: 9 MG/DL — SIGNIFICANT CHANGE UP (ref 8.4–10.5)
CALCIUM SERPL-MCNC: 9.1 MG/DL — SIGNIFICANT CHANGE UP (ref 8.4–10.5)
CALCIUM SERPL-MCNC: 9.3 MG/DL — SIGNIFICANT CHANGE UP (ref 8.4–10.5)
CALCIUM SERPL-MCNC: 9.4 MG/DL
CALCIUM SERPL-MCNC: 9.5 MG/DL
CALCIUM SERPL-MCNC: 9.6 MG/DL — SIGNIFICANT CHANGE UP (ref 8.4–10.5)
CALCIUM SERPL-MCNC: 9.8 MG/DL — SIGNIFICANT CHANGE UP (ref 8.4–10.5)
CHLORIDE SERPL-SCNC: 101 MMOL/L
CHLORIDE SERPL-SCNC: 101 MMOL/L — SIGNIFICANT CHANGE UP (ref 96–108)
CHLORIDE SERPL-SCNC: 88 MMOL/L — LOW (ref 98–107)
CHLORIDE SERPL-SCNC: 92 MMOL/L — LOW (ref 98–107)
CHLORIDE SERPL-SCNC: 93 MMOL/L — LOW (ref 98–107)
CHLORIDE SERPL-SCNC: 94 MMOL/L — LOW (ref 98–107)
CHLORIDE SERPL-SCNC: 94 MMOL/L — LOW (ref 98–107)
CHLORIDE SERPL-SCNC: 95 MMOL/L — LOW (ref 98–107)
CHLORIDE SERPL-SCNC: 96 MMOL/L — LOW (ref 98–107)
CHLORIDE SERPL-SCNC: 97 MMOL/L — LOW (ref 98–107)
CHLORIDE SERPL-SCNC: 98 MMOL/L — SIGNIFICANT CHANGE UP (ref 98–107)
CHLORIDE SERPL-SCNC: 99 MMOL/L
CHLORIDE UR-SCNC: 133 MMOL/L — SIGNIFICANT CHANGE UP
CO2 SERPL-SCNC: 20 MMOL/L — LOW (ref 22–31)
CO2 SERPL-SCNC: 21 MMOL/L — LOW (ref 22–31)
CO2 SERPL-SCNC: 22 MMOL/L — SIGNIFICANT CHANGE UP (ref 22–31)
CO2 SERPL-SCNC: 22 MMOL/L — SIGNIFICANT CHANGE UP (ref 22–31)
CO2 SERPL-SCNC: 23 MMOL/L — SIGNIFICANT CHANGE UP (ref 22–31)
CO2 SERPL-SCNC: 24 MMOL/L — SIGNIFICANT CHANGE UP (ref 22–31)
CO2 SERPL-SCNC: 25 MMOL/L — SIGNIFICANT CHANGE UP (ref 22–31)
CO2 SERPL-SCNC: 26 MMOL/L
CO2 SERPL-SCNC: 26 MMOL/L
CO2 SERPL-SCNC: 26 MMOL/L — SIGNIFICANT CHANGE UP (ref 22–31)
CO2 SERPL-SCNC: 27 MMOL/L — SIGNIFICANT CHANGE UP (ref 22–31)
CO2 SERPL-SCNC: 28 MMOL/L — SIGNIFICANT CHANGE UP (ref 22–31)
COLOR SPEC: SIGNIFICANT CHANGE UP
COLOR SPEC: YELLOW — SIGNIFICANT CHANGE UP
COMMENT - URINE: SIGNIFICANT CHANGE UP
COVID-19 SPIKE DOMAIN AB INTERP: POSITIVE
COVID-19 SPIKE DOMAIN ANTIBODY RESULT: >250 U/ML — HIGH
CREAT SERPL-MCNC: 0.55 MG/DL — SIGNIFICANT CHANGE UP (ref 0.5–1.3)
CREAT SERPL-MCNC: 0.59 MG/DL — SIGNIFICANT CHANGE UP (ref 0.5–1.3)
CREAT SERPL-MCNC: 0.59 MG/DL — SIGNIFICANT CHANGE UP (ref 0.5–1.3)
CREAT SERPL-MCNC: 0.6 MG/DL — SIGNIFICANT CHANGE UP (ref 0.5–1.3)
CREAT SERPL-MCNC: 0.61 MG/DL — SIGNIFICANT CHANGE UP (ref 0.5–1.3)
CREAT SERPL-MCNC: 0.62 MG/DL — SIGNIFICANT CHANGE UP (ref 0.5–1.3)
CREAT SERPL-MCNC: 0.62 MG/DL — SIGNIFICANT CHANGE UP (ref 0.5–1.3)
CREAT SERPL-MCNC: 0.65 MG/DL — SIGNIFICANT CHANGE UP (ref 0.5–1.3)
CREAT SERPL-MCNC: 0.66 MG/DL — SIGNIFICANT CHANGE UP (ref 0.5–1.3)
CREAT SERPL-MCNC: 0.7 MG/DL — SIGNIFICANT CHANGE UP (ref 0.5–1.3)
CREAT SERPL-MCNC: 0.7 MG/DL — SIGNIFICANT CHANGE UP (ref 0.5–1.3)
CREAT SERPL-MCNC: 1.07 MG/DL
CREAT SERPL-MCNC: 1.07 MG/DL — SIGNIFICANT CHANGE UP (ref 0.5–1.3)
CREAT SERPL-MCNC: 1.17 MG/DL
CULTURE RESULTS: NO GROWTH — SIGNIFICANT CHANGE UP
CULTURE RESULTS: SIGNIFICANT CHANGE UP
DIFF PNL FLD: NEGATIVE — SIGNIFICANT CHANGE UP
DIFF PNL FLD: NEGATIVE — SIGNIFICANT CHANGE UP
EOSINOPHIL # BLD AUTO: 0 K/UL — SIGNIFICANT CHANGE UP (ref 0–0.5)
EOSINOPHIL # BLD AUTO: 0.01 K/UL — SIGNIFICANT CHANGE UP (ref 0–0.5)
EOSINOPHIL # BLD AUTO: 0.03 K/UL — SIGNIFICANT CHANGE UP (ref 0–0.5)
EOSINOPHIL # BLD AUTO: 0.14 K/UL
EOSINOPHIL # BLD AUTO: 0.16 K/UL — SIGNIFICANT CHANGE UP (ref 0–0.5)
EOSINOPHIL # BLD AUTO: 0.23 K/UL — SIGNIFICANT CHANGE UP (ref 0–0.5)
EOSINOPHIL # BLD AUTO: 0.35 K/UL — SIGNIFICANT CHANGE UP (ref 0–0.5)
EOSINOPHIL # BLD AUTO: 0.45 K/UL — SIGNIFICANT CHANGE UP (ref 0–0.5)
EOSINOPHIL NFR BLD AUTO: 0 % — SIGNIFICANT CHANGE UP (ref 0–6)
EOSINOPHIL NFR BLD AUTO: 1 % — SIGNIFICANT CHANGE UP (ref 0–6)
EOSINOPHIL NFR BLD AUTO: 1.9 %
EOSINOPHIL NFR BLD AUTO: 2 % — SIGNIFICANT CHANGE UP (ref 0–6)
EOSINOPHIL NFR BLD AUTO: 2.1 % — SIGNIFICANT CHANGE UP (ref 0–6)
EOSINOPHIL NFR BLD AUTO: 6 % — SIGNIFICANT CHANGE UP (ref 0–6)
EPI CELLS # UR: 3 /HPF — SIGNIFICANT CHANGE UP (ref 0–5)
EPI CELLS # UR: SIGNIFICANT CHANGE UP
GLUCOSE SERPL-MCNC: 100 MG/DL — HIGH (ref 70–99)
GLUCOSE SERPL-MCNC: 107 MG/DL — HIGH (ref 70–99)
GLUCOSE SERPL-MCNC: 107 MG/DL — HIGH (ref 70–99)
GLUCOSE SERPL-MCNC: 116 MG/DL — HIGH (ref 70–99)
GLUCOSE SERPL-MCNC: 121 MG/DL — HIGH (ref 70–99)
GLUCOSE SERPL-MCNC: 121 MG/DL — HIGH (ref 70–99)
GLUCOSE SERPL-MCNC: 132 MG/DL — HIGH (ref 70–99)
GLUCOSE SERPL-MCNC: 152 MG/DL — HIGH (ref 70–99)
GLUCOSE SERPL-MCNC: 165 MG/DL — HIGH (ref 70–99)
GLUCOSE SERPL-MCNC: 179 MG/DL — HIGH (ref 70–99)
GLUCOSE SERPL-MCNC: 225 MG/DL — HIGH (ref 70–99)
GLUCOSE SERPL-MCNC: 67 MG/DL
GLUCOSE SERPL-MCNC: 87 MG/DL — SIGNIFICANT CHANGE UP (ref 70–99)
GLUCOSE SERPL-MCNC: 93 MG/DL
GLUCOSE UR QL: NEGATIVE — SIGNIFICANT CHANGE UP
GLUCOSE UR QL: NEGATIVE — SIGNIFICANT CHANGE UP
HCT VFR BLD CALC: 34.7 % — SIGNIFICANT CHANGE UP (ref 34.5–45)
HCT VFR BLD CALC: 36.6 % — SIGNIFICANT CHANGE UP (ref 34.5–45)
HCT VFR BLD CALC: 36.6 % — SIGNIFICANT CHANGE UP (ref 34.5–45)
HCT VFR BLD CALC: 36.8 %
HCT VFR BLD CALC: 37.9 % — SIGNIFICANT CHANGE UP (ref 34.5–45)
HCT VFR BLD CALC: 38.1 % — SIGNIFICANT CHANGE UP (ref 34.5–45)
HCT VFR BLD CALC: 38.4 % — SIGNIFICANT CHANGE UP (ref 34.5–45)
HCT VFR BLD CALC: 38.8 % — SIGNIFICANT CHANGE UP (ref 34.5–45)
HCT VFR BLD CALC: 39 % — SIGNIFICANT CHANGE UP (ref 34.5–45)
HCT VFR BLD CALC: 39.6 % — SIGNIFICANT CHANGE UP (ref 34.5–45)
HCT VFR BLD CALC: 40.3 % — SIGNIFICANT CHANGE UP (ref 34.5–45)
HCT VFR BLD CALC: 40.5 % — SIGNIFICANT CHANGE UP (ref 34.5–45)
HCT VFR BLD CALC: 40.5 % — SIGNIFICANT CHANGE UP (ref 34.5–45)
HCT VFR BLD CALC: 41.7 % — SIGNIFICANT CHANGE UP (ref 34.5–45)
HCT VFR BLD CALC: 42.2 % — SIGNIFICANT CHANGE UP (ref 34.5–45)
HCT VFR BLD CALC: 43.3 % — SIGNIFICANT CHANGE UP (ref 34.5–45)
HCT VFR BLD CALC: 44.1 % — SIGNIFICANT CHANGE UP (ref 34.5–45)
HGB BLD-MCNC: 11.9 G/DL — SIGNIFICANT CHANGE UP (ref 11.5–15.5)
HGB BLD-MCNC: 12.2 G/DL
HGB BLD-MCNC: 12.3 G/DL — SIGNIFICANT CHANGE UP (ref 11.5–15.5)
HGB BLD-MCNC: 12.4 G/DL — SIGNIFICANT CHANGE UP (ref 11.5–15.5)
HGB BLD-MCNC: 12.8 G/DL — SIGNIFICANT CHANGE UP (ref 11.5–15.5)
HGB BLD-MCNC: 12.8 G/DL — SIGNIFICANT CHANGE UP (ref 11.5–15.5)
HGB BLD-MCNC: 12.9 G/DL — SIGNIFICANT CHANGE UP (ref 11.5–15.5)
HGB BLD-MCNC: 13.2 G/DL — SIGNIFICANT CHANGE UP (ref 11.5–15.5)
HGB BLD-MCNC: 13.4 G/DL — SIGNIFICANT CHANGE UP (ref 11.5–15.5)
HGB BLD-MCNC: 13.4 G/DL — SIGNIFICANT CHANGE UP (ref 11.5–15.5)
HGB BLD-MCNC: 13.7 G/DL — SIGNIFICANT CHANGE UP (ref 11.5–15.5)
HGB BLD-MCNC: 13.7 G/DL — SIGNIFICANT CHANGE UP (ref 11.5–15.5)
HGB BLD-MCNC: 14.1 G/DL — SIGNIFICANT CHANGE UP (ref 11.5–15.5)
HGB BLD-MCNC: 14.2 G/DL — SIGNIFICANT CHANGE UP (ref 11.5–15.5)
HGB BLD-MCNC: 14.4 G/DL — SIGNIFICANT CHANGE UP (ref 11.5–15.5)
HGB BLD-MCNC: 14.5 G/DL — SIGNIFICANT CHANGE UP (ref 11.5–15.5)
HGB BLD-MCNC: 14.8 G/DL — SIGNIFICANT CHANGE UP (ref 11.5–15.5)
HYALINE CASTS # UR AUTO: SIGNIFICANT CHANGE UP (ref 0–7)
IANC: 10.48 K/UL — HIGH (ref 1.5–8.5)
IANC: 48.92 K/UL — HIGH (ref 1.5–8.5)
IANC: 5.27 K/UL — SIGNIFICANT CHANGE UP (ref 1.5–8.5)
IANC: 54.63 K/UL — HIGH (ref 1.5–8.5)
IANC: 59.76 K/UL — HIGH (ref 1.5–8.5)
IANC: 70.18 K/UL — HIGH (ref 1.5–8.5)
IMM GRANULOCYTES NFR BLD AUTO: 0.3 % — SIGNIFICANT CHANGE UP (ref 0–1.5)
IMM GRANULOCYTES NFR BLD AUTO: 0.3 % — SIGNIFICANT CHANGE UP (ref 0–1.5)
IMM GRANULOCYTES NFR BLD AUTO: 0.4 %
IMM GRANULOCYTES NFR BLD AUTO: 0.4 % — SIGNIFICANT CHANGE UP (ref 0–1.5)
IMM GRANULOCYTES NFR BLD AUTO: 19.7 % — HIGH (ref 0–1.5)
IMM GRANULOCYTES NFR BLD AUTO: 23.7 % — HIGH (ref 0–1.5)
IMM GRANULOCYTES NFR BLD AUTO: 7.5 % — HIGH (ref 0–1.5)
INR PPP: 0.94 RATIO
KETONES UR-MCNC: ABNORMAL
KETONES UR-MCNC: NEGATIVE — SIGNIFICANT CHANGE UP
LEUKOCYTE ESTERASE UR-ACNC: NEGATIVE — SIGNIFICANT CHANGE UP
LEUKOCYTE ESTERASE UR-ACNC: NEGATIVE — SIGNIFICANT CHANGE UP
LIDOCAIN IGE QN: 46 U/L — SIGNIFICANT CHANGE UP (ref 7–60)
LYMPHOCYTES # BLD AUTO: 0 % — LOW (ref 13–44)
LYMPHOCYTES # BLD AUTO: 0 K/UL — LOW (ref 1–3.3)
LYMPHOCYTES # BLD AUTO: 0.38 K/UL — LOW (ref 1–3.3)
LYMPHOCYTES # BLD AUTO: 0.39 K/UL — LOW (ref 1–3.3)
LYMPHOCYTES # BLD AUTO: 0.42 K/UL — LOW (ref 1–3.3)
LYMPHOCYTES # BLD AUTO: 0.48 K/UL — LOW (ref 1–3.3)
LYMPHOCYTES # BLD AUTO: 0.48 K/UL — LOW (ref 1–3.3)
LYMPHOCYTES # BLD AUTO: 0.5 % — LOW (ref 13–44)
LYMPHOCYTES # BLD AUTO: 0.58 K/UL — LOW (ref 1–3.3)
LYMPHOCYTES # BLD AUTO: 0.6 % — LOW (ref 13–44)
LYMPHOCYTES # BLD AUTO: 0.68 K/UL — LOW (ref 1–3.3)
LYMPHOCYTES # BLD AUTO: 0.76 K/UL — LOW (ref 1–3.3)
LYMPHOCYTES # BLD AUTO: 0.9 % — LOW (ref 13–44)
LYMPHOCYTES # BLD AUTO: 1 % — LOW (ref 13–44)
LYMPHOCYTES # BLD AUTO: 1.27 K/UL — SIGNIFICANT CHANGE UP (ref 1–3.3)
LYMPHOCYTES # BLD AUTO: 1.36 K/UL — SIGNIFICANT CHANGE UP (ref 1–3.3)
LYMPHOCYTES # BLD AUTO: 1.41 K/UL
LYMPHOCYTES # BLD AUTO: 16.9 % — SIGNIFICANT CHANGE UP (ref 13–44)
LYMPHOCYTES # BLD AUTO: 2.72 K/UL — SIGNIFICANT CHANGE UP (ref 1–3.3)
LYMPHOCYTES # BLD AUTO: 23 % — SIGNIFICANT CHANGE UP (ref 13–44)
LYMPHOCYTES # BLD AUTO: 3.4 % — LOW (ref 13–44)
LYMPHOCYTES # BLD AUTO: 6 % — LOW (ref 13–44)
LYMPHOCYTES # BLD AUTO: 7.4 % — LOW (ref 13–44)
LYMPHOCYTES NFR BLD AUTO: 19.1 %
MAGNESIUM SERPL-MCNC: 1.8 MG/DL — SIGNIFICANT CHANGE UP (ref 1.6–2.6)
MAGNESIUM SERPL-MCNC: 1.9 MG/DL — SIGNIFICANT CHANGE UP (ref 1.6–2.6)
MAGNESIUM SERPL-MCNC: 1.9 MG/DL — SIGNIFICANT CHANGE UP (ref 1.6–2.6)
MAGNESIUM SERPL-MCNC: 2 MG/DL — SIGNIFICANT CHANGE UP (ref 1.6–2.6)
MAGNESIUM SERPL-MCNC: 2 MG/DL — SIGNIFICANT CHANGE UP (ref 1.6–2.6)
MAGNESIUM SERPL-MCNC: 2.1 MG/DL — SIGNIFICANT CHANGE UP (ref 1.6–2.6)
MAN DIFF?: NORMAL
MCHC RBC-ENTMCNC: 28.8 PG — SIGNIFICANT CHANGE UP (ref 27–34)
MCHC RBC-ENTMCNC: 29.1 PG — SIGNIFICANT CHANGE UP (ref 27–34)
MCHC RBC-ENTMCNC: 29.1 PG — SIGNIFICANT CHANGE UP (ref 27–34)
MCHC RBC-ENTMCNC: 29.4 PG — SIGNIFICANT CHANGE UP (ref 27–34)
MCHC RBC-ENTMCNC: 29.5 PG — SIGNIFICANT CHANGE UP (ref 27–34)
MCHC RBC-ENTMCNC: 29.6 PG — SIGNIFICANT CHANGE UP (ref 27–34)
MCHC RBC-ENTMCNC: 29.8 PG — SIGNIFICANT CHANGE UP (ref 27–34)
MCHC RBC-ENTMCNC: 30.1 PG — SIGNIFICANT CHANGE UP (ref 27–34)
MCHC RBC-ENTMCNC: 30.5 PG — SIGNIFICANT CHANGE UP (ref 27–34)
MCHC RBC-ENTMCNC: 30.5 PG — SIGNIFICANT CHANGE UP (ref 27–34)
MCHC RBC-ENTMCNC: 30.6 PG
MCHC RBC-ENTMCNC: 30.8 PG — SIGNIFICANT CHANGE UP (ref 27–34)
MCHC RBC-ENTMCNC: 30.9 PG — SIGNIFICANT CHANGE UP (ref 27–34)
MCHC RBC-ENTMCNC: 31 PG — SIGNIFICANT CHANGE UP (ref 27–34)
MCHC RBC-ENTMCNC: 31 PG — SIGNIFICANT CHANGE UP (ref 27–34)
MCHC RBC-ENTMCNC: 32.9 GM/DL — SIGNIFICANT CHANGE UP (ref 32–36)
MCHC RBC-ENTMCNC: 33.1 GM/DL — SIGNIFICANT CHANGE UP (ref 32–36)
MCHC RBC-ENTMCNC: 33.2 GM/DL
MCHC RBC-ENTMCNC: 33.3 GM/DL — SIGNIFICANT CHANGE UP (ref 32–36)
MCHC RBC-ENTMCNC: 33.4 GM/DL — SIGNIFICANT CHANGE UP (ref 32–36)
MCHC RBC-ENTMCNC: 33.6 G/DL — SIGNIFICANT CHANGE UP (ref 32–36)
MCHC RBC-ENTMCNC: 33.6 G/DL — SIGNIFICANT CHANGE UP (ref 32–36)
MCHC RBC-ENTMCNC: 33.9 G/DL — SIGNIFICANT CHANGE UP (ref 32–36)
MCHC RBC-ENTMCNC: 34 GM/DL — SIGNIFICANT CHANGE UP (ref 32–36)
MCHC RBC-ENTMCNC: 34.2 GM/DL — SIGNIFICANT CHANGE UP (ref 32–36)
MCHC RBC-ENTMCNC: 34.3 G/DL — SIGNIFICANT CHANGE UP (ref 32–36)
MCHC RBC-ENTMCNC: 34.4 GM/DL — SIGNIFICANT CHANGE UP (ref 32–36)
MCHC RBC-ENTMCNC: 34.5 GM/DL — SIGNIFICANT CHANGE UP (ref 32–36)
MCHC RBC-ENTMCNC: 34.6 GM/DL — SIGNIFICANT CHANGE UP (ref 32–36)
MCHC RBC-ENTMCNC: 35.1 G/DL — SIGNIFICANT CHANGE UP (ref 32–36)
MCV RBC AUTO: 85.3 FL — SIGNIFICANT CHANGE UP (ref 80–100)
MCV RBC AUTO: 86.1 FL — SIGNIFICANT CHANGE UP (ref 80–100)
MCV RBC AUTO: 86.2 FL — SIGNIFICANT CHANGE UP (ref 80–100)
MCV RBC AUTO: 86.3 FL — SIGNIFICANT CHANGE UP (ref 80–100)
MCV RBC AUTO: 86.3 FL — SIGNIFICANT CHANGE UP (ref 80–100)
MCV RBC AUTO: 87 FL — SIGNIFICANT CHANGE UP (ref 80–100)
MCV RBC AUTO: 87.3 FL — SIGNIFICANT CHANGE UP (ref 80–100)
MCV RBC AUTO: 88 FL — SIGNIFICANT CHANGE UP (ref 80–100)
MCV RBC AUTO: 88.4 FL — SIGNIFICANT CHANGE UP (ref 80–100)
MCV RBC AUTO: 88.6 FL — SIGNIFICANT CHANGE UP (ref 80–100)
MCV RBC AUTO: 89.6 FL — SIGNIFICANT CHANGE UP (ref 80–100)
MCV RBC AUTO: 90.1 FL — SIGNIFICANT CHANGE UP (ref 80–100)
MCV RBC AUTO: 90.4 FL — SIGNIFICANT CHANGE UP (ref 80–100)
MCV RBC AUTO: 90.9 FL — SIGNIFICANT CHANGE UP (ref 80–100)
MCV RBC AUTO: 91.1 FL — SIGNIFICANT CHANGE UP (ref 80–100)
MCV RBC AUTO: 91.5 FL — SIGNIFICANT CHANGE UP (ref 80–100)
MCV RBC AUTO: 92.2 FL
MONOCYTES # BLD AUTO: 0.38 K/UL — SIGNIFICANT CHANGE UP (ref 0–0.9)
MONOCYTES # BLD AUTO: 0.58 K/UL — SIGNIFICANT CHANGE UP (ref 0–0.9)
MONOCYTES # BLD AUTO: 0.7 K/UL — SIGNIFICANT CHANGE UP (ref 0–0.9)
MONOCYTES # BLD AUTO: 0.71 K/UL — SIGNIFICANT CHANGE UP (ref 0–0.9)
MONOCYTES # BLD AUTO: 0.71 K/UL — SIGNIFICANT CHANGE UP (ref 0–0.9)
MONOCYTES # BLD AUTO: 0.8 K/UL — SIGNIFICANT CHANGE UP (ref 0–0.9)
MONOCYTES # BLD AUTO: 1.06 K/UL — HIGH (ref 0–0.9)
MONOCYTES # BLD AUTO: 1.06 K/UL — HIGH (ref 0–0.9)
MONOCYTES # BLD AUTO: 1.07 K/UL
MONOCYTES # BLD AUTO: 1.27 K/UL — HIGH (ref 0–0.9)
MONOCYTES # BLD AUTO: 1.31 K/UL — HIGH (ref 0–0.9)
MONOCYTES # BLD AUTO: 1.7 K/UL — HIGH (ref 0–0.9)
MONOCYTES # BLD AUTO: 3.17 K/UL — HIGH (ref 0–0.9)
MONOCYTES NFR BLD AUTO: 0.9 % — LOW (ref 2–14)
MONOCYTES NFR BLD AUTO: 1 % — LOW (ref 2–14)
MONOCYTES NFR BLD AUTO: 1 % — LOW (ref 2–14)
MONOCYTES NFR BLD AUTO: 1.7 % — LOW (ref 2–14)
MONOCYTES NFR BLD AUTO: 10 % — SIGNIFICANT CHANGE UP (ref 2–14)
MONOCYTES NFR BLD AUTO: 10.9 % — SIGNIFICANT CHANGE UP (ref 2–14)
MONOCYTES NFR BLD AUTO: 14.1 % — HIGH (ref 2–14)
MONOCYTES NFR BLD AUTO: 14.5 %
MONOCYTES NFR BLD AUTO: 18 % — HIGH (ref 2–14)
MONOCYTES NFR BLD AUTO: 3.1 % — SIGNIFICANT CHANGE UP (ref 2–14)
MONOCYTES NFR BLD AUTO: 3.4 % — SIGNIFICANT CHANGE UP (ref 2–14)
MONOCYTES NFR BLD AUTO: 7 % — SIGNIFICANT CHANGE UP (ref 2–14)
MONOCYTES NFR BLD AUTO: 7 % — SIGNIFICANT CHANGE UP (ref 2–14)
MYELOCYTES NFR BLD: 1 % — HIGH (ref 0–0)
MYELOCYTES NFR BLD: 3 % — HIGH (ref 0–0)
NEUTROPHILS # BLD AUTO: 10.48 K/UL — HIGH (ref 1.8–7.4)
NEUTROPHILS # BLD AUTO: 10.55 K/UL — HIGH (ref 1.8–7.4)
NEUTROPHILS # BLD AUTO: 3.13 K/UL — SIGNIFICANT CHANGE UP (ref 1.8–7.4)
NEUTROPHILS # BLD AUTO: 38.95 K/UL — HIGH (ref 1.8–7.4)
NEUTROPHILS # BLD AUTO: 4.7 K/UL
NEUTROPHILS # BLD AUTO: 48.92 K/UL — HIGH (ref 1.8–7.4)
NEUTROPHILS # BLD AUTO: 5 K/UL — SIGNIFICANT CHANGE UP (ref 1.8–7.4)
NEUTROPHILS # BLD AUTO: 5.27 K/UL — SIGNIFICANT CHANGE UP (ref 1.8–7.4)
NEUTROPHILS # BLD AUTO: 54.63 K/UL — HIGH (ref 1.8–7.4)
NEUTROPHILS # BLD AUTO: 56.9 K/UL — HIGH (ref 1.8–7.4)
NEUTROPHILS # BLD AUTO: 59.76 K/UL — HIGH (ref 1.8–7.4)
NEUTROPHILS # BLD AUTO: 75.46 K/UL — HIGH (ref 1.8–7.4)
NEUTROPHILS # BLD AUTO: 9.33 K/UL — HIGH (ref 1.8–7.4)
NEUTROPHILS NFR BLD AUTO: 53 % — SIGNIFICANT CHANGE UP (ref 43–77)
NEUTROPHILS NFR BLD AUTO: 63.7 %
NEUTROPHILS NFR BLD AUTO: 66.5 % — SIGNIFICANT CHANGE UP (ref 43–77)
NEUTROPHILS NFR BLD AUTO: 74.9 % — SIGNIFICANT CHANGE UP (ref 43–77)
NEUTROPHILS NFR BLD AUTO: 78.7 % — HIGH (ref 43–77)
NEUTROPHILS NFR BLD AUTO: 81.2 % — HIGH (ref 43–77)
NEUTROPHILS NFR BLD AUTO: 82 % — HIGH (ref 43–77)
NEUTROPHILS NFR BLD AUTO: 83 % — HIGH (ref 43–77)
NEUTROPHILS NFR BLD AUTO: 85 % — HIGH (ref 43–77)
NEUTROPHILS NFR BLD AUTO: 88.4 % — HIGH (ref 43–77)
NEUTROPHILS NFR BLD AUTO: 92.6 % — HIGH (ref 43–77)
NEUTROPHILS NFR BLD AUTO: 94 % — HIGH (ref 43–77)
NEUTROPHILS NFR BLD AUTO: 98 % — HIGH (ref 43–77)
NEUTS BAND # BLD: 1 % — SIGNIFICANT CHANGE UP (ref 0–8)
NITRITE UR-MCNC: NEGATIVE — SIGNIFICANT CHANGE UP
NITRITE UR-MCNC: NEGATIVE — SIGNIFICANT CHANGE UP
NON-GYNECOLOGICAL CYTOLOGY STUDY: SIGNIFICANT CHANGE UP
NRBC # BLD: 0 /100 WBCS — SIGNIFICANT CHANGE UP
NRBC # BLD: 0 /100 WBCS — SIGNIFICANT CHANGE UP (ref 0–0)
NRBC # BLD: 0 /100 — SIGNIFICANT CHANGE UP (ref 0–0)
NRBC # BLD: SIGNIFICANT CHANGE UP /100 WBCS (ref 0–0)
NRBC # FLD: 0 K/UL — SIGNIFICANT CHANGE UP
NRBC # FLD: 0.03 K/UL — HIGH
NRBC # FLD: 0.03 K/UL — HIGH
OSMOLALITY UR: 594 MOSM/KG — SIGNIFICANT CHANGE UP (ref 50–1200)
PH UR: 6.5 — SIGNIFICANT CHANGE UP (ref 5–8)
PH UR: 7 — SIGNIFICANT CHANGE UP (ref 5–8)
PHOSPHATE SERPL-MCNC: 1.8 MG/DL — LOW (ref 2.5–4.5)
PHOSPHATE SERPL-MCNC: 1.9 MG/DL — LOW (ref 2.5–4.5)
PHOSPHATE SERPL-MCNC: 2.2 MG/DL — LOW (ref 2.5–4.5)
PHOSPHATE SERPL-MCNC: 2.2 MG/DL — LOW (ref 2.5–4.5)
PHOSPHATE SERPL-MCNC: 2.5 MG/DL — SIGNIFICANT CHANGE UP (ref 2.5–4.5)
PHOSPHATE SERPL-MCNC: 2.7 MG/DL — SIGNIFICANT CHANGE UP (ref 2.5–4.5)
PHOSPHATE SERPL-MCNC: 2.9 MG/DL — SIGNIFICANT CHANGE UP (ref 2.5–4.5)
PHOSPHATE SERPL-MCNC: 3 MG/DL — SIGNIFICANT CHANGE UP (ref 2.5–4.5)
PHOSPHATE SERPL-MCNC: 3.2 MG/DL — SIGNIFICANT CHANGE UP (ref 2.5–4.5)
PLAT MORPH BLD: NORMAL — SIGNIFICANT CHANGE UP
PLATELET # BLD AUTO: 101 K/UL — LOW (ref 150–400)
PLATELET # BLD AUTO: 158 K/UL — SIGNIFICANT CHANGE UP (ref 150–400)
PLATELET # BLD AUTO: 193 K/UL — SIGNIFICANT CHANGE UP (ref 150–400)
PLATELET # BLD AUTO: 207 K/UL — SIGNIFICANT CHANGE UP (ref 150–400)
PLATELET # BLD AUTO: 214 K/UL — SIGNIFICANT CHANGE UP (ref 150–400)
PLATELET # BLD AUTO: 233 K/UL — SIGNIFICANT CHANGE UP (ref 150–400)
PLATELET # BLD AUTO: 241 K/UL — SIGNIFICANT CHANGE UP (ref 150–400)
PLATELET # BLD AUTO: 248 K/UL — SIGNIFICANT CHANGE UP (ref 150–400)
PLATELET # BLD AUTO: 279 K/UL — SIGNIFICANT CHANGE UP (ref 150–400)
PLATELET # BLD AUTO: 291 K/UL — SIGNIFICANT CHANGE UP (ref 150–400)
PLATELET # BLD AUTO: 308 K/UL
PLATELET # BLD AUTO: 32 K/UL — LOW (ref 150–400)
PLATELET # BLD AUTO: 53 K/UL — LOW (ref 150–400)
PLATELET # BLD AUTO: 67 K/UL — LOW (ref 150–400)
PLATELET # BLD AUTO: 71 K/UL — LOW (ref 150–400)
PLATELET # BLD AUTO: 72 K/UL — LOW (ref 150–400)
PLATELET # BLD AUTO: 94 K/UL — LOW (ref 150–400)
POTASSIUM SERPL-MCNC: 3.4 MMOL/L — LOW (ref 3.5–5.3)
POTASSIUM SERPL-MCNC: 3.4 MMOL/L — LOW (ref 3.5–5.3)
POTASSIUM SERPL-MCNC: 3.5 MMOL/L — SIGNIFICANT CHANGE UP (ref 3.5–5.3)
POTASSIUM SERPL-MCNC: 3.6 MMOL/L — SIGNIFICANT CHANGE UP (ref 3.5–5.3)
POTASSIUM SERPL-MCNC: 3.6 MMOL/L — SIGNIFICANT CHANGE UP (ref 3.5–5.3)
POTASSIUM SERPL-MCNC: 3.7 MMOL/L — SIGNIFICANT CHANGE UP (ref 3.5–5.3)
POTASSIUM SERPL-MCNC: 3.7 MMOL/L — SIGNIFICANT CHANGE UP (ref 3.5–5.3)
POTASSIUM SERPL-MCNC: 3.8 MMOL/L — SIGNIFICANT CHANGE UP (ref 3.5–5.3)
POTASSIUM SERPL-MCNC: 4 MMOL/L — SIGNIFICANT CHANGE UP (ref 3.5–5.3)
POTASSIUM SERPL-MCNC: 4 MMOL/L — SIGNIFICANT CHANGE UP (ref 3.5–5.3)
POTASSIUM SERPL-MCNC: 4.4 MMOL/L — SIGNIFICANT CHANGE UP (ref 3.5–5.3)
POTASSIUM SERPL-MCNC: 4.7 MMOL/L — SIGNIFICANT CHANGE UP (ref 3.5–5.3)
POTASSIUM SERPL-MCNC: 4.9 MMOL/L — SIGNIFICANT CHANGE UP (ref 3.5–5.3)
POTASSIUM SERPL-SCNC: 3.4 MMOL/L — LOW (ref 3.5–5.3)
POTASSIUM SERPL-SCNC: 3.4 MMOL/L — LOW (ref 3.5–5.3)
POTASSIUM SERPL-SCNC: 3.5 MMOL/L — SIGNIFICANT CHANGE UP (ref 3.5–5.3)
POTASSIUM SERPL-SCNC: 3.6 MMOL/L — SIGNIFICANT CHANGE UP (ref 3.5–5.3)
POTASSIUM SERPL-SCNC: 3.6 MMOL/L — SIGNIFICANT CHANGE UP (ref 3.5–5.3)
POTASSIUM SERPL-SCNC: 3.7 MMOL/L — SIGNIFICANT CHANGE UP (ref 3.5–5.3)
POTASSIUM SERPL-SCNC: 3.7 MMOL/L — SIGNIFICANT CHANGE UP (ref 3.5–5.3)
POTASSIUM SERPL-SCNC: 3.8 MMOL/L — SIGNIFICANT CHANGE UP (ref 3.5–5.3)
POTASSIUM SERPL-SCNC: 4 MMOL/L — SIGNIFICANT CHANGE UP (ref 3.5–5.3)
POTASSIUM SERPL-SCNC: 4 MMOL/L — SIGNIFICANT CHANGE UP (ref 3.5–5.3)
POTASSIUM SERPL-SCNC: 4.4 MMOL/L — SIGNIFICANT CHANGE UP (ref 3.5–5.3)
POTASSIUM SERPL-SCNC: 4.7 MMOL/L — SIGNIFICANT CHANGE UP (ref 3.5–5.3)
POTASSIUM SERPL-SCNC: 4.9 MMOL/L — SIGNIFICANT CHANGE UP (ref 3.5–5.3)
POTASSIUM SERPL-SCNC: 5.4 MMOL/L
POTASSIUM SERPL-SCNC: 6.5 MMOL/L
POTASSIUM UR-SCNC: 34.9 MMOL/L — SIGNIFICANT CHANGE UP
PROT SERPL-MCNC: 6.3 G/DL — SIGNIFICANT CHANGE UP (ref 6–8.3)
PROT SERPL-MCNC: 6.3 G/DL — SIGNIFICANT CHANGE UP (ref 6–8.3)
PROT SERPL-MCNC: 6.5 G/DL — SIGNIFICANT CHANGE UP (ref 6–8.3)
PROT SERPL-MCNC: 6.6 G/DL
PROT SERPL-MCNC: 7 G/DL — SIGNIFICANT CHANGE UP (ref 6–8.3)
PROT SERPL-MCNC: 7.1 G/DL — SIGNIFICANT CHANGE UP (ref 6–8.3)
PROT SERPL-MCNC: 7.2 G/DL — SIGNIFICANT CHANGE UP (ref 6–8.3)
PROT SERPL-MCNC: 7.5 G/DL — SIGNIFICANT CHANGE UP (ref 6–8.3)
PROT UR-MCNC: ABNORMAL
PROT UR-MCNC: ABNORMAL
PT BLD: 11.1 SEC
RBC # BLD: 3.84 M/UL — SIGNIFICANT CHANGE UP (ref 3.8–5.2)
RBC # BLD: 3.99 M/UL
RBC # BLD: 4 M/UL — SIGNIFICANT CHANGE UP (ref 3.8–5.2)
RBC # BLD: 4.06 M/UL — SIGNIFICANT CHANGE UP (ref 3.8–5.2)
RBC # BLD: 4.16 M/UL — SIGNIFICANT CHANGE UP (ref 3.8–5.2)
RBC # BLD: 4.19 M/UL — SIGNIFICANT CHANGE UP (ref 3.8–5.2)
RBC # BLD: 4.39 M/UL — SIGNIFICANT CHANGE UP (ref 3.8–5.2)
RBC # BLD: 4.4 M/UL — SIGNIFICANT CHANGE UP (ref 3.8–5.2)
RBC # BLD: 4.52 M/UL — SIGNIFICANT CHANGE UP (ref 3.8–5.2)
RBC # BLD: 4.52 M/UL — SIGNIFICANT CHANGE UP (ref 3.8–5.2)
RBC # BLD: 4.6 M/UL — SIGNIFICANT CHANGE UP (ref 3.8–5.2)
RBC # BLD: 4.63 M/UL — SIGNIFICANT CHANGE UP (ref 3.8–5.2)
RBC # BLD: 4.64 M/UL — SIGNIFICANT CHANGE UP (ref 3.8–5.2)
RBC # BLD: 4.84 M/UL — SIGNIFICANT CHANGE UP (ref 3.8–5.2)
RBC # BLD: 4.89 M/UL — SIGNIFICANT CHANGE UP (ref 3.8–5.2)
RBC # BLD: 4.98 M/UL — SIGNIFICANT CHANGE UP (ref 3.8–5.2)
RBC # BLD: 5.03 M/UL — SIGNIFICANT CHANGE UP (ref 3.8–5.2)
RBC # FLD: 13.9 % — SIGNIFICANT CHANGE UP (ref 10.3–14.5)
RBC # FLD: 14.2 % — SIGNIFICANT CHANGE UP (ref 10.3–14.5)
RBC # FLD: 14.3 % — SIGNIFICANT CHANGE UP (ref 10.3–14.5)
RBC # FLD: 14.3 % — SIGNIFICANT CHANGE UP (ref 10.3–14.5)
RBC # FLD: 14.5 % — SIGNIFICANT CHANGE UP (ref 10.3–14.5)
RBC # FLD: 14.6 %
RBC # FLD: 14.6 % — HIGH (ref 10.3–14.5)
RBC # FLD: 14.7 % — HIGH (ref 10.3–14.5)
RBC # FLD: 14.7 % — HIGH (ref 10.3–14.5)
RBC # FLD: 14.9 % — HIGH (ref 10.3–14.5)
RBC # FLD: 15.1 % — HIGH (ref 10.3–14.5)
RBC # FLD: 15.1 % — HIGH (ref 10.3–14.5)
RBC # FLD: 15.2 % — HIGH (ref 10.3–14.5)
RBC # FLD: 15.3 % — HIGH (ref 10.3–14.5)
RBC BLD AUTO: SIGNIFICANT CHANGE UP
RBC CASTS # UR COMP ASSIST: 0 /HPF — SIGNIFICANT CHANGE UP (ref 0–4)
RBC CASTS # UR COMP ASSIST: <5 /HPF — SIGNIFICANT CHANGE UP (ref 0–4)
RH IG SCN BLD-IMP: POSITIVE — SIGNIFICANT CHANGE UP
RH IG SCN BLD-IMP: POSITIVE — SIGNIFICANT CHANGE UP
SARS-COV-2 IGG+IGM SERPL QL IA: >250 U/ML — HIGH
SARS-COV-2 IGG+IGM SERPL QL IA: POSITIVE
SARS-COV-2 N GENE NPH QL NAA+PROBE: NOT DETECTED
SARS-COV-2 RNA SPEC QL NAA+PROBE: SIGNIFICANT CHANGE UP
SODIUM SERPL-SCNC: 127 MMOL/L — LOW (ref 135–145)
SODIUM SERPL-SCNC: 128 MMOL/L — LOW (ref 135–145)
SODIUM SERPL-SCNC: 128 MMOL/L — LOW (ref 135–145)
SODIUM SERPL-SCNC: 129 MMOL/L — LOW (ref 135–145)
SODIUM SERPL-SCNC: 130 MMOL/L — LOW (ref 135–145)
SODIUM SERPL-SCNC: 131 MMOL/L — LOW (ref 135–145)
SODIUM SERPL-SCNC: 131 MMOL/L — LOW (ref 135–145)
SODIUM SERPL-SCNC: 133 MMOL/L — LOW (ref 135–145)
SODIUM SERPL-SCNC: 134 MMOL/L — LOW (ref 135–145)
SODIUM SERPL-SCNC: 136 MMOL/L
SODIUM SERPL-SCNC: 136 MMOL/L
SODIUM SERPL-SCNC: 137 MMOL/L — SIGNIFICANT CHANGE UP (ref 135–145)
SODIUM UR-SCNC: 119 MMOL/L — SIGNIFICANT CHANGE UP
SP GR SPEC: 1.02 — SIGNIFICANT CHANGE UP (ref 1.01–1.02)
SP GR SPEC: 1.02 — SIGNIFICANT CHANGE UP (ref 1.01–1.02)
SPECIMEN SOURCE: SIGNIFICANT CHANGE UP
SPECIMEN SOURCE: SIGNIFICANT CHANGE UP
UROBILINOGEN FLD QL: SIGNIFICANT CHANGE UP
UROBILINOGEN FLD QL: SIGNIFICANT CHANGE UP
WBC # BLD: 11.3 K/UL — HIGH (ref 3.8–10.5)
WBC # BLD: 11.38 K/UL — HIGH (ref 3.8–10.5)
WBC # BLD: 12.71 K/UL — HIGH (ref 3.8–10.5)
WBC # BLD: 17.65 K/UL — HIGH (ref 3.8–10.5)
WBC # BLD: 19.07 K/UL — HIGH (ref 3.8–10.5)
WBC # BLD: 26.75 K/UL — HIGH (ref 3.8–10.5)
WBC # BLD: 45.29 K/UL — CRITICAL HIGH (ref 3.8–10.5)
WBC # BLD: 46.15 K/UL — CRITICAL HIGH (ref 3.8–10.5)
WBC # BLD: 5.9 K/UL — SIGNIFICANT CHANGE UP (ref 3.8–10.5)
WBC # BLD: 55.28 K/UL — CRITICAL HIGH (ref 3.8–10.5)
WBC # BLD: 58.06 K/UL — CRITICAL HIGH (ref 3.8–10.5)
WBC # BLD: 6.49 K/UL — SIGNIFICANT CHANGE UP (ref 3.8–10.5)
WBC # BLD: 69.41 K/UL — CRITICAL HIGH (ref 3.8–10.5)
WBC # BLD: 7.52 K/UL — SIGNIFICANT CHANGE UP (ref 3.8–10.5)
WBC # BLD: 76.77 K/UL — CRITICAL HIGH (ref 3.8–10.5)
WBC # BLD: 79.8 K/UL — CRITICAL HIGH (ref 3.8–10.5)
WBC # FLD AUTO: 11.3 K/UL — HIGH (ref 3.8–10.5)
WBC # FLD AUTO: 11.38 K/UL — HIGH (ref 3.8–10.5)
WBC # FLD AUTO: 12.71 K/UL — HIGH (ref 3.8–10.5)
WBC # FLD AUTO: 17.65 K/UL — HIGH (ref 3.8–10.5)
WBC # FLD AUTO: 19.07 K/UL — HIGH (ref 3.8–10.5)
WBC # FLD AUTO: 26.75 K/UL — HIGH (ref 3.8–10.5)
WBC # FLD AUTO: 45.29 K/UL — CRITICAL HIGH (ref 3.8–10.5)
WBC # FLD AUTO: 46.15 K/UL — CRITICAL HIGH (ref 3.8–10.5)
WBC # FLD AUTO: 5.9 K/UL — SIGNIFICANT CHANGE UP (ref 3.8–10.5)
WBC # FLD AUTO: 55.28 K/UL — CRITICAL HIGH (ref 3.8–10.5)
WBC # FLD AUTO: 58.06 K/UL — CRITICAL HIGH (ref 3.8–10.5)
WBC # FLD AUTO: 6.49 K/UL — SIGNIFICANT CHANGE UP (ref 3.8–10.5)
WBC # FLD AUTO: 69.41 K/UL — CRITICAL HIGH (ref 3.8–10.5)
WBC # FLD AUTO: 7.38 K/UL
WBC # FLD AUTO: 7.52 K/UL — SIGNIFICANT CHANGE UP (ref 3.8–10.5)
WBC # FLD AUTO: 76.77 K/UL — CRITICAL HIGH (ref 3.8–10.5)
WBC # FLD AUTO: 79.8 K/UL — CRITICAL HIGH (ref 3.8–10.5)
WBC UR QL: 3 /HPF — SIGNIFICANT CHANGE UP (ref 0–5)
WBC UR QL: <5 /HPF — SIGNIFICANT CHANGE UP (ref 0–5)

## 2021-01-01 PROCEDURE — 71260 CT THORAX DX C+: CPT | Mod: 26,QQ

## 2021-01-01 PROCEDURE — 74177 CT ABD & PELVIS W/CONTRAST: CPT | Mod: 26,MH

## 2021-01-01 PROCEDURE — 77334 RADIATION TREATMENT AID(S): CPT | Mod: 26

## 2021-01-01 PROCEDURE — 76937 US GUIDE VASCULAR ACCESS: CPT | Mod: 26

## 2021-01-01 PROCEDURE — 77012 CT SCAN FOR NEEDLE BIOPSY: CPT | Mod: 26

## 2021-01-01 PROCEDURE — 99233 SBSQ HOSP IP/OBS HIGH 50: CPT | Mod: GC

## 2021-01-01 PROCEDURE — 80053 COMPREHEN METABOLIC PANEL: CPT

## 2021-01-01 PROCEDURE — 77435 SBRT MANAGEMENT: CPT

## 2021-01-01 PROCEDURE — 74177 CT ABD & PELVIS W/CONTRAST: CPT | Mod: 26,MG

## 2021-01-01 PROCEDURE — 77295 3-D RADIOTHERAPY PLAN: CPT | Mod: 26

## 2021-01-01 PROCEDURE — G1004: CPT

## 2021-01-01 PROCEDURE — 99232 SBSQ HOSP IP/OBS MODERATE 35: CPT

## 2021-01-01 PROCEDURE — 99443: CPT | Mod: 95

## 2021-01-01 PROCEDURE — 74018 RADEX ABDOMEN 1 VIEW: CPT | Mod: 26

## 2021-01-01 PROCEDURE — 77300 RADIATION THERAPY DOSE PLAN: CPT | Mod: 26

## 2021-01-01 PROCEDURE — 90791 PSYCH DIAGNOSTIC EVALUATION: CPT | Mod: 95

## 2021-01-01 PROCEDURE — 77001 FLUOROGUIDE FOR VEIN DEVICE: CPT | Mod: 26,GC

## 2021-01-01 PROCEDURE — 36415 COLL VENOUS BLD VENIPUNCTURE: CPT

## 2021-01-01 PROCEDURE — 99214 OFFICE O/P EST MOD 30 MIN: CPT

## 2021-01-01 PROCEDURE — 72157 MRI CHEST SPINE W/O & W/DYE: CPT | Mod: 26,ME

## 2021-01-01 PROCEDURE — 99233 SBSQ HOSP IP/OBS HIGH 50: CPT

## 2021-01-01 PROCEDURE — 72157 MRI CHEST SPINE W/O & W/DYE: CPT | Mod: 26

## 2021-01-01 PROCEDURE — 71045 X-RAY EXAM CHEST 1 VIEW: CPT | Mod: 26

## 2021-01-01 PROCEDURE — 72148 MRI LUMBAR SPINE W/O DYE: CPT | Mod: 26

## 2021-01-01 PROCEDURE — 99284 EMERGENCY DEPT VISIT MOD MDM: CPT

## 2021-01-01 PROCEDURE — 99223 1ST HOSP IP/OBS HIGH 75: CPT | Mod: GC

## 2021-01-01 PROCEDURE — 88305 TISSUE EXAM BY PATHOLOGIST: CPT | Mod: 26

## 2021-01-01 PROCEDURE — 88360 TUMOR IMMUNOHISTOCHEM/MANUAL: CPT | Mod: 26

## 2021-01-01 PROCEDURE — 93005 ELECTROCARDIOGRAM TRACING: CPT

## 2021-01-01 PROCEDURE — 82565 ASSAY OF CREATININE: CPT

## 2021-01-01 PROCEDURE — 99215 OFFICE O/P EST HI 40 MIN: CPT

## 2021-01-01 PROCEDURE — 20206 BIOPSY MUSCLE PERQ NEEDLE: CPT

## 2021-01-01 PROCEDURE — 93010 ELECTROCARDIOGRAM REPORT: CPT

## 2021-01-01 PROCEDURE — 74177 CT ABD & PELVIS W/CONTRAST: CPT

## 2021-01-01 PROCEDURE — 99214 OFFICE O/P EST MOD 30 MIN: CPT | Mod: 95

## 2021-01-01 PROCEDURE — 99238 HOSP IP/OBS DSCHRG MGMT 30/<: CPT

## 2021-01-01 PROCEDURE — 71260 CT THORAX DX C+: CPT | Mod: 26,MH

## 2021-01-01 PROCEDURE — 71260 CT THORAX DX C+: CPT

## 2021-01-01 PROCEDURE — 93306 TTE W/DOPPLER COMPLETE: CPT | Mod: 26

## 2021-01-01 PROCEDURE — 77290 THER RAD SIMULAJ FIELD CPLX: CPT | Mod: 26

## 2021-01-01 PROCEDURE — 36000 PLACE NEEDLE IN VEIN: CPT

## 2021-01-01 PROCEDURE — 99214 OFFICE O/P EST MOD 30 MIN: CPT | Mod: GC,25

## 2021-01-01 PROCEDURE — 99442: CPT | Mod: 95

## 2021-01-01 PROCEDURE — A9579: CPT

## 2021-01-01 PROCEDURE — 85025 COMPLETE CBC W/AUTO DIFF WBC: CPT

## 2021-01-01 PROCEDURE — 99213 OFFICE O/P EST LOW 20 MIN: CPT

## 2021-01-01 PROCEDURE — 99223 1ST HOSP IP/OBS HIGH 75: CPT

## 2021-01-01 PROCEDURE — 71250 CT THORAX DX C-: CPT | Mod: 26

## 2021-01-01 PROCEDURE — 99285 EMERGENCY DEPT VISIT HI MDM: CPT | Mod: CS

## 2021-01-01 PROCEDURE — 93306 TTE W/DOPPLER COMPLETE: CPT

## 2021-01-01 PROCEDURE — 0002A: CPT

## 2021-01-01 PROCEDURE — 77263 THER RADIOLOGY TX PLNG CPLX: CPT

## 2021-01-01 PROCEDURE — 99222 1ST HOSP IP/OBS MODERATE 55: CPT | Mod: GC

## 2021-01-01 PROCEDURE — 72157 MRI CHEST SPINE W/O & W/DYE: CPT

## 2021-01-01 PROCEDURE — 88173 CYTOPATH EVAL FNA REPORT: CPT | Mod: 26

## 2021-01-01 PROCEDURE — 36561 INSERT TUNNELED CV CATH: CPT

## 2021-01-01 PROCEDURE — 99024 POSTOP FOLLOW-UP VISIT: CPT | Mod: GC

## 2021-01-01 PROCEDURE — 99283 EMERGENCY DEPT VISIT LOW MDM: CPT

## 2021-01-01 RX ORDER — NALOXONE HYDROCHLORIDE 4 MG/.1ML
0.1 SPRAY NASAL ONCE
Refills: 0 | Status: DISCONTINUED | OUTPATIENT
Start: 2021-01-01 | End: 2021-01-01

## 2021-01-01 RX ORDER — CHLORHEXIDINE GLUCONATE 213 G/1000ML
1 SOLUTION TOPICAL DAILY
Refills: 0 | Status: DISCONTINUED | OUTPATIENT
Start: 2021-01-01 | End: 2021-01-01

## 2021-01-01 RX ORDER — HYDROMORPHONE HYDROCHLORIDE 2 MG/ML
2 INJECTION INTRAMUSCULAR; INTRAVENOUS; SUBCUTANEOUS
Refills: 0 | Status: DISCONTINUED | OUTPATIENT
Start: 2021-01-01 | End: 2021-01-01

## 2021-01-01 RX ORDER — HYDROMORPHONE HYDROCHLORIDE 2 MG/ML
1 INJECTION INTRAMUSCULAR; INTRAVENOUS; SUBCUTANEOUS EVERY 4 HOURS
Refills: 0 | Status: DISCONTINUED | OUTPATIENT
Start: 2021-01-01 | End: 2021-01-01

## 2021-01-01 RX ORDER — FUROSEMIDE 40 MG
20 TABLET ORAL ONCE
Refills: 0 | Status: COMPLETED | OUTPATIENT
Start: 2021-01-01 | End: 2021-01-01

## 2021-01-01 RX ORDER — OXYCODONE HYDROCHLORIDE 15 MG/1
15 TABLET, FILM COATED, EXTENDED RELEASE ORAL
Qty: 30 | Refills: 0 | Status: DISCONTINUED | COMMUNITY
Start: 2021-01-01 | End: 2021-01-01

## 2021-01-01 RX ORDER — CEFEPIME 1 G/1
INJECTION, POWDER, FOR SOLUTION INTRAMUSCULAR; INTRAVENOUS
Refills: 0 | Status: DISCONTINUED | OUTPATIENT
Start: 2021-01-01 | End: 2021-01-01

## 2021-01-01 RX ORDER — LOSARTAN POTASSIUM 100 MG/1
100 TABLET, FILM COATED ORAL DAILY
Refills: 0 | Status: DISCONTINUED | OUTPATIENT
Start: 2021-01-01 | End: 2021-01-01

## 2021-01-01 RX ORDER — HYDRALAZINE HCL 50 MG
10 TABLET ORAL ONCE
Refills: 0 | Status: DISCONTINUED | OUTPATIENT
Start: 2021-01-01 | End: 2021-01-01

## 2021-01-01 RX ORDER — DEXAMETHASONE 0.5 MG/5ML
4 ELIXIR ORAL EVERY 6 HOURS
Refills: 0 | Status: DISCONTINUED | OUTPATIENT
Start: 2021-01-01 | End: 2021-01-01

## 2021-01-01 RX ORDER — SODIUM CHLORIDE 9 MG/ML
1000 INJECTION INTRAMUSCULAR; INTRAVENOUS; SUBCUTANEOUS
Refills: 0 | Status: DISCONTINUED | OUTPATIENT
Start: 2021-01-01 | End: 2021-01-01

## 2021-01-01 RX ORDER — ACYCLOVIR SODIUM 500 MG
650 VIAL (EA) INTRAVENOUS ONCE
Refills: 0 | Status: COMPLETED | OUTPATIENT
Start: 2021-01-01 | End: 2021-01-01

## 2021-01-01 RX ORDER — SUCRALFATE 1 G/1
1 TABLET ORAL 4 TIMES DAILY
Qty: 60 | Refills: 0 | Status: DISCONTINUED | COMMUNITY
Start: 2020-01-01 | End: 2021-01-01

## 2021-01-01 RX ORDER — PIPERACILLIN AND TAZOBACTAM 4; .5 G/20ML; G/20ML
3.38 INJECTION, POWDER, LYOPHILIZED, FOR SOLUTION INTRAVENOUS ONCE
Refills: 0 | Status: COMPLETED | OUTPATIENT
Start: 2021-01-01 | End: 2021-01-01

## 2021-01-01 RX ORDER — SODIUM,POTASSIUM PHOSPHATES 278-250MG
1 POWDER IN PACKET (EA) ORAL THREE TIMES A DAY
Refills: 0 | Status: COMPLETED | OUTPATIENT
Start: 2021-01-01 | End: 2021-01-01

## 2021-01-01 RX ORDER — SODIUM CHLORIDE 9 MG/ML
1000 INJECTION INTRAMUSCULAR; INTRAVENOUS; SUBCUTANEOUS
Refills: 0 | Status: COMPLETED | OUTPATIENT
Start: 2021-01-01 | End: 2021-01-01

## 2021-01-01 RX ORDER — ACYCLOVIR SODIUM 500 MG
650 VIAL (EA) INTRAVENOUS EVERY 8 HOURS
Refills: 0 | Status: DISCONTINUED | OUTPATIENT
Start: 2021-01-01 | End: 2021-01-01

## 2021-01-01 RX ORDER — LOSARTAN POTASSIUM 100 MG/1
100 TABLET, FILM COATED ORAL ONCE
Refills: 0 | Status: COMPLETED | OUTPATIENT
Start: 2021-01-01 | End: 2021-01-01

## 2021-01-01 RX ORDER — METHADONE HYDROCHLORIDE 40 MG/1
5 TABLET ORAL EVERY 8 HOURS
Refills: 0 | Status: DISCONTINUED | OUTPATIENT
Start: 2021-01-01 | End: 2021-01-01

## 2021-01-01 RX ORDER — ONDANSETRON 8 MG/1
4 TABLET, FILM COATED ORAL ONCE
Refills: 0 | Status: COMPLETED | OUTPATIENT
Start: 2021-01-01 | End: 2021-01-01

## 2021-01-01 RX ORDER — LORAZEPAM 0.5 MG/1
0.5 TABLET ORAL
Qty: 60 | Refills: 0 | Status: ACTIVE | COMMUNITY
Start: 2021-01-01

## 2021-01-01 RX ORDER — ACETAMINOPHEN 500 MG
1000 TABLET ORAL ONCE
Refills: 0 | Status: COMPLETED | OUTPATIENT
Start: 2021-01-01 | End: 2021-01-01

## 2021-01-01 RX ORDER — CEFEPIME 1 G/1
2000 INJECTION, POWDER, FOR SOLUTION INTRAMUSCULAR; INTRAVENOUS EVERY 8 HOURS
Refills: 0 | Status: DISCONTINUED | OUTPATIENT
Start: 2021-01-01 | End: 2021-01-01

## 2021-01-01 RX ORDER — DEXAMETHASONE 0.5 MG/5ML
10 ELIXIR ORAL ONCE
Refills: 0 | Status: COMPLETED | OUTPATIENT
Start: 2021-01-01 | End: 2021-01-01

## 2021-01-01 RX ORDER — LABETALOL HCL 100 MG
50 TABLET ORAL ONCE
Refills: 0 | Status: COMPLETED | OUTPATIENT
Start: 2021-01-01 | End: 2021-01-01

## 2021-01-01 RX ORDER — SODIUM CHLORIDE 5 G/100ML
500 INJECTION, SOLUTION INTRAVENOUS
Refills: 0 | Status: DISCONTINUED | OUTPATIENT
Start: 2021-01-01 | End: 2021-01-01

## 2021-01-01 RX ORDER — OXYCODONE HYDROCHLORIDE 30 MG/1
30 TABLET, FILM COATED, EXTENDED RELEASE ORAL EVERY 6 HOURS
Qty: 120 | Refills: 0 | Status: DISCONTINUED | COMMUNITY
Start: 2021-01-01 | End: 2021-01-01

## 2021-01-01 RX ORDER — PROCHLORPERAZINE MALEATE 10 MG/1
10 TABLET ORAL
Qty: 30 | Refills: 1 | Status: ACTIVE | COMMUNITY
Start: 2021-01-01 | End: 1900-01-01

## 2021-01-01 RX ORDER — SERTRALINE 25 MG/1
25 TABLET, FILM COATED ORAL
Refills: 0 | Status: DISCONTINUED | OUTPATIENT
Start: 2021-01-01 | End: 2021-01-01

## 2021-01-01 RX ORDER — OXYCODONE 10 MG/1
10 TABLET ORAL
Qty: 80 | Refills: 0 | Status: ACTIVE | COMMUNITY
Start: 2020-01-01 | End: 1900-01-01

## 2021-01-01 RX ORDER — DIPHENHYDRAMINE HYDROCHLORIDE AND LIDOCAINE HYDROCHLORIDE AND ALUMINUM HYDROXIDE AND MAGNESIUM HYDRO
KIT
Qty: 1 | Refills: 3 | Status: ACTIVE | COMMUNITY
Start: 2020-01-01 | End: 1900-01-01

## 2021-01-01 RX ORDER — HYDROMORPHONE HYDROCHLORIDE 2 MG/ML
3 INJECTION INTRAMUSCULAR; INTRAVENOUS; SUBCUTANEOUS
Refills: 0 | Status: DISCONTINUED | OUTPATIENT
Start: 2021-01-01 | End: 2021-01-01

## 2021-01-01 RX ORDER — HYDRALAZINE HCL 50 MG
5 TABLET ORAL ONCE
Refills: 0 | Status: COMPLETED | OUTPATIENT
Start: 2021-01-01 | End: 2021-01-01

## 2021-01-01 RX ORDER — HYDROMORPHONE HYDROCHLORIDE 2 MG/ML
0.5 INJECTION INTRAMUSCULAR; INTRAVENOUS; SUBCUTANEOUS
Refills: 0 | Status: DISCONTINUED | OUTPATIENT
Start: 2021-01-01 | End: 2021-01-01

## 2021-01-01 RX ORDER — LABETALOL HCL 100 MG
5 TABLET ORAL EVERY 6 HOURS
Refills: 0 | Status: DISCONTINUED | OUTPATIENT
Start: 2021-01-01 | End: 2021-01-01

## 2021-01-01 RX ORDER — HYDRALAZINE HCL 50 MG
25 TABLET ORAL EVERY 8 HOURS
Refills: 0 | Status: DISCONTINUED | OUTPATIENT
Start: 2021-01-01 | End: 2021-01-01

## 2021-01-01 RX ORDER — VALACYCLOVIR 500 MG/1
1000 TABLET, FILM COATED ORAL EVERY 8 HOURS
Refills: 0 | Status: DISCONTINUED | OUTPATIENT
Start: 2021-01-01 | End: 2021-01-01

## 2021-01-01 RX ORDER — HYDROMORPHONE HYDROCHLORIDE 2 MG/ML
1.5 INJECTION INTRAMUSCULAR; INTRAVENOUS; SUBCUTANEOUS EVERY 4 HOURS
Refills: 0 | Status: DISCONTINUED | OUTPATIENT
Start: 2021-01-01 | End: 2021-01-01

## 2021-01-01 RX ORDER — POLYETHYLENE GLYCOL 3350 17 G/17G
17 POWDER, FOR SOLUTION ORAL EVERY 12 HOURS
Refills: 0 | Status: DISCONTINUED | OUTPATIENT
Start: 2021-01-01 | End: 2021-01-01

## 2021-01-01 RX ORDER — HYDRALAZINE HCL 50 MG
50 TABLET ORAL EVERY 8 HOURS
Refills: 0 | Status: DISCONTINUED | OUTPATIENT
Start: 2021-01-01 | End: 2021-01-01

## 2021-01-01 RX ORDER — ATORVASTATIN CALCIUM 80 MG/1
10 TABLET, FILM COATED ORAL
Qty: 0 | Refills: 0 | DISCHARGE

## 2021-01-01 RX ORDER — GABAPENTIN 100 MG/1
100 CAPSULE ORAL 3 TIMES DAILY
Qty: 90 | Refills: 0 | Status: ACTIVE | COMMUNITY
Start: 2020-01-01 | End: 1900-01-01

## 2021-01-01 RX ORDER — HYDROMORPHONE HYDROCHLORIDE 2 MG/ML
1.5 INJECTION INTRAMUSCULAR; INTRAVENOUS; SUBCUTANEOUS ONCE
Refills: 0 | Status: DISCONTINUED | OUTPATIENT
Start: 2021-01-01 | End: 2021-01-01

## 2021-01-01 RX ORDER — METHADONE HYDROCHLORIDE 5 MG/1
5 TABLET ORAL 3 TIMES DAILY
Qty: 42 | Refills: 0 | Status: ACTIVE | COMMUNITY
Start: 2021-01-01 | End: 1900-01-01

## 2021-01-01 RX ORDER — ATORVASTATIN CALCIUM 80 MG/1
10 TABLET, FILM COATED ORAL AT BEDTIME
Refills: 0 | Status: DISCONTINUED | OUTPATIENT
Start: 2021-01-01 | End: 2021-01-01

## 2021-01-01 RX ORDER — AMLODIPINE BESYLATE 2.5 MG/1
5 TABLET ORAL DAILY
Refills: 0 | Status: DISCONTINUED | OUTPATIENT
Start: 2021-01-01 | End: 2021-01-01

## 2021-01-01 RX ORDER — SERTRALINE 25 MG/1
25 TABLET, FILM COATED ORAL DAILY
Qty: 30 | Refills: 0 | Status: ACTIVE | COMMUNITY
Start: 2021-01-01 | End: 1900-01-01

## 2021-01-01 RX ORDER — TAMSULOSIN HYDROCHLORIDE 0.4 MG/1
0.4 CAPSULE ORAL AT BEDTIME
Refills: 0 | Status: DISCONTINUED | OUTPATIENT
Start: 2021-01-01 | End: 2021-01-01

## 2021-01-01 RX ORDER — LOSARTAN POTASSIUM 100 MG/1
1 TABLET, FILM COATED ORAL
Qty: 0 | Refills: 0 | DISCHARGE

## 2021-01-01 RX ORDER — ALBUTEROL 90 UG/1
2.5 AEROSOL, METERED ORAL ONCE
Refills: 0 | Status: COMPLETED | OUTPATIENT
Start: 2021-01-01 | End: 2021-01-01

## 2021-01-01 RX ORDER — SODIUM,POTASSIUM PHOSPHATES 278-250MG
1 POWDER IN PACKET (EA) ORAL
Refills: 0 | Status: COMPLETED | OUTPATIENT
Start: 2021-01-01 | End: 2021-01-01

## 2021-01-01 RX ORDER — HYDRALAZINE HCL 50 MG
100 TABLET ORAL EVERY 8 HOURS
Refills: 0 | Status: DISCONTINUED | OUTPATIENT
Start: 2021-01-01 | End: 2021-01-01

## 2021-01-01 RX ORDER — HYDROMORPHONE HYDROCHLORIDE 2 MG/ML
4 INJECTION INTRAMUSCULAR; INTRAVENOUS; SUBCUTANEOUS ONCE
Refills: 0 | Status: DISCONTINUED | OUTPATIENT
Start: 2021-01-01 | End: 2021-01-01

## 2021-01-01 RX ORDER — POTASSIUM CHLORIDE 20 MEQ
40 PACKET (EA) ORAL ONCE
Refills: 0 | Status: COMPLETED | OUTPATIENT
Start: 2021-01-01 | End: 2021-01-01

## 2021-01-01 RX ORDER — METHADONE HYDROCHLORIDE 40 MG/1
1 TABLET ORAL
Qty: 0 | Refills: 0 | DISCHARGE

## 2021-01-01 RX ORDER — HYDROMORPHONE HYDROCHLORIDE 2 MG/ML
30 INJECTION INTRAMUSCULAR; INTRAVENOUS; SUBCUTANEOUS
Refills: 0 | Status: DISCONTINUED | OUTPATIENT
Start: 2021-01-01 | End: 2021-01-01

## 2021-01-01 RX ORDER — PIPERACILLIN AND TAZOBACTAM 4; .5 G/20ML; G/20ML
3.38 INJECTION, POWDER, LYOPHILIZED, FOR SOLUTION INTRAVENOUS EVERY 8 HOURS
Refills: 0 | Status: DISCONTINUED | OUTPATIENT
Start: 2021-01-01 | End: 2021-01-01

## 2021-01-01 RX ORDER — NALOXONE HYDROCHLORIDE NASAL 4 MG/.1ML
4 SPRAY NASAL
Qty: 1 | Refills: 0 | Status: ACTIVE | COMMUNITY
Start: 2021-01-01 | End: 1900-01-01

## 2021-01-01 RX ORDER — ACYCLOVIR SODIUM 500 MG
VIAL (EA) INTRAVENOUS
Refills: 0 | Status: DISCONTINUED | OUTPATIENT
Start: 2021-01-01 | End: 2021-01-01

## 2021-01-01 RX ORDER — LABETALOL HCL 100 MG
5 TABLET ORAL ONCE
Refills: 0 | Status: COMPLETED | OUTPATIENT
Start: 2021-01-01 | End: 2021-01-01

## 2021-01-01 RX ORDER — ONDANSETRON 8 MG/1
4 TABLET, FILM COATED ORAL EVERY 8 HOURS
Refills: 0 | Status: DISCONTINUED | OUTPATIENT
Start: 2021-01-01 | End: 2021-01-01

## 2021-01-01 RX ORDER — GABAPENTIN 400 MG/1
2 CAPSULE ORAL
Qty: 0 | Refills: 0 | DISCHARGE

## 2021-01-01 RX ORDER — OXYCODONE HYDROCHLORIDE 5 MG/1
1 TABLET ORAL
Qty: 0 | Refills: 0 | DISCHARGE

## 2021-01-01 RX ORDER — AMLODIPINE BESYLATE 2.5 MG/1
10 TABLET ORAL DAILY
Refills: 0 | Status: DISCONTINUED | OUTPATIENT
Start: 2021-01-01 | End: 2021-01-01

## 2021-01-01 RX ORDER — CEFEPIME 1 G/1
2000 INJECTION, POWDER, FOR SOLUTION INTRAMUSCULAR; INTRAVENOUS ONCE
Refills: 0 | Status: COMPLETED | OUTPATIENT
Start: 2021-01-01 | End: 2021-01-01

## 2021-01-01 RX ORDER — FENTANYL CITRATE 50 UG/ML
1 INJECTION INTRAVENOUS
Refills: 0 | Status: DISCONTINUED | OUTPATIENT
Start: 2021-01-01 | End: 2021-01-01

## 2021-01-01 RX ORDER — AMLODIPINE BESYLATE 2.5 MG/1
5 TABLET ORAL ONCE
Refills: 0 | Status: COMPLETED | OUTPATIENT
Start: 2021-01-01 | End: 2021-01-01

## 2021-01-01 RX ORDER — LEVALBUTEROL 1.25 MG/.5ML
0.63 SOLUTION, CONCENTRATE RESPIRATORY (INHALATION) ONCE
Refills: 0 | Status: COMPLETED | OUTPATIENT
Start: 2021-01-01 | End: 2021-01-01

## 2021-01-01 RX ORDER — LIDOCAINE 4 G/100G
1 CREAM TOPICAL ONCE
Refills: 0 | Status: COMPLETED | OUTPATIENT
Start: 2021-01-01 | End: 2021-01-01

## 2021-01-01 RX ORDER — NALOXONE HYDROCHLORIDE 4 MG/.1ML
0.1 SPRAY NASAL ONCE
Refills: 0 | Status: COMPLETED | OUTPATIENT
Start: 2021-01-01 | End: 2021-01-01

## 2021-01-01 RX ORDER — HYDROMORPHONE HYDROCHLORIDE 2 MG/ML
1 INJECTION INTRAMUSCULAR; INTRAVENOUS; SUBCUTANEOUS ONCE
Refills: 0 | Status: DISCONTINUED | OUTPATIENT
Start: 2021-01-01 | End: 2021-01-01

## 2021-01-01 RX ORDER — MORPHINE SULFATE 50 MG/1
6 CAPSULE, EXTENDED RELEASE ORAL ONCE
Refills: 0 | Status: DISCONTINUED | OUTPATIENT
Start: 2021-01-01 | End: 2021-01-01

## 2021-01-01 RX ORDER — NALOXONE HYDROCHLORIDE 4 MG/.1ML
0.4 SPRAY NASAL ONCE
Refills: 0 | Status: COMPLETED | OUTPATIENT
Start: 2021-01-01 | End: 2021-01-01

## 2021-01-01 RX ORDER — SENNA PLUS 8.6 MG/1
2 TABLET ORAL AT BEDTIME
Refills: 0 | Status: DISCONTINUED | OUTPATIENT
Start: 2021-01-01 | End: 2021-01-01

## 2021-01-01 RX ORDER — SERTRALINE 25 MG/1
1 TABLET, FILM COATED ORAL
Qty: 0 | Refills: 0 | DISCHARGE

## 2021-01-01 RX ORDER — ONDANSETRON 4 MG/1
4 TABLET, ORALLY DISINTEGRATING ORAL
Qty: 30 | Refills: 0 | Status: ACTIVE | COMMUNITY
Start: 2021-01-01 | End: 1900-01-01

## 2021-01-01 RX ORDER — LACTULOSE 10 G/15ML
10 SOLUTION ORAL EVERY 6 HOURS
Refills: 0 | Status: DISCONTINUED | OUTPATIENT
Start: 2021-01-01 | End: 2021-01-01

## 2021-01-01 RX ORDER — PANTOPRAZOLE SODIUM 20 MG/1
40 TABLET, DELAYED RELEASE ORAL
Refills: 0 | Status: DISCONTINUED | OUTPATIENT
Start: 2021-01-01 | End: 2021-01-01

## 2021-01-01 RX ORDER — SODIUM CHLORIDE 9 MG/ML
1000 INJECTION, SOLUTION INTRAVENOUS ONCE
Refills: 0 | Status: COMPLETED | OUTPATIENT
Start: 2021-01-01 | End: 2021-01-01

## 2021-01-01 RX ORDER — DIPHENHYDRAMINE HYDROCHLORIDE AND LIDOCAINE HYDROCHLORIDE AND ALUMINUM HYDROXIDE AND MAGNESIUM HYDRO
10 KIT
Refills: 0 | Status: DISCONTINUED | OUTPATIENT
Start: 2021-01-01 | End: 2021-01-01

## 2021-01-01 RX ORDER — ONDANSETRON 4 MG/1
4 TABLET ORAL EVERY 8 HOURS
Qty: 30 | Refills: 0 | Status: DISCONTINUED | COMMUNITY
Start: 2021-01-01 | End: 2021-01-01

## 2021-01-01 RX ORDER — ENOXAPARIN SODIUM 100 MG/ML
40 INJECTION SUBCUTANEOUS DAILY
Refills: 0 | Status: DISCONTINUED | OUTPATIENT
Start: 2021-01-01 | End: 2021-01-01

## 2021-01-01 RX ORDER — TOLVAPTAN 15 MG/1
30 TABLET ORAL ONCE
Refills: 0 | Status: COMPLETED | OUTPATIENT
Start: 2021-01-01 | End: 2021-01-01

## 2021-01-01 RX ORDER — ALBUTEROL 90 UG/1
2.5 AEROSOL, METERED ORAL ONCE
Refills: 0 | Status: DISCONTINUED | OUTPATIENT
Start: 2021-01-01 | End: 2021-01-01

## 2021-01-01 RX ORDER — OXYCODONE HYDROCHLORIDE 30 MG/1
30 TABLET ORAL
Qty: 90 | Refills: 0 | Status: DISCONTINUED | COMMUNITY
Start: 2021-01-01 | End: 2021-01-01

## 2021-01-01 RX ADMIN — Medication 100 MILLIGRAM(S): at 13:10

## 2021-01-01 RX ADMIN — HYDROMORPHONE HYDROCHLORIDE 0.5 MILLIGRAM(S): 2 INJECTION INTRAMUSCULAR; INTRAVENOUS; SUBCUTANEOUS at 13:10

## 2021-01-01 RX ADMIN — ONDANSETRON 4 MILLIGRAM(S): 8 TABLET, FILM COATED ORAL at 05:28

## 2021-01-01 RX ADMIN — HYDROMORPHONE HYDROCHLORIDE 0.5 MILLIGRAM(S): 2 INJECTION INTRAMUSCULAR; INTRAVENOUS; SUBCUTANEOUS at 19:58

## 2021-01-01 RX ADMIN — Medication 4 MILLIGRAM(S): at 01:48

## 2021-01-01 RX ADMIN — CEFEPIME 100 MILLIGRAM(S): 1 INJECTION, POWDER, FOR SOLUTION INTRAMUSCULAR; INTRAVENOUS at 06:37

## 2021-01-01 RX ADMIN — SODIUM CHLORIDE 80 MILLILITER(S): 9 INJECTION INTRAMUSCULAR; INTRAVENOUS; SUBCUTANEOUS at 16:48

## 2021-01-01 RX ADMIN — ENOXAPARIN SODIUM 40 MILLIGRAM(S): 100 INJECTION SUBCUTANEOUS at 13:03

## 2021-01-01 RX ADMIN — ENOXAPARIN SODIUM 40 MILLIGRAM(S): 100 INJECTION SUBCUTANEOUS at 11:20

## 2021-01-01 RX ADMIN — SODIUM CHLORIDE 1000 MILLILITER(S): 9 INJECTION, SOLUTION INTRAVENOUS at 18:01

## 2021-01-01 RX ADMIN — MORPHINE SULFATE 6 MILLIGRAM(S): 50 CAPSULE, EXTENDED RELEASE ORAL at 18:01

## 2021-01-01 RX ADMIN — Medication 4 MILLIGRAM(S): at 05:38

## 2021-01-01 RX ADMIN — SODIUM CHLORIDE 1000 MILLILITER(S): 9 INJECTION, SOLUTION INTRAVENOUS at 17:52

## 2021-01-01 RX ADMIN — Medication 40 MILLIEQUIVALENT(S): at 18:27

## 2021-01-01 RX ADMIN — Medication 4 MILLIGRAM(S): at 21:45

## 2021-01-01 RX ADMIN — HYDROMORPHONE HYDROCHLORIDE 0.5 MILLIGRAM(S): 2 INJECTION INTRAMUSCULAR; INTRAVENOUS; SUBCUTANEOUS at 06:28

## 2021-01-01 RX ADMIN — Medication 50 MILLIGRAM(S): at 21:59

## 2021-01-01 RX ADMIN — LOSARTAN POTASSIUM 100 MILLIGRAM(S): 100 TABLET, FILM COATED ORAL at 06:29

## 2021-01-01 RX ADMIN — Medication 30 MILLILITER(S): at 22:40

## 2021-01-01 RX ADMIN — Medication 4 MILLIGRAM(S): at 05:34

## 2021-01-01 RX ADMIN — ONDANSETRON 4 MILLIGRAM(S): 8 TABLET, FILM COATED ORAL at 13:26

## 2021-01-01 RX ADMIN — METHADONE HYDROCHLORIDE 5 MILLIGRAM(S): 40 TABLET ORAL at 14:27

## 2021-01-01 RX ADMIN — HYDROMORPHONE HYDROCHLORIDE 2 MILLIGRAM(S): 2 INJECTION INTRAMUSCULAR; INTRAVENOUS; SUBCUTANEOUS at 19:00

## 2021-01-01 RX ADMIN — SENNA PLUS 2 TABLET(S): 8.6 TABLET ORAL at 21:04

## 2021-01-01 RX ADMIN — ATORVASTATIN CALCIUM 10 MILLIGRAM(S): 80 TABLET, FILM COATED ORAL at 22:09

## 2021-01-01 RX ADMIN — SERTRALINE 25 MILLIGRAM(S): 25 TABLET, FILM COATED ORAL at 06:37

## 2021-01-01 RX ADMIN — HYDROMORPHONE HYDROCHLORIDE 2 MILLIGRAM(S): 2 INJECTION INTRAMUSCULAR; INTRAVENOUS; SUBCUTANEOUS at 04:50

## 2021-01-01 RX ADMIN — MORPHINE SULFATE 6 MILLIGRAM(S): 50 CAPSULE, EXTENDED RELEASE ORAL at 17:45

## 2021-01-01 RX ADMIN — Medication 25 MILLIGRAM(S): at 21:04

## 2021-01-01 RX ADMIN — CEFEPIME 100 MILLIGRAM(S): 1 INJECTION, POWDER, FOR SOLUTION INTRAMUSCULAR; INTRAVENOUS at 06:29

## 2021-01-01 RX ADMIN — HYDROMORPHONE HYDROCHLORIDE 1 MILLIGRAM(S): 2 INJECTION INTRAMUSCULAR; INTRAVENOUS; SUBCUTANEOUS at 18:01

## 2021-01-01 RX ADMIN — HYDROMORPHONE HYDROCHLORIDE 30 MILLILITER(S): 2 INJECTION INTRAMUSCULAR; INTRAVENOUS; SUBCUTANEOUS at 14:15

## 2021-01-01 RX ADMIN — METHADONE HYDROCHLORIDE 5 MILLIGRAM(S): 40 TABLET ORAL at 21:03

## 2021-01-01 RX ADMIN — ATORVASTATIN CALCIUM 10 MILLIGRAM(S): 80 TABLET, FILM COATED ORAL at 22:33

## 2021-01-01 RX ADMIN — Medication 1000 MILLIGRAM(S): at 20:40

## 2021-01-01 RX ADMIN — CHLORHEXIDINE GLUCONATE 1 APPLICATION(S): 213 SOLUTION TOPICAL at 13:25

## 2021-01-01 RX ADMIN — CEFEPIME 100 MILLIGRAM(S): 1 INJECTION, POWDER, FOR SOLUTION INTRAMUSCULAR; INTRAVENOUS at 13:23

## 2021-01-01 RX ADMIN — NALOXONE HYDROCHLORIDE 0.1 MILLIGRAM(S): 4 SPRAY NASAL at 19:49

## 2021-01-01 RX ADMIN — Medication 5 MILLIGRAM(S): at 04:08

## 2021-01-01 RX ADMIN — HYDROMORPHONE HYDROCHLORIDE 1 MILLIGRAM(S): 2 INJECTION INTRAMUSCULAR; INTRAVENOUS; SUBCUTANEOUS at 04:45

## 2021-01-01 RX ADMIN — HYDROMORPHONE HYDROCHLORIDE 30 MILLILITER(S): 2 INJECTION INTRAMUSCULAR; INTRAVENOUS; SUBCUTANEOUS at 10:30

## 2021-01-01 RX ADMIN — Medication 1 PACKET(S): at 13:25

## 2021-01-01 RX ADMIN — HYDROMORPHONE HYDROCHLORIDE 30 MILLILITER(S): 2 INJECTION INTRAMUSCULAR; INTRAVENOUS; SUBCUTANEOUS at 14:23

## 2021-01-01 RX ADMIN — SODIUM CHLORIDE 50 MILLILITER(S): 5 INJECTION, SOLUTION INTRAVENOUS at 22:51

## 2021-01-01 RX ADMIN — HYDROMORPHONE HYDROCHLORIDE 30 MILLILITER(S): 2 INJECTION INTRAMUSCULAR; INTRAVENOUS; SUBCUTANEOUS at 07:14

## 2021-01-01 RX ADMIN — Medication 4 MILLIGRAM(S): at 13:25

## 2021-01-01 RX ADMIN — HYDROMORPHONE HYDROCHLORIDE 30 MILLILITER(S): 2 INJECTION INTRAMUSCULAR; INTRAVENOUS; SUBCUTANEOUS at 07:52

## 2021-01-01 RX ADMIN — Medication 4 MILLIGRAM(S): at 18:34

## 2021-01-01 RX ADMIN — LACTULOSE 10 GRAM(S): 10 SOLUTION ORAL at 17:30

## 2021-01-01 RX ADMIN — TAMSULOSIN HYDROCHLORIDE 0.4 MILLIGRAM(S): 0.4 CAPSULE ORAL at 22:36

## 2021-01-01 RX ADMIN — HYDROMORPHONE HYDROCHLORIDE 2 MILLIGRAM(S): 2 INJECTION INTRAMUSCULAR; INTRAVENOUS; SUBCUTANEOUS at 15:17

## 2021-01-01 RX ADMIN — METHADONE HYDROCHLORIDE 5 MILLIGRAM(S): 40 TABLET ORAL at 13:49

## 2021-01-01 RX ADMIN — HYDROMORPHONE HYDROCHLORIDE 2 MILLIGRAM(S): 2 INJECTION INTRAMUSCULAR; INTRAVENOUS; SUBCUTANEOUS at 01:10

## 2021-01-01 RX ADMIN — LEVALBUTEROL 0.63 MILLIGRAM(S): 1.25 SOLUTION, CONCENTRATE RESPIRATORY (INHALATION) at 03:22

## 2021-01-01 RX ADMIN — LOSARTAN POTASSIUM 100 MILLIGRAM(S): 100 TABLET, FILM COATED ORAL at 06:53

## 2021-01-01 RX ADMIN — SODIUM CHLORIDE 80 MILLILITER(S): 9 INJECTION INTRAMUSCULAR; INTRAVENOUS; SUBCUTANEOUS at 10:24

## 2021-01-01 RX ADMIN — PIPERACILLIN AND TAZOBACTAM 25 GRAM(S): 4; .5 INJECTION, POWDER, LYOPHILIZED, FOR SOLUTION INTRAVENOUS at 01:38

## 2021-01-01 RX ADMIN — ONDANSETRON 4 MILLIGRAM(S): 8 TABLET, FILM COATED ORAL at 21:04

## 2021-01-01 RX ADMIN — HYDROMORPHONE HYDROCHLORIDE 1 MILLIGRAM(S): 2 INJECTION INTRAMUSCULAR; INTRAVENOUS; SUBCUTANEOUS at 08:45

## 2021-01-01 RX ADMIN — METHADONE HYDROCHLORIDE 5 MILLIGRAM(S): 40 TABLET ORAL at 13:22

## 2021-01-01 RX ADMIN — Medication 25 MILLIGRAM(S): at 06:37

## 2021-01-01 RX ADMIN — Medication 5 MILLIGRAM(S): at 08:38

## 2021-01-01 RX ADMIN — HYDROMORPHONE HYDROCHLORIDE 30 MILLILITER(S): 2 INJECTION INTRAMUSCULAR; INTRAVENOUS; SUBCUTANEOUS at 07:26

## 2021-01-01 RX ADMIN — ATORVASTATIN CALCIUM 10 MILLIGRAM(S): 80 TABLET, FILM COATED ORAL at 22:03

## 2021-01-01 RX ADMIN — Medication 25 MILLIGRAM(S): at 06:29

## 2021-01-01 RX ADMIN — HYDROMORPHONE HYDROCHLORIDE 30 MILLILITER(S): 2 INJECTION INTRAMUSCULAR; INTRAVENOUS; SUBCUTANEOUS at 03:47

## 2021-01-01 RX ADMIN — CHLORHEXIDINE GLUCONATE 1 APPLICATION(S): 213 SOLUTION TOPICAL at 11:27

## 2021-01-01 RX ADMIN — HYDROMORPHONE HYDROCHLORIDE 0.5 MILLIGRAM(S): 2 INJECTION INTRAMUSCULAR; INTRAVENOUS; SUBCUTANEOUS at 12:55

## 2021-01-01 RX ADMIN — HYDROMORPHONE HYDROCHLORIDE 30 MILLILITER(S): 2 INJECTION INTRAMUSCULAR; INTRAVENOUS; SUBCUTANEOUS at 19:16

## 2021-01-01 RX ADMIN — HYDROMORPHONE HYDROCHLORIDE 30 MILLILITER(S): 2 INJECTION INTRAMUSCULAR; INTRAVENOUS; SUBCUTANEOUS at 09:47

## 2021-01-01 RX ADMIN — CHLORHEXIDINE GLUCONATE 1 APPLICATION(S): 213 SOLUTION TOPICAL at 13:49

## 2021-01-01 RX ADMIN — PIPERACILLIN AND TAZOBACTAM 25 GRAM(S): 4; .5 INJECTION, POWDER, LYOPHILIZED, FOR SOLUTION INTRAVENOUS at 09:08

## 2021-01-01 RX ADMIN — Medication 5 MILLIGRAM(S): at 23:03

## 2021-01-01 RX ADMIN — SENNA PLUS 2 TABLET(S): 8.6 TABLET ORAL at 22:09

## 2021-01-01 RX ADMIN — Medication 4 MILLIGRAM(S): at 05:56

## 2021-01-01 RX ADMIN — LOSARTAN POTASSIUM 100 MILLIGRAM(S): 100 TABLET, FILM COATED ORAL at 06:17

## 2021-01-01 RX ADMIN — METHADONE HYDROCHLORIDE 5 MILLIGRAM(S): 40 TABLET ORAL at 22:48

## 2021-01-01 RX ADMIN — HYDROMORPHONE HYDROCHLORIDE 1 MILLIGRAM(S): 2 INJECTION INTRAMUSCULAR; INTRAVENOUS; SUBCUTANEOUS at 04:30

## 2021-01-01 RX ADMIN — HYDROMORPHONE HYDROCHLORIDE 30 MILLILITER(S): 2 INJECTION INTRAMUSCULAR; INTRAVENOUS; SUBCUTANEOUS at 08:03

## 2021-01-01 RX ADMIN — HYDROMORPHONE HYDROCHLORIDE 30 MILLILITER(S): 2 INJECTION INTRAMUSCULAR; INTRAVENOUS; SUBCUTANEOUS at 08:19

## 2021-01-01 RX ADMIN — PIPERACILLIN AND TAZOBACTAM 200 GRAM(S): 4; .5 INJECTION, POWDER, LYOPHILIZED, FOR SOLUTION INTRAVENOUS at 02:29

## 2021-01-01 RX ADMIN — CEFEPIME 100 MILLIGRAM(S): 1 INJECTION, POWDER, FOR SOLUTION INTRAMUSCULAR; INTRAVENOUS at 22:10

## 2021-01-01 RX ADMIN — AMLODIPINE BESYLATE 5 MILLIGRAM(S): 2.5 TABLET ORAL at 06:29

## 2021-01-01 RX ADMIN — Medication 1 MILLIGRAM(S): at 18:46

## 2021-01-01 RX ADMIN — METHADONE HYDROCHLORIDE 5 MILLIGRAM(S): 40 TABLET ORAL at 15:04

## 2021-01-01 RX ADMIN — LIDOCAINE 1 PATCH: 4 CREAM TOPICAL at 07:54

## 2021-01-01 RX ADMIN — PANTOPRAZOLE SODIUM 40 MILLIGRAM(S): 20 TABLET, DELAYED RELEASE ORAL at 06:37

## 2021-01-01 RX ADMIN — Medication 4 MILLIGRAM(S): at 19:48

## 2021-01-01 RX ADMIN — Medication 85 MILLIMOLE(S): at 09:55

## 2021-01-01 RX ADMIN — HYDROMORPHONE HYDROCHLORIDE 1 MILLIGRAM(S): 2 INJECTION INTRAMUSCULAR; INTRAVENOUS; SUBCUTANEOUS at 19:00

## 2021-01-01 RX ADMIN — Medication 25 MILLIGRAM(S): at 13:22

## 2021-01-01 RX ADMIN — LACTULOSE 10 GRAM(S): 10 SOLUTION ORAL at 22:48

## 2021-01-01 RX ADMIN — Medication 400 MILLIGRAM(S): at 20:17

## 2021-01-01 RX ADMIN — PIPERACILLIN AND TAZOBACTAM 25 GRAM(S): 4; .5 INJECTION, POWDER, LYOPHILIZED, FOR SOLUTION INTRAVENOUS at 18:07

## 2021-01-01 RX ADMIN — POLYETHYLENE GLYCOL 3350 17 GRAM(S): 17 POWDER, FOR SOLUTION ORAL at 17:30

## 2021-01-01 RX ADMIN — ONDANSETRON 4 MILLIGRAM(S): 8 TABLET, FILM COATED ORAL at 05:37

## 2021-01-01 RX ADMIN — CEFEPIME 100 MILLIGRAM(S): 1 INJECTION, POWDER, FOR SOLUTION INTRAMUSCULAR; INTRAVENOUS at 14:26

## 2021-01-01 RX ADMIN — HYDROMORPHONE HYDROCHLORIDE 30 MILLILITER(S): 2 INJECTION INTRAMUSCULAR; INTRAVENOUS; SUBCUTANEOUS at 17:07

## 2021-01-01 RX ADMIN — ATORVASTATIN CALCIUM 10 MILLIGRAM(S): 80 TABLET, FILM COATED ORAL at 22:47

## 2021-01-01 RX ADMIN — Medication 102 MILLIGRAM(S): at 15:25

## 2021-01-01 RX ADMIN — HYDROMORPHONE HYDROCHLORIDE 30 MILLILITER(S): 2 INJECTION INTRAMUSCULAR; INTRAVENOUS; SUBCUTANEOUS at 20:23

## 2021-01-01 RX ADMIN — Medication 100 MILLIGRAM(S): at 13:01

## 2021-01-01 RX ADMIN — PIPERACILLIN AND TAZOBACTAM 25 GRAM(S): 4; .5 INJECTION, POWDER, LYOPHILIZED, FOR SOLUTION INTRAVENOUS at 13:25

## 2021-01-01 RX ADMIN — Medication 25 MILLIGRAM(S): at 22:02

## 2021-01-01 RX ADMIN — HYDROMORPHONE HYDROCHLORIDE 30 MILLILITER(S): 2 INJECTION INTRAMUSCULAR; INTRAVENOUS; SUBCUTANEOUS at 02:19

## 2021-01-01 RX ADMIN — Medication 4 MILLIGRAM(S): at 07:59

## 2021-01-01 RX ADMIN — Medication 400 MILLIGRAM(S): at 21:08

## 2021-01-01 RX ADMIN — SERTRALINE 25 MILLIGRAM(S): 25 TABLET, FILM COATED ORAL at 18:34

## 2021-01-01 RX ADMIN — PANTOPRAZOLE SODIUM 40 MILLIGRAM(S): 20 TABLET, DELAYED RELEASE ORAL at 05:54

## 2021-01-01 RX ADMIN — ONDANSETRON 4 MILLIGRAM(S): 8 TABLET, FILM COATED ORAL at 05:56

## 2021-01-01 RX ADMIN — ONDANSETRON 4 MILLIGRAM(S): 8 TABLET, FILM COATED ORAL at 21:46

## 2021-01-01 RX ADMIN — ONDANSETRON 4 MILLIGRAM(S): 8 TABLET, FILM COATED ORAL at 12:06

## 2021-01-01 RX ADMIN — AMLODIPINE BESYLATE 10 MILLIGRAM(S): 2.5 TABLET ORAL at 05:53

## 2021-01-01 RX ADMIN — ONDANSETRON 4 MILLIGRAM(S): 8 TABLET, FILM COATED ORAL at 13:22

## 2021-01-01 RX ADMIN — PANTOPRAZOLE SODIUM 40 MILLIGRAM(S): 20 TABLET, DELAYED RELEASE ORAL at 06:01

## 2021-01-01 RX ADMIN — Medication 4 MILLIGRAM(S): at 13:59

## 2021-01-01 RX ADMIN — Medication 50 MILLIGRAM(S): at 21:45

## 2021-01-01 RX ADMIN — Medication 4 MILLIGRAM(S): at 00:30

## 2021-01-01 RX ADMIN — Medication 5 MILLIGRAM(S): at 14:31

## 2021-01-01 RX ADMIN — ONDANSETRON 4 MILLIGRAM(S): 8 TABLET, FILM COATED ORAL at 13:01

## 2021-01-01 RX ADMIN — HYDROMORPHONE HYDROCHLORIDE 0.5 MILLIGRAM(S): 2 INJECTION INTRAMUSCULAR; INTRAVENOUS; SUBCUTANEOUS at 02:51

## 2021-01-01 RX ADMIN — SERTRALINE 25 MILLIGRAM(S): 25 TABLET, FILM COATED ORAL at 05:54

## 2021-01-01 RX ADMIN — HYDROMORPHONE HYDROCHLORIDE 30 MILLILITER(S): 2 INJECTION INTRAMUSCULAR; INTRAVENOUS; SUBCUTANEOUS at 07:38

## 2021-01-01 RX ADMIN — HYDROMORPHONE HYDROCHLORIDE 0.5 MILLIGRAM(S): 2 INJECTION INTRAMUSCULAR; INTRAVENOUS; SUBCUTANEOUS at 06:44

## 2021-01-01 RX ADMIN — ONDANSETRON 4 MILLIGRAM(S): 8 TABLET, FILM COATED ORAL at 08:51

## 2021-01-01 RX ADMIN — ONDANSETRON 4 MILLIGRAM(S): 8 TABLET, FILM COATED ORAL at 08:43

## 2021-01-01 RX ADMIN — SERTRALINE 25 MILLIGRAM(S): 25 TABLET, FILM COATED ORAL at 05:28

## 2021-01-01 RX ADMIN — HYDROMORPHONE HYDROCHLORIDE 2 MILLIGRAM(S): 2 INJECTION INTRAMUSCULAR; INTRAVENOUS; SUBCUTANEOUS at 22:03

## 2021-01-01 RX ADMIN — Medication 40 MILLIEQUIVALENT(S): at 13:44

## 2021-01-01 RX ADMIN — Medication 50 MILLIGRAM(S): at 22:09

## 2021-01-01 RX ADMIN — Medication 4 MILLIGRAM(S): at 06:50

## 2021-01-01 RX ADMIN — AMLODIPINE BESYLATE 5 MILLIGRAM(S): 2.5 TABLET ORAL at 06:37

## 2021-01-01 RX ADMIN — HYDROMORPHONE HYDROCHLORIDE 2 MILLIGRAM(S): 2 INJECTION INTRAMUSCULAR; INTRAVENOUS; SUBCUTANEOUS at 21:53

## 2021-01-01 RX ADMIN — CEFEPIME 100 MILLIGRAM(S): 1 INJECTION, POWDER, FOR SOLUTION INTRAMUSCULAR; INTRAVENOUS at 22:21

## 2021-01-01 RX ADMIN — CEFEPIME 100 MILLIGRAM(S): 1 INJECTION, POWDER, FOR SOLUTION INTRAMUSCULAR; INTRAVENOUS at 21:07

## 2021-01-01 RX ADMIN — Medication 100 MILLIGRAM(S): at 05:53

## 2021-01-01 RX ADMIN — Medication 4 MILLIGRAM(S): at 18:27

## 2021-01-01 RX ADMIN — SERTRALINE 25 MILLIGRAM(S): 25 TABLET, FILM COATED ORAL at 18:08

## 2021-01-01 RX ADMIN — Medication 40 MILLIEQUIVALENT(S): at 21:45

## 2021-01-01 RX ADMIN — Medication 50 MILLIGRAM(S): at 14:26

## 2021-01-01 RX ADMIN — ONDANSETRON 4 MILLIGRAM(S): 8 TABLET, FILM COATED ORAL at 22:02

## 2021-01-01 RX ADMIN — LOSARTAN POTASSIUM 100 MILLIGRAM(S): 100 TABLET, FILM COATED ORAL at 05:53

## 2021-01-01 RX ADMIN — Medication 4 MILLIGRAM(S): at 12:06

## 2021-01-01 RX ADMIN — Medication 50 MILLIGRAM(S): at 06:52

## 2021-01-01 RX ADMIN — ENOXAPARIN SODIUM 40 MILLIGRAM(S): 100 INJECTION SUBCUTANEOUS at 11:27

## 2021-01-01 RX ADMIN — Medication 4 MILLIGRAM(S): at 19:37

## 2021-01-01 RX ADMIN — HYDROMORPHONE HYDROCHLORIDE 2 MILLIGRAM(S): 2 INJECTION INTRAMUSCULAR; INTRAVENOUS; SUBCUTANEOUS at 06:11

## 2021-01-01 RX ADMIN — Medication 4 MILLIGRAM(S): at 22:35

## 2021-01-01 RX ADMIN — Medication 50 MILLIGRAM(S): at 05:27

## 2021-01-01 RX ADMIN — HYDROMORPHONE HYDROCHLORIDE 2 MILLIGRAM(S): 2 INJECTION INTRAMUSCULAR; INTRAVENOUS; SUBCUTANEOUS at 02:44

## 2021-01-01 RX ADMIN — HYDROMORPHONE HYDROCHLORIDE 1.5 MILLIGRAM(S): 2 INJECTION INTRAMUSCULAR; INTRAVENOUS; SUBCUTANEOUS at 20:44

## 2021-01-01 RX ADMIN — METHADONE HYDROCHLORIDE 5 MILLIGRAM(S): 40 TABLET ORAL at 13:59

## 2021-01-01 RX ADMIN — METHADONE HYDROCHLORIDE 5 MILLIGRAM(S): 40 TABLET ORAL at 21:45

## 2021-01-01 RX ADMIN — ONDANSETRON 4 MILLIGRAM(S): 8 TABLET, FILM COATED ORAL at 17:18

## 2021-01-01 RX ADMIN — HYDROMORPHONE HYDROCHLORIDE 30 MILLILITER(S): 2 INJECTION INTRAMUSCULAR; INTRAVENOUS; SUBCUTANEOUS at 19:18

## 2021-01-01 RX ADMIN — POLYETHYLENE GLYCOL 3350 17 GRAM(S): 17 POWDER, FOR SOLUTION ORAL at 21:04

## 2021-01-01 RX ADMIN — PIPERACILLIN AND TAZOBACTAM 25 GRAM(S): 4; .5 INJECTION, POWDER, LYOPHILIZED, FOR SOLUTION INTRAVENOUS at 18:27

## 2021-01-01 RX ADMIN — ENOXAPARIN SODIUM 40 MILLIGRAM(S): 100 INJECTION SUBCUTANEOUS at 12:06

## 2021-01-01 RX ADMIN — POLYETHYLENE GLYCOL 3350 17 GRAM(S): 17 POWDER, FOR SOLUTION ORAL at 06:36

## 2021-01-01 RX ADMIN — HYDROMORPHONE HYDROCHLORIDE 3 MILLIGRAM(S): 2 INJECTION INTRAMUSCULAR; INTRAVENOUS; SUBCUTANEOUS at 16:30

## 2021-01-01 RX ADMIN — LOSARTAN POTASSIUM 100 MILLIGRAM(S): 100 TABLET, FILM COATED ORAL at 05:54

## 2021-01-01 RX ADMIN — HYDROMORPHONE HYDROCHLORIDE 2 MILLIGRAM(S): 2 INJECTION INTRAMUSCULAR; INTRAVENOUS; SUBCUTANEOUS at 18:47

## 2021-01-01 RX ADMIN — PANTOPRAZOLE SODIUM 40 MILLIGRAM(S): 20 TABLET, DELAYED RELEASE ORAL at 05:28

## 2021-01-01 RX ADMIN — HYDROMORPHONE HYDROCHLORIDE 30 MILLILITER(S): 2 INJECTION INTRAMUSCULAR; INTRAVENOUS; SUBCUTANEOUS at 23:28

## 2021-01-01 RX ADMIN — POLYETHYLENE GLYCOL 3350 17 GRAM(S): 17 POWDER, FOR SOLUTION ORAL at 18:07

## 2021-01-01 RX ADMIN — AMLODIPINE BESYLATE 10 MILLIGRAM(S): 2.5 TABLET ORAL at 05:57

## 2021-01-01 RX ADMIN — Medication 50 MILLIGRAM(S): at 13:45

## 2021-01-01 RX ADMIN — Medication 1 PACKET(S): at 13:11

## 2021-01-01 RX ADMIN — DIPHENHYDRAMINE HYDROCHLORIDE AND LIDOCAINE HYDROCHLORIDE AND ALUMINUM HYDROXIDE AND MAGNESIUM HYDRO 10 MILLILITER(S): KIT at 16:16

## 2021-01-01 RX ADMIN — ONDANSETRON 4 MILLIGRAM(S): 8 TABLET, FILM COATED ORAL at 02:14

## 2021-01-01 RX ADMIN — HYDROMORPHONE HYDROCHLORIDE 2 MILLIGRAM(S): 2 INJECTION INTRAMUSCULAR; INTRAVENOUS; SUBCUTANEOUS at 01:25

## 2021-01-01 RX ADMIN — ONDANSETRON 4 MILLIGRAM(S): 8 TABLET, FILM COATED ORAL at 21:45

## 2021-01-01 RX ADMIN — HYDROMORPHONE HYDROCHLORIDE 30 MILLILITER(S): 2 INJECTION INTRAMUSCULAR; INTRAVENOUS; SUBCUTANEOUS at 16:47

## 2021-01-01 RX ADMIN — HYDROMORPHONE HYDROCHLORIDE 1.5 MILLIGRAM(S): 2 INJECTION INTRAMUSCULAR; INTRAVENOUS; SUBCUTANEOUS at 11:21

## 2021-01-01 RX ADMIN — HYDROMORPHONE HYDROCHLORIDE 30 MILLILITER(S): 2 INJECTION INTRAMUSCULAR; INTRAVENOUS; SUBCUTANEOUS at 09:11

## 2021-01-01 RX ADMIN — SODIUM CHLORIDE 80 MILLILITER(S): 9 INJECTION INTRAMUSCULAR; INTRAVENOUS; SUBCUTANEOUS at 07:52

## 2021-01-01 RX ADMIN — TAMSULOSIN HYDROCHLORIDE 0.4 MILLIGRAM(S): 0.4 CAPSULE ORAL at 22:49

## 2021-01-01 RX ADMIN — Medication 10 MILLIGRAM(S): at 18:04

## 2021-01-01 RX ADMIN — HYDROMORPHONE HYDROCHLORIDE 30 MILLILITER(S): 2 INJECTION INTRAMUSCULAR; INTRAVENOUS; SUBCUTANEOUS at 01:16

## 2021-01-01 RX ADMIN — Medication 100 MILLIGRAM(S): at 13:26

## 2021-01-01 RX ADMIN — HYDROMORPHONE HYDROCHLORIDE 30 MILLILITER(S): 2 INJECTION INTRAMUSCULAR; INTRAVENOUS; SUBCUTANEOUS at 09:52

## 2021-01-01 RX ADMIN — Medication 4 MILLIGRAM(S): at 05:53

## 2021-01-01 RX ADMIN — CEFEPIME 100 MILLIGRAM(S): 1 INJECTION, POWDER, FOR SOLUTION INTRAMUSCULAR; INTRAVENOUS at 14:50

## 2021-01-01 RX ADMIN — HYDROMORPHONE HYDROCHLORIDE 0.5 MILLIGRAM(S): 2 INJECTION INTRAMUSCULAR; INTRAVENOUS; SUBCUTANEOUS at 03:11

## 2021-01-01 RX ADMIN — HYDROMORPHONE HYDROCHLORIDE 30 MILLILITER(S): 2 INJECTION INTRAMUSCULAR; INTRAVENOUS; SUBCUTANEOUS at 11:36

## 2021-01-01 RX ADMIN — HYDROMORPHONE HYDROCHLORIDE 2 MILLIGRAM(S): 2 INJECTION INTRAMUSCULAR; INTRAVENOUS; SUBCUTANEOUS at 00:05

## 2021-01-01 RX ADMIN — ONDANSETRON 4 MILLIGRAM(S): 8 TABLET, FILM COATED ORAL at 05:05

## 2021-01-01 RX ADMIN — ENOXAPARIN SODIUM 40 MILLIGRAM(S): 100 INJECTION SUBCUTANEOUS at 13:44

## 2021-01-01 RX ADMIN — Medication 10 MILLIGRAM(S): at 10:24

## 2021-01-01 RX ADMIN — Medication 10 MILLIGRAM(S): at 09:35

## 2021-01-01 RX ADMIN — LOSARTAN POTASSIUM 100 MILLIGRAM(S): 100 TABLET, FILM COATED ORAL at 05:55

## 2021-01-01 RX ADMIN — HYDROMORPHONE HYDROCHLORIDE 30 MILLILITER(S): 2 INJECTION INTRAMUSCULAR; INTRAVENOUS; SUBCUTANEOUS at 21:10

## 2021-01-01 RX ADMIN — Medication 4 MILLIGRAM(S): at 13:11

## 2021-01-01 RX ADMIN — HYDROMORPHONE HYDROCHLORIDE 3 MILLIGRAM(S): 2 INJECTION INTRAMUSCULAR; INTRAVENOUS; SUBCUTANEOUS at 11:35

## 2021-01-01 RX ADMIN — ONDANSETRON 4 MILLIGRAM(S): 8 TABLET, FILM COATED ORAL at 13:58

## 2021-01-01 RX ADMIN — SERTRALINE 25 MILLIGRAM(S): 25 TABLET, FILM COATED ORAL at 18:23

## 2021-01-01 RX ADMIN — Medication 4 MILLIGRAM(S): at 01:38

## 2021-01-01 RX ADMIN — HYDROMORPHONE HYDROCHLORIDE 30 MILLILITER(S): 2 INJECTION INTRAMUSCULAR; INTRAVENOUS; SUBCUTANEOUS at 20:19

## 2021-01-01 RX ADMIN — METHADONE HYDROCHLORIDE 5 MILLIGRAM(S): 40 TABLET ORAL at 14:44

## 2021-01-01 RX ADMIN — PIPERACILLIN AND TAZOBACTAM 25 GRAM(S): 4; .5 INJECTION, POWDER, LYOPHILIZED, FOR SOLUTION INTRAVENOUS at 17:15

## 2021-01-01 RX ADMIN — HYDROMORPHONE HYDROCHLORIDE 3 MILLIGRAM(S): 2 INJECTION INTRAMUSCULAR; INTRAVENOUS; SUBCUTANEOUS at 09:26

## 2021-01-01 RX ADMIN — ONDANSETRON 4 MILLIGRAM(S): 8 TABLET, FILM COATED ORAL at 05:53

## 2021-01-01 RX ADMIN — HYDROMORPHONE HYDROCHLORIDE 30 MILLILITER(S): 2 INJECTION INTRAMUSCULAR; INTRAVENOUS; SUBCUTANEOUS at 17:09

## 2021-01-01 RX ADMIN — HYDROMORPHONE HYDROCHLORIDE 2 MILLIGRAM(S): 2 INJECTION INTRAMUSCULAR; INTRAVENOUS; SUBCUTANEOUS at 15:30

## 2021-01-01 RX ADMIN — Medication 400 MILLIGRAM(S): at 20:44

## 2021-01-01 RX ADMIN — HYDROMORPHONE HYDROCHLORIDE 1.5 MILLIGRAM(S): 2 INJECTION INTRAMUSCULAR; INTRAVENOUS; SUBCUTANEOUS at 11:06

## 2021-01-01 RX ADMIN — Medication 4 MILLIGRAM(S): at 00:09

## 2021-01-01 RX ADMIN — PIPERACILLIN AND TAZOBACTAM 25 GRAM(S): 4; .5 INJECTION, POWDER, LYOPHILIZED, FOR SOLUTION INTRAVENOUS at 10:07

## 2021-01-01 RX ADMIN — SERTRALINE 25 MILLIGRAM(S): 25 TABLET, FILM COATED ORAL at 06:29

## 2021-01-01 RX ADMIN — Medication 4 MILLIGRAM(S): at 17:17

## 2021-01-01 RX ADMIN — HYDROMORPHONE HYDROCHLORIDE 30 MILLILITER(S): 2 INJECTION INTRAMUSCULAR; INTRAVENOUS; SUBCUTANEOUS at 19:14

## 2021-01-01 RX ADMIN — SERTRALINE 25 MILLIGRAM(S): 25 TABLET, FILM COATED ORAL at 05:55

## 2021-01-01 RX ADMIN — METHADONE HYDROCHLORIDE 5 MILLIGRAM(S): 40 TABLET ORAL at 22:21

## 2021-01-01 RX ADMIN — LACTULOSE 10 GRAM(S): 10 SOLUTION ORAL at 05:53

## 2021-01-01 RX ADMIN — HYDROMORPHONE HYDROCHLORIDE 2 MILLIGRAM(S): 2 INJECTION INTRAMUSCULAR; INTRAVENOUS; SUBCUTANEOUS at 05:54

## 2021-01-01 RX ADMIN — AMLODIPINE BESYLATE 5 MILLIGRAM(S): 2.5 TABLET ORAL at 09:24

## 2021-01-01 RX ADMIN — SENNA PLUS 2 TABLET(S): 8.6 TABLET ORAL at 21:20

## 2021-01-01 RX ADMIN — Medication 20 MILLIGRAM(S): at 11:20

## 2021-01-01 RX ADMIN — SODIUM CHLORIDE 80 MILLILITER(S): 9 INJECTION INTRAMUSCULAR; INTRAVENOUS; SUBCUTANEOUS at 22:02

## 2021-01-01 RX ADMIN — SERTRALINE 25 MILLIGRAM(S): 25 TABLET, FILM COATED ORAL at 17:16

## 2021-01-01 RX ADMIN — ONDANSETRON 4 MILLIGRAM(S): 8 TABLET, FILM COATED ORAL at 06:36

## 2021-01-01 RX ADMIN — ENOXAPARIN SODIUM 40 MILLIGRAM(S): 100 INJECTION SUBCUTANEOUS at 13:02

## 2021-01-01 RX ADMIN — HYDROMORPHONE HYDROCHLORIDE 30 MILLILITER(S): 2 INJECTION INTRAMUSCULAR; INTRAVENOUS; SUBCUTANEOUS at 19:44

## 2021-01-01 RX ADMIN — AMLODIPINE BESYLATE 5 MILLIGRAM(S): 2.5 TABLET ORAL at 08:40

## 2021-01-01 RX ADMIN — Medication 4 MILLIGRAM(S): at 11:21

## 2021-01-01 RX ADMIN — ONDANSETRON 4 MILLIGRAM(S): 8 TABLET, FILM COATED ORAL at 17:45

## 2021-01-01 RX ADMIN — ONDANSETRON 4 MILLIGRAM(S): 8 TABLET, FILM COATED ORAL at 06:53

## 2021-01-01 RX ADMIN — Medication 5 MILLIGRAM(S): at 23:33

## 2021-01-01 RX ADMIN — POLYETHYLENE GLYCOL 3350 17 GRAM(S): 17 POWDER, FOR SOLUTION ORAL at 17:16

## 2021-01-01 RX ADMIN — Medication 4 MILLIGRAM(S): at 02:46

## 2021-01-01 RX ADMIN — HYDROMORPHONE HYDROCHLORIDE 0.5 MILLIGRAM(S): 2 INJECTION INTRAMUSCULAR; INTRAVENOUS; SUBCUTANEOUS at 04:08

## 2021-01-01 RX ADMIN — HYDROMORPHONE HYDROCHLORIDE 1 MILLIGRAM(S): 2 INJECTION INTRAMUSCULAR; INTRAVENOUS; SUBCUTANEOUS at 08:30

## 2021-01-01 RX ADMIN — LIDOCAINE 1 PATCH: 4 CREAM TOPICAL at 20:44

## 2021-01-01 RX ADMIN — HYDROMORPHONE HYDROCHLORIDE 4 MILLIGRAM(S): 2 INJECTION INTRAMUSCULAR; INTRAVENOUS; SUBCUTANEOUS at 18:46

## 2021-01-01 RX ADMIN — ATORVASTATIN CALCIUM 10 MILLIGRAM(S): 80 TABLET, FILM COATED ORAL at 21:20

## 2021-01-01 RX ADMIN — SERTRALINE 25 MILLIGRAM(S): 25 TABLET, FILM COATED ORAL at 19:31

## 2021-01-01 RX ADMIN — Medication 4 MILLIGRAM(S): at 17:41

## 2021-01-01 RX ADMIN — PANTOPRAZOLE SODIUM 40 MILLIGRAM(S): 20 TABLET, DELAYED RELEASE ORAL at 06:29

## 2021-01-01 RX ADMIN — SODIUM CHLORIDE 80 MILLILITER(S): 9 INJECTION INTRAMUSCULAR; INTRAVENOUS; SUBCUTANEOUS at 14:15

## 2021-01-01 RX ADMIN — METHADONE HYDROCHLORIDE 5 MILLIGRAM(S): 40 TABLET ORAL at 07:02

## 2021-01-01 RX ADMIN — HYDROMORPHONE HYDROCHLORIDE 1.5 MILLIGRAM(S): 2 INJECTION INTRAMUSCULAR; INTRAVENOUS; SUBCUTANEOUS at 22:40

## 2021-01-01 RX ADMIN — Medication 4 MILLIGRAM(S): at 13:24

## 2021-01-01 RX ADMIN — PANTOPRAZOLE SODIUM 40 MILLIGRAM(S): 20 TABLET, DELAYED RELEASE ORAL at 05:56

## 2021-01-01 RX ADMIN — HYDROMORPHONE HYDROCHLORIDE 30 MILLILITER(S): 2 INJECTION INTRAMUSCULAR; INTRAVENOUS; SUBCUTANEOUS at 16:29

## 2021-01-01 RX ADMIN — PIPERACILLIN AND TAZOBACTAM 25 GRAM(S): 4; .5 INJECTION, POWDER, LYOPHILIZED, FOR SOLUTION INTRAVENOUS at 01:51

## 2021-01-01 RX ADMIN — TOLVAPTAN 30 MILLIGRAM(S): 15 TABLET ORAL at 09:55

## 2021-01-01 RX ADMIN — Medication 4 MILLIGRAM(S): at 13:43

## 2021-01-01 RX ADMIN — AMLODIPINE BESYLATE 10 MILLIGRAM(S): 2.5 TABLET ORAL at 06:49

## 2021-01-01 RX ADMIN — PIPERACILLIN AND TAZOBACTAM 25 GRAM(S): 4; .5 INJECTION, POWDER, LYOPHILIZED, FOR SOLUTION INTRAVENOUS at 17:30

## 2021-01-01 RX ADMIN — SERTRALINE 25 MILLIGRAM(S): 25 TABLET, FILM COATED ORAL at 05:53

## 2021-01-01 RX ADMIN — SENNA PLUS 2 TABLET(S): 8.6 TABLET ORAL at 21:46

## 2021-01-01 RX ADMIN — HYDROMORPHONE HYDROCHLORIDE 30 MILLILITER(S): 2 INJECTION INTRAMUSCULAR; INTRAVENOUS; SUBCUTANEOUS at 14:16

## 2021-01-01 RX ADMIN — ATORVASTATIN CALCIUM 10 MILLIGRAM(S): 80 TABLET, FILM COATED ORAL at 21:45

## 2021-01-01 RX ADMIN — CHLORHEXIDINE GLUCONATE 1 APPLICATION(S): 213 SOLUTION TOPICAL at 13:10

## 2021-01-01 RX ADMIN — HYDROMORPHONE HYDROCHLORIDE 0.5 MILLIGRAM(S): 2 INJECTION INTRAMUSCULAR; INTRAVENOUS; SUBCUTANEOUS at 03:53

## 2021-01-01 RX ADMIN — Medication 1 PACKET(S): at 18:28

## 2021-01-01 RX ADMIN — ALBUTEROL 2.5 MILLIGRAM(S): 90 AEROSOL, METERED ORAL at 10:54

## 2021-01-01 RX ADMIN — FENTANYL CITRATE 1 PATCH: 50 INJECTION INTRAVENOUS at 09:19

## 2021-01-01 RX ADMIN — SERTRALINE 25 MILLIGRAM(S): 25 TABLET, FILM COATED ORAL at 18:28

## 2021-01-01 RX ADMIN — POLYETHYLENE GLYCOL 3350 17 GRAM(S): 17 POWDER, FOR SOLUTION ORAL at 05:28

## 2021-01-01 RX ADMIN — Medication 5 MILLIGRAM(S): at 07:53

## 2021-01-01 RX ADMIN — NALOXONE HYDROCHLORIDE 0.4 MILLIGRAM(S): 4 SPRAY NASAL at 20:35

## 2021-01-01 RX ADMIN — Medication 4 MILLIGRAM(S): at 18:09

## 2021-01-01 RX ADMIN — Medication 5 MILLIGRAM(S): at 17:48

## 2021-01-01 RX ADMIN — METHADONE HYDROCHLORIDE 5 MILLIGRAM(S): 40 TABLET ORAL at 14:26

## 2021-01-01 RX ADMIN — Medication 4 MILLIGRAM(S): at 02:51

## 2021-01-01 RX ADMIN — SODIUM CHLORIDE 80 MILLILITER(S): 9 INJECTION INTRAMUSCULAR; INTRAVENOUS; SUBCUTANEOUS at 08:40

## 2021-01-01 RX ADMIN — PIPERACILLIN AND TAZOBACTAM 25 GRAM(S): 4; .5 INJECTION, POWDER, LYOPHILIZED, FOR SOLUTION INTRAVENOUS at 11:19

## 2021-01-01 RX ADMIN — Medication 1 PACKET(S): at 17:15

## 2021-01-01 RX ADMIN — PIPERACILLIN AND TAZOBACTAM 25 GRAM(S): 4; .5 INJECTION, POWDER, LYOPHILIZED, FOR SOLUTION INTRAVENOUS at 10:24

## 2021-01-01 RX ADMIN — METHADONE HYDROCHLORIDE 5 MILLIGRAM(S): 40 TABLET ORAL at 06:01

## 2021-01-01 RX ADMIN — ONDANSETRON 4 MILLIGRAM(S): 8 TABLET, FILM COATED ORAL at 06:30

## 2021-01-01 RX ADMIN — HYDROMORPHONE HYDROCHLORIDE 0.5 MILLIGRAM(S): 2 INJECTION INTRAMUSCULAR; INTRAVENOUS; SUBCUTANEOUS at 09:15

## 2021-01-01 RX ADMIN — LOSARTAN POTASSIUM 100 MILLIGRAM(S): 100 TABLET, FILM COATED ORAL at 21:25

## 2021-01-01 RX ADMIN — Medication 1000 MILLIGRAM(S): at 21:30

## 2021-01-01 RX ADMIN — HYDROMORPHONE HYDROCHLORIDE 1 MILLIGRAM(S): 2 INJECTION INTRAMUSCULAR; INTRAVENOUS; SUBCUTANEOUS at 00:26

## 2021-01-01 RX ADMIN — HYDROMORPHONE HYDROCHLORIDE 30 MILLILITER(S): 2 INJECTION INTRAMUSCULAR; INTRAVENOUS; SUBCUTANEOUS at 20:01

## 2021-01-01 RX ADMIN — ONDANSETRON 4 MILLIGRAM(S): 8 TABLET, FILM COATED ORAL at 22:39

## 2021-01-01 RX ADMIN — AMLODIPINE BESYLATE 10 MILLIGRAM(S): 2.5 TABLET ORAL at 05:27

## 2021-01-01 RX ADMIN — Medication 4 MILLIGRAM(S): at 07:40

## 2021-01-01 RX ADMIN — METHADONE HYDROCHLORIDE 5 MILLIGRAM(S): 40 TABLET ORAL at 05:27

## 2021-01-01 RX ADMIN — SENNA PLUS 2 TABLET(S): 8.6 TABLET ORAL at 22:02

## 2021-01-01 RX ADMIN — LACTULOSE 10 GRAM(S): 10 SOLUTION ORAL at 14:40

## 2021-01-01 RX ADMIN — Medication 50 MILLIGRAM(S): at 15:04

## 2021-01-01 RX ADMIN — SENNA PLUS 2 TABLET(S): 8.6 TABLET ORAL at 22:48

## 2021-01-01 RX ADMIN — SERTRALINE 25 MILLIGRAM(S): 25 TABLET, FILM COATED ORAL at 06:52

## 2021-01-01 RX ADMIN — LOSARTAN POTASSIUM 100 MILLIGRAM(S): 100 TABLET, FILM COATED ORAL at 06:37

## 2021-01-01 RX ADMIN — SERTRALINE 25 MILLIGRAM(S): 25 TABLET, FILM COATED ORAL at 06:17

## 2021-01-01 RX ADMIN — Medication 4 MILLIGRAM(S): at 02:30

## 2021-01-01 RX ADMIN — Medication 263 MILLIGRAM(S): at 22:44

## 2021-01-01 RX ADMIN — HYDROMORPHONE HYDROCHLORIDE 30 MILLILITER(S): 2 INJECTION INTRAMUSCULAR; INTRAVENOUS; SUBCUTANEOUS at 07:21

## 2021-01-01 RX ADMIN — CHLORHEXIDINE GLUCONATE 1 APPLICATION(S): 213 SOLUTION TOPICAL at 15:12

## 2021-01-01 RX ADMIN — PIPERACILLIN AND TAZOBACTAM 25 GRAM(S): 4; .5 INJECTION, POWDER, LYOPHILIZED, FOR SOLUTION INTRAVENOUS at 02:08

## 2021-01-01 RX ADMIN — Medication 4 MILLIGRAM(S): at 23:50

## 2021-01-01 RX ADMIN — HYDROMORPHONE HYDROCHLORIDE 0.5 MILLIGRAM(S): 2 INJECTION INTRAMUSCULAR; INTRAVENOUS; SUBCUTANEOUS at 09:30

## 2021-01-01 RX ADMIN — ONDANSETRON 4 MILLIGRAM(S): 8 TABLET, FILM COATED ORAL at 13:12

## 2021-01-01 RX ADMIN — PANTOPRAZOLE SODIUM 40 MILLIGRAM(S): 20 TABLET, DELAYED RELEASE ORAL at 06:49

## 2021-01-01 RX ADMIN — LOSARTAN POTASSIUM 100 MILLIGRAM(S): 100 TABLET, FILM COATED ORAL at 05:27

## 2021-01-01 RX ADMIN — HYDROMORPHONE HYDROCHLORIDE 2 MILLIGRAM(S): 2 INJECTION INTRAMUSCULAR; INTRAVENOUS; SUBCUTANEOUS at 21:47

## 2021-01-01 RX ADMIN — Medication 25 MILLIGRAM(S): at 13:58

## 2021-01-01 RX ADMIN — HYDROMORPHONE HYDROCHLORIDE 1.5 MILLIGRAM(S): 2 INJECTION INTRAMUSCULAR; INTRAVENOUS; SUBCUTANEOUS at 19:46

## 2021-01-01 RX ADMIN — Medication 100 MILLIGRAM(S): at 22:32

## 2021-01-01 RX ADMIN — Medication 100 MILLIGRAM(S): at 22:48

## 2021-01-01 RX ADMIN — Medication 1 PACKET(S): at 22:35

## 2021-01-01 RX ADMIN — HYDROMORPHONE HYDROCHLORIDE 30 MILLILITER(S): 2 INJECTION INTRAMUSCULAR; INTRAVENOUS; SUBCUTANEOUS at 02:21

## 2021-01-01 RX ADMIN — ONDANSETRON 4 MILLIGRAM(S): 8 TABLET, FILM COATED ORAL at 15:05

## 2021-01-01 RX ADMIN — PIPERACILLIN AND TAZOBACTAM 25 GRAM(S): 4; .5 INJECTION, POWDER, LYOPHILIZED, FOR SOLUTION INTRAVENOUS at 04:51

## 2021-01-01 RX ADMIN — METHADONE HYDROCHLORIDE 5 MILLIGRAM(S): 40 TABLET ORAL at 06:29

## 2021-01-01 RX ADMIN — SERTRALINE 25 MILLIGRAM(S): 25 TABLET, FILM COATED ORAL at 17:35

## 2021-01-01 RX ADMIN — ONDANSETRON 4 MILLIGRAM(S): 8 TABLET, FILM COATED ORAL at 22:10

## 2021-01-01 RX ADMIN — Medication 4 MILLIGRAM(S): at 21:20

## 2021-01-01 RX ADMIN — SODIUM CHLORIDE 80 MILLILITER(S): 9 INJECTION INTRAMUSCULAR; INTRAVENOUS; SUBCUTANEOUS at 09:39

## 2021-01-01 RX ADMIN — Medication 100 MILLIGRAM(S): at 05:55

## 2021-01-01 RX ADMIN — SERTRALINE 25 MILLIGRAM(S): 25 TABLET, FILM COATED ORAL at 17:53

## 2021-01-01 RX ADMIN — HYDROMORPHONE HYDROCHLORIDE 0.5 MILLIGRAM(S): 2 INJECTION INTRAMUSCULAR; INTRAVENOUS; SUBCUTANEOUS at 20:13

## 2021-01-01 RX ADMIN — POLYETHYLENE GLYCOL 3350 17 GRAM(S): 17 POWDER, FOR SOLUTION ORAL at 19:31

## 2021-01-01 RX ADMIN — POLYETHYLENE GLYCOL 3350 17 GRAM(S): 17 POWDER, FOR SOLUTION ORAL at 18:27

## 2021-01-01 RX ADMIN — Medication 1 PACKET(S): at 05:53

## 2021-01-01 RX ADMIN — CHLORHEXIDINE GLUCONATE 1 APPLICATION(S): 213 SOLUTION TOPICAL at 13:14

## 2021-01-01 RX ADMIN — Medication 5 MILLIGRAM(S): at 01:33

## 2021-01-01 RX ADMIN — PIPERACILLIN AND TAZOBACTAM 25 GRAM(S): 4; .5 INJECTION, POWDER, LYOPHILIZED, FOR SOLUTION INTRAVENOUS at 22:44

## 2021-01-01 RX ADMIN — HYDROMORPHONE HYDROCHLORIDE 30 MILLILITER(S): 2 INJECTION INTRAMUSCULAR; INTRAVENOUS; SUBCUTANEOUS at 05:43

## 2021-01-01 RX ADMIN — Medication 4 MILLIGRAM(S): at 13:01

## 2021-01-01 RX ADMIN — POLYETHYLENE GLYCOL 3350 17 GRAM(S): 17 POWDER, FOR SOLUTION ORAL at 06:01

## 2021-01-01 RX ADMIN — HYDROMORPHONE HYDROCHLORIDE 3 MILLIGRAM(S): 2 INJECTION INTRAMUSCULAR; INTRAVENOUS; SUBCUTANEOUS at 09:11

## 2021-01-01 RX ADMIN — CHLORHEXIDINE GLUCONATE 1 APPLICATION(S): 213 SOLUTION TOPICAL at 11:19

## 2021-01-01 RX ADMIN — ATORVASTATIN CALCIUM 10 MILLIGRAM(S): 80 TABLET, FILM COATED ORAL at 21:04

## 2021-01-01 RX ADMIN — ENOXAPARIN SODIUM 40 MILLIGRAM(S): 100 INJECTION SUBCUTANEOUS at 12:54

## 2021-01-01 RX ADMIN — Medication 1 PACKET(S): at 05:28

## 2021-01-01 RX ADMIN — PIPERACILLIN AND TAZOBACTAM 25 GRAM(S): 4; .5 INJECTION, POWDER, LYOPHILIZED, FOR SOLUTION INTRAVENOUS at 02:25

## 2021-01-01 RX ADMIN — ONDANSETRON 4 MILLIGRAM(S): 8 TABLET, FILM COATED ORAL at 13:45

## 2021-02-08 NOTE — DATA REVIEWED
[FreeTextEntry1] : CT c/a/p images reviewed and results discussed at length with the patient.\par \par EXAM: CT ABDOMEN AND PELVIS OC IC\par EXAM: CT CHEST IC\par \par \par PROCEDURE DATE: 01/29/2021\par \par \par \par INTERPRETATION: CLINICAL INFORMATION: Esophageal neoplasm. Evaluate for recurrent disease.\par \par COMPARISON: CT scan of the chest, abdomen and pelvis dated 10/26/2020.\par \par PROCEDURE:\par CT of the Chest, Abdomen and Pelvis was performed with intravenous contrast.\par Intravenous contrast: 90 ml Omnipaque 350. 10 ml discarded.\par Oral contrast: Positive contrast was administered.\par Sagittal and coronal reformats were performed.\par \par FINDINGS:\par CHEST:\par LUNGS AND LARGE AIRWAYS: Patent central airways. Small fibrotic and atelectatic changes at the lung bases are stable. A small groundglass nodule maximally measuring 4 mm in the anterior segment of the right upper lobe (2:51) is unchanged when compared to patient's prior studies dating back to 3/13/2020.\par PLEURA: No pleural effusion.\par VESSELS: Mild atheromatous changes of the thoracic aorta and at the origin of the left subclavian artery.\par HEART: Heart size is normal. No pericardial effusion. Coronary arterial calcifications.\par MEDIASTINUM AND SHANTELL: A precarinal node measuring 1.2 x 0.7 cm (2:57) and a subcarinal node measuring 2.2 x 0.7 cm (2:68) are unchanged.\par CHEST WALL AND LOWER NECK: Within normal limits.\par \par ABDOMEN AND PELVIS:\par LIVER: Within normal limits.\par BILE DUCTS: Normal caliber.\par GALLBLADDER: Within normal limits.\par SPLEEN: Within normal limits.\par PANCREAS: Within normal limits.\par ADRENALS: Stable mild thickening the left adrenal gland.\par KIDNEYS/URETERS: Within normal limits.\par \par REPRODUCTIVE ORGANS: Hysterectomy.\par BLADDER: Within normal limits.\par Laxity of the pelvic wall with a rectocele present. There is mild downward displacement of the bladder as well.\par BOWEL: Persistent diffuse wall thickening of the distal esophagus with infiltration of the adjacent periesophageal fascial planes (2:109). Thickening in the region of the gastric cardia is improved. Please note when compared to the patient's previous study of 3/13/2020 the current examination demonstrates marked improvement\par Extensive diverticulosis of colon without evidence of acute diverticulitis. Muscular hypertrophy of sigmoid colon. No bowel obstruction. Appendix is normal.\par PERITONEUM: No ascites.\par VESSELS: Moderate atheromatous disease of the abdominal aorta, iliac arteries and branch vessels.\par RETROPERITONEUM/LYMPH NODES: 8 mm celiac node slightly smaller compared with prior study when it measured 9 mm in maximal dimension (2:132).\par ABDOMINAL WALL: Within normal limits.\par BONES: Lytic and sclerotic metastatic disease involving the left aspect of T4 vertebral body associated with an adjacent paravertebral mass\par Levoscoliosis and severe degenerative changes of lumbar spine.\par \par IMPRESSION:\par \par Persistent thickening the wall of the distal esophagus and GE junction unchanged compared with the most recent prior study. A single celiac node is slightly smaller.\par New lytic sclerotic metastatic disease involving T4 vertebral body with adjacent paravertebral mass.\par No developing hepatic mass, adenopathy or parenchymal lung nodules.\par The pertinent findings were relayed to Rafaela, at the office of Dr. Shelby, at the conclusion of today's evaluation.\par \par \par \par \par \par \par \par ANNAMARIA BALDWIN MD; Attending Radiologist\par This document has been electronically signed. Jan 29 2021 2:45PM

## 2021-02-08 NOTE — REVIEW OF SYSTEMS
[Fever] : no fever [Chills] : no chills [Loss Of Hearing] : no hearing loss [Cough] : no cough [SOB on Exertion] : no shortness of breath during exertion [Diarrhea] : no diarrhea [Easy Bleeding] : no tendency for easy bleeding [Easy Bruising] : no tendency for easy bruising

## 2021-02-08 NOTE — ASSESSMENT
[FreeTextEntry1] : 70 year old female with esophageal cancer and new mixed lytic/sclerotic T4 vertebral body lesion suspicious for metastatic disease. Patient is referred for biopsy. \par \par Plan is for prone biopsy of T4 vertebral body as seen on CT of 1/29/2021 (3:32) with sedation by Anesthesiology.\par \par All questions asked and answered to completion and patient satisfaction.\par Risks, benefits, and alternatives to procedure discussed with patient and informed consent obtained. [Other: _____] : [unfilled]

## 2021-02-08 NOTE — HISTORY OF PRESENT ILLNESS
[Home] : at home, [unfilled] , at the time of the visit. [Medical Office: ([facility])___] : at the medical office located in  [Verbal consent obtained from patient] : the patient, [unfilled] [FreeTextEntry1] : 70 year old female with history of HTN, spinal stenosis s/p C spine surgeries in June and July 2019 fusion and laminectomy. March 2020 Ca of esophagus.  pt reports having dysphagia and initially attributed it to C spine surgeries. She had EGD done that showed lesion at the distal esophagus. Esophageal mass biopsy revealed infiltrating, moderately to poorly differentiated adenocarcinoma. pt started chemo/ RT  and completed in May 2020. In June 2020 f/u scan showed GGO. pt followed with Dr. Weaver and recommended surgery pt refused.  \par pt has been on surveillance scans she had CT C/A/P done on 1/29/21 that showed new lytic sclerotic metastatic disease involving T4 vertebral body with adjacent paravertebral mass.  \par pt had discussion with Marcia Hammond and referred the pt to IR for possible T4 vertebral body lesion biopsy.\par \par Denies any recent SOB, CP, fever, chills, n/v/d. \par Patient sates she has been feeling well overall. Appetite has been good no unintentional weight loss.\par \par Of note patient is going for labs and COVID today. \par   [de-identified] : 1/29/2021 Chest/Abdomen

## 2021-02-08 NOTE — CONSULT LETTER
[Dear  ___] : Dear  [unfilled], [Consult Letter:] : I had the pleasure of evaluating your patient, [unfilled]. [Please see my note below.] : Please see my note below. [Consult Closing:] : Thank you very much for allowing me to participate in the care of this patient.  If you have any questions, please do not hesitate to contact me. [Sincerely,] : Sincerely, [FreeTextEntry2] : Dr. Dilma Kennedy  [FreeTextEntry3] : \par Brijesh Valenzuela MD, FSIR\par Interventional Radiologist\par , Interventional Radiology Residency\par Assistant Professor of Radiology, [employer] at [facility]\par Vascular & Interventional Radiology, [facility]\par  \par [facility]\par P: [phone]\par P: [phone]\par F: [phone]\par [facility]\par P: [phone]\par F: [phone]\par \par \par

## 2021-02-17 PROBLEM — M89.9 LESION OF THORACIC VERTEBRA: Status: ACTIVE | Noted: 2021-01-01

## 2021-02-17 NOTE — HISTORY OF PRESENT ILLNESS
[Disease: _____________________] : Disease: [unfilled] [N: ___] : N[unfilled] [de-identified] : Had C-spine surgeries 6/2019 and 7/2019-soon thereafter had difficulty swallowing which patient attributed to her neck surgeries. However, the feeling of something "sticking in her throat" when she ate or drank, gradually got worse. She had lost 14 pounds in prior few months.\par Upper endoscopy (Dr. Chew) showed a suspicious appearing lesion at the distal esophagus at 38 cm, circumferential, unable to traverse with the scope. Esophageal mass biopsy revealed infiltrating, moderately to poorly differentiated adenocarcinoma. CT scan chest/abdomen/pelvis showed wall thickening of the distal esophagus/hiatal hernia with gastrohepatic and mediastinal lymphadenopathy, highly concerning for malignancy. Asymmetric wall thickening of the right bladder slightly increased since prior exam. PET/CT scan showed FDG avid distal esophageal soft tissue thickening, consistent with known malignancy. FDG avid gastrohepatic ligaments, lymph nodes, probably metastatic. FDG avid subcarinal lymph node, probably metastatic. Minimally FDG avid subtle linear left upper lobe pulmonary infiltrate, nonspecific, new since 3/13/20.\par 4/2020-Began Carboplatin/Taxol along with radiation, and completing 5/2020.\par 6/2020-CT scan chest/abdomen/pelvis–thickened mid to distal esophagus and masslike thickening of the gastric cardia.  Prior mediastinal adenopathy resolved.  Prior perigastric lymph nodes resolved except for point lymph nodes smaller than the prior study.  New patchy groundglass opacities with low-grade airspace disease in the lungs.  Prior subcarinal adenopathy resolved.  Stable asymmetrical right lateral bladder wall thickening.  Asymmetrical thickening of the left levator ani muscle unchanged.  \par 7/2020–PET/CT scan showed considerably decreased extent and FDG avidity of soft tissue thickening distal esophagus.  Persistent mild FDG avidity may represent posttreatment inflammatory changes.  Resolved FDG avid mediastinal and gastrohepatic lymph nodes.  New linear segmental FDG avidity from the mid to distal esophagus, indeterminate.  Minimally FDG avid right lower lobe groundglass infiltrate, probably infectious/inflammatory, minimally improved since 6/2020 CT chest.\par 7/2020–EGD cardia/distal esophagus/proximal esophagus biopsies negative for malignancy.\par Saw Dr. Weaver in thoracic surgical consultation for consideration of esophagectomy-patient refused surgery.\par 10/2020-CT chest/abdomen/pelvis–mid to distal esophageal wall thickening appears less prominent.  Stable 3.2 cm masslike opacity within the gastric cardia.  9 mm perigastric lymph node appears decreased in size.\par \par 1/2021–CT scan chest/abdomen/pelvis–persistent thickening of the wall of the distal esophagus and GE junction unchanged compared to most recent prior study.  Single celiac node slightly smaller.  New lytic sclerotic metastatic disease involving T4 vertebral body with adjacent paravertebral mass. [de-identified] : Adenocarcinoma [de-identified] : Taking oxycodone 20 mg PO Q 6 hours for upper back pain up to 10/10 intensity. No associated LE paresthesias. No bowel/bladder in continence.\par +Burning reflux symptoms.\par Had back lesion biopsy done yesterday. No fevers. No c/o SOB.\par No abdominal/pelvic pain. \par \par

## 2021-02-17 NOTE — ASSESSMENT
[FreeTextEntry1] : #1) Esophageal cancer–status post chemotherapy/radiation. Patient declined surgery.  \par CT scan results reviewed with patient–suggestive of recurrent disease with metastasis to thoracic vertebra–s/p biopsy with pathology pending.\par With information which we have, discussed potential management options pending path.–to consider follow-up with radiation oncology for ? palliative radiation.  Will be sending tissue for Foundation One/PD-L1 testing, HER-2 testing for systemic therapy options.\par If confirmed Stage IV disease, explained palliative treatment intent and discussed GOC.\par Continue f/u with palliative care team for cancer related symptom management.\par #2) Anemia–most recent lab work available reviewed.  Hemoglobin WNL.  Continue to monitor CBC.  No overt bleeding noted clinically at present.\par \par Patient was given the opportunity to ask questions.  Her questions have been answered to her apparent satisfaction at this time. She is agreeable to recommended f/u.\par \par \par

## 2021-02-17 NOTE — PHYSICAL EXAM
[Restricted in physically strenuous activity but ambulatory and able to carry out work of a light or sedentary nature] : Status 1- Restricted in physically strenuous activity but ambulatory and able to carry out work of a light or sedentary nature, e.g., light house work, office work [Normal] : affect appropriate [de-identified] : non-toxic appearing [de-identified] : NT

## 2021-02-17 NOTE — REVIEW OF SYSTEMS
[Negative] : Allergic/Immunologic [Odynophagia] : odynophagia [Abdominal Pain] : no abdominal pain [FreeTextEntry9] : back pain

## 2021-02-25 NOTE — ASU DISCHARGE PLAN (ADULT/PEDIATRIC) - PATIENT BELONGINGS
Posterior Auricular Interpolation Flap Text: A decision was made to reconstruct the defect utilizing an interpolation axial flap and a staged reconstruction.  A telfa template was made of the defect.  This telfa template was then used to outline the posterior auricular interpolation flap.  The donor area for the pedicle flap was then injected with anesthesia.  The flap was excised through the skin and subcutaneous tissue down to the layer of the underlying musculature.  The pedicle flap was carefully excised within this deep plane to maintain its blood supply.  The edges of the donor site were undermined.   The donor site was closed in a primary fashion.  The pedicle was then rotated into position and sutured.  Once the tube was sutured into place, adequate blood supply was confirmed with blanching and refill.  The pedicle was then wrapped with xeroform gauze and dressed appropriately with a telfa and gauze bandage to ensure continued blood supply and protect the attached pedicle. Patient's belongings returned

## 2021-02-25 NOTE — VITALS
[Maximal Pain Intensity: 10/10] : 10/10 [Least Pain Intensity: 4/10] : 4/10 [Pain Description/Quality: ___] : Pain description/quality: [unfilled] [Pain Duration: ___] : Pain duration: [unfilled] [Pain Location: ___] : Pain Location: [unfilled] [Pain Interferes with ADLs] : Pain interferes with activities of daily living. [Opioid] : opioid [80: Normal activity with effort; some signs or symptoms of disease.] : 80: Normal activity with effort; some signs or symptoms of disease.  [ECOG Performance Status: 2 - Ambulatory and capable of all self care but unable to carry out any work activities] : Performance Status: 2 - Ambulatory and capable of all self care but unable to carry out any work activities. Up and about more than 50% of waking hours

## 2021-02-26 NOTE — REASON FOR VISIT
[Follow-Up Visit] : a follow-up [Home] : at home, [unfilled] , at the time of the visit. [Medical Office: ([facility])___] : at the medical office located in  [Verbal consent obtained from patient] : the patient, [unfilled] [FreeTextEntry2] : esophageal cancer

## 2021-02-26 NOTE — ADDENDUM
[FreeTextEntry1] : After the visit today, patient called back and requested that I speak with her , which I did–reviewed the above with him including patient's diagnosis of metastatic cancer and palliative management options.  He expressed his understanding of the serious nature of her illness and will accompany patient to her next visit to further discuss issues.  His questions answered to his apparent satisfaction at this time.

## 2021-02-26 NOTE — CONSULT LETTER
[Dear  ___] : Dear  [unfilled], [Courtesy Letter:] : I had the pleasure of seeing your patient, [unfilled], in my office today. [Please see my note below.] : Please see my note below. [Consult Closing:] : Thank you very much for allowing me to participate in the care of this patient.  If you have any questions, please do not hesitate to contact me. [Sincerely,] : Sincerely, [DrLiyah  ___] : Dr. BARAHONA [FreeTextEntry3] : Dilma Kennedy MD

## 2021-02-26 NOTE — HISTORY OF PRESENT ILLNESS
[Disease: _____________________] : Disease: [unfilled] [N: ___] : N[unfilled] [de-identified] : Had C-spine surgeries 6/2019 and 7/2019-soon thereafter had difficulty swallowing which patient attributed to her neck surgeries. However, the feeling of something "sticking in her throat" when she ate or drank, gradually got worse. She had lost 14 pounds in prior few months.\par Upper endoscopy (Dr. Chew) showed a suspicious appearing lesion at the distal esophagus at 38 cm, circumferential, unable to traverse with the scope. Esophageal mass biopsy revealed infiltrating, moderately to poorly differentiated adenocarcinoma. CT scan chest/abdomen/pelvis showed wall thickening of the distal esophagus/hiatal hernia with gastrohepatic and mediastinal lymphadenopathy, highly concerning for malignancy. Asymmetric wall thickening of the right bladder slightly increased since prior exam. PET/CT scan showed FDG avid distal esophageal soft tissue thickening, consistent with known malignancy. FDG avid gastrohepatic ligaments, lymph nodes, probably metastatic. FDG avid subcarinal lymph node, probably metastatic. Minimally FDG avid subtle linear left upper lobe pulmonary infiltrate, nonspecific, new since 3/13/20.\par 4/2020-Began Carboplatin/Taxol along with radiation, and completing 5/2020.\par 6/2020-CT scan chest/abdomen/pelvis–thickened mid to distal esophagus and masslike thickening of the gastric cardia.  Prior mediastinal adenopathy resolved.  Prior perigastric lymph nodes resolved except for point lymph nodes smaller than the prior study.  New patchy groundglass opacities with low-grade airspace disease in the lungs.  Prior subcarinal adenopathy resolved.  Stable asymmetrical right lateral bladder wall thickening.  Asymmetrical thickening of the left levator ani muscle unchanged.  \par 7/2020–PET/CT scan showed considerably decreased extent and FDG avidity of soft tissue thickening distal esophagus.  Persistent mild FDG avidity may represent posttreatment inflammatory changes.  Resolved FDG avid mediastinal and gastrohepatic lymph nodes.  New linear segmental FDG avidity from the mid to distal esophagus, indeterminate.  Minimally FDG avid right lower lobe groundglass infiltrate, probably infectious/inflammatory, minimally improved since 6/2020 CT chest.\par 7/2020–EGD cardia/distal esophagus/proximal esophagus biopsies negative for malignancy.\par Saw Dr. Weaver in thoracic surgical consultation for consideration of esophagectomy-patient refused surgery.\par 10/2020-CT chest/abdomen/pelvis–mid to distal esophageal wall thickening appears less prominent.  Stable 3.2 cm masslike opacity within the gastric cardia.  9 mm perigastric lymph node appears decreased in size.\par \par 1/2021–CT scan chest/abdomen/pelvis–persistent thickening of the wall of the distal esophagus and GE junction unchanged compared to most recent prior study.  Single celiac node slightly smaller.  New lytic sclerotic metastatic disease involving T4 vertebral body with adjacent paravertebral mass.  CT-guided biopsy of thoracic paravertebral mass positive for malignant cells, metastatic adenocarcinoma with cytomorphology compatible with known esophageal adenocarcinoma. [de-identified] : Adenocarcinoma [de-identified] : Telephone visit due to COVID pandemic (patient unable to do video today).\par Saw radiation oncologist-MRI T-spine ordered for RT planning (scheduled 3/4/21) for palliative RT. Simulation planned 3/8/21.\par Palliative care team managing pain. No c/o paresthesias. No bowel/bladder in continence.\par No fevers. No c/o SOB.\par No abdominal/pelvic pain. No bleeding.\par \par

## 2021-02-26 NOTE — ASSESSMENT
[FreeTextEntry1] : Dr. Nguyen's radiation oncology note reviewed, as well as pathology report, and 2/8/21 CBC.\par \par #1) Esophageal cancer–status post chemotherapy/radiation. Patient declined surgery.  \par Now recurrent disease with metastasis to thoracic vertebra–s/p biopsy with pathology results reviewed with patient- c/w metastatic adenocarcinoma from esophageal primary.\par Foundation One/PD-L1 testing, HER-2 testing was requested and results are pending.\par With Stage IV disease, palliative treatment intent-discussed this with patient along with potential treatment options such as chemotherapy, immunotherapy/antibody treatment.  While awaiting foundation one results, tentatively plan for FOLFOX chemotherapy regimen –need Mediport in anticipation of this for vascular access.  Patient agreeable to chemotherapy recommended, however, did not wish to further review potential side effects of treatment at this time but wishes to take 1 step at a time and was agreeable to schedule Mediport first.  She will return to the office with her  to further discuss drug regimen.  She will proceed with radiation as well, in the interim.\par \par Continue f/u with palliative care team for cancer related symptom management.\par \par #2) Anemia–most recent lab work available reviewed.  Hemoglobin WNL.  Continue to monitor CBC.  No overt bleeding reported at present.\par \par Patient was given the opportunity to ask questions.  Her questions have been answered to her apparent satisfaction at this time. She is agreeable to recommended f/u.\par \par \par

## 2021-03-01 NOTE — ASSESSMENT
[FreeTextEntry1] : 70yoF with:\par \par 1. Esophageal cancer - s/p chemoRT.  Patient has declined surgery at this time. Follow up with Rad Onc for RT.  Follow up with Med Onc tomorrow to discuss next steps in treatment. \par \par 2. Esophageal pain - C/w prilosec, sucralfate, magic mouthwash.\par \par 3. Acute on chronic back pain + new T4 met - Given history of OUD and increasing dosage/frequency of PRN short acting opioid, will start Oxycontin 30mg TID.  Counseled the patient that the initiation of long acting opioid and RT should result in a decrease in PRN dosage and usage. May c/w oxycodone 20mg for now, will decrease/wean once oxycontin approved by insurance.  \par \par 4. Encounter for palliative care - Reports her  is her HCP. Copy requested. Emotional support provided.\par \par Follow up in 2 weeks, call sooner with questions or issues.

## 2021-03-01 NOTE — HISTORY OF PRESENT ILLNESS
[Opioids for treatment of cancer not in remission, and/or  hospice, palliative care] : Opioids for treatment of cancer not in remission, and/or  hospice, palliative care [FreeTextEntry1] : 70yoF with esophageal cancer presents for follow up visit via Telemedicine. \par PMH significant for HTN, anemia, cervical degenerative disc s/p cervical laminectomy x2 (2019), former smoker (1 PPD x50 years), h/o OUD (oxycodone, was on Suboxone until about 4 years ago)\par \par Patient began experiencing progressive dysphagia with associated weight loss of ~15 lbs following C-spine surgeries in 6-7/2019. She was evaluated with upper endoscopy by Dr. Chew which showed a suspicious appearing esophageal lesion. Esophageal mass biopsy revealed infiltrating, moderately to poorly differentiated adenocarcinoma. CT scan chest/abdomen/pelvis 3/13/2020 showed wall thickening of the distal esophagus/hiatal hernia with gastrohepatic and mediastinal lymphadenopathy, highly concerning for malignancy. \par \par Neoadjuvant treatment was recommended.  She completed chemoRT on 5/19/2020.Patient recently seen by [facility] for consideration of esophagectomy but patient has declined to proceed.\par \par Interval History: Patient presents for follow-up via Telemedicine. Now has biopsy proven T4 metastasis. \par \par She has been having pain in her upper back, lower back, esophagus and in her breasts. Pain gets high 40/10, as low as 4/10.The upper back pain is an aching pain.   She is now using oxycodone 20mg q6h but reports that it does not provide sufficient relief of back pain.  She has tried using ibuprofen which does help at times with her low back pain. Acetaminophen does not help. \par \par She reports that esophageal burning has improved with prilosec and sucralfate. Esophageal pain gets as high as 8/10, as low as 4/10.  She still has dysphagia, is swallowing a small amount of magic mouthrinse prior to meals. \par \par Current pain regimen consists of:\par Oxycodone IR 20mg PRN (using q6h)\par Gabapentin 100mg BID\par \par ROS:\par + good appetite\par ~40lb weight loss over the last 5 months - believes it has plateaued now (doesn't have a scale at home). \par +some fatigue\par +sleeps propped up with pillows\par +chronic UE neuropathy due to cervical spine issues\par Denies dysgeusia, anxiety/depression, constipation, diarrhea.\par All other ROS as outlined or noncontributory. \par \par Patient is , lives with her . She has two adult children. Works for an insurance agency, has been working from home. She is independent in her ADLs, drives herself. \par \par I-Stop Ref#:  [id number]

## 2021-03-09 NOTE — ED ADULT NURSE NOTE - NSIMPLEMENTINTERV_GEN_ALL_ED
Implemented All Universal Safety Interventions:  [place] to call system. Call bell, personal items and telephone within reach. Instruct patient to call for assistance. Room bathroom lighting operational. Non-slip footwear when patient is off stretcher. Physically safe environment: no spills, clutter or unnecessary equipment. Stretcher in lowest position, wheels locked, appropriate side rails in place.

## 2021-03-09 NOTE — ED PROVIDER NOTE - NSFOLLOWUPINSTRUCTIONS_ED_ALL_ED_FT
Follow up with your primary care physician within 2-3 days. Take the copy of your test results you were given in the emergency room for your doctor to review.     Stay hydrated    Return to the ER if your symptoms worsen or for any other medical emergencies

## 2021-03-09 NOTE — ED ADULT NURSE NOTE - OBJECTIVE STATEMENT
Patient ambulatory to ED and sent by PMD for potassium of 6.5 on blood work obtained 2 days ago. Patient has PMH of esophogeal cancer, HTN, HLD. Patient denies chest pain, SOB, dizziness, weakness, nausea, vomiting, diarrhea, or other complaints at this time.

## 2021-03-09 NOTE — ED PROVIDER NOTE - ATTENDING CONTRIBUTION TO CARE
69 y/o F with PMH of esophageal cancer with mets to the T-spine, had Chemo and XRT, follows with oncologist Dr. Shelby, was sent to the ED for K-6.5 on routine labs yesterday. Pt denies all medical complaints, she is asymptomatic. PE as noted above. will recheck labs and obtain EKG    626pm- labs reviewed, k-4.0 in the ED. EKG unremarkable. pt stable for dc  Dr. Zelaya:  I have reviewed and discussed with the PA/ resident the case specifics, including the history, physical assessment, evaluation, conclusion, laboratory results, and medical plan. I agree with the contents, and conclusions. I have personally examined, and interviewed the patient.

## 2021-03-09 NOTE — ED PROVIDER NOTE - CLINICAL SUMMARY MEDICAL DECISION MAKING FREE TEXT BOX
71 y/o F with PMH of esophageal cancer with mets to the T-spine, had Chemo and XRT, follows with oncologist Dr. Shelby, was sent to the ED for K-6.5 on routine labs yesterday. Pt denies all medical complaints, she is asymptomatic. PE as noted above. will recheck labs and obtain EKG 69 y/o F with PMH of esophageal cancer with mets to the T-spine, had Chemo and XRT, follows with oncologist Dr. Shelby, was sent to the ED for K-6.5 on routine labs yesterday. Pt denies all medical complaints, she is asymptomatic. PE as noted above. will recheck labs and obtain EKG    626pm- labs reviewed, k-4.0 in the ED. EKG unremarkable. pt stable for dc

## 2021-03-09 NOTE — ED PROVIDER NOTE - PATIENT PORTAL LINK FT
You can access the FollowMyHealth Patient Portal offered by [facility] by registering at the following website: [email or web address]. By joining [facility]’s FollowMyHealth portal, you will also be able to view your health information using other applications (apps) compatible with our system.

## 2021-03-09 NOTE — ED PROVIDER NOTE - OBJECTIVE STATEMENT
71 y/o F with PMH of esophageal cancer with mets to the T-spine, had Chemo and XRT, follows with oncologist Dr. Shelby, was sent to the ED for K-6.5 on routine labs yesterday. Pt denies all medical complaints, she is asymptomatic

## 2021-03-09 NOTE — ED ADULT NURSE NOTE - PSH
Class 1 obesity with body mass index (BMI) of 33.0 to 33.9 in adult    H/O:   x 2 -  H/O: hysterectomy  partial   H/O: osteoarthritis    S/P laminectomy  19- Anterior Cervical Laminectomy

## 2021-03-10 NOTE — PHYSICAL EXAM
[Sclera] : the sclera and conjunctiva were normal [Normal] : normoactive bowel sounds, soft and nontender, no hepatosplenomegaly or masses appreciated

## 2021-03-10 NOTE — REASON FOR VISIT
[Medical Office: ([facility])___] : at the medical office located in

## 2021-03-10 NOTE — REVIEW OF SYSTEMS
[Fatigue] : fatigue [Dysphagia] : dysphagia [Chest Pain] : chest pain [Easy Bruising] : a tendency for easy bruising [Negative] : Neurological [Constipation: Grade 0] : Constipation: Grade 0 [Diarrhea: Grade 0] : Diarrhea: Grade 0 [Dysphagia: Grade 0] : Dysphagia: Grade 0 [Nausea: Grade 1 - Loss of appetite without alteration in eating habits] : Nausea: Grade 1 - Loss of appetite without alteration in eating habits [Vomiting: Grade 1 - 1 - 2 episodes ( by 5 minutes) in 24 hrs] : Vomiting: Grade 1 - 1 - 2 episodes ( by 5 minutes) in 24 hrs [Fatigue: Grade 1 - Fatigue relieved by rest] : Fatigue: Grade 1 - Fatigue relieved by rest [Cough: Grade 0] : Cough: Grade 0 [Dyspnea: Grade 1 - Shortness of breath with moderate exertion] : Dyspnea: Grade 1 - Shortness of breath with moderate exertion [FreeTextEntry5] : from mass

## 2021-03-12 NOTE — ASSESSMENT
[FreeTextEntry1] : 70yoF with:\par \par 1. Esophageal cancer - s/p chemoRT.  Is to start FOLFOX/pembro.  Follow up with Rad Onc for RT.  \par \par 3. Acute on chronic back pain + new T4 met - Will c/w oxycontin 30mg QID, may c/w oxycodone 10mg. \par \par 3. Esophageal pain - C/w Prilsoec, magic mouthwash.\par \par 4. Nausea - PRN compazine\par \par 5. Encounter for palliative care - Reports her  is her HCP. Copy requested. Emotional support provided.\par \par Follow up in 2 weeks, call sooner with questions or issues.

## 2021-03-12 NOTE — HISTORY OF PRESENT ILLNESS
[FreeTextEntry1] : 70yoF with esophageal cancer presents for follow up visit via Telemedicine. \par PMH significant for HTN, anemia, cervical degenerative disc s/p cervical laminectomy x2 (2019), former smoker (1 PPD x50 years), h/o OUD (oxycodone, was on Suboxone until about 4 years ago)\par \par Patient began experiencing progressive dysphagia with associated weight loss of ~15 lbs following C-spine surgeries in 6-7/2019. She was evaluated with upper endoscopy by Dr. Chew which showed a suspicious appearing esophageal lesion. Esophageal mass biopsy revealed infiltrating, moderately to poorly differentiated adenocarcinoma. CT scan chest/abdomen/pelvis 3/13/2020 showed wall thickening of the distal esophagus/hiatal hernia with gastrohepatic and mediastinal lymphadenopathy, highly concerning for malignancy. \par \par Neoadjuvant treatment was recommended.  She completed chemoRT on 5/19/2020.  Patient recently seen by [facility] for consideration of esophagectomy but patient has declined to proceed.\par \par Interval History: Patient presents for follow-up via Telemedicine. Now has biopsy proven T4 metastasis and has underwent RT sim.  She will be having a port placed as she will be starting systemic treatment.  \par \par She has been having pain in her upper back, lower back, esophagus and in her breasts. She has been using Oxycontin 30mg TID - QID for the past week or so and has noted improvement overall. She has been able to decrease PRN oxycodone IR dosage to 10mg q6-7h.  Pain gets high 9/10, as low as 4/10.  The upper back pain is an aching pain. She has tried using ibuprofen which does help at times with her low back pain. Acetaminophen does not help. \par \par She reports that esophageal burning has improved with prilosec and sucralfate.   She still has dysphagia, is swallowing a small amount of magic mouthrinse prior to meals. Endorses increased anxiousness/tearfulness due to starting systemic treatment. \par \par Current pain regimen consists of:\par Oxycontin 30mg TID or QID\par Oxycodone IR 10 mg PRN (using q6h)\par Gabapentin 100mg BID\par \par ROS:\par + anxiety / no panic attacks \par + decreased appetite \par + occasional nausea in AM / no emesis\par ~40lb weight loss over the last 5 months - believes it has plateaued now (doesn't have a scale at home). \par +some fatigue\par +sleeps propped up with pillows\par +chronic UE neuropathy due to cervical spine issues\par Denies dysgeusia, anxiety/depression, constipation, diarrhea.\par All other ROS as outlined or noncontributory. \par \par Patient is , lives with her . She has two adult children. Works for an insurance agency, has been working from home. She is independent in her ADLs, drives herself. \par \par I-Stop Ref#:  [id number]

## 2021-03-15 NOTE — REASON FOR VISIT
[Follow-Up Visit] : a follow-up [Spouse] : spouse [Follow-Up] : a follow-up visit [Home] : at home, [unfilled] , at the time of the visit. [Verbal consent obtained from patient] : the patient, [unfilled] [FreeTextEntry2] : esophageal cancer

## 2021-03-15 NOTE — ASSESSMENT
[Palliative] : Goals of care discussed with patient: Palliative [FreeTextEntry1] : Dr. Nguyen's radiation oncology note reviewed, as well as pathology report, and 3/8/21 CBC, PT/PTT.\par Case d/w Dr. Nguyen today-plans 1 radiation treatment.\par \par #1) Esophageal cancer–status post chemotherapy/radiation. Patient declined surgery.  \par Now recurrent disease with metastasis to thoracic vertebra–s/p biopsy with pathology results reviewed with patient/- c/w metastatic adenocarcinoma from esophageal primary.\par Foundation One testing, HER-2 testing was requested and results are pending.\par PD-L1 TPS (0%) and CPS (60) results reviewed.\par With Stage IV disease, palliative treatment intent-discussed this along with potential treatment options such as chemotherapy, immunotherapy/antibody treatment.  While awaiting foundation one results, patient gave consent for FOLFOX chemotherapy regimen with pembrolizumab-potential alternatives reviewed with respective pros and cons.\par Potential side effects reviewed including but not limited to nausea/vomiting, diarrhea, myelosuppression, skin/neuro toxicities, allergic reaction, systemic inflammatory response with for example, endocrinopathy/thyroiditis, hepatitis, colitis, pneumonitis.\par Interval follow-up imaging will again be planned.\par \par Continue f/u with palliative care team for cancer related symptom management.\par \par #2) Anemia–most recent lab work available reviewed.  Hemoglobin WNL.  Continue to monitor CBC.  No overt bleeding noted clinically at present.\par \par Patient and her  were given the opportunity to ask questions.  Their questions have been answered to their apparent satisfaction at this time.  They expressed their understanding of the serious nature of patient's illness/palliative treatment intent, and that quality of life issues are to be considered in ongoing decision making.\par \par \par

## 2021-03-15 NOTE — PHYSICAL EXAM
[Restricted in physically strenuous activity but ambulatory and able to carry out work of a light or sedentary nature] : Status 1- Restricted in physically strenuous activity but ambulatory and able to carry out work of a light or sedentary nature, e.g., light house work, office work [Normal] : affect appropriate [General Appearance - Alert] : alert [General Appearance - In No Acute Distress] : in no acute distress [Oriented To Time, Place, And Person] : oriented to person, place, and time [Affect] : the affect was normal [de-identified] : NT

## 2021-03-15 NOTE — HISTORY OF PRESENT ILLNESS
[Disease: _____________________] : Disease: [unfilled] [N: ___] : N[unfilled] [M: ___] : M[unfilled] [Opioids for treatment of cancer not in remission, and/or  hospice, palliative care] : Opioids for treatment of cancer not in remission, and/or  hospice, palliative care [de-identified] : Had C-spine surgeries 6/2019 and 7/2019-soon thereafter had difficulty swallowing which patient attributed to her neck surgeries. However, the feeling of something "sticking in her throat" when she ate or drank, gradually got worse. She had lost 14 pounds in prior few months.\par Upper endoscopy (Dr. Chew) showed a suspicious appearing lesion at the distal esophagus at 38 cm, circumferential, unable to traverse with the scope. Esophageal mass biopsy revealed infiltrating, moderately to poorly differentiated adenocarcinoma. CT scan chest/abdomen/pelvis showed wall thickening of the distal esophagus/hiatal hernia with gastrohepatic and mediastinal lymphadenopathy, highly concerning for malignancy. Asymmetric wall thickening of the right bladder slightly increased since prior exam. PET/CT scan showed FDG avid distal esophageal soft tissue thickening, consistent with known malignancy. FDG avid gastrohepatic ligaments, lymph nodes, probably metastatic. FDG avid subcarinal lymph node, probably metastatic. Minimally FDG avid subtle linear left upper lobe pulmonary infiltrate, nonspecific, new since 3/13/20.\par 4/2020-Began Carboplatin/Taxol along with radiation, and completing 5/2020.\par 6/2020-CT scan chest/abdomen/pelvis–thickened mid to distal esophagus and masslike thickening of the gastric cardia.  Prior mediastinal adenopathy resolved.  Prior perigastric lymph nodes resolved except for point lymph nodes smaller than the prior study.  New patchy groundglass opacities with low-grade airspace disease in the lungs.  Prior subcarinal adenopathy resolved.  Stable asymmetrical right lateral bladder wall thickening.  Asymmetrical thickening of the left levator ani muscle unchanged.  \par 7/2020–PET/CT scan showed considerably decreased extent and FDG avidity of soft tissue thickening distal esophagus.  Persistent mild FDG avidity may represent posttreatment inflammatory changes.  Resolved FDG avid mediastinal and gastrohepatic lymph nodes.  New linear segmental FDG avidity from the mid to distal esophagus, indeterminate.  Minimally FDG avid right lower lobe groundglass infiltrate, probably infectious/inflammatory, minimally improved since 6/2020 CT chest.\par 7/2020–EGD cardia/distal esophagus/proximal esophagus biopsies negative for malignancy.\par Saw Dr. Weaver in thoracic surgical consultation for consideration of esophagectomy-patient refused surgery.\par 10/2020-CT chest/abdomen/pelvis–mid to distal esophageal wall thickening appears less prominent.  Stable 3.2 cm masslike opacity within the gastric cardia.  9 mm perigastric lymph node appears decreased in size.\par \par 1/2021–CT scan chest/abdomen/pelvis–persistent thickening of the wall of the distal esophagus and GE junction unchanged compared to most recent prior study.  Single celiac node slightly smaller.  New lytic sclerotic metastatic disease involving T4 vertebral body with adjacent paravertebral mass.  CT-guided biopsy of thoracic paravertebral mass positive for malignant cells, metastatic adenocarcinoma with cytomorphology compatible with known esophageal adenocarcinoma. [de-identified] : Adenocarcinoma [de-identified] : Foundation medicine-PD-L1 CPS-60.\par [place] PD-L1 TPS-0%. [de-identified] : S/p simulation for spine RT. \par Palliative care team managing pain. No c/o paresthesias. No bowel/bladder in continence.\par No fevers. No c/o SOB.\par No abdominal/pelvic pain. No bleeding. +Occasional nausea.\par Mediport placement scheduled this week.\par \par

## 2021-03-25 NOTE — VITALS
[Maximal Pain Intensity: 6/10] : 6/10 [Least Pain Intensity: 4/10] : 4/10 [Pain Description/Quality: ___] : Pain description/quality: [unfilled] [Pain Duration: ___] : Pain duration: [unfilled] [Pain Location: ___] : Pain Location: [unfilled] [Pain Interferes with ADLs] : Pain interferes with activities of daily living. [Opioid] : opioid [80: Normal activity with effort; some signs or symptoms of disease.] : 80: Normal activity with effort; some signs or symptoms of disease.  [ECOG Performance Status: 2 - Ambulatory and capable of all self care but unable to carry out any work activities] : Performance Status: 2 - Ambulatory and capable of all self care but unable to carry out any work activities. Up and about more than 50% of waking hours

## 2021-03-26 NOTE — HISTORY OF PRESENT ILLNESS
[de-identified] : Had C-spine surgeries 6/2019 and 7/2019-soon thereafter had difficulty swallowing which patient attributed to her neck surgeries. However, the feeling of something "sticking in her throat" when she ate or drank, gradually got worse. She had lost 14 pounds in prior few months.\par Upper endoscopy (Dr. Chew) showed a suspicious appearing lesion at the distal esophagus at 38 cm, circumferential, unable to traverse with the scope. Esophageal mass biopsy revealed infiltrating, moderately to poorly differentiated adenocarcinoma. CT scan chest/abdomen/pelvis showed wall thickening of the distal esophagus/hiatal hernia with gastrohepatic and mediastinal lymphadenopathy, highly concerning for malignancy. Asymmetric wall thickening of the right bladder slightly increased since prior exam. PET/CT scan showed FDG avid distal esophageal soft tissue thickening, consistent with known malignancy. FDG avid gastrohepatic ligaments, lymph nodes, probably metastatic. FDG avid subcarinal lymph node, probably metastatic. Minimally FDG avid subtle linear left upper lobe pulmonary infiltrate, nonspecific, new since 3/13/20.\par 4/2020-Began Carboplatin/Taxol along with radiation, and completing 5/2020.\par 6/2020-CT scan chest/abdomen/pelvis–thickened mid to distal esophagus and masslike thickening of the gastric cardia.  Prior mediastinal adenopathy resolved.  Prior perigastric lymph nodes resolved except for point lymph nodes smaller than the prior study.  New patchy groundglass opacities with low-grade airspace disease in the lungs.  Prior subcarinal adenopathy resolved.  Stable asymmetrical right lateral bladder wall thickening.  Asymmetrical thickening of the left levator ani muscle unchanged.  \par 7/2020–PET/CT scan showed considerably decreased extent and FDG avidity of soft tissue thickening distal esophagus.  Persistent mild FDG avidity may represent posttreatment inflammatory changes.  Resolved FDG avid mediastinal and gastrohepatic lymph nodes.  New linear segmental FDG avidity from the mid to distal esophagus, indeterminate.  Minimally FDG avid right lower lobe groundglass infiltrate, probably infectious/inflammatory, minimally improved since 6/2020 CT chest.\par 7/2020–EGD cardia/distal esophagus/proximal esophagus biopsies negative for malignancy.\par Saw Dr. Weaver in thoracic surgical consultation for consideration of esophagectomy-patient refused surgery.\par 10/2020-CT chest/abdomen/pelvis–mid to distal esophageal wall thickening appears less prominent.  Stable 3.2 cm masslike opacity within the gastric cardia.  9 mm perigastric lymph node appears decreased in size.\par \par 1/2021–CT scan chest/abdomen/pelvis–persistent thickening of the wall of the distal esophagus and GE junction unchanged compared to most recent prior study.  Single celiac node slightly smaller.  New lytic sclerotic metastatic disease involving T4 vertebral body with adjacent paravertebral mass.  CT-guided biopsy of thoracic paravertebral mass positive for malignant cells, metastatic adenocarcinoma with cytomorphology compatible with known esophageal adenocarcinoma. [de-identified] : Adenocarcinoma [de-identified] : Foundation medicine-PD-L1 CPS-60.\par [place] PD-L1 TPS-0%. [FreeTextEntry1] : S/P Cycy[place] 1  \par FOLFOX + HERCEPTIN   3/18/21  [de-identified] : S/p simulation for spine RT. \par FX # 1 TX at  430 today  \par Reports persistent nausea/emesis \par Refractory to compazine \par Dtr called office out of concern for nausea and poor appetite \par Pt was prescribed Zofran and felt response after last night's initial dose \par She is eating a bit less with noticeable fatigue \par Palliative care team managing pain. No c/o paresthesias. No bowel/bladder in continence.\par No fevers. No c/o SOB.\par No abdominal/pelvic pain. No bleeding. \par She did not get her COVID vaccine as recommended. \par Mediport placement scheduled this week completed. \par \par

## 2021-03-26 NOTE — ASSESSMENT
[FreeTextEntry1] : Dr. Nguyen's radiation oncology note reviewed, as well as pathology report, and 3/8/21 CBC, PT/PTT.\par Case d/w Dr. Nguyen today-plans 1 radiation treatment.  Present today at [facility] \par \par #1) Esophageal cancer–status post chemotherapy/radiation. Patient declined surgery.  \par Now recurrent disease with metastasis to thoracic vertebra–s/p biopsy with pathology results reviewed with patient/- c/w metastatic adenocarcinoma from esophageal primary.  Reviewed this pathology again with pt with clearer understanding of prognosis. \par Foundation One testing, HER-2 testing was requested and results are pending.\par PD-L1 TPS (0%) and CPS (60) results reviewed.\par  \par \par Continue f/u with palliative care team for cancer related symptom management.  OXY IR and OXY ER \par \par #2) Anemia–most recent lab work available reviewed.  Hemoglobin WNL.  Continue to monitor CBC.  No overt bleeding noted clinically at present.  CMP electrolyte review pending \par \par # 3 ) Nausea- Improvement with Ondansetron.  Pt will not take Compazine.  PT will monitor for constipation and discussed otc softener/laxative  \par \par Patient  given the opportunity to ask questions.  Follow up 2 weeks \par \par \par

## 2021-03-31 NOTE — ASSESSMENT
[Palliative] : Goals of care discussed with patient: Palliative [FreeTextEntry1] : Dr. Nguyen's radiation oncology note reviewed, as well as pathology report, and 3/8/21 CBC, PT/PTT.\par \par #1) Esophageal cancer–status post chemotherapy/radiation. Patient declined surgery.  \par Now recurrent disease with metastasis to thoracic vertebra–s/p biopsy with pathology results reviewed with patient/- c/w metastatic adenocarcinoma from esophageal primary.  Reviewed this pathology again with pt with clearer understanding of prognosis. \par Foundation One testing, HER-2 testing   R678Q \par PD-L1 TPS (0%) and CPS (60) results reviewed.\par  \par \par Continue f/u with palliative care team for cancer related symptom management.  OXY IR and OXY ER \par \par #2) Anemia–most recent lab work available reviewed.  Hemoglobin WNL.  Continue to monitor CBC.  No overt bleeding noted clinically at present.  CMP electrolyte review pending \par \par # 3 ) Nausea- Improvement with Ondansetron.  PT will monitor for constipation and discussed otc softener/laxative    Prilosec 40 mg X 5 days, then maintain daily dosing. \par \par Pain- Remains on OXY IR/ ER per pain team with good pain control \par \par Follow up 2 days for disconeect, \par 2  weeks Dr. Shelby / Cycle 3 \par \par Patient  given the opportunity to ask questions.  Follow up 2 weeks \par \par \par

## 2021-03-31 NOTE — HISTORY OF PRESENT ILLNESS
[Disease: _____________________] : Disease: [unfilled] [N: ___] : N[unfilled] [M: ___] : M[unfilled] [Cycle: ___] : Cycle: [unfilled] [Day: ___] : Day: [unfilled] [de-identified] : Had C-spine surgeries 6/2019 and 7/2019-soon thereafter had difficulty swallowing which patient attributed to her neck surgeries. However, the feeling of something "sticking in her throat" when she ate or drank, gradually got worse. She had lost 14 pounds in prior few months.\par Upper endoscopy (Dr. Chew) showed a suspicious appearing lesion at the distal esophagus at 38 cm, circumferential, unable to traverse with the scope. Esophageal mass biopsy revealed infiltrating, moderately to poorly differentiated adenocarcinoma. CT scan chest/abdomen/pelvis showed wall thickening of the distal esophagus/hiatal hernia with gastrohepatic and mediastinal lymphadenopathy, highly concerning for malignancy. Asymmetric wall thickening of the right bladder slightly increased since prior exam. PET/CT scan showed FDG avid distal esophageal soft tissue thickening, consistent with known malignancy. FDG avid gastrohepatic ligaments, lymph nodes, probably metastatic. FDG avid subcarinal lymph node, probably metastatic. Minimally FDG avid subtle linear left upper lobe pulmonary infiltrate, nonspecific, new since 3/13/20.\par 4/2020-Began Carboplatin/Taxol along with radiation, and completing 5/2020.\par 6/2020-CT scan chest/abdomen/pelvis–thickened mid to distal esophagus and masslike thickening of the gastric cardia.  Prior mediastinal adenopathy resolved.  Prior perigastric lymph nodes resolved except for point lymph nodes smaller than the prior study.  New patchy groundglass opacities with low-grade airspace disease in the lungs.  Prior subcarinal adenopathy resolved.  Stable asymmetrical right lateral bladder wall thickening.  Asymmetrical thickening of the left levator ani muscle unchanged.  \par 7/2020–PET/CT scan showed considerably decreased extent and FDG avidity of soft tissue thickening distal esophagus.  Persistent mild FDG avidity may represent posttreatment inflammatory changes.  Resolved FDG avid mediastinal and gastrohepatic lymph nodes.  New linear segmental FDG avidity from the mid to distal esophagus, indeterminate.  Minimally FDG avid right lower lobe groundglass infiltrate, probably infectious/inflammatory, minimally improved since 6/2020 CT chest.\par 7/2020–EGD cardia/distal esophagus/proximal esophagus biopsies negative for malignancy.\par Saw Dr. Weaver in thoracic surgical consultation for consideration of esophagectomy-patient refused surgery.\par 10/2020-CT chest/abdomen/pelvis–mid to distal esophageal wall thickening appears less prominent.  Stable 3.2 cm masslike opacity within the gastric cardia.  9 mm perigastric lymph node appears decreased in size.\par \par 1/2021–CT scan chest/abdomen/pelvis–persistent thickening of the wall of the distal esophagus and GE junction unchanged compared to most recent prior study.  Single celiac node slightly smaller.  New lytic sclerotic metastatic disease involving T4 vertebral body with adjacent paravertebral mass.  CT-guided biopsy of thoracic paravertebral mass positive for malignant cells, metastatic adenocarcinoma with cytomorphology compatible with known esophageal adenocarcinoma. [de-identified] : Adenocarcinoma [de-identified] : Foundation medicine-PD-L1 CPS-60.\par [place] PD-L1 TPS-0%. [FreeTextEntry1] : Cycle 2 FOLFOX +HERCEPTIN   3/31/21 \par FOLFOX + HERCEPTIN   3/31/21  [de-identified] : Completed  RT  3/25/21 with Dr. Nguyen \par Received 2nd Covid Vaccine - Pfizer  3/26/21\par Has noted some nausea/small emesis in the early morning hours. \par She noted improvement with Zofran one week after chemotherapy and Prilosec \par Reports feeling much better this week \par She has remained afebrile, No dysphagia, SOB, BARBOSA\par Eating to eat. No great desire for food. Supplementing with ensure drinks\par No abdominal pain, chest pain, \par Pre existing left hand numbness from cervical spine \par No paresthesias of lower extremities, falls or trauma. \par

## 2021-03-31 NOTE — PHYSICAL EXAM
[Restricted in physically strenuous activity but ambulatory and able to carry out work of a light or sedentary nature] : Status 1- Restricted in physically strenuous activity but ambulatory and able to carry out work of a light or sedentary nature, e.g., light house work, office work [Normal] : affect appropriate [de-identified] : NT

## 2021-04-06 NOTE — REASON FOR VISIT
[Follow-Up] : a follow-up visit [Home] : at home, [unfilled] , at the time of the visit. [Medical Office: ([facility])___] : at the medical office located in  [Verbal consent obtained from patient] : the patient, [unfilled]

## 2021-04-08 NOTE — ASSESSMENT
[Palliative] : Goals of care discussed with patient: Palliative [FreeTextEntry1] : #1) Esophageal cancer–status post chemotherapy/radiation. Patient declined surgery.  \par Now recurrent disease with metastasis to thoracic vertebra–s/p biopsy with pathology results reviewed with patient/- c/w metastatic adenocarcinoma from esophageal primary.  Reviewed this pathology again with pt with clearer understanding of prognosis. \par Foundation One testing, HER-2 testing   R678Q \par PD-L1 TPS (0%) and CPS (60) results reviewed.\par  \par \par Continue f/u with palliative care team for cancer related symptom management.  OXY IR and OXY ER \par \par #2) Anemia–most recent lab work available reviewed.    Continue to monitor CBC.\par \par # 3 ) Nausea- Improvement with Ondansetron.  PT will monitor for constipation and discussed otc softener/laxative    Prilosec 40 mg X 5 days, then maintain daily dosing.    Will add PRN Ativan with emphasis on rare use for refractory nausea.   Cautioned on use/sedation.  Pt may resume Zofran now for home use as she is past the 5 day window.  IV hydration and blood work assessment for today \par \par \par 2  weeks Dr. Shelby \par \par Patient  given the opportunity to ask questions.  \par \par \par

## 2021-04-08 NOTE — PHYSICAL EXAM
[Restricted in physically strenuous activity but ambulatory and able to carry out work of a light or sedentary nature] : Status 1- Restricted in physically strenuous activity but ambulatory and able to carry out work of a light or sedentary nature, e.g., light house work, office work [Normal] : affect appropriate [de-identified] : NT

## 2021-04-09 NOTE — HISTORY OF PRESENT ILLNESS
[Opioids for treatment of cancer not in remission, and/or  hospice, palliative care] : Opioids for treatment of cancer not in remission, and/or  hospice, palliative care [FreeTextEntry1] : 70yoF with esophageal cancer presents for follow up palliative care visit.\par PMH significant for HTN, anemia, cervical degenerative disc s/p cervical laminectomy x2 (2019), former smoker (1 PPD x50 years), h/o OUD (oxycodone, was on Suboxone until about 4 years ago)\par \par Patient began experiencing progressive dysphagia with associated weight loss of ~15 lbs following C-spine surgeries in 6-7/2019. She was evaluated with upper endoscopy by Dr. Chew which showed a suspicious appearing esophageal lesion. Esophageal mass biopsy revealed infiltrating, moderately to poorly differentiated adenocarcinoma. CT scan chest/abdomen/pelvis 3/13/2020 showed wall thickening of the distal esophagus/hiatal hernia with gastrohepatic and mediastinal lymphadenopathy, highly concerning for malignancy. \par \par Neoadjuvant treatment was recommended.  She completed chemoRT on 5/19/2020.  Patient recently seen by [facility] for consideration of esophagectomy but patient has declined to proceed.\par \par Interval History: Patient presents for follow-up via Telemedicine. Is now on FOLFOX/Herceptin. Endorses treatment related nausea and vomiting 5 days post treatment. She is s/p IVF and is now using PRN lorazepam 0.5mg q12h.  She took one dose last night and believes it helped. Has not used any PRN ondanestron.\par \par She has been having pain in her upper back, lower back, esophagus and in her breasts. She has been using Oxycontin 30mg QID. She has been able to decrease PRN oxycodone IR dosage to 10mg q4-6h.  Pain gets high 8/10, as low as 4/10.  The upper back pain is an aching pain. She has tried using ibuprofen which does help at times with her low back pain. Acetaminophen does not help. \par \par She reports that esophageal burning has improved with prilosec and sucralfate.   She still has dysphagia, is swallowing a small amount of magic mouthrinse prior to meals. Endorses increased anxiousness/tearfulness due to starting systemic treatment. \par \par Current pain regimen consists of:\par Oxycontin 30mg QID\par Oxycodone IR 10 mg PRN (using q6h)\par Gabapentin 100mg BID (not using)\par \par ROS:\par + anxiety / no panic attacks + depressed mood\par + decreased appetite \par + treatment related nausea / vomiting\par ~40lb weight loss over the last 5 months \par + fatigue\par +sleeps propped up with pillows\par +chronic UE neuropathy due to cervical spine issues\par Denies dysgeusia, anxiety/depression, constipation, diarrhea.\par All other ROS as outlined or noncontributory. \par \par Patient is , lives with her . She has two adult children. Works for an insurance agency, has been working from home. She is independent in her ADLs, drives herself. \par \par I-Stop Ref#:  [id number]\par \par Of note, confirmed oxycodone IR 10mg #80 tabs were dispensed by [facility] on 3/28/21.  Informed that pharmacy is aware of issue and that computer update was completed overnight.

## 2021-04-09 NOTE — ASSESSMENT
[FreeTextEntry1] : 70yoF with:\par \par 1. Esophageal cancer - s/p chemoRT, now on FOLFOX/herceptin.  Follow up with Betty Mcdowell.\par \par 3. Acute on chronic back pain + new T4 met \par - Will increase oxycontin to 45/30/30/30mg.  May c/w oxycodone 10mg PRN q6h. Dispensed #70 tabs and patient is aware that we will continue to decrease with long acting alterations. She is aware that the ultimate goal is to decrease/wean PRN oxycodone IR.\par - Co-prescribed Naloxone spray 4 mg/0.1 ml intranasal, spray 0.1 mL into one nostril. Repeat with into other nostril after 2-3 minutes if no or minimal response (http://prescribetoprevent.org/prescribers/palliative/) \par \par 3. Esophageal pain - C/w Prilsoec, magic mouthwash.\par \par 4. Nausea \par - Recommend altering PRN lorazepam to 0.25mg q6h.\par - Recommend use of PRN ondanestron 4-8mg during off week as this was beneficial previously.  She is aware that should not be used for 5 days post-treatment.\par \par 5. Encounter for palliative care - Reports her  is her HCP. Copy requested. Emotional support provided.\par \par Follow up in 1 week, call sooner with questions or issues.

## 2021-04-14 NOTE — HISTORY OF PRESENT ILLNESS
[FreeTextEntry1] : Ms. Boyd is a 71yo woman with a [id number], likely Stage [place] moderate to poorly differentiated adenocarcinoma with circumferential involvement of the lower esophagus 38cm from the incisors with several gastrohepatic and mediastinal enlarged and FGD avid lymph nodes, causing weight loss and limited oral intake. She was unable to receive complete staging with EUS due to COVID-19 outbreak. She underwent chemoradiation, completing 45 Gy on 5/19/2020, with intent to consider resection subsequently however she denied resection. \par \par She then had a  biopsy proven T4 metastasis without progression elsewhere likely representing oligometastatic disease now undergoing SBRT, 24Gy/3 fractions. Her pain persists in her back, but stable. Lost some weight but appetite is improved now.  Denies paresthesias, bowel/.

## 2021-04-14 NOTE — ED PROVIDER NOTE - MUSCULOSKELETAL, MLM
Due to be dispensed Friday April 16, rx prepped,sign if approved.  
Refill Requested:    phentermine 30 MG capsule  Take 1 capsule by mouth every morning.    LF: 3/17/21        # 30        R-0  LOV: 3/25/21    PDMP reviewed; no aberrant behavior identified, prescription authorized. Last dispensed 3/17/21. Too Soon, Due Friday 4/16/21. Prepped if agreed. Please advise.       
rx prepped with those instructions to the pharmacist. Sign to go directly to pharmacy.  
no pedal edema

## 2021-04-14 NOTE — REASON FOR VISIT
[Follow-Up] : a follow-up visit [Home] : at home, [unfilled] , at the time of the visit. [Medical Office: ([facility])___] : at the medical office located in  [Verbal consent obtained from patient] : the patient, [unfilled]

## 2021-04-14 NOTE — PHYSICAL EXAM
[Restricted in physically strenuous activity but ambulatory and able to carry out work of a light or sedentary nature] : Status 1- Restricted in physically strenuous activity but ambulatory and able to carry out work of a light or sedentary nature, e.g., light house work, office work [Normal] : affect appropriate [de-identified] : non-toxic appearing [de-identified] : NT

## 2021-04-14 NOTE — ASSESSMENT
[FreeTextEntry1] : Lab work reviewed.\par #1) Esophageal cancer–status post chemotherapy/radiation. Patient declined surgery.  \par Developed recurrent disease with metastasis to thoracic vertebra–s/p biopsy with pathology results c/w metastatic adenocarcinoma from esophageal primary.  Foundation One testing, HER-2 testing   [id number] \par PD-L1 TPS (0%) and CPS (60).\par Patient consents to treatment as planned.\par  \par Continue f/u with palliative care team for cancer related symptom management. \par \par #2) h/o Anemia– hemoglobin currently WNL.    Continue to monitor CBC.\par Leukocytosis noted–no obvious infection to implicate clinically currently.  Suspect secondary to history of growth factor use.  Holding Neulasta Onpro with this cycle of treatment.\par \par Patient  given the opportunity to ask questions.  Her questions have been answered to her apparent satisfaction at this time.  She concurs with plans of care.\par \par \par

## 2021-04-14 NOTE — HISTORY OF PRESENT ILLNESS
[Disease: _____________________] : Disease: [unfilled] [N: ___] : N[unfilled] [M: ___] : M[unfilled] [de-identified] : Had C-spine surgeries 6/2019 and 7/2019-soon thereafter had difficulty swallowing which patient attributed to her neck surgeries. However, the feeling of something "sticking in her throat" when she ate or drank, gradually got worse. She had lost 14 pounds in prior few months.\par Upper endoscopy (Dr. Chew) showed a suspicious appearing lesion at the distal esophagus at 38 cm, circumferential, unable to traverse with the scope. Esophageal mass biopsy revealed infiltrating, moderately to poorly differentiated adenocarcinoma. CT scan chest/abdomen/pelvis showed wall thickening of the distal esophagus/hiatal hernia with gastrohepatic and mediastinal lymphadenopathy, highly concerning for malignancy. Asymmetric wall thickening of the right bladder slightly increased since prior exam. PET/CT scan showed FDG avid distal esophageal soft tissue thickening, consistent with known malignancy. FDG avid gastrohepatic ligaments, lymph nodes, probably metastatic. FDG avid subcarinal lymph node, probably metastatic. Minimally FDG avid subtle linear left upper lobe pulmonary infiltrate, nonspecific, new since 3/13/20.\par 4/2020-Began Carboplatin/Taxol along with radiation, and completing 5/2020.\par 6/2020-CT scan chest/abdomen/pelvis–thickened mid to distal esophagus and masslike thickening of the gastric cardia.  Prior mediastinal adenopathy resolved.  Prior perigastric lymph nodes resolved except for point lymph nodes smaller than the prior study.  New patchy groundglass opacities with low-grade airspace disease in the lungs.  Prior subcarinal adenopathy resolved.  Stable asymmetrical right lateral bladder wall thickening.  Asymmetrical thickening of the left levator ani muscle unchanged.  \par 7/2020–PET/CT scan showed considerably decreased extent and FDG avidity of soft tissue thickening distal esophagus.  Persistent mild FDG avidity may represent posttreatment inflammatory changes.  Resolved FDG avid mediastinal and gastrohepatic lymph nodes.  New linear segmental FDG avidity from the mid to distal esophagus, indeterminate.  Minimally FDG avid right lower lobe groundglass infiltrate, probably infectious/inflammatory, minimally improved since 6/2020 CT chest.\par 7/2020–EGD cardia/distal esophagus/proximal esophagus biopsies negative for malignancy.\par Saw Dr. Weaver in thoracic surgical consultation for consideration of esophagectomy-patient refused surgery.\par 10/2020-CT chest/abdomen/pelvis–mid to distal esophageal wall thickening appears less prominent.  Stable 3.2 cm masslike opacity within the gastric cardia.  9 mm perigastric lymph node appears decreased in size.\par \par 1/2021–CT scan chest/abdomen/pelvis–persistent thickening of the wall of the distal esophagus and GE junction unchanged compared to most recent prior study.  Single celiac node slightly smaller.  New lytic sclerotic metastatic disease involving T4 vertebral body with adjacent paravertebral mass.  CT-guided biopsy of thoracic paravertebral mass positive for malignant cells, metastatic adenocarcinoma with cytomorphology compatible with known esophageal adenocarcinoma. \par 3/2021-S/P RT to T4.\par 3/2021-Began FOLFOX/Herceptin. [de-identified] : Adenocarcinoma [de-identified] : Foundation medicine-PD-L1 CPS-60.\par [place] PD-L1 TPS-0%. [de-identified] : Intermittent back pain after opening the window the other day.  No new paresthesias or gait disturbance. \par No falls, trauma \par No current nausea/vomiting/abdominal pain.  No fevers, bleeding.  No complaints of chest pain, shortness of breath or calf pain.\par Hoping to receive IV fluids on day 3 of treatment (feels better with this).\par Continues to work.\par

## 2021-04-14 NOTE — DISEASE MANAGEMENT
[Clinical] : TNM Stage: c [TAMANNA] : TAMANNA [TTNM] : x [NTNM] : 2 [MTNM] : 0 [de-identified] : 2400cGy / 3Fx [de-identified] : 2400cGy / 3Fx [de-identified] : T4

## 2021-04-15 PROBLEM — K22.8 ESOPHAGEAL PAIN: Status: ACTIVE | Noted: 2020-01-01

## 2021-04-15 NOTE — ASSESSMENT
[FreeTextEntry1] : 70yoF with:\par \par 1. Esophageal cancer - s/p chemoRT, now on FOLFOX/herceptin.  Follow up with Betty Mcdowell.\par \par 2. Acute on chronic back pain + new T4 met s/p RT\par - C/w oxycontin 45/30/30/30mg.  May c/w oxycodone 10mg PRN q6h. Last RX #70 tabs and patient is aware that we will continue to decrease moving forward. She is aware that the ultimate goal is to decrease/wean PRN oxycodone IR and that she will do so by attempting to increase time between PRNs.  \par - Co-prescribed Naloxone spray 4 mg/0.1 ml intranasal, spray 0.1 mL into one nostril. Repeat with into other nostril after 2-3 minutes if no or minimal response (http://prescribetoprevent.org/prescribers/palliative/) \par - Restart gabapentin 100mg TID.\par - Pill count today of oxycodone IR 10mg #26 tabs with refill of 70 tabs 7 days ago.\par \par 3. Esophageal pain - C/w Prilsoec, magic mouthwash.\par \par 4. Nausea - Patient previously started on PRN lorazepam to 0.25mg q6h. It is preferable to utilize a non-benzodiazepine due to current opioid use with preexisting history of OUD. However, lorazepam provided relief to refractory nausea.  Would consider zyprexa PRN.\par \par 5. Adjustment disorder - Patient with intermittent tearfulness previous few visits.  She does not share her emotions with her family because she does not want to burden them.  Introduced to Dr. Manuel, will arrange to establish care.\par \par 6. Encounter for palliative care - Reports her  is her HCP. Copy requested. Emotional support provided.\par \par Follow up in 2 weeks, call sooner with questions or issues.

## 2021-04-15 NOTE — HISTORY OF PRESENT ILLNESS
[Opioids for treatment of cancer not in remission, and/or  hospice, palliative care] : Opioids for treatment of cancer not in remission, and/or  hospice, palliative care [FreeTextEntry1] : 70yoF with esophageal cancer presents for follow up palliative care visit.\par PMH significant for HTN, anemia, cervical degenerative disc s/p cervical laminectomy x2 (2019), former smoker (1 PPD x50 years), h/o OUD (oxycodone, was on Suboxone until about 4 years ago)\par \par Patient began experiencing progressive dysphagia with associated weight loss of ~15 lbs following C-spine surgeries in 6-7/2019. She was evaluated with upper endoscopy by Dr. Chew which showed a suspicious appearing esophageal lesion. Esophageal mass biopsy revealed infiltrating, moderately to poorly differentiated adenocarcinoma. CT scan chest/abdomen/pelvis 3/13/2020 showed wall thickening of the distal esophagus/hiatal hernia with gastrohepatic and mediastinal lymphadenopathy, highly concerning for malignancy. \par \par Neoadjuvant treatment was recommended.  She completed chemoRT on 5/19/2020.  Patient recently seen by [facility] for consideration of esophagectomy but patient has declined to proceed. Is s/p RT to T4 for new met - 3/2021.\par \par Interval History: Patient presents for follow-up seen in treatment room. Is now on FOLFOX/Herceptin. \par \par Patient reports cervical and chronic lumbar back pain to be stable. Continues to experience chronic LUE numbness at times.  Is experiencing new intermittent thoracic back pain after opening the window last week. She has been using Oxycontin 45/30/30/30mg. She has been able to decrease PRN oxycodone IR dosage to 10mg q4-6h.  Pain gets high 8/10, as low as 4/10.  The upper back pain is an aching pain. Acetaminophen does not help. \par \par She reports that esophageal burning has improved with prilosec and sucralfate.   She still has dysphagia, is swallowing a small amount of magic mouthrinse prior to meals. Endorses increased anxiousness/tearfulness due to starting systemic treatment. 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 \par \par Patient is , lives with her . She has two adult children. Works for an insurance agency, has been working from home. She is independent in her ADLs, drives herself. \par \par I-Stop Ref#:  [id number]

## 2021-04-15 NOTE — PHYSICAL EXAM
[General Appearance - Alert] : alert [General Appearance - In No Acute Distress] : in no acute distress [Oriented To Time, Place, And Person] : oriented to person, place, and time [Sclera] : the sclera and conjunctiva were normal [Neck Appearance] : the appearance of the neck was normal [] : no respiratory distress [Auscultation Breath Sounds / Voice Sounds] : lungs were clear to auscultation bilaterally [Heart Rate And Rhythm] : heart rate was normal and rhythm regular [Heart Sounds] : normal S1 and S2 [Edema] : there was no peripheral edema [Bowel Sounds] : normal bowel sounds [Abdomen Soft] : soft [Abdomen Tenderness] : non-tender [Abnormal Walk] : normal gait [Skin Color & Pigmentation] : normal skin color and pigmentation [No Focal Deficits] : no focal deficits [FreeTextEntry1] : Intermittent tearfulness

## 2021-04-28 PROBLEM — F43.20 ADJUSTMENT DISORDER: Status: ACTIVE | Noted: 2021-01-01

## 2021-04-28 NOTE — HISTORY OF PRESENT ILLNESS
[Opioids for treatment of cancer not in remission, and/or  hospice, palliative care] : Opioids for treatment of cancer not in remission, and/or  hospice, palliative care [FreeTextEntry1] : 70yoF with recurrent esophageal cancer presents for follow up palliative care visit.\par PMH significant for HTN, anemia, cervical degenerative disc s/p cervical laminectomy x2 (2019), former smoker (1 PPD x50 years), h/o OUD (oxycodone, was on Suboxone until about 4 years ago)\par \par Patient began experiencing progressive dysphagia with associated weight loss of ~15 lbs following C-spine surgeries in 6-7/2019. She was evaluated with upper endoscopy by Dr. Chew which showed a suspicious appearing esophageal lesion. Esophageal mass biopsy revealed infiltrating, moderately to poorly differentiated adenocarcinoma. CT scan chest/abdomen/pelvis 3/13/2020 showed wall thickening of the distal esophagus/hiatal hernia with gastrohepatic and mediastinal lymphadenopathy, highly concerning for malignancy. \par \par Neoadjuvant treatment was recommended.  She completed chemoRT on 5/19/2020.  Patient recently seen by [facility] for consideration of esophagectomy but patient has declined to proceed. Is s/p RT to T4 for new met - 3/2021.\par \par Interval History (4/28/21): Visit done via telemedicine.  Pt now on FOLFOX/Herceptin. Her last infusion was 4/14. She delayed today's scheduled session due to feeling unwell. She has been experiencing nauseous, lost her appetite. She had IVF last week on 4/23 which she found to help a bit. \par \par Patient reports cervical and chronic lumbar back pain to be stable. Continues to experience chronic LUE numbness at times. The acute back pain that occurred after opening a window about 3 weeks ago is still present, not as prominent. She has been using Oxycontin 45/30/30mg. The morning is the worse time of day for her pain, she finds it very hard to get moving.  PRN oxycodone IR dosage to 10-20mg q4-6h  .  Pain gets high 8/10, as low as 4/10.  The upper back pain is an aching pain. Acetaminophen does not help. \par \par The upper back pain is still a prominent issue, still burning. She is using magic mouthwash TID which helps. 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 \par \par Patient is , lives with her . She has two adult children. 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 She is independent in her ADLs, drives herself. \par \par I-Stop Ref#:  [id number]

## 2021-04-28 NOTE — PHYSICAL EXAM
[General Appearance - Alert] : alert [General Appearance - In No Acute Distress] : in no acute distress [Edema] : there was no peripheral edema [Bowel Sounds] : normal bowel sounds [Abdomen Soft] : soft [Abdomen Tenderness] : non-tender [No Focal Deficits] : no focal deficits [Oriented To Time, Place, And Person] : oriented to person, place, and time

## 2021-04-28 NOTE — ASSESSMENT
[FreeTextEntry1] : 70yoF with:\par \par 1. Esophageal cancer - s/p chemoRT, now on FOLFOX/herceptin.  Follow up with Betty Mcdowell.\par \par 2. Acute on chronic back pain + new T4 met s/p RT\par - C/w oxycontin, alter dosing to 60/30/30mg.  May c/w oxycodone 10mg PRN q6h, she is aware that our goal is to decrease/wean PRN oxycodone IR and that she will do so by attempting to increase time between PRNs.  \par - Co-prescribed Naloxone spray 4 mg/0.1 ml intranasal, spray 0.1 mL into one nostril. Repeat with into other nostril after 2-3 minutes if no or minimal response (http://prescribetoprevent.org/prescribers/palliative/) \par - Increase gabapentin to 200mg TID.\par \par 3. Esophageal pain - C/w Prilsoec, magic mouthwash.\par \par 4. Nausea - Patient previously started on PRN lorazepam to 0.25mg q6h. It is preferable to utilize a non-benzodiazepine due to current opioid use with preexisting history of OUD. However, lorazepam provided relief to refractory nausea.  Would consider zyprexa PRN.\par \par 5. Adjustment disorder - Patient with intermittent tearfulness previous few visits.  She does not share her emotions with her family because she does not want to burden them.  Introduced to Dr. Manuel, will arrange to establish care.\par \par 6. Encounter for palliative care - Reports her  is her HCP. Copy requested. Emotional support provided.\par \par Follow up in 2 weeks, call sooner with questions or issues.

## 2021-05-05 NOTE — VITALS
[Maximal Pain Intensity: 10/10] : 10/10 [Least Pain Intensity: 7/10] : 7/10 [Pain Description/Quality: ___] : Pain description/quality: [unfilled] [Pain Duration: ___] : Pain duration: [unfilled] [Pain Location: ___] : Pain Location: [unfilled] [Pain Interferes with ADLs] : Pain interferes with activities of daily living. [Opioid] : opioid [80: Normal activity with effort; some signs or symptoms of disease.] : 80: Normal activity with effort; some signs or symptoms of disease.  [ECOG Performance Status: 2 - Ambulatory and capable of all self care but unable to carry out any work activities] : Performance Status: 2 - Ambulatory and capable of all self care but unable to carry out any work activities. Up and about more than 50% of waking hours

## 2021-05-11 NOTE — REVIEW OF SYSTEMS
[Muscle Pain] : muscle pain [Easy Bruising] : a tendency for easy bruising [Negative] : Neurological [Constipation: Grade 0] : Constipation: Grade 0 [Diarrhea: Grade 0] : Diarrhea: Grade 0 [Dysphagia: Grade 0] : Dysphagia: Grade 0 [Nausea: Grade 0] : Nausea: Grade 0 [Vomiting: Grade 0] : Vomiting: Grade 0 [Fatigue: Grade 1 - Fatigue relieved by rest] : Fatigue: Grade 1 - Fatigue relieved by rest [Cough: Grade 0] : Cough: Grade 0 [Cognitive Disturbance: Grade 0] : Cognitive Disturbance: Grade 0 [Peripheral Motor Neuropathy: Grade 0] : Peripheral Motor Neuropathy: Grade 0 [Peripheral Sensory Neuropathy: Grade 0] : Peripheral Sensory Neuropathy: Grade 0

## 2021-05-11 NOTE — HISTORY OF PRESENT ILLNESS
[Home] : at home, [unfilled] , at the time of the visit. [Medical Office: ([facility])___] : at the medical office located in  [Verbal consent obtained from patient] : the patient, [unfilled] [FreeTextEntry4] : Harvey Boudreaux RN [FreeTextEntry1] : Ms. Boyd is a 72yo woman with a TxN2Mx, likely Stage [place] moderate to poorly differentiated adenocarcinoma with circumferential involvement of the lower esophagus 38cm from the incisors with several gastrohepatic and mediastinal enlarged and FGD avid lymph nodes, causing weight loss and limited oral intake. She was unable to receive complete staging with EUS due to COVID-19 outbreak. She underwent chemoradiation, completing 45 Gy on 5/19/2020, with intent to consider resection subsequently however she denied resection. She then developed a biopsy proven T4 spine metastasis without progression elsewhere likely representing oligometastatic disease for which she underwent SBRT 2,400 cGy in 3 fractions on 3/25/2021. Tolerated it well and now returns for follow-up.\par \par Spoke to pt today, she states a couple of weeks ago she opened a window and felt something pop in back. Since then she is having 10/10 pain diffusely in the back, slightly worse in the lower back, and also on her right side. Pain decreases to a 7/10 with her prescribed Oxycodone regimen. She spoke to  but no other tests were ordered yet. No SOB and eating well. \par

## 2021-05-11 NOTE — DISEASE MANAGEMENT
[Clinical] : TNM Stage: c [TAMANNA] : TAMANNA [FreeTextEntry4] : lung cancer [TTNM] : x [NTNM] : 2 [MTNM] : 0

## 2021-05-12 PROBLEM — G89.3 PAIN, NEOPLASM-RELATED: Status: ACTIVE | Noted: 2021-01-01

## 2021-05-12 PROBLEM — Z51.5 ENCOUNTER FOR PALLIATIVE CARE: Status: ACTIVE | Noted: 2020-01-01

## 2021-05-12 PROBLEM — M54.9 CHRONIC BACK PAIN: Status: ACTIVE | Noted: 2020-01-01

## 2021-05-12 PROBLEM — R11.0 NAUSEA: Status: ACTIVE | Noted: 2020-01-01

## 2021-05-12 PROBLEM — Z91.89 AT RISK FOR NAUSEA: Status: ACTIVE | Noted: 2020-04-02

## 2021-05-12 PROBLEM — D64.9 ANEMIA: Status: ACTIVE | Noted: 2020-04-21

## 2021-05-12 PROBLEM — C79.51 METASTATIC CARCINOMA TO BONE: Status: ACTIVE | Noted: 2021-01-01

## 2021-05-12 PROBLEM — F43.21 ADJUSTMENT DISORDER WITH DEPRESSED MOOD: Status: ACTIVE | Noted: 2021-01-01

## 2021-05-12 PROBLEM — C15.9 ADENOCARCINOMA OF ESOPHAGUS: Status: ACTIVE | Noted: 2020-03-18

## 2021-05-12 NOTE — ASSESSMENT
[FreeTextEntry1] : Lab work reviewed.\par #1) Esophageal cancer–status post chemotherapy/radiation. Patient declined surgery.  \par Developed recurrent disease with metastasis to thoracic vertebra–s/p biopsy with pathology results c/w metastatic adenocarcinoma from esophageal primary.  +ERBB2 mutation.\par Patient consents to treatment as planned.\par CT scans as ordered.\par  \par Continue f/u with palliative care team for cancer related symptom management. \par \par #2) h/o Anemia– hemoglobin currently WNL.    Continue to monitor CBC.\par \par Patient  given the opportunity to ask questions.  Her questions have been answered to her apparent satisfaction at this time.  She concurs with plans of care.\par \par \par

## 2021-05-12 NOTE — PHYSICAL EXAM
[Restricted in physically strenuous activity but ambulatory and able to carry out work of a light or sedentary nature] : Status 1- Restricted in physically strenuous activity but ambulatory and able to carry out work of a light or sedentary nature, e.g., light house work, office work [Normal] : affect appropriate [de-identified] : non-toxic appearing [de-identified] : NT

## 2021-05-12 NOTE — HISTORY OF PRESENT ILLNESS
[Disease: _____________________] : Disease: [unfilled] [N: ___] : N[unfilled] [M: ___] : M[unfilled] [de-identified] : Had C-spine surgeries 6/2019 and 7/2019-soon thereafter had difficulty swallowing which patient attributed to her neck surgeries. However, the feeling of something "sticking in her throat" when she ate or drank, gradually got worse. She had lost 14 pounds in prior few months.\par Upper endoscopy (Dr. Chew) showed a suspicious appearing lesion at the distal esophagus at 38 cm, circumferential, unable to traverse with the scope. Esophageal mass biopsy revealed infiltrating, moderately to poorly differentiated adenocarcinoma. CT scan chest/abdomen/pelvis showed wall thickening of the distal esophagus/hiatal hernia with gastrohepatic and mediastinal lymphadenopathy, highly concerning for malignancy. Asymmetric wall thickening of the right bladder slightly increased since prior exam. PET/CT scan showed FDG avid distal esophageal soft tissue thickening, consistent with known malignancy. FDG avid gastrohepatic ligaments, lymph nodes, probably metastatic. FDG avid subcarinal lymph node, probably metastatic. Minimally FDG avid subtle linear left upper lobe pulmonary infiltrate, nonspecific, new since 3/13/20.\par 4/2020-Began Carboplatin/Taxol along with radiation, and completing 5/2020.\par 6/2020-CT scan chest/abdomen/pelvis–thickened mid to distal esophagus and masslike thickening of the gastric cardia.  Prior mediastinal adenopathy resolved.  Prior perigastric lymph nodes resolved except for point lymph nodes smaller than the prior study.  New patchy groundglass opacities with low-grade airspace disease in the lungs.  Prior subcarinal adenopathy resolved.  Stable asymmetrical right lateral bladder wall thickening.  Asymmetrical thickening of the left levator ani muscle unchanged.  \par 7/2020–PET/CT scan showed considerably decreased extent and FDG avidity of soft tissue thickening distal esophagus.  Persistent mild FDG avidity may represent posttreatment inflammatory changes.  Resolved FDG avid mediastinal and gastrohepatic lymph nodes.  New linear segmental FDG avidity from the mid to distal esophagus, indeterminate.  Minimally FDG avid right lower lobe groundglass infiltrate, probably infectious/inflammatory, minimally improved since 6/2020 CT chest.\par 7/2020–EGD cardia/distal esophagus/proximal esophagus biopsies negative for malignancy.\par Saw Dr. Weaver in thoracic surgical consultation for consideration of esophagectomy-patient refused surgery.\par 10/2020-CT chest/abdomen/pelvis–mid to distal esophageal wall thickening appears less prominent.  Stable 3.2 cm masslike opacity within the gastric cardia.  9 mm perigastric lymph node appears decreased in size.\par \par 1/2021–CT scan chest/abdomen/pelvis–persistent thickening of the wall of the distal esophagus and GE junction unchanged compared to most recent prior study.  Single celiac node slightly smaller.  New lytic sclerotic metastatic disease involving T4 vertebral body with adjacent paravertebral mass.  CT-guided biopsy of thoracic paravertebral mass positive for malignant cells, metastatic adenocarcinoma with cytomorphology compatible with known esophageal adenocarcinoma. \par 3/2021-S/P RT to T4.\par 3/2021-Began FOLFOX/Herceptin. [de-identified] : Adenocarcinoma [de-identified] : Foundation medicine-PD-L1 CPS-60. +ERBB2 mutation. DONI. TMB 4 Muts/Mb.\par [place] PD-L1 TPS-0%. [de-identified] : +Upper back pain increased over last 3 weeks-started when lifting kitchen window. Has CT scan tomorrow.\par Palliative care assisting with pain management.\par No new paresthesias or gait disturbance. \par No current abdominal pain.  No fevers, bleeding.  No complaints of shortness of breath or calf pain.\par \par

## 2021-05-13 NOTE — HISTORY OF PRESENT ILLNESS
[Opioids for treatment of cancer not in remission, and/or  hospice, palliative care] : Opioids for treatment of cancer not in remission, and/or  hospice, palliative care [FreeTextEntry1] : 70yoF with recurrent esophageal cancer presents for follow up palliative care visit.\par PMH significant for HTN, anemia, cervical degenerative disc s/p cervical laminectomy x2 (2019), former smoker (1 PPD x50 years), h/o OUD (oxycodone, was on Suboxone until about 4 years ago)\par \par Patient began experiencing progressive dysphagia with associated weight loss of ~15 lbs following C-spine surgeries in 6-7/2019. She was evaluated with upper endoscopy by Dr. Chew which showed a suspicious appearing esophageal lesion. Esophageal mass biopsy revealed infiltrating, moderately to poorly differentiated adenocarcinoma. CT scan chest/abdomen/pelvis 3/13/2020 showed wall thickening of the distal esophagus/hiatal hernia with gastrohepatic and mediastinal lymphadenopathy, highly concerning for malignancy. \par \par Neoadjuvant treatment was recommended.  She completed chemoRT on 5/19/2020.  Patient recently seen by [facility] for consideration of esophagectomy but patient has declined to proceed. Is s/p RT to T4 for new met - 3/2021.\par \par Patient reports cervical and chronic lumbar back pain to be stable. Continues to experience chronic LUE numbness at times. The acute back pain that occurred after opening a window about 3 weeks ago is still present, not as prominent. She has been using Oxycontin 45/30/30mg. The morning is the worse time of day for her pain, she finds it very hard to get moving.  PRN oxycodone IR dosage to 10-20mg q4-6h  .  Pain gets high 8/10, as low as 4/10.  The upper back pain is an aching pain. Acetaminophen does not help. \par \par Interval History (5/12/21):  Pt now on FOLFOX/Herceptin. Her last infusion was 4/14.  Nausea and appetite have improved.  Patient reports increased upper back pain since last visit.  Patient self discontinued oxycontin a few days ago because she did not believe this was helping her pain.  She has been using PRN oxycodone IR 30mg up to four times per day. \par \par Current pain regimen consists of:\par Oxycontin 45/30/30mg   (has not used for past 3 days)\par Oxycodone IR 30 mg PRN (using q6h) \par Gabapentin 100mg TID\par \par ROS:\par + anxiety / no panic attacks + depressed mood - intermittent tearfulness\par + decreased appetite \par + treatment related nausea / vomiting - using PRN lorazepam 0.5mg Q12h with ODT ondansetron Q8h\par ~40lb weight loss over the last 5 months \par + fatigue\par + sleeps propped up with pillows\par + chronic UE neuropathy due to cervical spine issues\par Denies dysgeusia, anxiety/depression, constipation, diarrhea.\par All other ROS as outlined or noncontributory. \par \par Patient is , lives with her . She has two adult children. 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 She is independent in her ADLs, drives herself. \par \par I-Stop Ref#:  [id number]

## 2021-05-13 NOTE — PHYSICAL EXAM
[General Appearance - Alert] : alert [General Appearance - In No Acute Distress] : in no acute distress [Edema] : there was no peripheral edema [Bowel Sounds] : normal bowel sounds [Abdomen Soft] : soft [Abdomen Tenderness] : non-tender [No Focal Deficits] : no focal deficits [Oriented To Time, Place, And Person] : oriented to person, place, and time [Sclera] : the sclera and conjunctiva were normal [Neck Appearance] : the appearance of the neck was normal [Auscultation Breath Sounds / Voice Sounds] : lungs were clear to auscultation bilaterally [] : no respiratory distress [Heart Rate And Rhythm] : heart rate was normal and rhythm regular [Heart Sounds] : normal S1 and S2 [Abnormal Walk] : normal gait [Skin Color & Pigmentation] : normal skin color and pigmentation [FreeTextEntry1] : Intermittently tearful

## 2021-05-13 NOTE — ASSESSMENT
[FreeTextEntry1] : 71yoF with:\par \par 1. Esophageal cancer - s/p chemoRT, now on FOLFOX/herceptin.  Scans pending. Follow up with Betty Mcdowell.\par \par 2. Acute on chronic back pain + new T4 met s/p RT\par - Start methadone 5mg TID, may c/w oxycodone 10mg PRN q6h, she is aware that our goal is to decrease/wean PRN oxycodone IR and that she will do so by attempting to increase time between PRNs.  \par - Co-prescribed Naloxone spray 4 mg/0.1 ml intranasal, spray 0.1 mL into one nostril. Repeat with into other nostril after 2-3 minutes if no or minimal response (http://prescribetoprevent.org/prescribers/palliative/) \par - Increase gabapentin to 200mg TID.\par \par 3. Esophageal pain - C/w Prilsoec, magic mouthwash.\par \par 4. Nausea - Patient previously started on PRN lorazepam to 0.25mg q6h. It is preferable to utilize a non-benzodiazepine due to current opioid use with preexisting history of OUD. However, lorazepam provided relief to refractory nausea.  Would consider zyprexa PRN.\par \par 5. Adjustment disorder - Patient with intermittent tearfulness previous few visits.  She does not share her emotions with her family because she does not want to burden them.   Will start sertraline 25mg QD.  C/w psycho-oncology follow-up. \par \par 6. Encounter for palliative care - Reports her  is her HCP. Copy requested. Emotional support provided.\par \par Follow up in 1 week, call sooner with questions or issues.

## 2021-05-14 NOTE — ED ADULT NURSE NOTE - CHIEF COMPLAINT QUOTE
Pt sent from [facility] for abd pain x4 days and back pain x1 month.  +Nausea.  No vomiting or diarrhea.  No fever.  Hx esophageal cancer with bone mets.  Last chemo Wednesday.  Fentanyl 50 mg given by EMS.  #20g L hand by EMS

## 2021-05-14 NOTE — ED ADULT NURSE NOTE - OBJECTIVE STATEMENT
Break RN: Patient is a 71-year-old female A&OX3, brought in by EMS, ambulatory at baseline c/o abdominal pain X 4 days and back pain that started X 1 month ago. Pt coming from [facility], last chemo on Wednesday. No vomit, diarrhea, fevers, chills. Pt states "It feels like when I had diverticulitis in the past". + Nausea. Pt arrives with a 20 G to L hand. Was given pain medication in the field. No relief at present. Placed on cardiac monitor. BP noted to be elevated. Awaiting MD evaluation. Respirations even and unlabored, chest rise equal b/l. Awaiting MD evaluation. Will continue to monitor.

## 2021-05-14 NOTE — H&P ADULT - PROBLEM SELECTOR PLAN 4
Na 133 on presentation, mild hyponatremia   -will monitor for SIADH iso pain w/ hesitancy x 1 day, no dysuria or hx of UTI  -UA neg  -f/u UCx  -monitor off abx for now

## 2021-05-14 NOTE — H&P ADULT - HISTORY OF PRESENT ILLNESS
72 yo female w/ a PMHx of metastatic esophageal adenocarcinoma (s/p carbo/taxol and RT 4/2020-5/2020, started on folfox/herceptin 3/2021,last 4/14) w/ T4 lytic lesion and paravertebral mass (dx 1/2021, s/p RT to T4 3/2021), refused esophagectomy, HTN, anemia, degenerative disc disease of C-spine s/p laminectomy x2 (2019), adjustment disorder, former smoker (50 pack years) p/f heme/onc Dr. Shelby's ([facility] [place]) office for acute back pain worsening x 3 weeks. Pain started after opening a kitchen window three weeks ago, worse w/ movement. Home regimen of oxycontin w/ PRN oxycodone was not working. Recently seen by palliative care Dr. Zhao on 5/12, oxycontin switched to methadone w/ oxy PRN. A CTAP was obtained for further investigation 5/13 showing new T10 compression fx likely pathologic (results via HIE).    ED vitals:  ED labs:   Radiology 5/13 (via HIE): Lucent and sclerotic lesion at T4 with mild compression deformity as a prior imaging. New heterogeneous sclerosis with a new compression fracture at T10 likely pathologic.  ED course: 72 yo female w/ a PMHx of metastatic esophageal adenocarcinoma (s/p carbo/taxol and RT 4/2020-5/2020, started on folfox/herceptin 3/2021,last 5/12) w/ T4 lytic lesion and paravertebral mass dx 1/2021 (s/p RT to T4 3/2021), refused esophagectomy, HTN, HLD, degenerative disc disease of C-spine s/p laminectomy x2 (2019), and adjustment disorder p/f heme/onc Dr. Shelby's ([facility] [place]) office for acute back pain worsening x 3 weeks. Pain is 10/10, mid lower back, radiates to R abdomen, worse w/ movement. Pain started after opening a kitchen window three weeks ago at which time she "felt a pop." Home regimen of oxycontin w/ PRN oxycodone was not working (reduced pain to 7/10). Seen by rad-onc Dr. Trevor Nguyen 5/5 who was c/f progression of disease at which time CTAP was ordered. Also seen by palliative care Dr. Zhao on 5/12, oxycontin switched to methadone and continued on oxy PRN. CTAP 5/13 now showing new T10 compression fx likely pathologic (results via HIE).    ED vitals: Tmaz 97.8, HR 70-80, /83 -210/89, 93% RA, RR 24   ED labs: WCB 6.49, Na 133, , AST 34, UA neg (small ketones)  Radiology 5/13 (via HIE): Lucent and sclerotic lesion at T4 with mild compression deformity as a prior imaging. New heterogeneous sclerosis with a new compression fracture at T10 likely pathologic.  ED course: s/p LR 1L, dilaudid x 3, lido patch, labetalol 50 x 1, losartan 100 x1 72 yo female w/ a PMHx of metastatic esophageal adenocarcinoma (s/p carbo/taxol and RT 4/2020-5/2020, started on folfox/herceptin 3/2021,last 5/12) w/ T4 lytic lesion and paravertebral mass dx 1/2021 (s/p RT to T4 3/2021), refused esophagectomy, HTN, HLD, degenerative disc disease of C-spine s/p laminectomy x2 (2019), and adjustment disorder p/f heme/onc Dr. Shelby's ([facility] [place]) office for acute back pain worsening x 3 weeks. Pain is 10/10, mid lower back, radiates to R abdomen (onset 4-5 days ago), worse w/ movement. Pain started after opening a kitchen window three weeks ago at which time she "felt a pop." Home regimen of oxycontin w/ PRN oxycodone was not working (reduced pain to 7/10). Seen by rad-onc Dr. Trevor Nguyen 5/5 who was c/f progression of disease at which time CTAP was ordered. Also seen by palliative care Dr. Zhao on 5/12, oxycontin switched to methadone and continued on oxy PRN. Patient is not taking methadone. Is using oxy 30 q4. CTAP 5/13 now showing new T10 compression fx likely pathologic (results via HIE). Patient denies fevers, chills, vomiting, diarrhea. loss of bladder or bowel control, saddle anesthesia, any falls or trauma. Endorses urinary frequency and hesitancy x 1 day w/o suprapubic pain, dysuria, or hematuria. No hx of UTIs.     ED vitals: Tmaz 97.8, HR 70-80, /83 -210/89, 93% RA, RR 24   ED labs: WCB 6.49, Na 133, , AST 34, UA neg (small ketones)  Radiology 5/13 (via HIE): Lucent and sclerotic lesion at T4 with mild compression deformity as a prior imaging. New heterogeneous sclerosis with a new compression fracture at T10 likely pathologic.  ED course: s/p LR 1L, dilaudid x 3, lido patch, labetalol 50 x 1, losartan 100 x1

## 2021-05-14 NOTE — H&P ADULT - ATTENDING COMMENTS
Pt seen and examined. I discussed the case with Dr. Wade and agree. 70 y/o female with metastatic esophageal adenoca presenting wih acute back pain for 3 weeks found to have new T10 likely pathologic compression fracture complicated by hypertensive urgency. No weakness, sensation loss or incontinence reported. Continue pain control, give labetalol prn. spine, onc consult.

## 2021-05-14 NOTE — H&P ADULT - PROBLEM SELECTOR PLAN 3
, ALT 34 on presentation. Unclear etiology, patient does endorse back pain radiating to R-sided abdomen  -CTAP 5/13 w/ normal caliber bile ducts and GB, normal kidneys   -likely incidental finding  -continue to trend CMP , ALT 34 on presentation. Unclear etiology, patient does endorse back pain radiating to R-sided abdomen  -CTAP 5/13 w/ normal caliber bile ducts and GB, normal kidneys   -likely incidental finding, neg Huff on exam   -continue to trend CMP , AST 34 on presentation. Unclear etiology, patient does endorse back pain radiating to R-sided abdomen  -CTAP 5/13 w/ normal caliber bile ducts and GB, normal kidneys   -likely incidental finding, neg Huff on exam   -continue to trend LFTs

## 2021-05-14 NOTE — H&P ADULT - PROBLEM SELECTOR PLAN 1
New acute back pain x 3 weeks refractory to home opioid regimen (on methadone 5mg BID and oxy 10 q4 PRN, gabapentin 200 TID). Of note s/p recent RT 3/2021 for T4 lytic lesion.   -CTAP 5/13 (via HIE): New heterogeneous sclerosis with a new compression fracture at T10 likely pathologic. Lucent and sclerotic lesion at T4 with mild compression deformity as seen on prior imaging.   -c/w dilaudid 1mg q4 hrs PRN mod-severe pain  -c/w home gabapentin 200 mg TID   -spine consult   -rad onc consult in AM   -patient will likely require TLSO brace, limited weight bearing until then   -palliative consult for pain mgt in AM  -PT consult in AM New acute back pain x 3 weeks refractory to home opioid regimen (on methadone 5mg TID and oxy 10 q4 PRN, gabapentin 200 TID at home). Of note s/p recent RT 3/2021 for T4 lytic lesion.   -CTAP 5/13 (via HIE): New heterogeneous sclerosis with a new compression fracture at T10 likely pathologic. Lucent and sclerotic lesion at T4 with mild compression deformity as seen on prior imaging.   -c/w dilaudid 1mg q4 hrs PRN mod-severe pain  -spine consult now  -rad onc consult re: role of palliative chemo in AM  -patient will likely require TLSO brace, limited weight bearing until then   -palliative consult for pain mgt in AM  -PT consult in AM New acute back pain x 3 weeks refractory to home opioid regimen (on oxy 30 q4 PRN, not using methadone or gabapentin as prescribed). Of note s/p recent RT 3/2021 for T4 lytic lesion.   -CTAP 5/13 (via HIE): New heterogeneous sclerosis with a new compression fracture at T10 likely pathologic. Lucent and sclerotic lesion at T4 with mild compression deformity as seen on prior imaging.   -c/w dilaudid 1mg q4 hrs PRN mod-severe pain  -c/w lidocaine patch over T10  -spine consult now  -rad onc consult re: role of palliative chemo in AM  -patient will likely require TLSO brace   -palliative consult for pain mgt in AM  -PT consult in AM New acute back pain x 3 weeks refractory to home opioid regimen (on oxy 30 q4 PRN, not using methadone or gabapentin as prescribed). Of note s/p recent RT 3/2021 for T4 lytic lesion.   -CTAP 5/13 (via HIE): New heterogeneous sclerosis with a new compression fracture at T10 likely pathologic. Lucent and sclerotic lesion at T4 with mild compression deformity as seen on prior imaging.   -c/w dilaudid 1mg q4 hrs PRN mod-severe pain  -c/w lidocaine patch over T10  -spine consult, strict bed rest until then   -rad onc consult re: role of palliative chemo in AM  -patient will likely require TLSO brace   -palliative consult for pain mgt in AM  -PT consult in AM New acute back pain x 3 weeks refractory to home opioid regimen (on oxy 30 q4 PRN, not using methadone or gabapentin as prescribed). No neurological deficits on exam.   -CTAP 5/13 (via HIE): New heterogeneous sclerosis with a new compression fracture at T10 likely pathologic. Lucent and sclerotic lesion at T4 with mild compression deformity as seen on prior imaging.   -c/w dilaudid 1mg q4 hrs PRN mod-severe pain  -c/w lidocaine patch over T10  -spine consult (paged, pending response)  -rad onc consult re: role of palliative chemo in AM  -patient will likely require TLSO brace, bed rest until then   -palliative consult for pain mgt in AM  -PT consult in AM New acute back pain x 3 weeks refractory to home opioid regimen (on oxy 30 q4 PRN, not using methadone or gabapentin as prescribed). No neurological deficits on exam.   -CTAP 5/13 (via HIE): New heterogeneous sclerosis with a new compression fracture at T10 likely pathologic. Lucent and sclerotic lesion at T4 with mild compression deformity as seen on prior imaging.   -c/w dilaudid 1mg q4 hrs PRN mod-severe pain  -c/w lidocaine patch over T10  -spine/ortho consult now  -rad onc consult re: role of palliative chemo in AM  -patient will likely require TLSO brace, bed rest until then   -palliative consult for pain mgt in AM  -PT consult in AM

## 2021-05-14 NOTE — H&P ADULT - NSICDXPASTSURGICALHX_GEN_ALL_CORE_FT
PAST SURGICAL HISTORY:  Class 1 obesity with body mass index (BMI) of 33.0 to 33.9 in adult     H/O:  x 2 -    H/O: hysterectomy partial     H/O: osteoarthritis     S/P laminectomy 19- Anterior Cervical Laminectomy

## 2021-05-14 NOTE — ED ADULT TRIAGE NOTE - CHIEF COMPLAINT QUOTE
Pt sent from [facility] for abd pain x4 days and back pain x1 month.  +Nausea.  No vomiting or diarrhea.  No fever.  Hx esophageal cancer with bone mets.  Last chemo Wednesday.  Fentanyl 50 mg given by EMS.  #20g L hand by EMS

## 2021-05-14 NOTE — H&P ADULT - NSHPLABSRESULTS_GEN_ALL_CORE
133<L>  |  93<L>  |  21  ----------------------------<  116<H>  4.7   |  27  |  0.70    Ca    9.6      14 May 2021 17:01    TPro  7.2  /  Alb  4.2  /  TBili  0.4  /  DBili  x   /  AST  34<H>  /  ALT  25  /  AlkPhos  161<H>                          Urinalysis Basic - ( 14 May 2021 19:08 )    Color: Light Yellow / Appearance: Clear / S.021 / pH: x  Gluc: x / Ketone: Trace  / Bili: Negative / Urobili: <2 mg/dL   Blood: x / Protein: 30 mg/dL / Nitrite: Negative   Leuk Esterase: Negative / RBC: 0 /HPF / WBC 3 /HPF   Sq Epi: x / Non Sq Epi: 3 /HPF / Bacteria: Negative                              14.5   6.49  )-----------( 241      ( 14 May 2021 17:01 )             44.1                         14.2   12.71 )-----------( 248      ( 12 May 2021 08:09 )             40.5     CAPILLARY BLOOD GLUCOSE      The Labs were reviewed by me   The Radiology was reviewed by me    EKG tracing reviewed by me     133<L>  |  93<L>  |  21  ----------------------------<  116<H>  4.7   |  27  |  0.70    Ca    9.6      14 May 2021 17:01    TPro  7.2  /  Alb  4.2  /  TBili  0.4  /  DBili  x   /  AST  34<H>  /  ALT  25  /  AlkPhos  161<H>                          Urinalysis Basic - ( 14 May 2021 19:08 )    Color: Light Yellow / Appearance: Clear / S.021 / pH: x  Gluc: x / Ketone: Trace  / Bili: Negative / Urobili: <2 mg/dL   Blood: x / Protein: 30 mg/dL / Nitrite: Negative   Leuk Esterase: Negative / RBC: 0 /HPF / WBC 3 /HPF   Sq Epi: x / Non Sq Epi: 3 /HPF / Bacteria: Negative                              14.5   6.49  )-----------( 241      ( 14 May 2021 17:01 )             44.1                         14.2   12.71 )-----------( 248      ( 12 May 2021 08:09 )             40.5     CAPILLARY BLOOD GLUCOSE    Radiology:    EXAM:  CT ABDOMEN AND PELVIS OC IC  EXAM:  CT CHEST IC  PROCEDURE DATE:  2021    INTERPRETATION:  CLINICAL INFORMATION: Metastatic esophageal cancer. Restaging. Back pain.    COMPARISON: CT chest abdomen and pelvis 2021    CONTRAST/COMPLICATIONS:  IV Contrast: Omnipaque 350 90 cc administered   10 cc discarded  Oral Contrast: Omnipaque 300  Complications: None reported at time of study completion    PROCEDURE:  CT of the Chest, Abdomen and Pelvis was performed.  Sagittal and coronal reformats were performed.    FINDINGS:  CHEST:  LUNGS AND LARGE AIRWAYS: Patent central airways. Left upper lobe bronchiectasis with linear scarring.  PLEURA: No pleural effusion.  VESSELS: Atherosclerotic changes of the aorta and coronary arteries.  HEART: Heart size is normal. No pericardial effusion.  MEDIASTINUM AND SHATNELL: Previously referenced precarinal node (2:60) 1.2 x 0.6 cm previously 1.2 x 0.7 cm. Previously referenced subcarinal node (2:65) 2.3 x 0.5 cm, previously 2.2 x 0.7 cm. Mild thickening of the mid to distal esophagus as at prior imaging.  CHEST WALL AND LOWER NECK: Right chest wall Mediport with catheter tip at the cavoatrial junction.    ABDOMEN AND PELVIS:  LIVER: Within normal limits.  BILE DUCTS: Normal caliber.  GALLBLADDER: Within normal limits.  SPLEEN: Within normal limits.  PANCREAS: Within normal limits.  ADRENALS: Within normal limits.  KIDNEYS/URETERS: Within normal limits.    BLADDER: Cystocele.  REPRODUCTIVE ORGANS: Hysterectomy.    BOWEL: No bowel obstruction. Colonic diverticulosis. Appendix is normal.  PERITONEUM: No ascites.  VESSELS: Atherosclerotic changes.  RETROPERITONEUM/LYMPH NODES: No lymphadenopathy.  ABDOMINAL WALL: Within normal limits.  BONES: Lucent an sclerotic sclerotic lesion at T4 with mild compression deformity as a prior imaging. New heterogeneous sclerosis with a new compression fracture at T10 likely pathologic.    IMPRESSION:  New T10 compression fracture likely pathologic.    COLLIN CHAVEZ MD; Attending Radiologist  This document has been electronically signed.  COLLIN CHAVEZ MD; Attending Radiologist  This document has been electronically signed. May 14 2021  6:12PM   Ordered by: TETE CANALES       Collected/Examined: [id number] 11:26AM         Verification Required       Stage: Final          Performed at: [facility] [place]       Resulted: [id number] 06:01PM       Last Updated: [id number] 06:15PM       Accession: [id number]             The Labs were reviewed by me   The Radiology was reviewed by me    EKG tracing reviewed by me     133<L>  |  93<L>  |  21  ----------------------------<  116<H>  4.7   |  27  |  0.70    Ca    9.6      14 May 2021 17:01    TPro  7.2  /  Alb  4.2  /  TBili  0.4  /  DBili  x   /  AST  34<H>  /  ALT  25  /  AlkPhos  161<H>                          Urinalysis Basic - ( 14 May 2021 19:08 )    Color: Light Yellow / Appearance: Clear / S.021 / pH: x  Gluc: x / Ketone: Trace  / Bili: Negative / Urobili: <2 mg/dL   Blood: x / Protein: 30 mg/dL / Nitrite: Negative   Leuk Esterase: Negative / RBC: 0 /HPF / WBC 3 /HPF   Sq Epi: x / Non Sq Epi: 3 /HPF / Bacteria: Negative                              14.5   6.49  )-----------( 241      ( 14 May 2021 17:01 )             44.1                         14.2   12.71 )-----------( 248      ( 12 May 2021 08:09 )             40.5     CAPILLARY BLOOD GLUCOSE    Radiology:    EXAM:  CT ABDOMEN AND PELVIS OC IC  EXAM:  CT CHEST IC  PROCEDURE DATE:  2021    INTERPRETATION:  CLINICAL INFORMATION: Metastatic esophageal cancer. Restaging. Back pain.    COMPARISON: CT chest abdomen and pelvis 2021    CONTRAST/COMPLICATIONS:  IV Contrast: Omnipaque 350 90 cc administered   10 cc discarded  Oral Contrast: Omnipaque 300  Complications: None reported at time of study completion    PROCEDURE:  CT of the Chest, Abdomen and Pelvis was performed.  Sagittal and coronal reformats were performed.    FINDINGS:  CHEST:  LUNGS AND LARGE AIRWAYS: Patent central airways. Left upper lobe bronchiectasis with linear scarring.  PLEURA: No pleural effusion.  VESSELS: Atherosclerotic changes of the aorta and coronary arteries.  HEART: Heart size is normal. No pericardial effusion.  MEDIASTINUM AND SHANTELL: Previously referenced precarinal node (2:60) 1.2 x 0.6 cm previously 1.2 x 0.7 cm. Previously referenced subcarinal node (2:65) 2.3 x 0.5 cm, previously 2.2 x 0.7 cm. Mild thickening of the mid to distal esophagus as at prior imaging.  CHEST WALL AND LOWER NECK: Right chest wall Mediport with catheter tip at the cavoatrial junction.    ABDOMEN AND PELVIS:  LIVER: Within normal limits.  BILE DUCTS: Normal caliber.  GALLBLADDER: Within normal limits.  SPLEEN: Within normal limits.  PANCREAS: Within normal limits.  ADRENALS: Within normal limits.  KIDNEYS/URETERS: Within normal limits.    BLADDER: Cystocele.  REPRODUCTIVE ORGANS: Hysterectomy.    BOWEL: No bowel obstruction. Colonic diverticulosis. Appendix is normal.  PERITONEUM: No ascites.  VESSELS: Atherosclerotic changes.  RETROPERITONEUM/LYMPH NODES: No lymphadenopathy.  ABDOMINAL WALL: Within normal limits.  BONES: Lucent an sclerotic sclerotic lesion at T4 with mild compression deformity as a prior imaging. New heterogeneous sclerosis with a new compression fracture at T10 likely pathologic.    IMPRESSION:  New T10 compression fracture likely pathologic.    COLLIN CHAVEZ MD; Attending Radiologist  This document has been electronically signed.  COLLIN CHAVEZ MD; Attending Radiologist  This document has been electronically signed. May 14 2021  6:12PM   Ordered by: TETE CANALES       Collected/Examined: [id number] 11:26AM         Verification Required       Stage: Final          Performed at: [facility] [place]       Resulted: [id number] 06:01PM       Last Updated: [id number] 06:15PM       Accession: [id number]             The Labs were reviewed by me   The Radiology was reviewed by me    EKG tracing reviewed by me    EKG: normal sinus rhythm with nonspecific twave changes

## 2021-05-14 NOTE — H&P ADULT - NSHPREVIEWOFSYSTEMS_GEN_ALL_CORE
REVIEW OF SYSTEMS:    CONSTITUTIONAL: No weakness, fevers or chills  EYES/ENT: No visual changes;  No vertigo or throat pain   NECK: No pain or stiffness  RESPIRATORY: No cough, wheezing, hemoptysis; No shortness of breath  CARDIOVASCULAR: No chest pain or palpitations  GASTROINTESTINAL: No abdominal or epigastric pain. No nausea, vomiting, or hematemesis; No diarrhea or constipation. No melena or hematochezia.  GENITOURINARY: No dysuria, frequency or hematuria  NEUROLOGICAL: No numbness or weakness  SKIN: No itching, burning, rashes, or lesions   All other review of systems is negative unless indicated above. CHILLS/FEVER REVIEW OF SYSTEMS:    CONSTITUTIONAL: No weakness, fevers or chills  EYES/ENT: No visual changes;  No vertigo or throat pain   NECK: No pain or stiffness  RESPIRATORY: No cough, wheezing, hemoptysis; No shortness of breath  CARDIOVASCULAR: No chest pain or palpitations  GASTROINTESTINAL: as per HPI  GENITOURINARY: as per HPI  NEUROLOGICAL: No numbness or weakness  SKIN: No itching, burning, rashes, or lesions   All other review of systems is negative unless indicated above. REVIEW OF SYSTEMS:    CONSTITUTIONAL: No weakness, fevers or chills  EYES/ENT: No visual changes;  No vertigo or throat pain   NECK: No pain or stiffness  RESPIRATORY: No cough, wheezing, hemoptysis; No shortness of breath  CARDIOVASCULAR: No chest pain or palpitations  GASTROINTESTINAL: as per HPI  GENITOURINARY: as per HPI  NEUROLOGICAL: No numbness or weakness  SKIN: No itching, burning, rashes, or lesions

## 2021-05-14 NOTE — H&P ADULT - NSRESEARCHGRANTASSESS_GEN_A_CORE
<<--- Click to launch IMPROVE-DD VTE Assessment IMPROVE-DD VTE assessment already completed for this visit

## 2021-05-14 NOTE — ED PROVIDER NOTE - PHYSICAL EXAMINATION
General: awake, alert, moderate distress, vitals reassuring, elevated BP likely due to pain   Head: atraumatic, normocephalic  Eyes: PERRL, EOMI, no scleral icterus  ENT: no epistaxis, normal phonation, airway patent  Neck: full ROM, no midline ttp  CV: RRR, no murmurs, HDS  Pulm: lungs CTA b/l, no wheezing, no respiratory distress  GI: abd soft, non tender, no guarding/rebound/masses  Back: pain with ROM, midline ttp mid spine, no stepoffs felt, no rash/signs of trauma   Extremities: normal ROM, joints stable, distal pulses intact, no edema  Neuro: alert, oriented x3, moving all extremities, interactive, normal speech/memory, no strength deficits appreciated   Derm: warm, dry, normal color, no rash/wounds

## 2021-05-14 NOTE — ED PROVIDER NOTE - CLINICAL SUMMARY MEDICAL DECISION MAKING FREE TEXT BOX
Tyshawn, PGY3- see attg attestation Tyshawn, PGY3- see attg attestation  DD ED ATTF c/o severe low back pain rad to abd.  H/o esophageal CA with mets to bone. Also with abd pain x 4 days, nauseous.  Sent from [facility].  Last chemo 2 days ago.  Pt rec'd fentanyl 50 by EMS.  Hypertensive here.   Pt had CT scan done yesterday CAP.  Pt says still having abd pain, sx x 4-5 days.  No LOC no diarrhea no fever, no dysuria.  Pt in mod distress abd pain.  Distal pulses intact.  R CVAT.  Plan check labs, and urine.  Rads consulted for read on ct done yest.  Multiple rounds of pain meds needed here, pt unable to walk due to pain.  Admit.

## 2021-05-14 NOTE — H&P ADULT - NSHPPHYSICALEXAM_GEN_ALL_CORE
Vital Signs Last 24 Hrs  T(C): 36.5 (14 May 2021 16:04), Max: 36.6 (14 May 2021 14:21)  T(F): 97.7 (14 May 2021 16:04), Max: 97.8 (14 May 2021 14:21)  HR: 72 (14 May 2021 21:05) (72 - 88)  BP: 203/84 (14 May 2021 21:05) (189/83 - 210/89)  BP(mean): --  RR: 16 (14 May 2021 21:05) (16 - 24)  SpO2: 98% (14 May 2021 21:05) (93% - 100%)    General: No acute distress.  HEENT: NCAT.  PERRL.  EOMI.  No scleral icterus or injection.  Moist MM.  No oropharyngeal exudates.    Neck: Supple.  Full ROM.  No JVD.  No thyromegaly. No lymphadenopathy.   Heart: RRR.  Normal S1 and S2.  No murmurs, rubs, or gallops.   Lungs: CTAB. No wheezes, crackles, or rhonchi.    Abdomen: BS+, soft, NT/ND.  No organomegaly.  Skin: Warm and dry.  No rashes.  Extremities: No edema, clubbing, or cyanosis.  2+ peripheral pulses b/l.  Musculoskeletal: No deformities.  No spinal or paraspinal tenderness.  Neuro: A&Ox3.  CN II-XII intact.  5/5 strength in UE and LE b/l.  Tactile sensation intact in UE and LE b/l.  Cerebellar function intact Vital Signs Last 24 Hrs  T(C): 36.5 (14 May 2021 16:04), Max: 36.6 (14 May 2021 14:21)  T(F): 97.7 (14 May 2021 16:04), Max: 97.8 (14 May 2021 14:21)  HR: 72 (14 May 2021 21:05) (72 - 88)  BP: 203/84 (14 May 2021 21:05) (189/83 - 210/89)  BP(mean): --  RR: 16 (14 May 2021 21:05) (16 - 24)  SpO2: 98% (14 May 2021 21:05) (93% - 100%)    General: elderly female in NAD ON RA   HEENT: NCAT.  PERRL.  EOMI.  No scleral icterus or injection.  Moist MM.   Neck:  no head and neck LAD   Heart: RRR.  Normal S1 and S2.  No murmurs, rubs, or gallops.   Lungs: CTAB. No wheezes, crackles, or rhonchi.    Abdomen: soft, nondistended, TTP suprapubically (full bladder, patient had to void), normoactive, neg Huff, neg CVA bl  Skin: Warm and dry.  No rashes.  Extremities: No edema, clubbing, or cyanosis.   Musculoskeletal: No deformities.  No spinal or paraspinal tenderness. Lido patch in place over T10   Neuro: A&Ox3.  CN II-XII intact.  5/5 strength in UE and LE b/l.  Tactile sensation intact in UE and LE b/l.

## 2021-05-14 NOTE — H&P ADULT - NSICDXFAMILYHX_GEN_ALL_CORE_FT
FAMILY HISTORY:  Family history of breast cancer in mother, mother-   Family hx of alcoholism, mother -  FH: type 2 diabetes, father-

## 2021-05-14 NOTE — ED PROVIDER NOTE - ATTENDING CONTRIBUTION TO CARE
71F c/o severe low back pain rad to abd.  H/o esophageal CA with mets to bone. Also with abd pain x 4 days, nauseous.  Sent from [facility].  Last chemo 2 days ago.  Pt rec'd fentanyl 50 by EMS.  Hypertensive here.   Pt had CT scan done yesterday CAP.  Pt says still having abd pain, sx x 4-5 days.  No LOC no diarrhea no fever, no dysuria.  Pt in mod distress abd pain.  Distal pulses intact.  R CVAT.  Plan check labs, and urine.  Rads consulted for read on ct done yest.  VS:  unremarkable except HTN    GEN - mod distress abd pain, malaise;   A+O x3   HEAD - NC/AT     ENT - PEERL, EOMI, mucous membranes  dry , no discharge      NECK: Neck supple, non-tender without lymphadenopathy, no masses, no JVD  PULM - CTA b/l,  symmetric breath sounds  COR -  normal heart sounds    ABD - , ND, RLQ ttp, soft,  BACK - (+)R CVA tenderness, nontender spine     EXTREMS - no edema, no deformity, warm and well perfused    SKIN - no rash    or bruising      NEUROLOGIC - alert, face symmetric, speech fluent, sensation nl, motor no focal deficit. 71F c/o severe low back pain rad to abd.  H/o esophageal CA with mets to bone. Also with abd pain x 4 days, nauseous.  Sent from [facility].  Last chemo 2 days ago.  Pt rec'd fentanyl 50 by EMS.  Hypertensive here.   Pt had CT scan done yesterday [facility].  Pt says still having abd pain, sx x 4-5 days.  No LOC no diarrhea no fever, no dysuria.  Pt in mod distress abd pain.  Distal pulses intact.  R CVAT.  Plan check labs, and urine.  Rads consulted for read on ct done yest.  Multiple rounds of pain meds needed here, pt unable to walk due to pain.  Admit.  VS:  unremarkable except HTN    GEN - mod distress abd pain, malaise;   A+O x3   HEAD - NC/AT     ENT - PEERL, EOMI, mucous membranes  dry , no discharge      NECK: Neck supple, non-tender without lymphadenopathy, no masses, no JVD  PULM - CTA b/l,  symmetric breath sounds  COR -  normal heart sounds    ABD - , ND, RLQ ttp, soft,  BACK - (+)R CVA tenderness, nontender spine     EXTREMS - no edema, no deformity, warm and well perfused    SKIN - no rash    or bruising      NEUROLOGIC - alert, face symmetric, speech fluent, sensation nl, motor no focal deficit.

## 2021-05-14 NOTE — ED PROVIDER NOTE - PROGRESS NOTE DETAILS
Haverty, PGY3- T10 compression fx, likely pathologic. Pt and son aware. Will admit for pain control, rad-onc/heme/onc consult. No neuro deficits. More meds ordered. Haverty, PGY3- BP remains elevated. Given PO meds but pain still not controlled. Admitted to Dr. Crespo. Attempted to call  [facility] but no answer.

## 2021-05-14 NOTE — ED PROVIDER NOTE - OBJECTIVE STATEMENT
71 yoF, PMHx of esophageal cancer, metastatic to bone (unknown location) sent from [facility] for severe back/abd pain. Had outpatient CT yesterday for this pain but no results. Pain worsening. Now severe. Not better with OTC meds. Worse with movement. No trauma but did feel pain after manipulating a window. Denies any fever, urinary/bowel changes. Felt nauseated. Recent chemo this past week. No IVDA. No prior spine/vertebral issues.

## 2021-05-14 NOTE — H&P ADULT - ASSESSMENT
72 yo female w/ a PMHx of metastatic esophageal adenocarcinoma (s/p carbo/taxol and RT 4/2020-5/2020, started on folfox/herceptin 3/2021, last 5/12) w/ T4 lytic lesion and paravertebral mass (dx 1/2021, s/p RT to T4 3/2021), refused esophagectomy, HTN, anemia, degenerative disc disease of C-spine s/p laminectomy x2 (2019), and adjustment disorder p/f heme/onc Dr. Shelby's ([facility] [place]) office for refractory acute back pain x 3 weeks found to have new T10 compression fx likely pathologic identified on outpatient CTAP 5/13 72 yo female w/ a PMHx of metastatic esophageal adenocarcinoma (s/p carbo/taxol and RT 4/2020-5/2020, started on folfox/herceptin 3/2021,last 5/12) w/ T4 lytic lesion and paravertebral mass dx 1/2021 (s/p RT to T4 3/2021), refused esophagectomy, HTN, HLD, degenerative disc disease of C-spine s/p laminectomy x2 (2019), and adjustment disorder p/f heme/onc Dr. Shelby's ([facility] [place] for refractory acute back pain x 3 weeks found to have new T10 compression fx likely pathologic identified on outpatient CTAP 5/13 admitted for further mgt

## 2021-05-14 NOTE — H&P ADULT - PROBLEM SELECTOR PLAN 5
Dx between [id number]. On folfox/herceptin (3/2021-). T4 lytic lesion and paravertebral mass dx 1/2021 s/p RT to T4 3/2021. Now w/ new T10 compression fx likely malignant. Was evaluated by [facility], refused esophagectomy. Follows w/ Dr. Afsaneh christina/onc ([facility] [place]), Dr. Trevor Nguyen Rad-Onc, Dr. Susanne Zhao Palliative   -heme onc consult in AM  -c/w zofran PRN nausea, will hold home ativan (for refractory nausea) for now Dx between 1543-1982. On folfox/herceptin (3/2021-). T4 lytic lesion and paravertebral mass dx 1/2021 s/p RT to T4 3/2021. Now w/ new T10 compression fx likely malignant. Was evaluated by [facility], refused esophagectomy. Follows w/ Dr. Afsaneh christina/[place] [place] Dr. Trevor Nguyen Rad-Onc, Dr. Susanne Zhao Palliative   -heme onc consult in AM  -c/w zofran PRN nausea (monitor qtc)

## 2021-05-14 NOTE — ED CLERICAL - NS ED CLERK NOTE PRE-ARRIVAL INFORMATION; ADDITIONAL PRE-ARRIVAL INFORMATION
Patient sent in for c.o of severe back pain radiates to her abdomen 10/10 patient has esophogeal cancer with mets to the bone.

## 2021-05-15 NOTE — CONSULT NOTE ADULT - ASSESSMENT
72 yo female w/ a PMHx of metastatic esophageal adenocarcinoma (s/p neoadjuvant concurrent chemoRT 4/2020-5/2020, declined esophagectomy, recurrent disease in 1/2021 at T4, had local RT and now on FOLFOX/herceptin, HTN, HLD, degenerative disc disease of C-spine s/p laminectomy x2 (2019), and adjustment disorder, who was sent in by Dr. Shelby for worsening new back pain. She is now found to have a new compression fracture at T10.    #Pathologic/compression fracture at T10  -appreciated orthopedics  -pain control  -agree with MRI CTLS  -depending on MRI result, patient will need decompression or local RT to alleviate the symptom    #Metastatic esophageal adenocarcinoma  -s/p neoadjuvant chemoRT with carbo/taxol, however, declined surgery  -recurrence in T4, confirmed with biopsy, in 1/2021  -now on FOLFOX + herceptin given ERBB positivity  -no plan for inpatient chemotherapy    Ирина Wheeler MD, PGY-4  Translational Medical Oncology Fellow  Pager: [phone] 70 yo female w/ a PMHx of metastatic esophageal adenocarcinoma (s/p neoadjuvant concurrent chemoRT 4/2020-5/2020, declined esophagectomy, recurrent disease in 1/2021 at T4, had local RT and now on FOLFOX/herceptin, HTN, HLD, degenerative disc disease of C-spine s/p laminectomy x2 (2019), and adjustment disorder, who was sent in by Dr. Shelby for worsening new back pain. She is now found to have a new compression fracture at T10.    #Pathologic/compression fracture at T10  -appreciated orthopedics  -pain control  -agree with MRI CTLS  -please start decadron 10mg IVP once then 4mg q6hr until we rule out cord compression with MRI  -depending on MRI result, patient will need decompression or local RT to alleviate the symptom    #Metastatic esophageal adenocarcinoma  -s/p neoadjuvant chemoRT with carbo/taxol, however, declined surgery  -recurrence in T4, confirmed with biopsy, in 1/2021  -now on FOLFOX + herceptin given ERBB positivity  -no plan for inpatient chemotherapy    Rishi-jun Wheeler MD, PGY-4  Translational Medical Oncology Fellow  Pager: [phone] 72 yo female w/ a PMHx of metastatic esophageal adenocarcinoma (s/p neoadjuvant concurrent chemoRT 4/2020-5/2020, declined esophagectomy, recurrent disease in 1/2021 at T4, had local RT and now on FOLFOX/herceptin, HTN, HLD, degenerative disc disease of C-spine s/p laminectomy x2 (2019), and adjustment disorder, who was sent in by Dr. Shelby for worsening new back pain. She is now found to have a new compression fracture at T10.    #Pathologic/compression fracture at T10  -appreciated orthopedics  -pain control  -agree with MRI CTLS  -please start decadron 10mg IVP once then 4mg q6hr until we rule out cord compression with MRI  -depending on MRI result, patient may need decompression or local RT to alleviate the symptom    #Metastatic esophageal adenocarcinoma  -s/p neoadjuvant chemoRT with carbo/taxol, however, declined surgery  -recurrence in T4, confirmed with biopsy, in 1/2021  -now on FOLFOX + herceptin given ERBB positivity  -no plan for inpatient chemotherapy    Rishi-jun Wheeler MD, PGY-4  Translational Medical Oncology Fellow  Pager: [phone]

## 2021-05-15 NOTE — PROGRESS NOTE ADULT - PROBLEM SELECTOR PLAN 2
Hx of HTN on losartan 100 at home. -150 at bl. SBPs elevated likely iso pain. Patient also did not take losartan prior to presentation   -c/w home losartan  -labetalol 5mg q4 PRN if SBP >200  -pain control as above

## 2021-05-15 NOTE — PATIENT PROFILE ADULT - HISTORY OF COVID-19 VACCINATION
CC:  Dr. Mera Davila  *

 

DISCHARGE SUMMARY:

 

DATE OF ADMISSION:  01/16/18

 

DATE OF DISCHARGE:  01/17/18

 

PRIMARY CARE PROVIDER:  Dr. Mera Davila.

 

MY ATTENDING WHILE IN THE HOSPITAL:  Dr. Chilo Davila.* (DICTATED BY JAVID BENOIT)

 

PRIMARY DISCHARGE DIAGNOSIS:  Chest pain.

 

SECONDARY DISCHARGE DIAGNOSES:

1.  Hypertension.

2.  Hypothyroidism.

3.  Hyperlipidemia.

4.  Depression.

5.  Possible impaired glucose tolerance.

 

STUDIES DONE WHILE IN THE HOSPITAL:  Electrocardiogram from admission showed 
normal sinus rhythm, regular axis, no ST-segment changes, single PVC, QTc 420, 
no other abnormalities.

 

Chest x-ray from 01/16/18 shows no radiographic evidence for acute 
cardiopulmonary abnormality on this portable chest x-ray.

 

Nuclear medicine scan from 01/17/18 shows there are no defects in stress 
induced or fixed nature.  Cardiac chamber size is normal.  There are no wall 
motion abnormalities.  Ejection fraction is calculated at 64% during stress, 
low risk.  No scintigraphic evidence of infarction.

 

MEDICATIONS:

1.  Aspirin 81 mg p.o. daily.

2.  Celadrin 1 cap p.o. daily.

3.  Levothyroxine 25 mcg p.o. daily.

4.  Losartan 50 mg p.o. daily.

5.  Metoprolol tartrate 50 mg p.o. b.i.d.

6.  Nitroglycerin 0.4 mg sublingual q.5 minutes as needed for angina 1 daily.

7.  Multivitamin 1 cap p.o. daily.

8.  Crestor 10 mg p.o. daily.

9.  Sertraline 100 mg p.o. at bedtime.

 

New medications at discharge:

1.  Nitroglycerin.

2.  Metoprolol tartrate.

 

Medications discontinued at discharge:  Metoprolol tartrate 50 mg p.o. daily.

 

HOSPITAL COURSE:  This is a brief summary of the patient's presentation.  For 
more details, please see history and physical from JAVID Benoit, on 01/16/
18.  In brief, the patient is an 83-year-old male with past medical history 
significant for the above who had 2 days of constant chest tightness with 
occasional shortness of breath with no palliating and provoking working factors
, not brought in by exertion, without associating features of shortness of 
breath or diaphoresis.  The patient also states that he was slightly dizzy in 
the morning when he stands up, the patient also has intermittent palpitations 
which have been going on for a while.  The patient recently had changes to 
medications, but he is unsure which medications were changed.  The patient had 
not had any chest pain similar to this previously.  The patient was admitted to 
the hospital for chest pain rule out.  The patient's telemetry showed frequent 
PVCs, but no sustained ventricular tachycardia. The patient had no events 
overnight, no complaints.  The patient's chest pain resolved and did not recur 
after 1 nitroglycerin while in the emergency department. The patient underwent 
a nuclear medicine stress test, which did not provoke any chest pain and was 
read as above.  The patient is amenable to being discharged to home on an 
increased dose of metoprolol for his frequent PVCs.  The patient had no other 
questions or complaints.

 

PHYSICAL EXAM ON DAY OF DISCHARGE:  General:  The patient is an 83-year-old 
male who appears stated age, sitting comfortably in bed, in no acute distress.  
Vital signs at the time of discharge:  Temperature 98.6, heart rate 87, 
respiratory rate 20, oxygen saturation 96% on room air, blood pressure 133/68.  
HEENT:  Head: Normocephalic, atraumatic.  Sclerae anicteric.  No conjunctival 
injection.  Nasal mucosa moist.  Oral mucosa moist.  Lips chapped.  No 
pharyngeal erythema, discharge, or exudates.  Neck:  Supple, nontender.  No 
lymphadenopathy.  No carotid bruit auscultated.  Cardiac:  Regular rate and 
rhythm.  No clicks, murmurs, gallops, rubs.  Pulses 2+ bilaterally in dorsalis 
pedis, posterior tibialis, and radial areas.  Respiratory:  Clear to 
auscultation bilaterally.  No wheezes, rales, or rhonchi.  Good air exchange 
bilaterally.  Abdomen:  Soft, nontender, nondistended.  Bowels sounds present, 
normoactive in all 4 quadrants.  No hepatosplenomegaly, no abdominal bruits 
auscultated.  Genitourinary:  No suprapubic tenderness or CVA tenderness.  Skin
:  Clean, dry and intact.  No rash.  Neuro: Cranial nerves II through XII 
intact.  No focal deficits.  Alert and oriented x3. Psychiatric:  Pleasant and 
cooperative.

 

LABORATORY DATA:  On day of discharge, white blood cell count 5.3, hemoglobin 
13.8, hematocrit 41.  Sodium 138, potassium 3.8, chloride 107, carbon dioxide 28
, anion gap 3, BUN 17, creatinine 0.86, glucose 109, hemoglobin A1c 5.9, 
calcium 8.6, magnesium 2.0.  Laboratory data of note, troponin I 0.00 x3.  
Cholesterol 129, LDL cholesterol 55, HDL cholesterol 39.5, triglycerides 125.

 

DISCHARGE PLAN:  The patient will be discharged to home.  The patient has good 
lipid panel, is slightly into the prediabetic range for his hemoglobin A1c; 
however, the patient already has plans for weight loss and diet change, which 
were discussed with him and it appeared to be a good idea.  The patient is 
already on adequate primary prevention for myocardial infarction.  The patient 
should follow up with his primary care provider within 1 week to discuss 
hospitalization and general medical management.  The patient should engage in 
activity as tolerated with a goal of weight loss and cardiovascular 
conditioning.  The patient should return to hospital for any recurrence of his 
chest pain.  The patient should take nitroglycerin tablets up to three 5 
minutes apart for any chest pain, but also return to the hospital even if the 
chest pain resolves.

 

TIME SPENT:  Approximately 60 minutes was spent on the discharge, 30 of which 
was spent face-to-face with the patient and obtaining history and physical and 
discussing treatment plan.

 

____________________________________ JAVID BENOIT

 

[id number]/[facility] #: [id number]

PRINCE Yes

## 2021-05-15 NOTE — CONSULT NOTE ADULT - SUBJECTIVE AND OBJECTIVE BOX
Patient is a 71y Female who presents c/o LBP.  Started ~3 weeks ago when she was opening a window.  Felt a pop.  Pain has persisted since and is not resolved w/ pain meds.  Denies any trauma / falls. Denies pain/injury elsewhere. Denies numbness/tingling/paresthesias/weakness. Denies bowel/bladder incontinence. Denies fevers/chills. No other complaints at this time.    Previous ACDF w/ Dr. Adrian followed by posterior decompression/fusion in 2019.  Patient has had continued diffuse intermittent LUE numbness since surgery.  Was present preop as well.    Hx of esophageal adenocarcinoma w/ known bony mets    HEALTH ISSUES - PROBLEM Dx:  Compression fracture of T10 vertebra, initial encounter  Compression fracture of T10 vertebra, initial encounter    HTN (hypertension)  HTN (hypertension)    Hyperlipidemia  Hyperlipidemia    Esophageal cancer  Esophageal cancer    Prophylactic measure  Prophylactic measure    Transaminitis  Transaminitis    Hyponatremia  Hyponatremia    Adjustment disorder  Adjustment disorder    Urinary frequency  Urinary frequency      MEDICATIONS  (STANDING):  atorvastatin 10 milliGRAM(s) Oral at bedtime  enoxaparin Injectable 40 milliGRAM(s) SubCutaneous daily  losartan 100 milliGRAM(s) Oral daily  senna 2 Tablet(s) Oral at bedtime  sertraline 25 milliGRAM(s) Oral two times a day      Allergies    No Known Allergies    Intolerances        PAST MEDICAL & SURGICAL HISTORY:  Chronic back pain    Diverticulitis    HTN (hypertension)    Hyperlipidemia    GERD (gastroesophageal reflux disease)    Compressed cervical disc  c4-c5    H/O:   x 2 -    History of partial thyroidectomy      H/O: hysterectomy  partial     S/P laminectomy  19- Anterior Cervical Laminectomy    H/O: osteoarthritis    Class 1 obesity with body mass index (BMI) of 33.0 to 33.9 in adult                              14.5   6.49  )-----------( 241      ( 14 May 2021 17:01 )             44.1       14 May 2021 17:01    133    |  93     |  21     ----------------------------<  116    4.7     |  27     |  0.70     Ca    9.6        14 May 2021 17:01    TPro  7.2    /  Alb  4.2    /  TBili  0.4    /  DBili  x      /  AST  34     /  ALT  25     /  AlkPhos  161    14 May 2021 17:01          Urinalysis Basic - ( 14 May 2021 19:08 )    Color: Light Yellow / Appearance: Clear / S.021 / pH: x  Gluc: x / Ketone: Trace  / Bili: Negative / Urobili: <2 mg/dL   Blood: x / Protein: 30 mg/dL / Nitrite: Negative   Leuk Esterase: Negative / RBC: 0 /HPF / WBC 3 /HPF   Sq Epi: x / Non Sq Epi: 3 /HPF / Bacteria: Negative        Vital Signs Last 24 Hrs  T(C): 36.4 (05-15-21 @ 00:28), Max: 36.6 (21 @ 14:21)  T(F): 97.5 (05-15-21 @ 00:28), Max: 97.8 (21 @ :21)  HR: 73 (05-15-21 @ 00:28) (64 - 88)  BP: 202/105 (05-15-21 @ 00:28) (175/82 - 210/89)  BP(mean): --  RR: 18 (05-15-21 @ 00:28) (16 - 24)  SpO2: 100% (05-15-21 @ 00:28) (93% - 100%)    Gen: NAD    Spine PE:  Skin intact; Cervical incisions well healing   No gross deformity  Mild midline TTP T spine   No midline TTP C/L/S spine  No bony step offs  No paraspinal muscle ttp/hypertonicity   Negative clonus  Negative babinski  Negative carbone  + rectal tone  No saddle anesthesia    Motor:                   C5                C6              C7               C8           T1   R            5/5                5/5            5/5             5/5          5/5  L             5/5               5/5             5/5             5/5          5/5                L2             L3             L4               L5            S1  R         5/5           5/5          5/5             5/5           5/5  L          5/5          5/5           5/5             5/5           5/5    Sensory:            C5         C6         C7      C8       T1        (0=absent, 1=impaired, 2=normal, NT=not testable)  R         2            2           2        2         2  L          1           1           1        1         1               L2          L3         L4      L5       S1         (0=absent, 1=impaired, 2=normal, NT=not testable)  R         2            2            2        2        2  L          2            2           2        2         2    Imaging: CT Abd/Pelvis showing new, likely pathologic T10 VCF; T4 VCF seen on older imaging    A/P: 71y Female with esophageal adenocarcinoma w/ likely new T10 pathologic vertebral compression Fx  Pain control  WBAT with assistive devices as needed  SCDs   Patient is a 71y Female who presents c/o LBP.  Started ~3 weeks ago when she was opening a window.  Felt a pop.  Pain has persisted since and is not resolved w/ pain meds.  Denies any trauma / falls. Denies pain/injury elsewhere. Denies numbness/tingling/paresthesias/weakness. Denies bowel/bladder incontinence. Denies fevers/chills. No other complaints at this time.    Previous ACDF w/ Dr. Adrian followed by posterior decompression/fusion in 2019.  Patient has had continued diffuse intermittent LUE numbness since surgery.  Was present preop as well.    Hx of esophageal adenocarcinoma w/ known bony mets    HEALTH ISSUES - PROBLEM Dx:  Compression fracture of T10 vertebra, initial encounter  Compression fracture of T10 vertebra, initial encounter    HTN (hypertension)  HTN (hypertension)    Hyperlipidemia  Hyperlipidemia    Esophageal cancer  Esophageal cancer    Prophylactic measure  Prophylactic measure    Transaminitis  Transaminitis    Hyponatremia  Hyponatremia    Adjustment disorder  Adjustment disorder    Urinary frequency  Urinary frequency      MEDICATIONS  (STANDING):  atorvastatin 10 milliGRAM(s) Oral at bedtime  enoxaparin Injectable 40 milliGRAM(s) SubCutaneous daily  losartan 100 milliGRAM(s) Oral daily  senna 2 Tablet(s) Oral at bedtime  sertraline 25 milliGRAM(s) Oral two times a day      Allergies    No Known Allergies    Intolerances        PAST MEDICAL & SURGICAL HISTORY:  Chronic back pain    Diverticulitis    HTN (hypertension)    Hyperlipidemia    GERD (gastroesophageal reflux disease)    Compressed cervical disc  c4-c5    H/O:   x 2 -    History of partial thyroidectomy      H/O: hysterectomy  partial     S/P laminectomy  19- Anterior Cervical Laminectomy    H/O: osteoarthritis    Class 1 obesity with body mass index (BMI) of 33.0 to 33.9 in adult                              14.5   6.49  )-----------( 241      ( 14 May 2021 17:01 )             44.1       14 May 2021 17:01    133    |  93     |  21     ----------------------------<  116    4.7     |  27     |  0.70     Ca    9.6        14 May 2021 17:01    TPro  7.2    /  Alb  4.2    /  TBili  0.4    /  DBili  x      /  AST  34     /  ALT  25     /  AlkPhos  161    14 May 2021 17:01          Urinalysis Basic - ( 14 May 2021 19:08 )    Color: Light Yellow / Appearance: Clear / S.021 / pH: x  Gluc: x / Ketone: Trace  / Bili: Negative / Urobili: <2 mg/dL   Blood: x / Protein: 30 mg/dL / Nitrite: Negative   Leuk Esterase: Negative / RBC: 0 /HPF / WBC 3 /HPF   Sq Epi: x / Non Sq Epi: 3 /HPF / Bacteria: Negative        Vital Signs Last 24 Hrs  T(C): 36.4 (05-15-21 @ 00:28), Max: 36.6 (21 @ 14:21)  T(F): 97.5 (05-15-21 @ 00:28), Max: 97.8 (21 @ :21)  HR: 73 (05-15-21 @ 00:28) (64 - 88)  BP: 202/105 (05-15-21 @ 00:28) (175/82 - 210/89)  BP(mean): --  RR: 18 (05-15-21 @ 00:28) (16 - 24)  SpO2: 100% (05-15-21 @ 00:28) (93% - 100%)    Gen: NAD    Spine PE:  Skin intact; Cervical incisions well healing   No gross deformity  Mild midline TTP T spine   No midline TTP C/L/S spine  No bony step offs  No paraspinal muscle ttp/hypertonicity   Negative clonus  Negative babinski  Negative carbone  + rectal tone  No saddle anesthesia    Motor:                   C5                C6              C7               C8           T1   R            5/5                5/5            5/5             5/5          5/5  L             5/5               5/5             5/5             5/5          5/5                L2             L3             L4               L5            S1  R         5/5           5/5          5/5             5/5           5/5  L          5/5          5/5           5/5             5/5           5/5    Sensory:            C5         C6         C7      C8       T1        (0=absent, 1=impaired, 2=normal, NT=not testable)  R         2            2           2        2         2  L          1           1           1        1         1               L2          L3         L4      L5       S1         (0=absent, 1=impaired, 2=normal, NT=not testable)  R         2            2            2        2        2  L          2            2           2        2         2    Imaging: CT Abd/Pelvis showing new, likely pathologic T10 VCF; T4 VCF seen on older imaging    A/P: 71y Female with esophageal adenocarcinoma w/ likely new T10 pathologic vertebral compression Fx  Pain control  WBAT with assistive devices as needed  SCDs  TLSO as tolerated   Patient is a 71y Female who presents c/o LBP.  Started ~3 weeks ago when she was opening a window.  Felt a pop.  Pain has persisted since and is not resolved w/ pain meds.  Denies any trauma / falls. Denies pain/injury elsewhere. Denies numbness/tingling/paresthesias/weakness. Denies bowel/bladder incontinence. Denies fevers/chills. No other complaints at this time.    Previous ACDF w/ Dr. Adrian followed by posterior decompression/fusion in 2019.  Patient has had continued diffuse intermittent LUE numbness since surgery.  Was present preop as well.    Hx of esophageal adenocarcinoma w/ known bony mets    HEALTH ISSUES - PROBLEM Dx:  Compression fracture of T10 vertebra, initial encounter  Compression fracture of T10 vertebra, initial encounter    HTN (hypertension)  HTN (hypertension)    Hyperlipidemia  Hyperlipidemia    Esophageal cancer  Esophageal cancer    Prophylactic measure  Prophylactic measure    Transaminitis  Transaminitis    Hyponatremia  Hyponatremia    Adjustment disorder  Adjustment disorder    Urinary frequency  Urinary frequency      MEDICATIONS  (STANDING):  atorvastatin 10 milliGRAM(s) Oral at bedtime  enoxaparin Injectable 40 milliGRAM(s) SubCutaneous daily  losartan 100 milliGRAM(s) Oral daily  senna 2 Tablet(s) Oral at bedtime  sertraline 25 milliGRAM(s) Oral two times a day      Allergies    No Known Allergies    Intolerances        PAST MEDICAL & SURGICAL HISTORY:  Chronic back pain    Diverticulitis    HTN (hypertension)    Hyperlipidemia    GERD (gastroesophageal reflux disease)    Compressed cervical disc  c4-c5    H/O:   x 2 -    History of partial thyroidectomy      H/O: hysterectomy  partial     S/P laminectomy  19- Anterior Cervical Laminectomy    H/O: osteoarthritis    Class 1 obesity with body mass index (BMI) of 33.0 to 33.9 in adult                              14.5   6.49  )-----------( 241      ( 14 May 2021 17:01 )             44.1       14 May 2021 17:01    133    |  93     |  21     ----------------------------<  116    4.7     |  27     |  0.70     Ca    9.6        14 May 2021 17:01    TPro  7.2    /  Alb  4.2    /  TBili  0.4    /  DBili  x      /  AST  34     /  ALT  25     /  AlkPhos  161    14 May 2021 17:01          Urinalysis Basic - ( 14 May 2021 19:08 )    Color: Light Yellow / Appearance: Clear / S.021 / pH: x  Gluc: x / Ketone: Trace  / Bili: Negative / Urobili: <2 mg/dL   Blood: x / Protein: 30 mg/dL / Nitrite: Negative   Leuk Esterase: Negative / RBC: 0 /HPF / WBC 3 /HPF   Sq Epi: x / Non Sq Epi: 3 /HPF / Bacteria: Negative        Vital Signs Last 24 Hrs  T(C): 36.4 (05-15-21 @ 00:28), Max: 36.6 (21 @ 14:21)  T(F): 97.5 (05-15-21 @ 00:28), Max: 97.8 (21 @ :21)  HR: 73 (05-15-21 @ 00:28) (64 - 88)  BP: 202/105 (05-15-21 @ 00:28) (175/82 - 210/89)  BP(mean): --  RR: 18 (05-15-21 @ 00:28) (16 - 24)  SpO2: 100% (05-15-21 @ 00:28) (93% - 100%)    Gen: NAD    Spine PE:  Skin intact; Cervical incisions well healing   No gross deformity  Mild midline TTP T spine   No midline TTP C/L/S spine  No bony step offs  No paraspinal muscle ttp/hypertonicity   Negative clonus  Negative babinski  Negative carbone  + rectal tone  No saddle anesthesia    Motor:                   C5                C6              C7               C8           T1   R            5/5                5/5            5/5             5/5          5/5  L             5/5               5/5             5/5             5/5          5/5                L2             L3             L4               L5            S1  R         5/5           5/5          5/5             5/5           5/5  L          5/5          5/5           5/5             5/5           5/5    Sensory:            C5         C6         C7      C8       T1        (0=absent, 1=impaired, 2=normal, NT=not testable)  R         2            2           2        2         2  L          1           1           1        1         1               L2          L3         L4      L5       S1         (0=absent, 1=impaired, 2=normal, NT=not testable)  R         2            2            2        2        2  L          2            2           2        2         2    Imaging: CT Abd/Pelvis showing new, likely pathologic T10 VCF; T4 VCF seen on older imaging    A/P: 71y Female with esophageal adenocarcinoma w/ likely new T10 pathologic vertebral compression Fx  Pain control  WBAT with assistive devices as needed  SCDs  TLSO as tolerated  Rec MRI CTLSp w/ and w/o contrast

## 2021-05-15 NOTE — PROGRESS NOTE ADULT - PROBLEM SELECTOR PLAN 5
Dx between [id number]. On folfox/herceptin (3/2021-). T4 lytic lesion and paravertebral mass dx 1/2021 s/p RT to T4 3/2021. Now w/ new T10 compression fx likely malignant. Was evaluated by CTSx, refused esophagectomy. Follows w/ Dr. Afsaneh christina/onc ([facility] [place]), Dr. Trevor Nguyen Rad-Onc, Dr. Susanne Zhao Palliative   -heme onc consult in AM  -c/w zofran PRN nausea (monitor qtc)

## 2021-05-15 NOTE — PROGRESS NOTE ADULT - PROBLEM SELECTOR PLAN 1
New acute back pain x 3 weeks refractory to home opioid regimen (on oxy 30 q4 PRN, not using methadone or gabapentin as prescribed). No neurological deficits on exam.   -CTAP 5/13 (via [facility]): New heterogeneous sclerosis with a new compression fracture at T10 likely pathologic. Lucent and sclerotic lesion at T4 with mild compression deformity as seen on prior imaging.   c/o 50/10 pain  -inc Dilaudid 1.5 mg q4 hrs PRN mod-severe pain- as given in ED  -c/w lidocaine patch over T10  -spine/ortho consult - see recs below  -rad onc consult re: role of palliative chemo in AM-   -patient will likely require TLSO brace, bed rest until then - will call Monday  -palliative consult for pain mgt in AM- ACP will call -   -PT consult when brace is here.    Pain control  WBAT with assistive devices as needed  SCDs  TLSO as tolerated  Rec MRI CTLSp w/ and w/o contrast

## 2021-05-15 NOTE — PATIENT PROFILE ADULT - FALLEN IN THE PAST
Clinical:  Right shoulder pain.

 

Comparison:  12/04/2016.

 

Findings:

Cardiomegaly with perihilar and bibasilar opacities (left greater than right) and

interstitial edema unchanged from prior examination.  No new acute process

identified.

 

Impression:

No change from prior examination.

 

 

Signed by

Cameron Chang MD 12/08/2016 02:16 A no

## 2021-05-15 NOTE — PROGRESS NOTE ADULT - PROBLEM SELECTOR PROBLEM 7
Adjustment disorder Patient denies cigarette or illicit drug use including cocaine, marijuana, amphetamines, opiates. Drinks 1-2 alcoholic beverages/month.

## 2021-05-15 NOTE — CONSULT NOTE ADULT - ATTENDING COMMENTS
Pt seen and examined at bedside. Pt of Dr. Shelby. being treated for metastatic esophageal ca- recently completed course of RT to T spine met now with severe pain at a different T spine level. Pt appears extremely uncomfortable. She is currently on Dilaudid prn but may benefit from pall care consult and pt controlled infusion. Start Dex 10 mg IV STAT and 4 mg Q 4 hours till we obtain MRI of the spine when definitive management can be discussed. A/w above assessment and recs.

## 2021-05-15 NOTE — CHART NOTE - NSCHARTNOTEFT_GEN_A_CORE
ACP called and expedited MRI. ACP called and expedited MRI. ACP called Noy Funmilayo [phone]-- daughter HCP and updated on current plan of care. All questions answered.

## 2021-05-15 NOTE — CONSULT NOTE ADULT - SUBJECTIVE AND OBJECTIVE BOX
SUSAN DOWD  MRN-[id number]      Reason for Consult: worsening back pain    HPI:  70 yo female w/ a PMHx of metastatic esophageal adenocarcinoma (s/p carbo/taxol and RT 2020-2020, started on folfox/herceptin 3/2021,last ) w/ T4 lytic lesion and paravertebral mass dx 2021 (s/p RT to T4 3/2021), refused esophagectomy, HTN, HLD, degenerative disc disease of C-spine s/p laminectomy x2 (), and adjustment disorder p/f heme/onc Dr. Shelby's ([facility] [place]) office for acute back pain worsening x 3 weeks. Pain is 10/10, mid lower back, radiates to R abdomen (onset 4-5 days ago), worse w/ movement. Pain started after opening a kitchen window three weeks ago at which time she "felt a pop." Home regimen of oxycontin w/ PRN oxycodone was not working (reduced pain to 7/10). Seen by rad-onc Dr. Trevor Nguyen  who was c/f progression of disease at which time CTAP was ordered. Also seen by palliative care Dr. Zhao on , oxycontin switched to methadone and continued on oxy PRN. Patient is not taking methadone. Is using oxy 30 q4. CTAP  now showing new T10 compression fx likely pathologic (results via HIE). Patient denies fevers, chills, vomiting, diarrhea. loss of bladder or bowel control, saddle anesthesia, any falls or trauma. Endorses urinary frequency and hesitancy x 1 day w/o suprapubic pain, dysuria, or hematuria. No hx of UTIs.     ED vitals: Tmaz 97.8, HR 70-80, /83 -210/89, 93% RA, RR 24   ED labs: WCB 6.49, Na 133, , AST 34, UA neg (small ketones)  Radiology  (via HIE): Lucent and sclerotic lesion at T4 with mild compression deformity as a prior imaging. New heterogeneous sclerosis with a new compression fracture at T10 likely pathologic.  ED course: s/p LR 1L, dilaudid x 3, lido patch, labetalol 50 x 1, losartan 100 x1 (14 May 2021 22:02)      Esophageal cancer hx  Patient began experiencing progressive dysphagia with associated weight loss of ~15 lbs following C-spine surgeries in -2019. She was evaluated with upper endoscopy by Dr. Chew which showed a suspicious appearing lesion at the distal esophagus at 38 cm, circumferential, unable to traverse with the scope. Esophageal mass biopsy revealed infiltrating, moderately to poorly differentiated adenocarcinoma. CT scan chest/abdomen/pelvis 3/13/2020 showed wall thickening of the distal esophagus/hiatal hernia with gastrohepatic and mediastinal lymphadenopathy, highly concerning for malignancy. FDG avid gastrohepatic ligaments, lymph nodes, probably metastatic. FDG avid subcarinal lymph node, probably metastatic. Minimally FDG avid subtle linear left upper lobe pulmonary infiltrate, nonspecific, new since 3/13/20.  Neoadjuvant treatment was recommended. She completed chemoRT on 2020 with carbo/taxol.   2020-CT scan chest/abdomen/pelvis–thickened mid to distal esophagus and masslike thickening of the gastric cardia with LAD being resolved  2020–PET/CT scan showed considerably decreased extent and FDG avidity of soft tissue thickening distal esophagus. Persistent mild FDG avidity may represent posttreatment inflammatory changes. Resolved FDG avid mediastinal and gastrohepatic lymph nodes. New linear segmental FDG avidity from the mid to distal esophagus, indeterminate.   2020–EGD cardia/distal esophagus/proximal esophagus biopsies negative for malignancy.  Saw Dr. Weaver in thoracic surgical consultation for consideration of esophagectomy-patient refused surgery.    Unfortunately, her surveillance CT scan in 2021 revealed new lytic sclerotic metastatic disease involving T4 vertebral body with adjacent paravertebral mass. CT-guided biopsy of thoracic paravertebral mass positive for malignant cells, metastatic adenocarcinoma with cytomorphology compatible with known esophageal adenocarcinoma.   3/2021-S/P RT to T4.  3/2021-Began FOLFOX/Herceptin for metastatic disease    Disease: Esophagus   Pathology: Adenocarcinoma   TNM stage: N2, M1     Foundation medicine-PD-L1 CPS-60. +ERBB2 mutation. DONI. TMB 4 Muts/Mb.  [place] PD-L1 TPS-0%.       PAST MEDICAL & SURGICAL HISTORY:  Chronic back pain    Diverticulitis    HTN (hypertension)    Hyperlipidemia    GERD (gastroesophageal reflux disease)    Compressed cervical disc  c4-c5    H/O:   x 2 -,    H/O: hysterectomy  partial     S/P laminectomy  19- Anterior Cervical Laminectomy    H/O: osteoarthritis    Class 1 obesity with body mass index (BMI) of 33.0 to 33.9 in adult        FAMILY HISTORY:  FH: type 2 diabetes  father-     Family history of breast cancer in mother  mother-     Family hx of alcoholism  mother -      Social History:  Prior smoker 50 pack years (14 May 2021 22:02)      Home Medications:  Ativan 0.5 mg oral tablet: 1 tab(s) orally 2 times a day (14 May 2021 22:49)  Compazine 10 mg oral tablet: orally every 6 hours, As Needed for nausea (14 May 2021 22:49)  gabapentin 100 mg oral tablet: 2 tab(s) orally 3 times a day (14 May 2021 22:49)  Lipitor: 10 milligram(s) orally once a day (at bedtime) (14 May 2021 22:49)  losartan 100 mg oral tablet: 1 tab(s) orally once a day (14 May 2021 22:49)  methadone 5 mg oral tablet: 1 tab(s)  3 times a day (14 May 2021 22:49)  oxyCODONE 10 mg oral tablet: 1 tab(s) orally every 4 hours, As Needed (14 May 2021 22:49)  sertraline 25 mg oral tablet: 1 tab(s) orally 2 times a day (14 May 2021 22:49)  Zofran 8 mg oral tablet: 1 tab(s) orally 3 times a day, As Needed (14 May 2021 22:49)    Allergies    No Known Allergies    Intolerances        MEDICATIONS  (STANDING):  atorvastatin 10 milliGRAM(s) Oral at bedtime  enoxaparin Injectable 40 milliGRAM(s) SubCutaneous daily  losartan 100 milliGRAM(s) Oral daily  senna 2 Tablet(s) Oral at bedtime  sertraline 25 milliGRAM(s) Oral two times a day    MEDICATIONS  (PRN):  HYDROmorphone  Injectable 0.5 milliGRAM(s) IV Push every 2 hours PRN Breakthrough pain  HYDROmorphone  Injectable 1 milliGRAM(s) IV Push every 4 hours PRN Moderate-Sevre pain  labetalol Injectable 5 milliGRAM(s) IV Push every 6 hours PRN Systolic blood pressure > 190  ondansetron Injectable 4 milliGRAM(s) IV Push every 8 hours PRN Nausea and/or Vomiting          Objectives:  Vital Signs Last 24 Hrs  T(C): 36.5 (15 May 2021 06:16), Max: 36.7 (15 May 2021 02:29)  T(F): 97.7 (15 May 2021 06:16), Max: 98 (15 May 2021 02:29)  HR: 72 (15 May 2021 08:27) (64 - 88)  BP: 190/99 (15 May 2021 08:27) (175/82 - 210/89)  BP(mean): --  RR: 17 (15 May 2021 06:16) (16 - 24)  SpO2: 99% (15 May 2021 06:16) (93% - 100%)    Physical exam  General - NAD, alert and oriented  HEENT - PERRLA, EOM intact, sclera and conjunctiva clear, oropharynx, nares clear  Neck - Supple, no thyromegaly or thyroid nodules, no bruits  Cardiovascular - RRR no m/r/g, no JVD, no carotid bruits  Lungs - CTAB, no use of acessory muscles, no w/c/r  Abdomen - Normal bowel sounds, NT/ND  Genito Urinary –   Lymph Nodes - No LAD  Extremeties - No e/c/c  Skin - No rashes, skin warm and dry, no erythematous areas  Musculo Skeletal - 5/5 strength, normal range of motion, no swollen or erythematous joints.  Neurological – Alert and oriented x 3, CN 2-12 grossly intact.          Labs:    CBC Full  -  ( 14 May 2021 17:01 )  WBC Count : 6.49 K/uL  RBC Count : 4.98 M/uL  Hemoglobin : 14.5 g/dL  Hematocrit : 44.1 %  Platelet Count - Automated : 241 K/uL  Mean Cell Volume : 88.6 fL  Mean Cell Hemoglobin : 29.1 pg  Mean Cell Hemoglobin Concentration : 32.9 gm/dL  Auto Neutrophil # : 5.27 K/uL  Auto Lymphocyte # : 0.48 K/uL  Auto Monocyte # : 0.71 K/uL  Auto Eosinophil # : 0.00 K/uL  Auto Basophil # : 0.01 K/uL  Auto Neutrophil % : 81.2 %  Auto Lymphocyte % : 7.4 %  Auto Monocyte % : 10.9 %  Auto Eosinophil % : 0.0 %  Auto Basophil % : 0.2 %        05-14    133<L>  |  93<L>  |  21  ----------------------------<  116<H>  4.7   |  27  |  0.70    Ca    9.6      14 May 2021 17:01    TPro  7.2  /  Alb  4.2  /  TBili  0.4  /  DBili  x   /  AST  34<H>  /  ALT  25  /  AlkPhos  161<H>  05-14    LIVER FUNCTIONS - ( 14 May 2021 17:01 )  Alb: 4.2 g/dL / Pro: 7.2 g/dL / ALK PHOS: 161 U/L / ALT: 25 U/L / AST: 34 U/L / GGT: x             Urinalysis Basic - ( 14 May 2021 19:08 )    Color: Light Yellow / Appearance: Clear / S.021 / pH: x  Gluc: x / Ketone: Trace  / Bili: Negative / Urobili: <2 mg/dL   Blood: x / Protein: 30 mg/dL / Nitrite: Negative   Leuk Esterase: Negative / RBC: 0 /HPF / WBC 3 /HPF   Sq Epi: x / Non Sq Epi: 3 /HPF / Bacteria: Negative            Imagings:       SUSAN DOWD  MRN-[id number]      Reason for Consult: worsening back pain    HPI:  72 yo female w/ a PMHx of metastatic esophageal adenocarcinoma (s/p carbo/taxol and RT 2020-2020, started on folfox/herceptin 3/2021,last ) w/ T4 lytic lesion and paravertebral mass dx 2021 (s/p RT to T4 3/2021), refused esophagectomy, HTN, HLD, degenerative disc disease of C-spine s/p laminectomy x2 (), and adjustment disorder p/f [place]. [place] [place] worsening x 3 weeks. Pain is 10/10, mid lower back, radiates to R abdomen (onset 4-5 days ago), worse w/ movement. Pain started after opening a kitchen window three weeks ago at which time she "felt a pop." Home regimen of oxycontin w/ PRN oxycodone was not working (reduced pain to 7/10). Seen by rad-onc Dr. Trevor Nguyen  who was c/f progression of disease at which time CTAP was ordered. Also seen by palliative care Dr. Zhao on , oxycontin switched to methadone and continued on oxy PRN. Patient is not taking methadone. Is using oxy 30 q4. CTAP  now showing new T10 compression fx likely pathologic (results via HIE). Patient denies fevers, chills, vomiting, diarrhea. loss of bladder or bowel control, saddle anesthesia, any falls or trauma. Endorses urinary frequency and hesitancy x 1 day w/o suprapubic pain, dysuria, or hematuria. No hx of UTIs.     ED vitals: Tmaz 97.8, HR 70-80, /83 -210/89, 93% RA, RR 24   ED labs: WCB 6.49, Na 133, , AST 34, UA neg (small ketones)  Radiology  (via HIE): Lucent and sclerotic lesion at T4 with mild compression deformity as a prior imaging. New heterogeneous sclerosis with a new compression fracture at T10 likely pathologic.  ED course: s/p LR 1L, dilaudid x 3, lido patch, labetalol 50 x 1, losartan 100 x1 (14 May 2021 22:02)      Esophageal cancer hx  Patient began experiencing progressive dysphagia with associated weight loss of ~15 lbs following C-spine surgeries in -2019. She was evaluated with upper endoscopy by Dr. Chew which showed a suspicious appearing lesion at the distal esophagus at 38 cm, circumferential, unable to traverse with the scope. Esophageal mass biopsy revealed infiltrating, moderately to poorly differentiated adenocarcinoma. CT scan chest/abdomen/pelvis 3/13/2020 showed wall thickening of the distal esophagus/hiatal hernia with gastrohepatic and mediastinal lymphadenopathy, highly concerning for malignancy. FDG avid gastrohepatic ligaments, lymph nodes, probably metastatic. FDG avid subcarinal lymph node, probably metastatic. Minimally FDG avid subtle linear left upper lobe pulmonary infiltrate, nonspecific, new since 3/13/20.  Neoadjuvant treatment was recommended. She completed chemoRT on 2020 with carbo/taxol.   2020-CT scan chest/abdomen/pelvis–thickened mid to distal esophagus and masslike thickening of the gastric cardia with LAD being resolved  2020–PET/CT scan showed considerably decreased extent and FDG avidity of soft tissue thickening distal esophagus. Persistent mild FDG avidity may represent posttreatment inflammatory changes. Resolved FDG avid mediastinal and gastrohepatic lymph nodes. New linear segmental FDG avidity from the mid to distal esophagus, indeterminate.   2020–EGD cardia/distal esophagus/proximal esophagus biopsies negative for malignancy.  Saw Dr. Weaver in thoracic surgical consultation for consideration of esophagectomy-patient refused surgery.    Unfortunately, her surveillance CT scan in 2021 revealed new lytic sclerotic metastatic disease involving T4 vertebral body with adjacent paravertebral mass. CT-guided biopsy of thoracic paravertebral mass positive for malignant cells, metastatic adenocarcinoma with cytomorphology compatible with known esophageal adenocarcinoma.   3/2021-S/P RT to T4.  3/2021-Began FOLFOX/Herceptin for metastatic disease    Disease: Esophagus   Pathology: Adenocarcinoma   TNM stage: N2, M1     Foundation medicine-PD-L1 CPS-60. +ERBB2 mutation. DONI. TMB 4 Muts/Mb.  [place] PD-L1 TPS-0%.       PAST MEDICAL & SURGICAL HISTORY:  Chronic back pain    Diverticulitis    HTN (hypertension)    Hyperlipidemia    GERD (gastroesophageal reflux disease)    Compressed cervical disc  c4-c5    H/O:   x 2 -,    H/O: hysterectomy  partial     S/P laminectomy  19- Anterior Cervical Laminectomy    H/O: osteoarthritis    Class 1 obesity with body mass index (BMI) of 33.0 to 33.9 in adult        FAMILY HISTORY:  FH: type 2 diabetes  father-     Family history of breast cancer in mother  mother-     Family hx of alcoholism  mother -      Social History:  Prior smoker 50 pack years (14 May 2021 22:02)      Home Medications:  Ativan 0.5 mg oral tablet: 1 tab(s) orally 2 times a day (14 May 2021 22:49)  Compazine 10 mg oral tablet: orally every 6 hours, As Needed for nausea (14 May 2021 22:49)  gabapentin 100 mg oral tablet: 2 tab(s) orally 3 times a day (14 May 2021 22:49)  Lipitor: 10 milligram(s) orally once a day (at bedtime) (14 May 2021 22:49)  losartan 100 mg oral tablet: 1 tab(s) orally once a day (14 May 2021 22:49)  methadone 5 mg oral tablet: 1 tab(s)  3 times a day (14 May 2021 22:49)  oxyCODONE 10 mg oral tablet: 1 tab(s) orally every 4 hours, As Needed (14 May 2021 22:49)  sertraline 25 mg oral tablet: 1 tab(s) orally 2 times a day (14 May 2021 22:49)  Zofran 8 mg oral tablet: 1 tab(s) orally 3 times a day, As Needed (14 May 2021 22:49)    Allergies    No Known Allergies    Intolerances        MEDICATIONS  (STANDING):  atorvastatin 10 milliGRAM(s) Oral at bedtime  enoxaparin Injectable 40 milliGRAM(s) SubCutaneous daily  losartan 100 milliGRAM(s) Oral daily  senna 2 Tablet(s) Oral at bedtime  sertraline 25 milliGRAM(s) Oral two times a day    MEDICATIONS  (PRN):  HYDROmorphone  Injectable 0.5 milliGRAM(s) IV Push every 2 hours PRN Breakthrough pain  HYDROmorphone  Injectable 1 milliGRAM(s) IV Push every 4 hours PRN Moderate-Sevre pain  labetalol Injectable 5 milliGRAM(s) IV Push every 6 hours PRN Systolic blood pressure > 190  ondansetron Injectable 4 milliGRAM(s) IV Push every 8 hours PRN Nausea and/or Vomiting          Objectives:  Vital Signs Last 24 Hrs  T(C): 36.5 (15 May 2021 06:16), Max: 36.7 (15 May 2021 02:29)  T(F): 97.7 (15 May 2021 06:16), Max: 98 (15 May 2021 02:29)  HR: 72 (15 May 2021 08:27) (64 - 88)  BP: 190/99 (15 May 2021 08:27) (175/82 - 210/89)  BP(mean): --  RR: 17 (15 May 2021 06:16) (16 - 24)  SpO2: 99% (15 May 2021 06:16) (93% - 100%)    Physical exam  General - in a lot of acute stress, alert and oriented  HEENT - PERRLA, EOM intact, sclera and conjunctiva clear, oropharynx, nares clear  Neck - Supple, no thyromegaly or thyroid nodules, no bruits  Cardiovascular - RRR no m/r/g, no JVD, no carotid bruits  Lungs - CTAB, no use of acessory muscles, no w/c/r  Skin - No rashes, skin warm and dry, no erythematous areas  Neurological – Alert and oriented x 3, CN 2-12 grossly intact.          Labs:    CBC Full  -  ( 14 May 2021 17:01 )  WBC Count : 6.49 K/uL  RBC Count : 4.98 M/uL  Hemoglobin : 14.5 g/dL  Hematocrit : 44.1 %  Platelet Count - Automated : 241 K/uL  Mean Cell Volume : 88.6 fL  Mean Cell Hemoglobin : 29.1 pg  Mean Cell Hemoglobin Concentration : 32.9 gm/dL  Auto Neutrophil # : 5.27 K/uL  Auto Lymphocyte # : 0.48 K/uL  Auto Monocyte # : 0.71 K/uL  Auto Eosinophil # : 0.00 K/uL  Auto Basophil # : 0.01 K/uL  Auto Neutrophil % : 81.2 %  Auto Lymphocyte % : 7.4 %  Auto Monocyte % : 10.9 %  Auto Eosinophil % : 0.0 %  Auto Basophil % : 0.2 %        -14    133<L>  |  93<L>  |  21  ----------------------------<  116<H>  4.7   |  27  |  0.70    Ca    9.6      14 May 2021 17:01    TPro  7.2  /  Alb  4.2  /  TBili  0.4  /  DBili  x   /  AST  34<H>  /  ALT  25  /  AlkPhos  161<H>  05-14    LIVER FUNCTIONS - ( 14 May 2021 17:01 )  Alb: 4.2 g/dL / Pro: 7.2 g/dL / ALK PHOS: 161 U/L / ALT: 25 U/L / AST: 34 U/L / GGT: x             Urinalysis Basic - ( 14 May 2021 19:08 )    Color: Light Yellow / Appearance: Clear / S.021 / pH: x  Gluc: x / Ketone: Trace  / Bili: Negative / Urobili: <2 mg/dL   Blood: x / Protein: 30 mg/dL / Nitrite: Negative   Leuk Esterase: Negative / RBC: 0 /HPF / WBC 3 /HPF   Sq Epi: x / Non Sq Epi: 3 /HPF / Bacteria: Negative            Imagings:

## 2021-05-15 NOTE — PROGRESS NOTE ADULT - SUBJECTIVE AND OBJECTIVE BOX
Patient is a 71y old  Female who presents with a chief complaint of back pain (15 May 2021 09:06)      SUBJECTIVE / OVERNIGHT EVENTS:  c/o low bacl pain 50/10  Sent in by her cancer MD from [facility]  No difficulty walking or urine or BM- no incontinence or stoppage- no saddle anesthesia  Patient got 1.5 mg Dilaudid in ED- comp 1 unit not helping      MEDICATIONS  (STANDING):  atorvastatin 10 milliGRAM(s) Oral at bedtime  enoxaparin Injectable 40 milliGRAM(s) SubCutaneous daily  losartan 100 milliGRAM(s) Oral daily  senna 2 Tablet(s) Oral at bedtime  sertraline 25 milliGRAM(s) Oral two times a day    MEDICATIONS  (PRN):  HYDROmorphone  Injectable 1.5 milliGRAM(s) IV Push every 4 hours PRN Moderate Pain (4 - 6) and severe pain  HYDROmorphone  Injectable 0.5 milliGRAM(s) IV Push every 2 hours PRN Breakthrough pain  labetalol Injectable 5 milliGRAM(s) IV Push every 6 hours PRN Systolic blood pressure > 190  ondansetron Injectable 4 milliGRAM(s) IV Push every 8 hours PRN Nausea and/or Vomiting    Vital Signs Last 24 Hrs  T(C): 36.5 (15 May 2021 06:16), Max: 36.7 (15 May 2021 02:29)  T(F): 97.7 (15 May 2021 06:16), Max: 98 (15 May 2021 02:29)  HR: 70 (15 May 2021 09:22) (64 - 88)  BP: 186/96 (15 May 2021 09:22) (175/82 - 210/89)  BP(mean): --  RR: 17 (15 May 2021 06:16) (16 - 24)  SpO2: 99% (15 May 2021 06:16) (93% - 100%)    PHYSICAL EXAM:  GENERAL: In distress due to pain  HEAD:  Atraumatic, Normocephalic  EYES: EOMI, PERRLA, conjunctiva and sclera clear  NECK: Supple, No JVD  CHEST/LUNG: Clear to auscultation bilaterally; No wheeze  HEART: Regular rate and rhythm; No murmurs, rubs, or gallops  ABDOMEN: Soft, Nontender, Nondistended; Bowel sounds present  EXTREMITIES:  2+ Peripheral Pulses, No clubbing, cyanosis, or edema  PSYCH: Alert  NEUROLOGY: non-focal      LABS:                        14.5   6.49  )-----------( 241      ( 14 May 2021 17:01 )             44.1         133<L>  |  93<L>  |  21  ----------------------------<  116<H>  4.7   |  27  |  0.70    Ca    9.6      14 May 2021 17:01    TPro  7.2  /  Alb  4.2  /  TBili  0.4  /  DBili  x   /  AST  34<H>  /  ALT  25  /  AlkPhos  161<H>      Urinalysis Basic - ( 14 May 2021 19:08 )    Color: Light Yellow / Appearance: Clear / S.021 / pH: x  Gluc: x / Ketone: Trace  / Bili: Negative / Urobili: <2 mg/dL   Blood: x / Protein: 30 mg/dL / Nitrite: Negative   Leuk Esterase: Negative / RBC: 0 /HPF / WBC 3 /HPF   Sq Epi: x / Non Sq Epi: 3 /HPF / Bacteria: Negative        RADIOLOGY & ADDITIONAL TESTS:  a< from: CT Abdomen and Pelvis w/ Oral Cont and w/ IV Cont (21 @ 11:26) >  IMPRESSION:  New T10 compression fracture likely pathologic.          Care Discussed with Consultants/Other Providers:

## 2021-05-16 NOTE — PROGRESS NOTE ADULT - PROBLEM SELECTOR PLAN 2
Hx of HTN on losartan 100 at home. -150 at bl. SBPs elevated likely iso pain. Patient also did not take losartan prior to presentation   -c/w home losartan  -labetalol 5mg q4 PRN if SBP >200  -pain control as above Hx of HTN on losartan 100 at home. -150 at bl. SBPs elevated likely iso pain. Patient also did not take losartan prior to presentation   /94 today  -c/w home losartan  -labetalol 5mg q4 PRN if SBP >200  Start Hydralazine 25 mg q8, start norvasc 5 mg qd with hold parameters with plan to remove labetolol  -pain control as above

## 2021-05-16 NOTE — DIETITIAN INITIAL EVALUATION ADULT. - ADD RECOMMEND
3) Encouraged PO intake as tolerated. Offered to obtain food preferences, pt declining at this time. Pt made aware RDN remains available PRN to discuss alternatives.                                                                       4) Monitor BMs, adjust bowel regimen as appropriate.                                                                                            5) Monitor PO intake, tolerance to supplement, weight trends, nutrition related lab values, GI distress, hydration status, skin integrity.

## 2021-05-16 NOTE — DIETITIAN INITIAL EVALUATION ADULT. - PERTINENT MEDS FT
For Your Information   Â· If you are in need of a medication refill please use one of the following options:  1. Call your pharmacy for all medication refills and renewals. 2. [facility]- [email or web address]  3. Call your providers office    Â· If your provider ordered any imaging for you today. Our pre-scheduling services will be reaching out to you within 2 business days to schedule this. [address]  Services can be reached by calling [phone]    Â· If your provider ordered testing today, you will be notified of your test results within 3-5 business days unless specified otherwise. If you have not received your results within 5 business days please call your provider's office. Â· You may be receiving a survey. Please take the time to complete this, as your feedback is very important to us! We strive to make your experience exceptional and your comments help us with that goal.  We look forward to hearing from you! Â· For all future appointments please arrive 15 minutes prior to your scheduled visit. Â·          Medicare Wellness Visit  Plan for Preventive Care    A good way for you to stay healthy is to use preventive care. Medicare covers many services that can help you stay healthy. * The goal of these services is to find any health problems as quickly as possible. Finding problems early can help make them easier to treat. Your personal plan below lists the services you may need and when they are due.      Health Maintenance Summary     DTaP/Tdap/Td Vaccine (2 - Td)  Overdue since 11/27/2017    Shingles Vaccine (2 of 2)  Overdue since 4/30/2020    Influenza Vaccine (Season Ended)  Next due on 9/1/2020    Colorectal Cancer Screening-Colonoscopy (Every 10 Years)  Next due on 5/9/2028    Hepatitis C Screening   Completed    Pneumococcal Vaccine 0-64   Completed    Meningococcal Vaccine   Aged Out           Preventive Care for Women and Men    Heart Screenings (Cardiovascular):  Â· Blood tests are used to check your cholesterol, lipid and triglyceride levels. High levels can increase your risk for heart disease and stroke. High levels can be treated with medications, diet and exercise. Lowering your levels can help keep your heart and blood vessels healthy. Your provider will order these tests if they are needed. Â· An ultrasound is done to see if you have an abdominal aortic aneurysm (AAA). This is an enlargement of one of the main blood vessels that delivers blood to the body. In the [facility], 9,000 deaths are caused by AAA. You may not even know you have this problem and as many as 1 in 3 people will have a serious problem if it is not treated. Early diagnosis allows for more effective treatment and cure. If you have a family history of AAA or are a male age 70-76 who has smoked, you are at higher risk of an AAA. Your provider can order this test, if needed. Colorectal Screening:  Â· There are many tests that are used to check for cancer of your colon and rectum. You and your provider should discuss what test is best for you and when to have it done. Options include:  Â· Screening Colonoscopy: exam of the entire colon, seen through a flexible lighted tube. Â· Flexible Sigmoidoscopy: exam of the last third (sigmoid portion) of the colon and rectum, seen through a flexible lighted tube. Â· Cologuard DNA stool test: a sample of your stool is used to screen for cancer and unseen blood in your stool. Â· Fecal Occult Blood Test: a sample of your stool is studied to find any unseen blood    Flu Shot:  Â· An immunization that helps to prevent influenza (the flu). You should get this every year. The best time to get the shot is in the fall. Pneumococcal Shot:  â¢ Vaccines are available that can help prevent pneumococcal disease, which is any type of infection caused by Streptococcus pneumoniae bacteria.    Their use can prevent some cases of pneumonia, meningitis, and sepsis. There are two types of pneumococcal vaccines:   o Conjugate vaccines (PCV-13 or Prevnar 13Â®) - helps protect against the 13 types of pneumococcal bacteria that are the most common causes of serious infections in children and adults. o Polysaccharide vaccine (PPSV23 or Hdmuvjfah79Â®) - helps protect against 23 types of pneumococcal bacteria for patients who are recommended to get it. These vaccines should be given at least 12 months apart. A booster is usually not needed. Hepatitis B Shot:  Â· An immunization that helps to protect people from getting Hepatitis B. Hepatitis B is a virus that spreads through contact with infected blood or body fluids. Many people with the virus do not have symptoms. The virus can lead to serious problems, such as liver disease. Some people are at higher risk than others. Your doctor will tell you if you need this shot. Diabetes Screening:  Â· A test to measure sugar (glucose) in your blood is called a fasting blood sugar. Fasting means you cannot have food or drink for at least 8 hours before the test. This test can detect diabetes long before you may notice symptoms. Glaucoma Screening:  Â· Glaucoma screening is performed by your eye doctor. The test measures the fluid pressure inside your eyes to determine if you have glaucoma. Hepatitis C Screening:  Â· A blood test to see if you have the hepatitis C virus. Hepatitis C attacks the liver and is a major cause of chronic liver disease. Medicare will cover a single screening for all adults born between [facility], or high risk patients (people who have injected illegal drugs or people who have had blood transfusions). High risk patients who continue to inject illegal drugs can be screened for Hepatitis C every year. Smoking and Tobacco-Use Cessation Counseling:  Â· Tobacco is the single greatest cause of disease and early death in our country today.  Medication and counseling together can increase a personâs chance of quitting for good. Â· Medicare covers two quitting attempts per year, with four counseling sessions per attempt (eight sessions in a 12 month period)    Preventive Screening tests for Women    Screening Mammograms and Breast Exams:  Â· An x-ray of your breasts to check for breast cancer before you or your doctor may be able to feel it. If breast cancer is found early it can usually be treated with success. Pelvic Exams and Pap Tests:  Â· An exam to check for cervical and vaginal cancer. A Pap test is a lab test in which cells are taken from your cervix and sent to the lab to look for signs of cervical cancer. If cancer of the cervix is found early, chances for a cure are good. Testing can generally end at age 72, or if a woman has a hysterectomy for a benign condition. Your provider may recommend more frequent testing if certain abnormal results are found. Bone Mass Measurements:  Â· A painless x-ray of your bone density to see if you are at risk for a broken bone. Bone density refers to the thickness of bones or how tightly the bone tissue is packed. Preventive Screening tests for Men    Prostate Screening:  Â· Should you have a prostate cancer test (PSA)? It is up to you to decide if you want a prostate cancer test. Talk to your clinician to find out if the test is right for you. Things for you to consider and talk about should include:  Â· Benefits and harms of the test  Â· Your family history  Â· How your race/ethnicity may influence the test  Â· If the test may impact other medical conditions you have  Â· Your values on screenings and treatments    *Medicare pays for many preventive services to keep you healthy. For some of these services, you might have to pay a deductible, coinsurance, and / or copayment. The amounts vary depending on the type of services you need and the kind of Medicare health plan you have. atorvastatin, cefepime IVPB, dexAMETHasone Injectable, HYDROmorphone PCA, methadone  Tablet, ondansetron Injectable, pantoprazole Tablet, polyethylene glycol 3350, senna, sodium chloride 0.9% @80 mL/Hr IV Continuous, ondansetron Injectable PRN

## 2021-05-16 NOTE — DIETITIAN INITIAL EVALUATION ADULT. - REASON FOR ADMISSION
Per chart, pt is 71 year old female PMHx metastatic esophageal adenocarcinoma on treatment with T4 lytic lesion and paravertebral mass s/p RT, refused esophagectomy, HTN, HLD, degenerative disc disease of C-spine s/p laminectomy x2 (2019), adjustment disorder presenting for refractory acute back pain x3 weeks found to have new T10 compression fx admitted for further management.

## 2021-05-16 NOTE — DIETITIAN INITIAL EVALUATION ADULT. - PERTINENT LABORATORY DATA
(5/16) Na 127<L>, BUN 25<H>, Cr 0.62, <H>, K+ 3.7, Phos 3.2, Mg 2.0, Alk Phos 193<H>, ALT/SGPT 21, AST/SGOT 32

## 2021-05-16 NOTE — DIETITIAN INITIAL EVALUATION ADULT. - OTHER INFO
Reports episode of emesis this morning. Ordered for zofran q8 hrs.   Complains of constipation, last BM 5/13 per flowsheets, +bowel regimen (senna, polyethylene glycol).   Minimal PO intake in house thus far. Breakfast tray visibly untouched at time of visit. Pt amenable to trial of Ensure Clear (wild berry flavor preference).   Denies chewing/swallowing difficulties.  Pt with fluctuating weights, per history obtained via chart: (5/15/21) 138.2 lbs, (3/12/21) 155 lbs, (2/8/21) 145 lbs, (11/3/20) 142 lbs.

## 2021-05-16 NOTE — PROGRESS NOTE ADULT - PROBLEM SELECTOR PLAN 1
New acute back pain x 3 weeks refractory to home opioid regimen (on oxy 30 q4 PRN, not using methadone or gabapentin as prescribed). No neurological deficits on exam.   -CTAP 5/13 (via HIE): New heterogeneous sclerosis with a new compression fracture at T10 likely pathologic. Lucent and sclerotic lesion at T4 with mild compression deformity as seen on prior imaging.   c/o 50/10 pain on 5/15-----> 7/10 today  nausea- start iv zofran tid before meals  Bowel Regiman  -inc Dilaudid 1.5 mg q4 hrs PRN mod-severe pain- as given in ED--> incto 2 mg q3 - --> will inc to 3 mg q3 prn  -c/w lidocaine patch over T10  -spine/ortho consult - see recs below  -rad onc consult re: role of palliative chemo in AM-   -patient will likely require TLSO brace, bed rest until then - will call Monday  -palliative consult for pain mgt in AM- ACP called 5/15  -PT consult when brace is here.    Pain control  WBAT with assistive devices as needed  SCDs  TLSO as tolerated  Rec MRI CTLSp w/ and w/o contrast

## 2021-05-16 NOTE — PROGRESS NOTE ADULT - PROBLEM SELECTOR PLAN 5
Dx between [id number]. On folfox/herceptin (3/2021-). T4 lytic lesion and paravertebral mass dx 1/2021 s/p RT to T4 3/2021. Now w/ new T10 compression fx likely malignant. Was evaluated by [facility], refused esophagectomy. [place] Afsaneh christina[place] Dr. Trevor Nguyen Rad-Onc, Dr. Susanne Zhao Palliative   -heme onc consult in AM  -c/w zofran PRN nausea (monitor qtc) Dx between [id number]. On folfox/herceptin (3/2021-). T4 lytic lesion and paravertebral mass dx 1/2021 s/p RT to T4 3/2021. Now w/ new T10 compression fx likely malignant. Was evaluated by [facility], refused esophagectomy. [place]s [place]/ Dr. Afsaneh christina[place]. Trevor Nguyen Rad-Onc, Dr. Susanne Zhao Palliative   -heme onc consult in AM  -c/w zofran PRN nausea (monitor qtc)- now tid before meals  Onc notes "  #Metastatic esophageal adenocarcinoma  -s/p neoadjuvant chemoRT with carbo/taxol, however, declined surgery  -recurrence in T4, confirmed with biopsy, in 1/2021  -now on FOLFOX + herceptin given ERBB positivity  -no plan for inpatient chemotherapy"

## 2021-05-16 NOTE — CHART NOTE - NSCHARTNOTEFT_GEN_A_CORE
Consult for Pain management in the setting of metastatic esophageal adenocarcinoma      DC fentanyl patch and Dilaudid for breakthrough pain    Pending whether Dilaudid 3mg IV is helping pt.      Full consult note to follow tomorrow      Med Christensen M.D.  Palliative Medicine Fellow  [id number] Consult for Pain management in the setting of metastatic esophageal adenocarcinoma    As per primary team and EMR:    71 year old woman w/ a PMHx of metastatic esophageal adenocarcinoma (s/p carbo/taxol and RT 4/2020-5/2020, started on folfox/herceptin 3/2021,last 5/12) w/ T4 lytic lesion and paravertebral mass dx 1/2021 (s/p RT to T4 3/2021), refused esophagectomy, HTN, HLD, degenerative disc disease of C-spine s/p laminectomy x2 (2019), and adjustment disorder p/f heme/on[place] Dr. Shelby's [place] for refractory acute back pain x 3 weeks found to have new T10 compression fx likely pathologic identified on outpatient CTAP 5/13 with mild cord compression as per team. pt received several opioid medication (see below) with pain unimproved. Dilaudid was increased to 3mg for which pain level went from 10/10 to 5/10.    Pt received in the past 24hours :    HYDROmorphone  Injectable   3 milliGRAM(s) IV Push (05-16-21 @ 09:11)    HYDROmorphone  Injectable   0.5 milliGRAM(s) IV Push (05-16-21 @ 02:51)   0.5 milliGRAM(s) IV Push (05-15-21 @ 19:58)   0.5 milliGRAM(s) IV Push (05-15-21 @ 12:55)    HYDROmorphone  Injectable   2 milliGRAM(s) IV Push (05-16-21 @ 05:54)   2 milliGRAM(s) IV Push (05-16-21 @ 01:10)   2 milliGRAM(s) IV Push (05-15-21 @ 21:47)   2 milliGRAM(s) IV Push (05-15-21 @ 18:47)   2 milliGRAM(s) IV Push (05-15-21 @ 15:17)      ISTOP # [id number]: Rx methadone 5mg 5/12/2021    05-16    127<L>  |  88<L>  |  25<H>  ----------------------------<  100<H>  3.7   |  20<L>  |  0.62    Ca    9.8      16 May 2021 07:35  Phos  3.2     05-16  Mg     2.0     05-16    TPro  7.5  /  Alb  4.3  /  TBili  0.6  /  DBili  x   /  AST  32  /  ALT  21  /  AlkPhos  193<H>  05-16    < from: MR Lumbar Spine No Cont (05.15.21 @ 20:59) >      MRI of the lumbar spine was attempted. Only sagittal T1 and T2-weighted sequence was performed. The patient could not continue with the rest of this study.  Extensive degenerative changes are again seen and appear relatively unchanged when compared with the prior MRI lumbar spine performed on June 20, 2019.  Please note complete MRI of the lumbar spine is recommended when patient patient can tolerate it or sedation be given.  IMPRESSION: Limited incomplete MRI of the lumbar spine as described above.    < from: CT Abdomen and Pelvis w/ Oral Cont and w/ IV Cont (05.13.21 @ 11:26) >    IMPRESSION:  New T10 compression fracture likely pathologic.      Plan:  -Please discontinue fentanyl patch and dilaudid PRNs  -Initiate Dilaudid PCA 0 bolus/1mg demand/ 15min lock out/ 0 continuous with 3mg Breakthrough pain clinician bolus  -Please initiate Methadone, Pts home medication  -C/w bowel regimen  -Plan discuss with Attending  -Full consult note to follow      Med Christensen M.D.  Palliative Medicine Fellow  [id number] Consult for Pain management in the setting of metastatic esophageal adenocarcinoma    As per primary team and EMR:    71 year old woman w/ a PMHx of metastatic esophageal adenocarcinoma (s/p carbo/taxol and RT 4/2020-5/2020, started on folfox/herceptin 3/2021,last 5/12) w/ T4 lytic lesion and paravertebral mass dx 1/2021 (s/p RT to T4 3/2021), refused esophagectomy, HTN, HLD, degenerative disc disease of C-spine s/p laminectomy x2 (2019), and adjustment disorder p/f heme/onc Dr. Shelby's ([facility]) office for refractory acute back pain x 3 weeks found to have new T10 compression fx likely pathologic identified on outpatient CTAP 5/13 with mild cord compression as per team. pt received several opioid medication (see below) with pain unimproved. Dilaudid was increased to 3mg for which pain level went from 10/10 to 5/10.    Pt received in the past 24hours :    HYDROmorphone  Injectable   3 milliGRAM(s) IV Push (05-16-21 @ 09:11)    HYDROmorphone  Injectable   0.5 milliGRAM(s) IV Push (05-16-21 @ 02:51)   0.5 milliGRAM(s) IV Push (05-15-21 @ 19:58)   0.5 milliGRAM(s) IV Push (05-15-21 @ 12:55)    HYDROmorphone  Injectable   2 milliGRAM(s) IV Push (05-16-21 @ 05:54)   2 milliGRAM(s) IV Push (05-16-21 @ 01:10)   2 milliGRAM(s) IV Push (05-15-21 @ 21:47)   2 milliGRAM(s) IV Push (05-15-21 @ 18:47)   2 milliGRAM(s) IV Push (05-15-21 @ 15:17)      ISTOP # [id number]: Rx methadone 5mg 5/12/2021    05-16    127<L>  |  88<L>  |  25<H>  ----------------------------<  100<H>  3.7   |  20<L>  |  0.62    Ca    9.8      16 May 2021 07:35  Phos  3.2     05-16  Mg     2.0     05-16    TPro  7.5  /  Alb  4.3  /  TBili  0.6  /  DBili  x   /  AST  32  /  ALT  21  /  AlkPhos  193<H>  05-16    < from: MR Lumbar Spine No Cont (05.15.21 @ 20:59) >      MRI of the lumbar spine was attempted. Only sagittal T1 and T2-weighted sequence was performed. The patient could not continue with the rest of this study.  Extensive degenerative changes are again seen and appear relatively unchanged when compared with the prior MRI lumbar spine performed on June 20, 2019.  Please note complete MRI of the lumbar spine is recommended when patient patient can tolerate it or sedation be given.  IMPRESSION: Limited incomplete MRI of the lumbar spine as described above.    < from: CT Abdomen and Pelvis w/ Oral Cont and w/ IV Cont (05.13.21 @ 11:26) >    IMPRESSION:  New T10 compression fracture likely pathologic.      Plan:  -Please discontinue fentanyl patch and dilaudid PRNs  -Initiate Dilaudid PCA 0 bolus/1mg demand/ 15min lock out/ 0 continuous with 3mg Breakthrough pain clinician bolus  -Please initiate Methadone, Pts home medication (5mg TID per ISTOP)  -C/w bowel regimen  -Plan discuss with Attending  -Full consult note to follow      Med Christensen M.D.  Palliative Medicine Fellow  [id number]    Attending addendum:  patient with uncontrolled pain, likely as not on home regimen which per ISTOP shows methadone TID  Qtc per EMR is 427ms, please monitor  restart methadone 5mg TID  dilaudid PCA started for acute uncontrolled pain, will monitor usage but anticipate off titration as methadone started.  please page if acute issues or uncontrolled symptoms requiring our assistance. pager number as above.    wm blancas MD

## 2021-05-16 NOTE — DIETITIAN INITIAL EVALUATION ADULT. - ORAL INTAKE PTA/DIET HISTORY
Limited subjective assessment, pt lethargic at time of visit.  NKFA, no noted micronutrient supplementation PTA per H&P.  Endorses poor PO intake x3 days PTA in the setting of nausea.

## 2021-05-16 NOTE — PROGRESS NOTE ADULT - PROBLEM SELECTOR PLAN 8
DVT: lovenox  Diet: DASH  Dispo: pending PT DVT: lovenox  Diet: DASH  Dispo: pending PT    meds for BP/ Pain/ Nausea d/w RN  RN notes patient is retaining urine 600 cc- may need fraire DVT: lovenox  Diet: DASH  Dispo: pending PT    meds for BP/ Pain/ Nausea d/w RN  RN notes patient is retaining urine 600 cc- may need fraire    Called daughter Diane Mello [phone]- left VM explaing pain is better controlled- will call again with updates

## 2021-05-16 NOTE — PROGRESS NOTE ADULT - SUBJECTIVE AND OBJECTIVE BOX
Patient is a 71y old  Female who presents with a chief complaint of back pain (15 May 2021 10:01)      SUBJECTIVE / OVERNIGHT EVENTS:  Seen with RN- notes pain improved from 50/10 to 7/10 with IV dilaudid 2 mg prn- will increase to 3 mg prn  No BM x 3 days  Urinating now  poor PO intake due to Nausea- will do IV Zofran tid before meals  noted she was not able to have MRI last night- need to " knock me out "      MEDICATIONS  (STANDING):  atorvastatin 10 milliGRAM(s) Oral at bedtime  dexAMETHasone  Injectable 4 milliGRAM(s) IV Push every 6 hours  enoxaparin Injectable 40 milliGRAM(s) SubCutaneous daily  losartan 100 milliGRAM(s) Oral daily  pantoprazole    Tablet 40 milliGRAM(s) Oral before breakfast  senna 2 Tablet(s) Oral at bedtime  sertraline 25 milliGRAM(s) Oral two times a day    MEDICATIONS  (PRN):  HYDROmorphone  Injectable 0.5 milliGRAM(s) IV Push every 2 hours PRN Breakthrough pain  HYDROmorphone  Injectable 2 milliGRAM(s) IV Push every 3 hours PRN Moderate Pain (4 - 6) and severe  labetalol Injectable 5 milliGRAM(s) IV Push every 6 hours PRN Systolic blood pressure > 190  naloxone Injectable 0.1 milliGRAM(s) IV Push once PRN lethargy  ondansetron Injectable 4 milliGRAM(s) IV Push every 8 hours PRN Nausea and/or Vomiting      I&O's Summary    15 May 2021 07:01  -  16 May 2021 07:00  --------------------------------------------------------  IN: 0 mL / OUT: 425 mL / NET: -425 mL      Vital Signs Last 24 Hrs  T(C): 37 (16 May 2021 05:45), Max: 37 (16 May 2021 05:45)  T(F): 98.6 (16 May 2021 05:45), Max: 98.6 (16 May 2021 05:45)  HR: 99 (16 May 2021 05:45) (66 - 99)  BP: 183/94 (16 May 2021 05:45) (172/90 - 199/95)  BP(mean): --  RR: 18 (15 May 2021 21:25) (18 - 18)  SpO2: 98% (15 May 2021 21:25) (97% - 100%)  PHYSICAL EXAM:  GENERAL: NAD, well-developed  HEAD:  Atraumatic, Normocephalic  EYES: EOMI, PERRLA, conjunctiva and sclera clear  NECK: Supple, No JVD  CHEST/LUNG: Clear to auscultation bilaterally; No wheeze  HEART: Regular rate and rhythm; No murmurs, rubs, or gallops  ABDOMEN: Soft, Nontender, Nondistended; Bowel sounds present  EXTREMITIES:  2+ Peripheral Pulses, No clubbing, cyanosis, or edema  PSYCH: AAOx3  NEUROLOGY: non-focal      LABS:                        14.8   x     )-----------( 233      ( 16 May 2021 07:35 )             43.3     05-15    128<L>  |  92<L>  |  18  ----------------------------<  121<H>  3.6   |  23  |  0.59    Ca    9.3      15 May 2021 09:34  Phos  3.0     05-15  Mg     1.9     05-15    TPro  7.1  /  Alb  4.2  /  TBili  0.6  /  DBili  x   /  AST  30  /  ALT  23  /  AlkPhos  157<H>  05-15          Urinalysis Basic - ( 14 May 2021 19:08 )    Color: Light Yellow / Appearance: Clear / S.021 / pH: x  Gluc: x / Ketone: Trace  / Bili: Negative / Urobili: <2 mg/dL   Blood: x / Protein: 30 mg/dL / Nitrite: Negative   Leuk Esterase: Negative / RBC: 0 /HPF / WBC 3 /HPF   Sq Epi: x / Non Sq Epi: 3 /HPF / Bacteria: Negative        RADIOLOGY & ADDITIONAL TESTS:  rad< from: CT Abdomen and Pelvis w/ Oral Cont and w/ IV Cont (21 @ 11:26) >  IMPRESSION:  New T10 compression fracture likely pathologic.        Care Discussed with Consultants/Other Providers:   Patient is a 71y old  Female who presents with a chief complaint of back pain (15 May 2021 10:01)      SUBJECTIVE / OVERNIGHT EVENTS:  Seen with RN- notes pain improved from 50/10 to 7/10 with IV dilaudid 2 mg prn- will increase to 3 mg prn  No BM x 3 days  Urinating now  poor PO intake due to Nausea- will do IV Zofran tid before meals  noted she was not able to have MRI last night- need to " knock me out "      MEDICATIONS  (STANDING):  atorvastatin 10 milliGRAM(s) Oral at bedtime  dexAMETHasone  Injectable 4 milliGRAM(s) IV Push every 6 hours  enoxaparin Injectable 40 milliGRAM(s) SubCutaneous daily  losartan 100 milliGRAM(s) Oral daily  pantoprazole    Tablet 40 milliGRAM(s) Oral before breakfast  senna 2 Tablet(s) Oral at bedtime  sertraline 25 milliGRAM(s) Oral two times a day    MEDICATIONS  (PRN):  HYDROmorphone  Injectable 0.5 milliGRAM(s) IV Push every 2 hours PRN Breakthrough pain  HYDROmorphone  Injectable 2 milliGRAM(s) IV Push every 3 hours PRN Moderate Pain (4 - 6) and severe  labetalol Injectable 5 milliGRAM(s) IV Push every 6 hours PRN Systolic blood pressure > 190  naloxone Injectable 0.1 milliGRAM(s) IV Push once PRN lethargy  ondansetron Injectable 4 milliGRAM(s) IV Push every 8 hours PRN Nausea and/or Vomiting      I&O's Summary    15 May 2021 07:01  -  16 May 2021 07:00  --------------------------------------------------------  IN: 0 mL / OUT: 425 mL / NET: -425 mL      Vital Signs Last 24 Hrs  T(C): 37 (16 May 2021 05:45), Max: 37 (16 May 2021 05:45)  T(F): 98.6 (16 May 2021 05:45), Max: 98.6 (16 May 2021 05:45)  HR: 99 (16 May 2021 05:45) (66 - 99)  BP: 183/94 (16 May 2021 05:45) (172/90 - 199/95)  BP(mean): --  RR: 18 (15 May 2021 21:25) (18 - 18)  SpO2: 98% (15 May 2021 21:25) (97% - 100%)  PHYSICAL EXAM:  GENERAL: NAD, well-developed  HEAD:  Atraumatic, Normocephalic  EYES: EOMI, PERRLA, conjunctiva and sclera clear  NECK: Supple, No JVD  R chest port  CHEST/LUNG: Clear to auscultation bilaterally; No wheeze  HEART: Regular rate and rhythm; No murmurs, rubs, or gallops  ABDOMEN: Soft, Nontender, Nondistended; Bowel sounds present  EXTREMITIES:  2+ Peripheral Pulses, No clubbing, cyanosis, or edema  PSYCH: AAOx3  NEUROLOGY: non-focal      LABS:                        14.8   x     )-----------( 233      ( 16 May 2021 07:35 )             43.3     05-15    128<L>  |  92<L>  |  18  ----------------------------<  121<H>  3.6   |  23  |  0.59    Ca    9.3      15 May 2021 09:34  Phos  3.0     05-15  Mg     1.9     -15    TPro  7.1  /  Alb  4.2  /  TBili  0.6  /  DBili  x   /  AST  30  /  ALT  23  /  AlkPhos  157<H>  05-15          Urinalysis Basic - ( 14 May 2021 19:08 )    Color: Light Yellow / Appearance: Clear / S.021 / pH: x  Gluc: x / Ketone: Trace  / Bili: Negative / Urobili: <2 mg/dL   Blood: x / Protein: 30 mg/dL / Nitrite: Negative   Leuk Esterase: Negative / RBC: 0 /HPF / WBC 3 /HPF   Sq Epi: x / Non Sq Epi: 3 /HPF / Bacteria: Negative        RADIOLOGY & ADDITIONAL TESTS:  rad< from: CT Abdomen and Pelvis w/ Oral Cont and w/ IV Cont (21 @ 11:26) >  IMPRESSION:  New T10 compression fracture likely pathologic.        Care Discussed with Consultants/Other Providers:

## 2021-05-16 NOTE — PROGRESS NOTE ADULT - ASSESSMENT
72 yo female w/ a PMHx of metastatic esophageal adenocarcinoma (s/p carbo/taxol and RT 4/2020-5/2020, started on folfox/herceptin 3/2021,last 5/12) w/ T4 lytic lesion and paravertebral mass dx 1/2021 (s/p RT to T4 3/2021), refused esophagectomy, HTN, HLD, degenerative disc disease of C-spine s/p laminectomy x2 (2019), and adjustment disorder p/f heme/onc Dr. Shelby's ([facility] [place]) office for refractory acute back pain x 3 weeks found to have new T10 compression fx likely pathologic identified on outpatient CTAP 5/13 admitted for further mgt  72 yo female w/ a PMHx of metastatic esophageal adenocarcinoma (s/p carbo/taxol and RT 4/2020-5/2020, started on folfox/herceptin 3/2021,last 5/12) w/ T4 lytic lesion and paravertebral mass dx 1/2021 (s/p RT to T4 3/2021), refused esophagectomy, HTN, HLD, degenerative disc disease of C-spine s/p laminectomy x2 (2019), and adjustment disorder p/f heme/onc Dr. Shelby's ([facility] [place]) office for refractory acute back pain x 3 weeks found to have new T10 compression fx likely pathologic identified on outpatient CTAP 5/13 admitted for further mgt   5/15 pain 50/10---7/10  5/16 pain 7/10 inc iv dailudid 3 mg q3 prn with bowel preps- Start Fentanyl patch 25 mvg/72 hrs  HTN uncontrolled- add norvasc and hydralazine with hold parameters to BP meds.

## 2021-05-16 NOTE — CHART NOTE - NSCHARTNOTEFT_GEN_A_CORE
Labs noted  Marked elevation of WBC count, specifically neutrophils  Pt had received Neulasta as outpatient and is on Dex here  D/w Dr Jacobo- since pt c/o dysuria- a/w short-course abx and check UA/urine culture  Pt will be started on analgesic pump for severe backacke  MRI could not be completed yesterday. Will try again today with better pain control.

## 2021-05-17 NOTE — PROGRESS NOTE ADULT - PROBLEM SELECTOR PLAN 5
Dx between 3705-1920. On folfox/herceptin (3/2021-). T4 lytic lesion and paravertebral mass dx 1/2021 s/p RT to T4 3/2021. Now w/ new T10 compression fx likely malignant. Was evaluated by [facility], refused esophagectomy. [place]s w/ Dr. Afsaneh christina[place] [place]. Trevor Nguyen Rad-Onc, Dr. Susanne Zhao Palliative   -heme onc consult in AM  -c/w zofran PRN nausea (monitor qtc)- now tid before meals  Onc notes "  #Metastatic esophageal adenocarcinoma  -s/p neoadjuvant chemoRT with carbo/taxol, however, declined surgery  -recurrence in T4, confirmed with biopsy, in 1/2021  -now on FOLFOX + herceptin given ERBB positivity  -no plan for inpatient chemotherapy"

## 2021-05-17 NOTE — CHART NOTE - NSCHARTNOTEFT_GEN_A_CORE
Called by primary NP for uncontrolled pain in the setting of metastatic esophageal cancer.    Pt seen today by our palliative care team. PCA demand dose was increased from 1mg to 2mg with a lockout of 30mins. Primary NP mentions pt's pain level goes from 10/10 to 7/10 and does not last to 30 mins. Pain is similar to pain during the day, which is back pain.    Plan:  Encouraged to utilize 3mg clinician bolus for breakthrough pain that is administered by the RN.  -Will keep 2mg for now, and increase frequency  -continue decadron 4mg IV q 6 hours  -Will pass information to the palliative team tomorrow  -Page [phone] with questions or concerns    Med Christensen M.D.  Palliative Care Fellow

## 2021-05-17 NOTE — PROGRESS NOTE ADULT - PROBLEM SELECTOR PLAN 2
Hx of HTN on losartan 100 at home. -150 at bl. SBPs elevated likely iso pain. Patient also did not take losartan prior to presentation   /94 today  -c/w home losartan  -labetalol 5mg q4 PRN if SBP >200  Started  Hydralazine 25 mg q8, start norvasc 5 mg qd  on 5/16 with hold parameters with plan to remove labetolol  -pain control as above

## 2021-05-17 NOTE — CONSULT NOTE ADULT - PROBLEM SELECTOR RECOMMENDATION 3
pt needs MRI   increased dilaudid pca  added ativan- would give prior to going for test  plan is for further dmt if able to ambulate after hospitalization

## 2021-05-17 NOTE — PROGRESS NOTE ADULT - PROBLEM SELECTOR PLAN 8
DVT: lovenox  Diet: DASH  Dispo: pending PT    meds for BP/ Pain/ Nausea d/w RN  RN notes patient is retaining urine 600 cc- may need fraire    Called daughter Diane Mello [phone]- left VM explaing pain is better controlled- will call again with updates DVT: lovenox  Diet: DASH  Dispo: pending PT    meds for BP/ Pain/ Nausea d/w RN  RN notes patient is retaining urine 600 cc- may need fraire    Called daughter Diane Mello [phone]- and updated

## 2021-05-17 NOTE — CONSULT NOTE ADULT - ASSESSMENT
72 yo female w/ a PMHx of metastatic esophageal adenocarcinoma (s/p carbo/taxol and RT 4/2020-5/2020, started on folfox/herceptin 3/2021,last 5/12) w/ T4 lytic lesion and paravertebral mass dx 1/2021 (s/p RT to T4 3/2021), refused esophagectomy, HTN, HLD, degenerative disc disease of C-spine s/p laminectomy x2 (2019), and adjustment disorder p/f heme/onc Dr. Shelby's ([facility] [place]) office for acute back pain worsening x 3 weeks.   Asked to see for pain control

## 2021-05-17 NOTE — PROGRESS NOTE ADULT - SUBJECTIVE AND OBJECTIVE BOX
INTERVAL HPI/OVERNIGHT EVENTS:  Patient seen at bedside.  Patient with back pain radiating to the front  Uncontrolled with current regimen      VITAL SIGNS:  T(F): 97.5 (21 @ 13:21)  HR: 93 (21 @ 13:21)  BP: 121/84 (21 @ 13:21)  RR: 18 (21 @ 13:21)  SpO2: 96% (21 @ 13:21)  Wt(kg): --    PHYSICAL EXAM:    In accordance with current standard limiting patient contact, deferred physical exam  2/2 COVID pandemic  Please refer to physical exam of primary team.    MEDICATIONS  (STANDING):  amLODIPine   Tablet 5 milliGRAM(s) Oral daily  atorvastatin 10 milliGRAM(s) Oral at bedtime  cefepime   IVPB      cefepime   IVPB 2000 milliGRAM(s) IV Intermittent every 8 hours  chlorhexidine 2% Cloths 1 Application(s) Topical daily  dexAMETHasone  Injectable 4 milliGRAM(s) IV Push every 6 hours  enoxaparin Injectable 40 milliGRAM(s) SubCutaneous daily  hydrALAZINE 25 milliGRAM(s) Oral every 8 hours  HYDROmorphone PCA (1 mG/mL) 30 milliLiter(s) PCA Continuous PCA Continuous  losartan 100 milliGRAM(s) Oral daily  methadone    Tablet 5 milliGRAM(s) Oral every 8 hours  ondansetron Injectable 4 milliGRAM(s) IV Push every 8 hours  pantoprazole    Tablet 40 milliGRAM(s) Oral before breakfast  polyethylene glycol 3350 17 Gram(s) Oral every 12 hours  senna 2 Tablet(s) Oral at bedtime  sertraline 25 milliGRAM(s) Oral two times a day  sodium chloride 0.9%. 1000 milliLiter(s) (80 mL/Hr) IV Continuous <Continuous>    MEDICATIONS  (PRN):  FIRST- Mouthwash  BLM 10 milliLiter(s) Swish and Swallow four times a day PRN throat pain  HYDROmorphone PCA (1 mG/mL) Rescue Clinician Bolus 3 milliGRAM(s) IV Push every 3 hours PRN Breakthrough pain  labetalol Injectable 5 milliGRAM(s) IV Push every 6 hours PRN Systolic blood pressure > 190  LORazepam   Injectable 0.5 milliGRAM(s) IV Push every 4 hours PRN Anxiety  naloxone Injectable 0.4 milliGRAM(s) IV Push once PRN Concern for opioid overdose  naloxone Injectable 0.1 milliGRAM(s) IV Push once PRN lethargy  ondansetron Injectable 4 milliGRAM(s) IV Push every 8 hours PRN Nausea and/or Vomiting      Allergies    No Known Allergies    Intolerances        LABS:                        13.7   79.80 )-----------( 158      ( 17 May 2021 07:25 )             40.3     05-    129<L>  |  95<L>  |  27<H>  ----------------------------<  107<H>  4.0   |  22  |  0.66    Ca    9.0      17 May 2021 07:25  Phos  2.9       Mg     2.1         TPro  6.3  /  Alb  4.0  /  TBili  0.5  /  DBili  x   /  AST  28  /  ALT  25  /  AlkPhos  182<H>        Urinalysis Basic - ( 16 May 2021 15:02 )    Color: Yellow / Appearance: Clear / S.020 / pH: x  Gluc: x / Ketone: Negative  / Bili: Negative / Urobili: <2 mg/dL   Blood: x / Protein: 30 mg/dL / Nitrite: Negative   Leuk Esterase: Negative / RBC: <5 /HPF / WBC <5 /HPF   Sq Epi: x / Non Sq Epi: Occasional / Bacteria: x        RADIOLOGY & ADDITIONAL TESTS:  Studies reviewed.

## 2021-05-17 NOTE — CONSULT NOTE ADULT - PROBLEM SELECTOR RECOMMENDATION 9
methadone 5mg tid started 5/16  increase dilaudid pca to 2mg q 30min  continue decadron 4mg iv q 6 hours  bowel regimen  had bm today

## 2021-05-17 NOTE — PROGRESS NOTE ADULT - PROBLEM SELECTOR PLAN 1
New acute back pain x 3 weeks refractory to home opioid regimen (on oxy 30 q4 PRN, not using methadone or gabapentin as prescribed). No neurological deficits on exam.   -CTAP 5/13 (via [facility]): New heterogeneous sclerosis with a new compression fracture at T10 likely pathologic. Lucent and sclerotic lesion at T4 with mild compression deformity as seen on prior imaging.   c/o 50/10 pain on 5/15-----> 7/10 today  nausea- start iv zofran tid before meals  Bowel Regiman  -inc Dilaudid 1.5 mg q4 hrs PRN mod-severe pain- as given in ED--> incto 2 mg q3 - --> will inc to 3 mg q3 prn  Now on dilaudid PCA pump  -c/w lidocaine patch over T10  -spine/ortho consult - need MRI  -patient will likely require TLSO brace, bed rest until then - will call Monday  -palliative consult for pain mgt in AM- ACP called 5/15  -PT consult when brace is here.    Pain control  WBAT with assistive devices as needed  SCDs  TLSO as tolerated  Rec MRI CTLSp w/ and w/o contrast

## 2021-05-17 NOTE — CONSULT NOTE ADULT - SUBJECTIVE AND OBJECTIVE BOX
HPI:  70 yo female w/ a PMHx of metastatic esophageal adenocarcinoma (s/p carbo/taxol and RT 2020-2020, started on folfox/herceptin 3/2021,last ) w/ T4 lytic lesion and paravertebral mass dx 2021 (s/p RT to T4 3/2021), refused esophagectomy, HTN, HLD, degenerative disc disease of C-spine s/p laminectomy x2 (), and adjustment disorder p/f heme/onc Dr. Shelby's ([facility]) office for acute back pain worsening x 3 weeks. Pain is 10/10, mid lower back, radiates to R abdomen (onset 4-5 days ago), worse w/ movement. Pain started after opening a kitchen window three weeks ago at which time she "felt a pop." Home regimen of oxycontin w/ PRN oxycodone was not working (reduced pain to 7/10). Seen by rad-onc Dr. Trevor Nguyen  who was c/f progression of disease at which time CTAP was ordered. Also seen by palliative care Dr. Zhao on , oxycontin switched to methadone and continued on oxy PRN. Patient is not taking methadone. Is using oxy 30 q4. CTAP  now showing new T10 compression fx likely pathologic (results via HIE). Patient denies fevers, chills, vomiting, diarrhea. loss of bladder or bowel control, saddle anesthesia, any falls or trauma. Endorses urinary frequency and hesitancy x 1 day w/o suprapubic pain, dysuria, or hematuria. No hx of UTIs.     ED vitals: Tmaz 97.8, HR 70-80, /83 -210/89, 93% RA, RR 24   ED labs: WCB 6.49, Na 133, , AST 34, UA neg (small ketones)  Radiology  (via HIE): Lucent and sclerotic lesion at T4 with mild compression deformity as a prior imaging. New heterogeneous sclerosis with a new compression fracture at T10 likely pathologic.  ED course: s/p LR 1L, dilaudid x 3, lido patch, labetalol 50 x 1, losartan 100 x1 (14 May 2021 22:02)    Pt states her pain is 10/10 which is better than when she came in to the hospital.  Pt known to our outpatient supportive onc and has had significant use of opioids in outpatient. was started on methadone but unable to state she was taking it.  pt started on methadone 5mg tid and pca dilaudid 1mg q 15 min prn.  pt taking at least 2-4 dose/hour.     Pt did have BM.  Pt now with fraire after retention.  Unable to tolerate laying flat for MRI.      PERTINENT PM/SXH:   Chronic back pain    Diverticulitis    HTN (hypertension)    Hyperlipidemia    GERD (gastroesophageal reflux disease)    Compressed cervical disc      H/O:     History of partial thyroidectomy    H/O: hysterectomy    S/P laminectomy    H/O: osteoarthritis    Class 1 obesity with body mass index (BMI) of 33.0 to 33.9 in adult      FAMILY HISTORY:  FH: type 2 diabetes  father-     Family history of breast cancer in mother  mother-     Family hx of alcoholism  mother -      ITEMS NOT CHECKED ARE NOT PRESENT    SOCIAL HISTORY:   Significant other/partner:  [x ]  Children:  [ x]  Mormon/Spirituality:  Substance hx:  [ ]   Tobacco hx:  [ ]   Alcohol hx: [ ]   Home Opioid hx:  [ ] I-Stop Reference No:  Living Situation: [x ]Home  [ ]Long term care  [ ]Rehab [ ]Other    ADVANCE DIRECTIVES:    DNR  MOLST  [ ]  Living Will  [ ]   DECISION MAKER(s):  [ ] Health Care Proxy(s)  [ ] Surrogate(s)  [ ] Guardian           Name(s): Phone Number(s):     BASELINE (I)ADL(s) (prior to admission):  [place]: [ x]Total  [ ] Moderate [ ]Dependent    Allergies    No Known Allergies    Intolerances    MEDICATIONS  (STANDING):  amLODIPine   Tablet 5 milliGRAM(s) Oral daily  atorvastatin 10 milliGRAM(s) Oral at bedtime  cefepime   IVPB      cefepime   IVPB 2000 milliGRAM(s) IV Intermittent every 8 hours  chlorhexidine 2% Cloths 1 Application(s) Topical daily  dexAMETHasone  Injectable 4 milliGRAM(s) IV Push every 6 hours  enoxaparin Injectable 40 milliGRAM(s) SubCutaneous daily  hydrALAZINE 25 milliGRAM(s) Oral every 8 hours  HYDROmorphone PCA (1 mG/mL) 30 milliLiter(s) PCA Continuous PCA Continuous  losartan 100 milliGRAM(s) Oral daily  methadone    Tablet 5 milliGRAM(s) Oral every 8 hours  ondansetron Injectable 4 milliGRAM(s) IV Push every 8 hours  pantoprazole    Tablet 40 milliGRAM(s) Oral before breakfast  polyethylene glycol 3350 17 Gram(s) Oral every 12 hours  senna 2 Tablet(s) Oral at bedtime  sertraline 25 milliGRAM(s) Oral two times a day  sodium chloride 0.9%. 1000 milliLiter(s) (80 mL/Hr) IV Continuous <Continuous>    MEDICATIONS  (PRN):  FIRST- Mouthwash  BLM 10 milliLiter(s) Swish and Swallow four times a day PRN throat pain  HYDROmorphone PCA (1 mG/mL) Rescue Clinician Bolus 3 milliGRAM(s) IV Push every 3 hours PRN Breakthrough pain  labetalol Injectable 5 milliGRAM(s) IV Push every 6 hours PRN Systolic blood pressure > 190  LORazepam   Injectable 0.5 milliGRAM(s) IV Push every 4 hours PRN Anxiety  naloxone Injectable 0.4 milliGRAM(s) IV Push once PRN Concern for opioid overdose  naloxone Injectable 0.1 milliGRAM(s) IV Push once PRN lethargy  ondansetron Injectable 4 milliGRAM(s) IV Push every 8 hours PRN Nausea and/or Vomiting    PRESENT SYMPTOMS: [ ]Unable to obtain due to poor mentation   Source if other than patient:  [ ]Family   [ ]Team     Pain (Impact on QOL):  yes  Location -      back to side   Minimal acceptable level (0-10 scale): 4/10                   Aggrevating factors -  laying flat on back  Quality -  dull  Radiation - around to front  Severity (0-10 scale) -  10/10  Timing - constant    PAIN AD Score:     http://geriatrictoolkit.[place].[email or web address]/[email or web address].pdf (press ctrl +  left click to view)    Dyspnea:                           [ ]Mild [ ]Moderate [ ]Severe  Anxiety:                             [ ]Mild [ ]Moderate [x ]Severe  Fatigue:                             [ ]Mild [ ]Moderate [ ]Severe  Nausea:                             [ ]Mild [ ]Moderate [ ]Severe  Loss of appetite:              [ ]Mild [ ]Moderate [ x]Severe  Constipation:                    [ ]Mild [ ]Moderate [x ]Severe    Other Symptoms:  [ x]All other review of systems negative     Karnofsky Performance Score/Palliative Performance Status Version 2:     50    %  PHYSICAL EXAM:  Vital Signs Last 24 Hrs  T(C): 36.4 (17 May 2021 13:21), Max: 36.8 (16 May 2021 17:30)  T(F): 97.5 (17 May 2021 13:21), Max: 98.3 (16 May 2021 17:30)  HR: 93 (17 May 2021 13:21) (83 - 96)  BP: 121/84 (17 May 2021 13:21) (121/84 - 173/84)  BP(mean): --  RR: 18 (17 May 2021 13:21) (18 - 18)  SpO2: 96% (17 May 2021 13:21) (96% - 98%) I&O's Summary    16 May 2021 07:01  -  17 May 2021 07:00  --------------------------------------------------------  IN: 925 mL / OUT: 750 mL / NET: 175 mL    GENERAL:  [ x]Alert  [x ]Oriented x 3  [ ]Lethargic  [ ]Cachexia  [ ]Unarousable  [ ]Verbal  [ ]Non-Verbal  Behavioral:   [ ] Anxiety  [ ] Delirium [ ] Agitation [ ] Other  HEENT:  [ ]Normal   [x ]Dry mouth   [ ]ET Tube/Trach  [ ]Oral lesions  PULMONARY:   [ x]Clear [ ]Tachypnea  [ ]Audible excessive secretions   [ ]Rhonchi        [ ]Right [ ]Left [ ]Bilateral  [ ]Crackles        [ ]Right [ ]Left [ ]Bilateral  [ ]Wheezing     [ ]Right [ ]Left [ ]Bilateral  CARDIOVASCULAR:    [ ]Regular [ ]Irregular [x ]Tachy  [ ]Mahesh [ ]Murmur [ ]Other  GASTROINTESTINAL:  [ x]Soft  [ ]Distended   [ x]+BS  [ x]Non tender [ ]Tender  [ ]PEG [ ]OGT/ NGT  Last BM:   GENITOURINARY:  [ ]Normal [ ] Incontinent   [ ]Oliguria/Anuria   [x ]Fraire  MUSCULOSKELETAL:   [ ]Normal   [ x]Weakness  [ ]Bed/Wheelchair bound [ ]Edema  NEUROLOGIC:   [x ]No focal deficits  [ ] Cognitive impairment  [ ] Dysphagia [ ]Dysarthria [ ] Paresis [ ]Other   SKIN:   [x ]Normal   [ ]Pressure ulcer(s)  [ ]Rash    CRITICAL CARE:  [ ] Shock Present  [ ]Septic [ ]Cardiogenic [ ]Neurologic [ ]Hypovolemic  [ ]  Vasopressors [ ]  Inotropes   [ ] Respiratory failure present  [ ] Acute  [ ] Chronic [ ] Hypoxic  [ ] Hypercarbic [ ] Other  [ ] Other organ failure     LABS:                        13.7   79.80 )-----------( 158      ( 17 May 2021 07:25 )             40.3       129<L>  |  95<L>  |  27<H>  ----------------------------<  107<H>  4.0   |  22  |  0.66    Ca    9.0      17 May 2021 07:25  Phos  2.9       Mg     2.1         TPro  6.3  /  Alb  4.0  /  TBili  0.5  /  DBili  x   /  AST  28  /  ALT  25  /  AlkPhos  182<H>        Urinalysis Basic - ( 16 May 2021 15:02 )    Color: Yellow / Appearance: Clear / S.020 / pH: x  Gluc: x / Ketone: Negative  / Bili: Negative / Urobili: <2 mg/dL   Blood: x / Protein: 30 mg/dL / Nitrite: Negative   Leuk Esterase: Negative / RBC: <5 /HPF / WBC <5 /HPF   Sq Epi: x / Non Sq Epi: Occasional / Bacteria: x      RADIOLOGY & ADDITIONAL STUDIES:    PROTEIN CALORIE MALNUTRITION:   [ ] PPSV2 < or = to 30% [ ] significant weight loss  [ ] poor nutritional intake [ ] catabolic state [ ] anasarca     Albumin, Serum: 4.0 g/dL (21 @ 07:25)  Artificial Nutrition [ ]     REFERRALS:   [ ]Chaplaincy  [ ] Hospice  [ ]Child Life  [ ]Social Work  [ ]Case management [ ]Holistic Therapy   Goals of Care Discussion Document:

## 2021-05-17 NOTE — CONSULT NOTE ADULT - PROBLEM SELECTOR RECOMMENDATION 4
pt hcp is   pt follow with supportive onc at [facility]  pt with issues of opioid abuse in past  appears to have acute pain with new lesion/fracture  hopeful surgery vs RT to help with improvement of pain

## 2021-05-17 NOTE — PROGRESS NOTE ADULT - SUBJECTIVE AND OBJECTIVE BOX
Pain control  WBAT with assistive devices as needed  SCDs  TLSO as tolerated  Rec MRI CTLSp w/ and w/o contrast    Await Mri befote consulting RT, after ortho f/u  Patient is a 71y old  Female who presents with a chief complaint of back pain (17 May 2021 09:47)      SUBJECTIVE / OVERNIGHT EVENTS:  Patient seen with RN and ACP  still c/o pain to r lower back  notes retaining urine - rn will place fraire today  notes still no bm'  notes she is not able to have mRI due to Pain- pain 10/10 despit pressing on pca pump- pal will help address pain control with pump then we can get mri    MEDICATIONS  (STANDING):  amLODIPine   Tablet 5 milliGRAM(s) Oral daily  atorvastatin 10 milliGRAM(s) Oral at bedtime  cefepime   IVPB      cefepime   IVPB 2000 milliGRAM(s) IV Intermittent every 8 hours  chlorhexidine 2% Cloths 1 Application(s) Topical daily  dexAMETHasone  Injectable 4 milliGRAM(s) IV Push every 6 hours  enoxaparin Injectable 40 milliGRAM(s) SubCutaneous daily  hydrALAZINE 25 milliGRAM(s) Oral every 8 hours  HYDROmorphone PCA (1 mG/mL) 30 milliLiter(s) PCA Continuous PCA Continuous  losartan 100 milliGRAM(s) Oral daily  methadone    Tablet 5 milliGRAM(s) Oral every 8 hours  ondansetron Injectable 4 milliGRAM(s) IV Push every 8 hours  pantoprazole    Tablet 40 milliGRAM(s) Oral before breakfast  polyethylene glycol 3350 17 Gram(s) Oral every 12 hours  senna 2 Tablet(s) Oral at bedtime  sertraline 25 milliGRAM(s) Oral two times a day  sodium chloride 0.9%. 1000 milliLiter(s) (80 mL/Hr) IV Continuous <Continuous>    MEDICATIONS  (PRN):  FIRST- Mouthwash  BLM 10 milliLiter(s) Swish and Swallow four times a day PRN throat pain  HYDROmorphone PCA (1 mG/mL) Rescue Clinician Bolus 3 milliGRAM(s) IV Push every 3 hours PRN Breakthrough pain  labetalol Injectable 5 milliGRAM(s) IV Push every 6 hours PRN Systolic blood pressure > 190  naloxone Injectable 0.4 milliGRAM(s) IV Push once PRN Concern for opioid overdose  naloxone Injectable 0.1 milliGRAM(s) IV Push once PRN lethargy  ondansetron Injectable 4 milliGRAM(s) IV Push every 8 hours PRN Nausea and/or Vomiting      I&O's Summary    16 May 2021 07:01  -  17 May 2021 07:00  --------------------------------------------------------  IN: 925 mL / OUT: 750 mL / NET: 175 mL      Vital Signs Last 24 Hrs  T(C): 36.7 (17 May 2021 06:34), Max: 36.8 (16 May 2021 17:30)  T(F): 98 (17 May 2021 06:34), Max: 98.3 (16 May 2021 17:30)  HR: 96 (17 May 2021 06:34) (83 - 96)  BP: 173/84 (17 May 2021 06:34) (150/87 - 173/84)  BP(mean): --  RR: 18 (17 May 2021 06:34) (18 - 18)  SpO2: 96% (17 May 2021 06:34) (96% - 98%)  PHYSICAL EXAM:  GENERAL: NAD, well-developed  HEAD:  Atraumatic, Normocephalic  EYES: EOMI, PERRLA, conjunctiva and sclera clear  NECK: Supple, No JVD  CHEST/LUNG: Clear to auscultation bilaterally; No wheeze  HEART: Regular rate and rhythm; No murmurs, rubs, or gallops  ABDOMEN: Soft, Nontender, Nondistended; Bowel sounds present  EXTREMITIES:  2+ Peripheral Pulses, No clubbing, cyanosis, or edema  PSYCH: AAOx3  NEUROLOGY: non-focal  SKIN: No rashes or lesions    LABS:                        13.7   79.80 )-----------( 158      ( 17 May 2021 07:25 )             40.3     05-17    129<L>  |  95<L>  |  27<H>  ----------------------------<  107<H>  4.0   |  22  |  0.66    Ca    9.0      17 May 2021 07:25  Phos  2.9     05-  Mg     2.1     -    TPro  6.3  /  Alb  4.0  /  TBili  0.5  /  DBili  x   /  AST  28  /  ALT  25  /  AlkPhos  182<H>  05-17    Urinalysis Basic - ( 16 May 2021 15:02 )  Color: Yellow / Appearance: Clear / S.020 / pH: x  Gluc: x / Ketone: Negative  / Bili: Negative / Urobili: <2 mg/dL   Blood: x / Protein: 30 mg/dL / Nitrite: Negative   Leuk Esterase: Negative / RBC: <5 /HPF / WBC <5 /HPF   Sq Epi: x / Non Sq Epi: Occasional / Bacteria: x        RADIOLOGY & ADDITIONAL TESTS:  Care Discussed with Consultants/Other Providers:  Pain control  WBAT with assistive devices as needed  SCDs  TLSO as tolerated  Rec MRI CTLSp w/ and w/o contrast    Await Mri befote consulting RT, after ortho f/u

## 2021-05-17 NOTE — PROGRESS NOTE ADULT - ASSESSMENT
72 yo female w/ a PMHx of metastatic esophageal adenocarcinoma (s/p neoadjuvant concurrent chemoRT 4/2020-5/2020, declined esophagectomy, recurrent disease in 1/2021 at T4, had local RT and now on FOLFOX/herceptin, HTN, HLD, degenerative disc disease of C-spine s/p laminectomy x2 (2019), and adjustment disorder, who was sent in by Dr. Shelby for worsening new back pain. She is now found to have a new compression fracture at T10.    #Pathologic/compression fracture at T10  -appreciated orthopedics  -pain control, Palliative Care consulted will followup recs  -agree with MRI CTLS, ordered and pending  -Now on decadronuntil we rule out cord compression with MRI  -depending on MRI result, patient may need decompression or local RT to alleviate the symptom    #Metastatic esophageal adenocarcinoma  -s/p neoadjuvant chemoRT with carbo/taxol, however, declined surgery  -recurrence in T4, confirmed with biopsy, in 1/2021  -now on FOLFOX + herceptin given ERBB positivity  -no plan for inpatient chemotherapy  -Patient to followup with Dr. Dowell ([facility]) upon discharge  -C/w Supportive care, pain control, Nutrition, PT, DVT ppx  -Oncology will continue to follow with you      Case d/w Dr. Chun DUQUE  Oncology Physician Assistant  [facility]  Pager [phone]    If after 5pm or weekends please page On-call Oncology Fellow   70 yo female w/ a PMHx of metastatic esophageal adenocarcinoma (s/p neoadjuvant concurrent chemoRT 4/2020-5/2020, declined esophagectomy, recurrent disease in 1/2021 at T4, had local RT and now on FOLFOX/herceptin, HTN, HLD, degenerative disc disease of C-spine s/p laminectomy x2 (2019), and adjustment disorder, who was sent in by Dr. Shelby for worsening new back pain. She is now found to have a new compression fracture at T10.    #Pathologic/compression fracture at T10  -appreciated orthopedics  -pain control, Palliative Care consulted will followup recs  -agree with MRI CTLS, ordered and pending  -Now on decadron until we rule out cord compression with MRI  -depending on MRI result, patient may need decompression or local RT to alleviate the symptom    #Metastatic esophageal adenocarcinoma  -s/p neoadjuvant chemoRT with carbo/taxol, however, declined surgery  -recurrence in T4, confirmed with biopsy, in 1/2021  -now on FOLFOX + herceptin given ERBB positivity  -no plan for inpatient chemotherapy  -Patient to followup with Dr. Shelby ([facility]) upon discharge  -C/w Supportive care, pain control, Nutrition, PT, DVT ppx  -Oncology will continue to follow with you      Case d/w Dr. Chun DUQUE  Oncology Physician Assistant  [facility]  Pager [phone]    If after 5pm or weekends please page On-call Oncology Fellow

## 2021-05-17 NOTE — PROGRESS NOTE ADULT - ASSESSMENT
72 yo female w/ a PMHx of metastatic esophageal adenocarcinoma (s/p carbo/taxol and RT 4/2020-5/2020, started on folfox/herceptin 3/2021,last 5/12) w/ T4 lytic lesion and paravertebral mass dx 1/2021 (s/p RT to T4 3/2021), refused esophagectomy, HTN, HLD, degenerative disc disease of C-spine s/p laminectomy x2 (2019), and adjustment disorder p/f heme/onc Dr. Shelby's ([facility] [place] [facility]) office for refractory acute back pain x 3 weeks found to have new T10 compression fx likely pathologic identified on outpatient CTAP 5/13 admitted for further mgt   5/15 pain 50/10---7/10  5/16 pain 7/10 inc iv dailudid 3 mg q3 prn with bowel preps- Start Fentanyl patch 25 mvg/72 hrs  HTN uncontrolled- add norvasc and hydralazine with hold parameters to BP meds.

## 2021-05-17 NOTE — CHART NOTE - NSCHARTNOTEFT_GEN_A_CORE
Notified by RN that patient’s pain not well controlled with current pain medications and unable to go for MRI. Patient seen and assessed at bedside. Patient reports that pain not well controlled well with the current regimen and unable to sill lie for MRI due to pain. Patient is currently on dilaudid PCA pump 2 mg ever y 30 minutes ( lock out time 30 minutes) and on  Clinician bolus -Dilaudid 3 mg IV Q3 hours PRN for break through pain. Patient reports that pain decreases to a level of 7 post Dilaudid. However, it does not last to 30 minutes. Tylenol 1 gm IVPB x1 ordered. Case discussed with palliative care fellow on call who adjusted the PCA dosages. Plan of care discussed with patient. Will continue to monitor pain level and attempt MRI in AM.

## 2021-05-18 NOTE — PROGRESS NOTE ADULT - PROBLEM SELECTOR PLAN 1
continue methadone 5mg tid.  continue dilaudid pca 2mg q 15 min.  may increase methadone tomorrow (3 days after started)  would give dilaudid 4mg iv and ativan 1mg iv prior to MRI for pt to tolerate procedure  need imaging to decide on treatment options- RT vs Surgery

## 2021-05-18 NOTE — PROGRESS NOTE ADULT - ASSESSMENT
72 yo female w/ a PMHx of metastatic esophageal adenocarcinoma (s/p carbo/taxol and RT 4/2020-5/2020, started on folfox/herceptin 3/2021,last 5/12) w/ T4 lytic lesion and paravertebral mass dx 1/2021 (s/p RT to T4 3/2021), refused esophagectomy, HTN, HLD, degenerative disc disease of C-spine s/p laminectomy x2 (2019), and adjustment disorder p/f heme/onc Dr. Shelby's ([facility]) office for refractory acute back pain x 3 weeks found to have new T10 compression fx likely pathologic identified on outpatient CTAP 5/13 admitted for further mgt   5/15 pain 50/10---7/10  5/16 pain 7/10 inc iv dailudid 3 mg q3 prn with bowel preps- Start Fentanyl patch 25 mvg/72 hrs  HTN uncontrolled- add norvasc and hydralazine with hold parameters to BP meds.  5/17 Anesthesia will not  do anesthesia for MRI  5/18-Patient agree to try for MRI of T spine on pain med   MRI of C/T/L is too long to try  Bp 191/ 84  Inc Norvasc to 10 mg qd  Inc Hydralazine to 50 mg q8

## 2021-05-18 NOTE — PROGRESS NOTE ADULT - PROBLEM SELECTOR PLAN 2
Hx of HTN on losartan 100 at home. -150 at bl. SBPs elevated likely iso pain. Patient also did not take losartan prior to presentation   /94 today  -c/w home losartan  -labetalol 5mg q4 PRN if SBP >200  Inc  Hydralazine 50 mg q8, Inc  norvasc 10 mg qd    Mon BP

## 2021-05-18 NOTE — PROGRESS NOTE ADULT - PROBLEM SELECTOR PLAN 1
New acute back pain x 3 weeks refractory to home opioid regimen (on oxy 30 q4 PRN, not using methadone or gabapentin as prescribed). No neurological deficits on exam.   -CTAP 5/13 (via HIE): New heterogeneous sclerosis with a new compression fracture at T10 likely pathologic. Lucent and sclerotic lesion at T4 with mild compression deformity as seen on prior imaging.   c/o 50/10 pain on 5/15-----> 7/10 today  nausea- start iv zofran tid before meals  Bowel Regiman  -inc Dilaudid 1.5 mg q4 hrs PRN mod-severe pain- as given in ED--> incto 2 mg q3 - --> will inc to 3 mg q3 prn  Now on dilaudid PCA pump  -c/w lidocaine patch over T10  -spine/ortho consult - need MRI  -patient will likely require TLSO brace, bed rest until then - will call Monday  -palliative consult for pain mgt in AM- ACP called 5/15  -PT consult when brace is here.    Pain control  WBAT with assistive devices as needed  SCDs  TLSO as tolerated  Rec MRI CTLSp w/ and w/o contrast- not able to get-Patient agrees to MRI of T spine as discussed above

## 2021-05-18 NOTE — PROGRESS NOTE ADULT - SUBJECTIVE AND OBJECTIVE BOX
Patient agree to try for MRI of T spine on pain med   MRI of C/T/L is too long ti try Patient agree to try for MRI of T spine on pain med   MRI of C/T/L is too long to try  Bp 191/ 84  Inc Norvasc to 10 mg qd  Inc Hydralazine to 50 mg q8     Patient is a 71y old  Female who presents with a chief complaint of back pain (18 May 2021 10:03)      SUBJECTIVE / OVERNIGHT EVENTS:  Patient agree to try for MRI of T spine on pain med   MRI of C/T/L is too long to try  Patient able to move all extremities. no pain with SLR- fraire in place. Pos BM    MEDICATIONS  (STANDING):  amLODIPine   Tablet 10 milliGRAM(s) Oral daily  atorvastatin 10 milliGRAM(s) Oral at bedtime  cefepime   IVPB      cefepime   IVPB 2000 milliGRAM(s) IV Intermittent every 8 hours  chlorhexidine 2% Cloths 1 Application(s) Topical daily  dexAMETHasone  Injectable 4 milliGRAM(s) IV Push every 6 hours  enoxaparin Injectable 40 milliGRAM(s) SubCutaneous daily  hydrALAZINE 50 milliGRAM(s) Oral every 8 hours  HYDROmorphone  Injectable 4 milliGRAM(s) IV Push once  HYDROmorphone PCA (1 mG/mL) 30 milliLiter(s) PCA Continuous PCA Continuous  LORazepam   Injectable 1 milliGRAM(s) IV Push once  losartan 100 milliGRAM(s) Oral daily  methadone    Tablet 5 milliGRAM(s) Oral every 8 hours  ondansetron Injectable 4 milliGRAM(s) IV Push every 8 hours  pantoprazole    Tablet 40 milliGRAM(s) Oral before breakfast  polyethylene glycol 3350 17 Gram(s) Oral every 12 hours  senna 2 Tablet(s) Oral at bedtime  sertraline 25 milliGRAM(s) Oral two times a day  sodium chloride 0.9%. 1000 milliLiter(s) (80 mL/Hr) IV Continuous <Continuous>    MEDICATIONS  (PRN):  FIRST- Mouthwash  BLM 10 milliLiter(s) Swish and Swallow four times a day PRN throat pain  HYDROmorphone PCA (1 mG/mL) Rescue Clinician Bolus 3 milliGRAM(s) IV Push every 3 hours PRN Breakthrough pain  labetalol Injectable 5 milliGRAM(s) IV Push every 6 hours PRN Systolic blood pressure > 190  LORazepam   Injectable 0.5 milliGRAM(s) IV Push every 4 hours PRN Anxiety  naloxone Injectable 0.4 milliGRAM(s) IV Push once PRN Concern for opioid overdose  naloxone Injectable 0.1 milliGRAM(s) IV Push once PRN lethargy  ondansetron Injectable 4 milliGRAM(s) IV Push every 8 hours PRN Nausea and/or Vomiting    Vital Signs Last 24 Hrs  T(C): 36.7 (18 May 2021 10:15), Max: 36.8 (17 May 2021 16:19)  T(F): 98 (18 May 2021 10:15), Max: 98.2 (17 May 2021 16:19)  HR: 82 (18 May 2021 10:15) (75 - 93)  BP: 180/93 (18 May 2021 10:15) (120/75 - 198/90)  BP(mean): --  RR: 18 (18 May 2021 10:15) (18 - 18)  SpO2: 96% (18 May 2021 10:15) (95% - 100%)    PHYSICAL EXAM:  GENERAL: NAD, well-developed  HEAD:  Atraumatic, Normocephalic  EYES: EOMI, PERRLA, conjunctiva and sclera clear  NECK: Supple, No JVD  CHEST/LUNG: Clear to auscultation bilaterally; No wheeze  HEART: Regular rate and rhythm; No murmurs, rubs, or gallops  ABDOMEN: Soft, Nontender, Nondistended; Bowel sounds present  EXTREMITIES:  2+ Peripheral Pulses, No clubbing, cyanosis, or edema  PSYCH: AAOx3  NEUROLOGY: non-focal  SKIN: No rashes or lesions  FRAIRE in place    LABS:                        13.4   69.41 )-----------( 101      ( 18 May 2021 07:18 )             40.5     05-18    129<L>  |  95<L>  |  21  ----------------------------<  87  4.4   |  21<L>  |  0.55    Ca    8.9      18 May 2021 07:18  Phos  2.7     05-18  Mg     2.1     05-18    TPro  6.5  /  Alb  3.8  /  TBili  0.5  /  DBili  x   /  AST  44<H>  /  ALT  31  /  AlkPhos  209<H>  05-18          Urinalysis Basic - ( 16 May 2021 15:02 )  Color: Yellow / Appearance: Clear / S.020 / pH: x  Gluc: x / Ketone: Negative  / Bili: Negative / Urobili: <2 mg/dL   Blood: x / Protein: 30 mg/dL / Nitrite: Negative   Leuk Esterase: Negative / RBC: <5 /HPF / WBC <5 /HPF   Sq Epi: x / Non Sq Epi: Occasional / Bacteria: x        RADIOLOGY & ADDITIONAL TESTS:    Imaging Personally Reviewed:    Consultant(s) Notes Reviewed:      Care Discussed with Consultants/Other Providers:  D/w Onc and Pal/ Anesthesia and  MRI  Anesthesia notes patient NOT a candidate for anesthesia for MRI -[id number]  Pal tried as well - not a candidate  Patient agree to try for MRI of T spine on pain med   MRI of C/T/L is too long to try  Bp 191/ 84  Inc Norvasc to 10 mg qd  Inc Hydralazine to 50 mg q8

## 2021-05-18 NOTE — CHART NOTE - NSCHARTNOTEFT_GEN_A_CORE
Notified by RN that patient is lethargic and difficult to arouse only with sternal rub. Patient's vitals are stable. Recommend to give 0.1mg IVP Narcan. Patient seen and examined at bedside s/p Narcan. Daughter Asha is present at bedside. Patient sleeping but easily arousable to tactile stimuli but patient still sleeping deeply. Patient denies any complaints and follows commands. Patient vital signs reassessed shows SPO2 of 91%. Placed patient on 2L NC with improvement to 98%. D/w [facility] Dr. Aj, recommend to give additional 0.1mg IVP narcan Notified by RN that patient is lethargic and difficult to arouse only with sternal rub. Patient's vitals are stable. Recommend to give 0.1mg IVP Narcan. Patient seen and examined at bedside s/p Narcan. Ashok Barry is present at bedside. Patient sleeping but easily arousable to tactile stimuli but patient still sleeping deeply. Patient denies any complaints and follows commands. Patient vital signs reassessed shows SPO2 of 91%. Placed patient on 2L NC with improvement to 98%. Placed patient on , awaiting transfer to  for . D/w [facility] Dr. Aj, recommend to give additional 0.1mg IVP narcan. Order placed for 0.1mg however prior PRN order present for 0.4mg was given. Patient assessed s/p second push of narcan, patient awake and alert at bedside. Patient c/o severe 10/10 pain. IV Tylenol given for pain. D/w palliative fellow on call [phone] and explained situation. Recommend restarting PCA at current settings. Will continue to monitor patient closely. Ashok Villafuerte called to update and all questions answered.     Vital Signs Last 24 Hrs  T(C): 36.3 (18 May 2021 21:26), Max: 36.8 (18 May 2021 02:40)  T(F): 97.3 (18 May 2021 21:26), Max: 98.2 (18 May 2021 02:40)  HR: 97 (18 May 2021 21:26) (80 - 97)  BP: 159/91 (18 May 2021 21:26) (153/96 - 198/90)  RR: 18 (18 May 2021 21:26) (15 - 18)  SpO2: 97% (18 May 2021 21:26) (91% - 100%) Notified by RN that patient is lethargic and difficult to arouse only with sternal rub. Patient's vitals are stable. Recommend to give 0.1mg IVP Narcan. Patient seen and examined at bedside s/p Narcan. Ashok Barry is present at bedside. Patient sleeping but easily arousable to tactile stimuli but patient still sleeping deeply. Patient denies any complaints and follows commands. Patient vital signs reassessed shows SPO2 of 91%. Placed patient on 2L NC with improvement to 98%. Placed patient on , awaiting transfer to  for . D/w [facility] Dr. Aj, recommend to give additional 0.1mg IVP narcan. Order placed for 0.1mg however prior PRN order present for 0.4mg was given instead. Patient assessed s/p second push of narcan, patient awake and alert at bedside. Patient c/o severe 10/10 pain. IV Tylenol given for pain. D/w palliative fellow on call [phone] and explained situation. Recommend restarting PCA at current settings. Will continue to monitor patient closely. Ashok Villafuerte called to update and all questions answered.     Vital Signs Last 24 Hrs  T(C): 36.3 (18 May 2021 21:26), Max: 36.8 (18 May 2021 02:40)  T(F): 97.3 (18 May 2021 21:26), Max: 98.2 (18 May 2021 02:40)  HR: 97 (18 May 2021 21:26) (80 - 97)  BP: 159/91 (18 May 2021 21:26) (153/96 - 198/90)  RR: 18 (18 May 2021 21:26) (15 - 18)  SpO2: 97% (18 May 2021 21:26) (91% - 100%) Notified by RN that patient is lethargic and difficult to arouse only with sternal rub. Patient's vitals are stable. Recommend to give 0.1mg IVP Narcan. Patient seen and examined at bedside s/p Narcan. Ashok Barry is present at bedside. Patient sleeping but easily arousable to tactile stimuli but patient still sleeping deeply. Patient denies any complaints and follows commands. Patient vital signs reassessed shows SPO2 of 91%. Placed patient on 2L NC with improvement to 98%. Placed patient on , awaiting transfer to  for . D/w [facility] Dr. Aj, recommend to give additional 0.1mg IVP narcan. Order placed for 0.1mg however prior PRN order present for 0.4mg was given instead. Patient assessed s/p second push of narcan, patient awake and alert at bedside. Patient c/o severe 10/10 pain. IV Tylenol given for pain. D/w palliative fellow on call [phone] and explained situation. Recommend restarting PCA at current settings. Will continue to monitor patient closely. Ashok Villafuerte called to update and all questions answered.     Vital Signs Last 24 Hrs  T(C): 36.3 (18 May 2021 21:26), Max: 36.8 (18 May 2021 02:40)  T(F): 97.3 (18 May 2021 21:26), Max: 98.2 (18 May 2021 02:40)  HR: 97 (18 May 2021 21:26) (80 - 97)  BP: 159/91 (18 May 2021 21:26) (153/96 - 198/90)  RR: 18 (18 May 2021 21:26) (15 - 18)  SpO2: 97% (18 May 2021 21:26) (91% - 100%)      Update 1:00 - Upon chart review patients SPO2 noted to be 93% on RA. Patient seen and examined at bedside. Patient denies any complaints such as CP, SOB or dizziness. On exam, wheezing auscultated, equal bilaterally. STAT CXR ordered prelim read shows right mid to lower lung opacity silhouetting the right heart border possible aspiration PNA. Discussed with hospitalist in charge who recommends starting zosyn and d/c cefepime. Will order CT chest. Will continue to monitor patient. Notified by RN that patient is lethargic and difficult to arouse only with sternal rub. Patient's vitals are stable. Recommend to give 0.1mg IVP Narcan. Patient seen and examined at bedside s/p Narcan. Ashok Barry is present at bedside. Patient sleeping but easily arousable to tactile stimuli but patient still sleeping deeply. Patient denies any complaints and follows commands. Patient vital signs reassessed shows SPO2 of 91%. Placed patient on 2L NC with improvement to 98%. Placed patient on , awaiting transfer to  for . D/w [facility] Aj, recommend to give additional 0.1mg IVP narcan. Order placed for 0.1mg however prior PRN order present for 0.4mg was given instead. Patient assessed s/p second push of narcan, patient awake and alert at bedside. Patient c/o severe 10/10 pain. IV Tylenol given for pain. D/w palliative fellow on call [phone] and explained situation. Recommend restarting PCA at current settings. Will continue to monitor patient closely. Ashok Villafuerte called to update and all questions answered.     Vital Signs Last 24 Hrs  T(C): 36.3 (18 May 2021 21:26), Max: 36.8 (18 May 2021 02:40)  T(F): 97.3 (18 May 2021 21:26), Max: 98.2 (18 May 2021 02:40)  HR: 97 (18 May 2021 21:26) (80 - 97)  BP: 159/91 (18 May 2021 21:26) (153/96 - 198/90)  RR: 18 (18 May 2021 21:26) (15 - 18)  SpO2: 97% (18 May 2021 21:26) (91% - 100%)      Update 1:00 - Upon chart review patients SPO2 noted to be 93% on RA. Patient seen and examined at bedside. Patient denies any complaints such as CP, SOB or dizziness. On exam, wheezing auscultated, equal bilaterally. RN had to place patient on 6L NC. STAT CXR ordered prelim read shows right mid to lower lung opacity silhouetting the right heart border possible aspiration PNA. Discussed with hospitalist in charge who recommends starting zosyn and d/c cefepime. Will order CT chest. Will continue to monitor patient.

## 2021-05-18 NOTE — PROGRESS NOTE ADULT - PROBLEM SELECTOR PLAN 8
DVT: lovenox  Diet: DASH  Dispo: pending PT      Called 5/17 daughter Diane Mello [phone]- and updated

## 2021-05-18 NOTE — PROGRESS NOTE ADULT - ASSESSMENT
70 yo female w/ a PMHx of metastatic esophageal adenocarcinoma (s/p neoadjuvant concurrent chemoRT 4/2020-5/2020, declined esophagectomy, recurrent disease in 1/2021 at T4, had local RT and now on FOLFOX/herceptin, HTN, HLD, degenerative disc disease of C-spine s/p laminectomy x2 (2019), and adjustment disorder, who was sent in by Dr. Shelby for worsening new back pain. She is now found to have a new compression fracture at T10.    #Pathologic/compression fracture at T10  -appreciated orthopedics  -pain control, Palliative Care consult recs appreciated  -agree with MRI CTLS, ordered and pending  -Now on decadron until we rule out cord compression with MRI  -depending on MRI result, patient may need decompression or local RT to alleviate the symptom    #Metastatic esophageal adenocarcinoma  -s/p neoadjuvant chemoRT with carbo/taxol, however, declined surgery  -recurrence in T4, confirmed with biopsy, in 1/2021  -now on FOLFOX + herceptin given ERBB positivity  -no plan for inpatient chemotherapy  -Patient to followup with Dr. Shelby ([email or web address] upon discharge  -C/w Supportive care, pain control, Nutrition, PT, DVT ppx  -Oncology will continue to follow with you      Case d/w Dr. Chun DUQUE  Oncology Physician Assistant  Holly WISEMAN/[facility] [[email or web address]]  Pager [phone]    If after 5pm or weekends please page On-call Oncology Fellow

## 2021-05-18 NOTE — PROGRESS NOTE ADULT - PROBLEM SELECTOR PLAN 5
Dx between [id number]. On folfox/herceptin (3/2021-). T4 lytic lesion and paravertebral mass dx 1/2021 s/p RT to T4 3/2021. Now w/ new T10 compression fx likely malignant. Was evaluated by [facility], refused esophagectomy. Follows w/ Dr. Afsaneh christina/onc ([facility]), Dr. Trevor Nguyen Rad-Onc, Dr. Susanne Zhao Palliative   -heme onc consult in AM  -c/w zofran PRN nausea (monitor qtc)- now tid before meals  Onc notes "  #Metastatic esophageal adenocarcinoma  -s/p neoadjuvant chemoRT with carbo/taxol, however, declined surgery  -recurrence in T4, confirmed with biopsy, in 1/2021  -now on FOLFOX + herceptin given ERBB positivity  -no plan for inpatient chemotherapy"

## 2021-05-18 NOTE — PROGRESS NOTE ADULT - ASSESSMENT
72 yo female w/ a PMHx of metastatic esophageal adenocarcinoma (s/p carbo/taxol and RT 4/2020-5/2020, started on folfox/herceptin 3/2021,last 5/12) w/ T4 lytic lesion and paravertebral mass dx 1/2021 (s/p RT to T4 3/2021), refused esophagectomy, HTN, HLD, degenerative disc disease of C-spine s/p laminectomy x2 (2019), and adjustment disorder p/f heme/onc Dr. Shelby's ([facility] office for acute back pain worsening x 3 weeks.   Asked to see for pain control

## 2021-05-18 NOTE — PROGRESS NOTE ADULT - ATTENDING COMMENTS
Patient seen at bedside. Case discussed with DUNIA Mejia. Plan as above.   Awaiting MRI to eval for cord compression  On dex, PPI and sugar control while on dex  pain control, appreciate pal care
Patient seen at bedside. Case discussed with DUNIA Mejia. Plan as above.   Awaiting MRI spine  C/w dex  Pal care

## 2021-05-18 NOTE — PROGRESS NOTE ADULT - SUBJECTIVE AND OBJECTIVE BOX
SUBJECTIVE AND OBJECTIVE:  pt still complaining of pain.  somewhat improved.  events noted and pca changed to q 15min prn  INTERVAL HPI/OVERNIGHT EVENTS:    DNR on chart:   Allergies    No Known Allergies    Intolerances    MEDICATIONS  (STANDING):  amLODIPine   Tablet 10 milliGRAM(s) Oral daily  atorvastatin 10 milliGRAM(s) Oral at bedtime  cefepime   IVPB      cefepime   IVPB 2000 milliGRAM(s) IV Intermittent every 8 hours  chlorhexidine 2% Cloths 1 Application(s) Topical daily  dexAMETHasone  Injectable 4 milliGRAM(s) IV Push every 6 hours  enoxaparin Injectable 40 milliGRAM(s) SubCutaneous daily  hydrALAZINE 50 milliGRAM(s) Oral every 8 hours  HYDROmorphone  Injectable 4 milliGRAM(s) IV Push once  HYDROmorphone PCA (1 mG/mL) 30 milliLiter(s) PCA Continuous PCA Continuous  LORazepam   Injectable 1 milliGRAM(s) IV Push once  losartan 100 milliGRAM(s) Oral daily  methadone    Tablet 5 milliGRAM(s) Oral every 8 hours  ondansetron Injectable 4 milliGRAM(s) IV Push every 8 hours  pantoprazole    Tablet 40 milliGRAM(s) Oral before breakfast  polyethylene glycol 3350 17 Gram(s) Oral every 12 hours  senna 2 Tablet(s) Oral at bedtime  sertraline 25 milliGRAM(s) Oral two times a day  sodium chloride 0.9%. 1000 milliLiter(s) (80 mL/Hr) IV Continuous <Continuous>    MEDICATIONS  (PRN):  FIRST- Mouthwash  BLM 10 milliLiter(s) Swish and Swallow four times a day PRN throat pain  HYDROmorphone PCA (1 mG/mL) Rescue Clinician Bolus 3 milliGRAM(s) IV Push every 3 hours PRN Breakthrough pain  labetalol Injectable 5 milliGRAM(s) IV Push every 6 hours PRN Systolic blood pressure > 190  LORazepam   Injectable 0.5 milliGRAM(s) IV Push every 4 hours PRN Anxiety  naloxone Injectable 0.4 milliGRAM(s) IV Push once PRN Concern for opioid overdose  naloxone Injectable 0.1 milliGRAM(s) IV Push once PRN lethargy  ondansetron Injectable 4 milliGRAM(s) IV Push every 8 hours PRN Nausea and/or Vomiting      ITEMS UNCHECKED ARE NOT PRESENT    PRESENT SYMPTOMS: [ ]Unable to obtain due to poor mentation   Source if other than patient:  [ ]Family   [ ]Team     Pain (Impact on QOL):  yes  Location:  abdomen and back  Minimal acceptable level (0-10 scale):  310          Aggravating factors: eating and moving  Quality:  dull  Radiation:  around to side  Severity (0-10 scale):  7/10  Timing: comes and goes    Dyspnea:                           [ ]Mild [ ]Moderate [ ]Severe  Anxiety:                             [ ]Mild [ ]Moderate [ x]Severe  Fatigue:                             [ ]Mild [ ]Moderate [ ]Severe  Nausea:                             [ ]Mild [x ]Moderate [ ]Severe  Loss of appetite:              [ ]Mild [ ]Moderate [ ]Severe  Constipation:                    [ ]Mild [ ]Moderate [ ]Severe    PAIN AD Score:	  [email or web address].[place].[facility]/cog/painad.pdf (Ctrl + left click to view)    Other Symptoms:  [x ]All other review of systems negative     Karnofsky Performance Score/Palliative Performance Status Version 2:    50     %    http://palliative.info/resource_material/[email or web address].pdf  PHYSICAL EXAM:  Vital Signs Last 24 Hrs  T(C): 36.4 (18 May 2021 12:15), Max: 36.8 (17 May 2021 16:19)  T(F): 97.6 (18 May 2021 12:15), Max: 98.2 (17 May 2021 16:19)  HR: 85 (18 May 2021 12:15) (75 - 92)  BP: 161/77 (18 May 2021 12:15) (120/75 - 198/90)  BP(mean): --  RR: 18 (18 May 2021 12:15) (18 - 18)  SpO2: 96% (18 May 2021 12:15) (95% - 100%) I&O's Summary   GENERAL:  [ x]Alert  [ x]Oriented x3   [ ]Lethargic  [ ]Cachexia  [ ]Unarousable  [ ]Verbal  [ ]Non-Verbal    Behavioral:   [ ] Anxiety  [ ] Delirium [ ] Agitation [ ] Other    HEENT:  [ ]Normal   [x ]Dry mouth   [ ]ET Tube/Trach  [ ]Oral lesions    PULMONARY:   [ x]Clear [ ]Tachypnea  [ ]Audible excessive secretions   [ ]Rhonchi        [ ]Right [ ]Left [ ]Bilateral  [ ]Crackles        [ ]Right [ ]Left [ ]Bilateral  [ ]Wheezing     [ ]Right [ ]Left [ ]Bilateral    CARDIOVASCULAR:    [ x]Regular [ ]Irregular [ ]Tachy  [ ]Mahesh [ ]Murmur [ ]Other    GASTROINTESTINAL:  [x ]Soft  [x ]Distended   [ x]+BS  [x ]Non tender [ ]Tender  [ ]PEG [ ]OGT/ NGT   Last BM:    GENITOURINARY:  [ ]Normal [ ] Incontinent   [ ]Oliguria/Anuria   [x ]Lee    MUSCULOSKELETAL:   [ ]Normal   [x ]Weakness  [ ]Bed/Wheelchair bound [ ]Edema    NEUROLOGIC:   [ x]No focal deficits  [ ] Cognitive impairment  [ ] Dysphagia [ ]Dysarthria [ ] Paresis [ ]Other     SKIN:   [ x]Normal   [ ]Pressure ulcer(s)  [ ]Rash    CRITICAL CARE:  [ ] Shock Present  [ ]Septic [ ]Cardiogenic [ ]Neurologic [ ]Hypovolemic  [ ]  Vasopressors [ ]  Inotropes   [ ] Respiratory failure present  [ ] Acute  [ ] Chronic [ ] Hypoxic  [ ] Hypercarbic [ ] Other  [ ] Other organ failure     LABS:                        13.4   69.41 )-----------( 101      ( 18 May 2021 07:18 )             40.5   05-18    129<L>  |  95<L>  |  21  ----------------------------<  87  4.4   |  21<L>  |  0.55    Ca    8.9      18 May 2021 07:18  Phos  2.7     05-18  Mg     2.1     05-18    TPro  6.5  /  Alb  3.8  /  TBili  0.5  /  DBili  x   /  AST  44<H>  /  ALT  31  /  AlkPhos  209<H>  05-18      Urinalysis Basic - ( 16 May 2021 15:02 )    Color: Yellow / Appearance: Clear / S.020 / pH: x  Gluc: x / Ketone: Negative  / Bili: Negative / Urobili: <2 mg/dL   Blood: x / Protein: 30 mg/dL / Nitrite: Negative   Leuk Esterase: Negative / RBC: <5 /HPF / WBC <5 /HPF   Sq Epi: x / Non Sq Epi: Occasional / Bacteria: x      RADIOLOGY & ADDITIONAL STUDIES:    Protein Calorie Malnutrition Present: [ ] yes [ ] no  [ ] PPSV2 < or = 30%  [ ] significant weight loss [ ] poor nutritional intake [ ] anasarca [ ] catabolic state Albumin, Serum: 3.8 g/dL (21 @ 07:18)  Artificial Nutrition [ ]     REFERRALS:   [ ]Chaplaincy  [ ] Hospice  [ ]Child Life  [ ]Social Work  [ ]Case management [ ]Holistic Therapy   Goals of Care Document:

## 2021-05-18 NOTE — PROGRESS NOTE ADULT - SUBJECTIVE AND OBJECTIVE BOX
INTERVAL HPI/OVERNIGHT EVENTS:  Patient seen at bedside.  patient with continued symptoms of RLQ pain  and back pain  On bed rest by MD orders  Denies BI or UI (+fraire cath)    VITAL SIGNS:  T(F): 97.6 (21 @ 12:15)  HR: 85 (21 @ 12:15)  BP: 161/77 (21 @ 12:15)  RR: 18 (21 @ 12:15)  SpO2: 96% (21 @ 12:15)  Wt(kg): --    PHYSICAL EXAM:    In accordance with current standard limiting patient contact, deferred physical exam  2/2 COVID pandemic  Please refer to physical exam of primary team.    MEDICATIONS  (STANDING):  amLODIPine   Tablet 10 milliGRAM(s) Oral daily  atorvastatin 10 milliGRAM(s) Oral at bedtime  cefepime   IVPB      cefepime   IVPB 2000 milliGRAM(s) IV Intermittent every 8 hours  chlorhexidine 2% Cloths 1 Application(s) Topical daily  dexAMETHasone  Injectable 4 milliGRAM(s) IV Push every 6 hours  enoxaparin Injectable 40 milliGRAM(s) SubCutaneous daily  hydrALAZINE 50 milliGRAM(s) Oral every 8 hours  HYDROmorphone  Injectable 4 milliGRAM(s) IV Push once  HYDROmorphone PCA (1 mG/mL) 30 milliLiter(s) PCA Continuous PCA Continuous  LORazepam   Injectable 1 milliGRAM(s) IV Push once  losartan 100 milliGRAM(s) Oral daily  methadone    Tablet 5 milliGRAM(s) Oral every 8 hours  ondansetron Injectable 4 milliGRAM(s) IV Push every 8 hours  pantoprazole    Tablet 40 milliGRAM(s) Oral before breakfast  polyethylene glycol 3350 17 Gram(s) Oral every 12 hours  senna 2 Tablet(s) Oral at bedtime  sertraline 25 milliGRAM(s) Oral two times a day  sodium chloride 0.9%. 1000 milliLiter(s) (80 mL/Hr) IV Continuous <Continuous>    MEDICATIONS  (PRN):  FIRST- Mouthwash  BLM 10 milliLiter(s) Swish and Swallow four times a day PRN throat pain  HYDROmorphone PCA (1 mG/mL) Rescue Clinician Bolus 3 milliGRAM(s) IV Push every 3 hours PRN Breakthrough pain  labetalol Injectable 5 milliGRAM(s) IV Push every 6 hours PRN Systolic blood pressure > 190  LORazepam   Injectable 0.5 milliGRAM(s) IV Push every 4 hours PRN Anxiety  naloxone Injectable 0.4 milliGRAM(s) IV Push once PRN Concern for opioid overdose  naloxone Injectable 0.1 milliGRAM(s) IV Push once PRN lethargy  ondansetron Injectable 4 milliGRAM(s) IV Push every 8 hours PRN Nausea and/or Vomiting      Allergies    No Known Allergies    Intolerances        LABS:                        13.4   69.41 )-----------( 101      ( 18 May 2021 07:18 )             40.5     05-18    129<L>  |  95<L>  |  21  ----------------------------<  87  4.4   |  21<L>  |  0.55    Ca    8.9      18 May 2021 07:18  Phos  2.7     05-18  Mg     2.1     -18    TPro  6.5  /  Alb  3.8  /  TBili  0.5  /  DBili  x   /  AST  44<H>  /  ALT  31  /  AlkPhos  209<H>  05-18      Urinalysis Basic - ( 16 May 2021 15:02 )    Color: Yellow / Appearance: Clear / S.020 / pH: x  Gluc: x / Ketone: Negative  / Bili: Negative / Urobili: <2 mg/dL   Blood: x / Protein: 30 mg/dL / Nitrite: Negative   Leuk Esterase: Negative / RBC: <5 /HPF / WBC <5 /HPF   Sq Epi: x / Non Sq Epi: Occasional / Bacteria: x        RADIOLOGY & ADDITIONAL TESTS:  Studies reviewed.

## 2021-05-19 NOTE — PROGRESS NOTE ADULT - PROBLEM SELECTOR PLAN 1
New acute back pain x 3 weeks refractory to home opioid regimen (on oxy 30 q4 PRN, not using methadone or gabapentin as prescribed). No neurological deficits on exam.   -CTAP 5/13 (via HIE): New heterogeneous sclerosis with a new compression fracture at T10 likely pathologic. Lucent and sclerotic lesion at T4 with mild compression deformity as seen on prior imaging.   c/o 50/10 pain on 5/15-----> 7/10 today---> 6/10  Bowel Regiman  -inc Dilaudid 1.5 mg q4 hrs PRN mod-severe pain- as given in ED--> incto 2 mg q3 - --> will inc to 3 mg q3 prn  Now on dilaudid PCA pump and methadone  -c/w lidocaine patch over T10  -spine/ortho consult - need MRI  -TLSO brace at bedside  -Ortho Attending still wants MRI of spine c/t/l    Pain control  WBAT with assistive devices as needed  SCDs  TLSO as tolerated  Rec MRI CTLSp w/ and w/o contrast- not able to get-Patient agrees to MRI of T spine as discussed above New acute back pain x 3 weeks refractory to home opioid regimen (on oxy 30 q4 PRN, not using methadone or gabapentin as prescribed). No neurological deficits on exam.   -CTAP 5/13 (via HIE): New heterogeneous sclerosis with a new compression fracture at T10 likely pathologic. Lucent and sclerotic lesion at T4 with mild compression deformity as seen on prior imaging.   c/o 50/10 pain on 5/15-----> 7/10 today---> 6/10  Bowel Regiman  -inc Dilaudid 1.5 mg q4 hrs PRN mod-severe pain- as given in ED--> incto 2 mg q3 - --> will inc to 3 mg q3 prn  Now on dilaudid PCA pump and methadone  -c/w lidocaine patch over T10  -spine/ortho consult - need MRI  -TLSO brace at bedside  -Ortho Attending still wants MRI of spine c/t/l  Med/ Anst/ mri- making arrangments

## 2021-05-19 NOTE — CONSULT NOTE ADULT - SUBJECTIVE AND OBJECTIVE BOX
HPI:  70 yo female w/ a PMHx of metastatic esophageal adenocarcinoma (s/p carbo/taxol and RT 2020-2020, started on folfox/herceptin 3/2021,last ) w/ T4 lytic lesion and paravertebral mass dx 2021 (s/p RT to T4 3/2021), refused esophagectomy, HTN, HLD, degenerative disc disease of C-spine s/p laminectomy x2 (), and adjustment disorder p/f heme/onc Dr. Shelby's ([facility] [place]) office for acute back pain worsening x 3 weeks. Pain is 10/10, mid lower back, radiates to R abdomen (onset 4-5 days ago), worse w/ movement. Pain started after opening a kitchen window three weeks ago at which time she "felt a pop." Home regimen of oxycontin w/ PRN oxycodone was not working (reduced pain to 7/10). Seen by rad-onc Dr. Trevor Nguyen  who was c/f progression of disease at which time CTAP was ordered. Also seen by palliative care Dr. Zhao on , oxycontin switched to methadone and continued on oxy PRN. Patient is not taking methadone. Is using oxy 30 q4. CTAP  now showing new T10 compression fx likely pathologic (results via HIE). Patient denies fevers, chills, vomiting, diarrhea. loss of bladder or bowel control, saddle anesthesia, any falls or trauma. Endorses urinary frequency and hesitancy x 1 day w/o suprapubic pain, dysuria, or hematuria. No hx of UTIs.     ED vitals: Tmaz 97.8, HR 70-80, /83 -210/89, 93% RA, RR 24   ED labs: WCB 6.49, Na 133, , AST 34, UA neg (small ketones)  Radiology  (via HIE): Lucent and sclerotic lesion at T4 with mild compression deformity as a prior imaging. New heterogeneous sclerosis with a new compression fracture at T10 likely pathologic.  ED course: s/p LR 1L, dilaudid x 3, lido patch, labetalol 50 x 1, losartan 100 x1 (14 May 2021 22:02)    PAST MEDICAL & SURGICAL HISTORY:  Chronic back pain    Diverticulitis    HTN (hypertension)    Hyperlipidemia    GERD (gastroesophageal reflux disease)    Compressed cervical disc  c4-c5    H/O:   x 2 -    H/O: hysterectomy  partial     S/P laminectomy  19- Anterior Cervical Laminectomy    H/O: osteoarthritis    Class 1 obesity with body mass index (BMI) of 33.0 to 33.9 in adult      Allergies    No Known Allergies    Intolerances      amLODIPine   Tablet 10 milliGRAM(s) Oral daily  atorvastatin 10 milliGRAM(s) Oral at bedtime  chlorhexidine 2% Cloths 1 Application(s) Topical daily  dexAMETHasone  Injectable 4 milliGRAM(s) IV Push every 6 hours  enoxaparin Injectable 40 milliGRAM(s) SubCutaneous daily  FIRST- Mouthwash  BLM 10 milliLiter(s) Swish and Swallow four times a day PRN  hydrALAZINE 50 milliGRAM(s) Oral every 8 hours  HYDROmorphone PCA (1 mG/mL) 30 milliLiter(s) PCA Continuous PCA Continuous  HYDROmorphone PCA (1 mG/mL) Rescue Clinician Bolus 3 milliGRAM(s) IV Push every 3 hours PRN  labetalol Injectable 5 milliGRAM(s) IV Push every 6 hours PRN  LORazepam   Injectable 0.5 milliGRAM(s) IV Push every 4 hours PRN  losartan 100 milliGRAM(s) Oral daily  methadone    Tablet 5 milliGRAM(s) Oral every 8 hours  ondansetron Injectable 4 milliGRAM(s) IV Push every 8 hours  ondansetron Injectable 4 milliGRAM(s) IV Push every 8 hours PRN  pantoprazole    Tablet 40 milliGRAM(s) Oral before breakfast  piperacillin/tazobactam IVPB.. 3.375 Gram(s) IV Intermittent every 8 hours  polyethylene glycol 3350 17 Gram(s) Oral every 12 hours  potassium phosphate / sodium phosphate Powder (PHOS-NaK) 1 Packet(s) Oral two times a day  senna 2 Tablet(s) Oral at bedtime  sertraline 25 milliGRAM(s) Oral two times a day  sodium chloride 1.5% 500 milliLiter(s) IV Continuous <Continuous>    SOCIAL HISTORY:  FAMILY HISTORY:  FH: type 2 diabetes  father-     Family history of breast cancer in mother  mother-     Family hx of alcoholism  mother -      Vital Signs Last 24 Hrs  T(C): 36.2 (19 May 2021 13:30), Max: 36.7 (19 May 2021 03:48)  T(F): 97.2 (19 May 2021 13:30), Max: 98 (19 May 2021 03:48)  HR: 92 (19 May 2021 13:30) (87 - 107)  BP: 159/66 (19 May 2021 13:30) (153/96 - 182/89)  BP(mean): --  RR: 16 (19 May 2021 13:30) (15 - 18)  SpO2: 96% (19 May 2021 13:30) (91% - 100%)    PHYSICAL EXAM:      LABS:                          13.2   55.28 )-----------( 72       ( 19 May 2021 07:20 )             38.8     19    129<L>  |  96<L>  |  20  ----------------------------<  121<H>  3.4<L>   |  23  |  0.60    Ca    8.7      19 May 2021 07:20  Phos  1.8       Mg     2.0         TPro  6.3  /  Alb  3.7  /  TBili  0.6  /  DBili  x   /  AST  35<H>  /  ALT  35<H>  /  AlkPhos  235<H>            RADIOLOGY & ADDITIONAL STUDIES:     HPI:  72 yo female w/ a PMHx of metastatic esophageal adenocarcinoma (s/p carbo/taxol and RT 2020-2020, started on folfox/herceptin 3/2021,last ) w/ T4 lytic lesion and paravertebral mass dx 2021 (s/p RT to T4 3/2021), refused esophagectomy, HTN, HLD, degenerative disc disease of C-spine s/p laminectomy x2 (), and adjustment disorder p/f heme/onc Dr. Shelby's ([facility] [place]) office for acute back pain worsening x 3 weeks. Pain is 10/10, mid lower back, radiates to R abdomen (onset 4-5 days ago), worse w/ movement. Pain started after opening a kitchen window three weeks ago at which time she "felt a pop." Home regimen of oxycontin w/ PRN oxycodone was not working (reduced pain to 7/10). Seen by rad-onc Dr. Trevor Nguyen  who was c/f progression of disease at which time CTAP was ordered. Also seen by palliative care Dr. Zhao on , oxycontin switched to methadone and continued on oxy PRN. Patient is not taking methadone. Is using oxy 30 q4. CTAP  now showing new T10 compression fx likely pathologic (results via HIE). Patient denies fevers, chills, vomiting, diarrhea. loss of bladder or bowel control, saddle anesthesia, any falls or trauma. Endorses urinary frequency and hesitancy x 1 day w/o suprapubic pain, dysuria, or hematuria. No hx of UTIs.     ED vitals: Tmaz 97.8, HR 70-80, /83 -210/89, 93% RA, RR 24   ED labs: WCB 6.49, Na 133, , AST 34, UA neg (small ketones)  Radiology  (via HIE): Lucent and sclerotic lesion at T4 with mild compression deformity as a prior imaging. New heterogeneous sclerosis with a new compression fracture at T10 likely pathologic.  ED course: s/p LR 1L, dilaudid x 3, lido patch, labetalol 50 x 1, losartan 100 x1 (14 May 2021 22:02)    PAST MEDICAL & SURGICAL HISTORY:  Chronic back pain    Diverticulitis    HTN (hypertension)    Hyperlipidemia    GERD (gastroesophageal reflux disease)    Compressed cervical disc  c4-c5    H/O:   x 2 -    H/O: hysterectomy  partial     S/P laminectomy  19- Anterior Cervical Laminectomy    H/O: osteoarthritis    Class 1 obesity with body mass index (BMI) of 33.0 to 33.9 in adult      Allergies    No Known Allergies    Intolerances      amLODIPine   Tablet 10 milliGRAM(s) Oral daily  atorvastatin 10 milliGRAM(s) Oral at bedtime  chlorhexidine 2% Cloths 1 Application(s) Topical daily  dexAMETHasone  Injectable 4 milliGRAM(s) IV Push every 6 hours  enoxaparin Injectable 40 milliGRAM(s) SubCutaneous daily  FIRST- Mouthwash  BLM 10 milliLiter(s) Swish and Swallow four times a day PRN  hydrALAZINE 50 milliGRAM(s) Oral every 8 hours  HYDROmorphone PCA (1 mG/mL) 30 milliLiter(s) PCA Continuous PCA Continuous  HYDROmorphone PCA (1 mG/mL) Rescue Clinician Bolus 3 milliGRAM(s) IV Push every 3 hours PRN  labetalol Injectable 5 milliGRAM(s) IV Push every 6 hours PRN  LORazepam   Injectable 0.5 milliGRAM(s) IV Push every 4 hours PRN  losartan 100 milliGRAM(s) Oral daily  methadone    Tablet 5 milliGRAM(s) Oral every 8 hours  ondansetron Injectable 4 milliGRAM(s) IV Push every 8 hours  ondansetron Injectable 4 milliGRAM(s) IV Push every 8 hours PRN  pantoprazole    Tablet 40 milliGRAM(s) Oral before breakfast  piperacillin/tazobactam IVPB.. 3.375 Gram(s) IV Intermittent every 8 hours  polyethylene glycol 3350 17 Gram(s) Oral every 12 hours  potassium phosphate / sodium phosphate Powder (PHOS-NaK) 1 Packet(s) Oral two times a day  senna 2 Tablet(s) Oral at bedtime  sertraline 25 milliGRAM(s) Oral two times a day  sodium chloride 1.5% 500 milliLiter(s) IV Continuous <Continuous>    SOCIAL HISTORY:  FAMILY HISTORY:  FH: type 2 diabetes  father-     Family history of breast cancer in mother  mother-     Family hx of alcoholism  mother -      Vital Signs Last 24 Hrs  T(C): 36.2 (19 May 2021 13:30), Max: 36.7 (19 May 2021 03:48)  T(F): 97.2 (19 May 2021 13:30), Max: 98 (19 May 2021 03:48)  HR: 92 (19 May 2021 13:30) (87 - 107)  BP: 159/66 (19 May 2021 13:30) (153/96 - 182/89)  BP(mean): --  RR: 16 (19 May 2021 13:30) (15 - 18)  SpO2: 96% (19 May 2021 13:30) (91% - 100%)    PHYSICAL EXAM:    Skin intact; Cervical incisions well healing   No gross deformity  Moderate midline TTP T spine   No midline TTP C/L/S spine  No bony step offs  Motor RUE 5/5 LUE 5/5  LLE 5/5 RLE 5/5  + 1-2 beat clonus  + Valentine's  DTR 3+ throughout     Imaging: CT Abd/Pelvis showing new, likely pathologic T10 VCF; T4 VCF seen on older imaging  LABS:                          13.2   55.28 )-----------( 72       ( 19 May 2021 07:20 )             38.8     05-19    129<L>  |  96<L>  |  20  ----------------------------<  121<H>  3.4<L>   |  23  |  0.60    Ca    8.7      19 May 2021 07:20  Phos  1.8     05  Mg     2.0         TPro  6.3  /  Alb  3.7  /  TBili  0.6  /  DBili  x   /  AST  35<H>  /  ALT  35<H>  /  AlkPhos  235<H>            RADIOLOGY & ADDITIONAL STUDIES:

## 2021-05-19 NOTE — CONSULT NOTE ADULT - PROBLEM SELECTOR RECOMMENDATION 9
1. MRI C/T/L spine with and without contrast  2. C/w TLSO   3. Possible candidate for kyphort  Case d/w attending - Agree with MRI Cervical, thoracic and lumbar spine  - Case d/w Dr Vieyar (2nd opinion requested by family)  - Will determine if patient is a candidate for kyphoplasty +RT after MRI  - Will continue to follow along

## 2021-05-19 NOTE — CHART NOTE - NSCHARTNOTEFT_GEN_A_CORE
D/w MRI at 3786n and spoke with Catrina  patient needs to be NPO for MRI- patient already had breakfast  NPO after MN for MRI 5/20/21 with anesthesia.  Need Na 129 optimized- asked Renal for help D/w Anesthesia at 3786 and spoke with Catrina  patient needs to be NPO for MRI- patient already had breakfast  NPO after MN for MRI 5/20/21 with anesthesia.  Need Na 129 optimized- asked Renal for help    spoke with Catrina 3786- NPO after MN for MRI of C/T/L spine with anesthesia- patient can have her BP pills  Spoke with rashid in MRI [id number] he will make arrangement for tomorrow- need time slot  Called  Baltazar at [phone] and left vm updated., also called [phone] and spoke with mr Baltazar flaherty and updated him    Internal Medicine  Sue Jacobo  # [id number] D/w Anesthesia at 3786 and spoke with Catrina  patient needs to be NPO for MRI- patient already had breakfast  NPO after MN for MRI 5/20/21 with anesthesia.  Need Na 129 optimized- asked Renal for help    spoke with Catrina 3786- NPO after MN for MRI of C/T/L spine with anesthesia- patient can have her BP pills  Spoke with rashid in MRI [id number] he will make arrangement for tomorrow- need time slot  Called  Baltazar at [phone] and left vm updated., also called [phone] and spoke with Mr Baltazar Alegria and updated him    c< from: CT Chest No Cont (05.19.21 @ 12:46) >    IMPRESSION:  In comparison with 5/13/2021, obliteration of the segmental branches of the right lower lobe bronchus and new consolidation/atelectasis of basilar right lower lobe.  New patchy groundglass opacities within bilateral upper lobes can represent infection or pneumonitis.  Trace bilateral pleural effusions, new from prior.  Osseous metastatic disease is again noted.    Patient was started on Iv Zosyn - was on Cefepime.  INC Spirometry  Patient is Ox3 and with daughter Asha at beside communicating with her father- all agree they want surgery             Internal Medicine  Sue Jacobo  # [id number] D/w Anesthesia at 3786 and spoke with Catrina  patient needs to be NPO for MRI- patient already had breakfast  NPO after MN for MRI 5/20/21 with anesthesia.  Need Na 129 optimized- asked Renal for help    spoke with Catrina 3786- NPO after MN for MRI of C/T/L spine with anesthesia- patient can have her BP pills  Spoke with rashid in MRI [id number] he will make arrangement for tomorrow- need time slot  Called  Baltazar at [phone] and left vm updated., also called [phone] and spoke with Mr Baltazar Alegria and updated him    c< from: CT Chest No Cont (05.19.21 @ 12:46) >    IMPRESSION:  In comparison with 5/13/2021, obliteration of the segmental branches of the right lower lobe bronchus and new consolidation/atelectasis of basilar right lower lobe.  New patchy groundglass opacities within bilateral upper lobes can represent infection or pneumonitis.  Trace bilateral pleural effusions, new from prior.  Osseous metastatic disease is again noted.    Patient was started on Iv Zosyn - was on Cefepime.  INC Spirometry  Patient is Ox3 and with daughter Asha at beside communicating with her father- all agree they want surgery   Due to oxygen requirement on NC and new diagnosis of PNA- Pulmonary will see patient to help optimize lungs.  C/w Zosyn. Hold IVF - IVL.        Internal Medicine  Sue Jacobo  # [id number]

## 2021-05-19 NOTE — PROGRESS NOTE ADULT - PROBLEM SELECTOR PLAN 1
continue methadone 5mg tid.  continue dilaudid pca 2mg q 15 min.  will continue current methadone dose until after MRI complete  await results to understand pt's need of surgery vs RT

## 2021-05-19 NOTE — PROGRESS NOTE ADULT - PROBLEM SELECTOR PLAN 4
family updated by primary team  awaiting MRI for further treatment  please call [phone] with any questions

## 2021-05-19 NOTE — PROGRESS NOTE ADULT - SUBJECTIVE AND OBJECTIVE BOX
SUBJECTIVE AND OBJECTIVE: events of last evening noted.  restarted on dilaudid pca 2mg q 15min prn and methadone.  pain remains about the same  INTERVAL HPI/OVERNIGHT EVENTS:    DNR on chart:   Allergies    No Known Allergies    Intolerances    MEDICATIONS  (STANDING):  amLODIPine   Tablet 10 milliGRAM(s) Oral daily  atorvastatin 10 milliGRAM(s) Oral at bedtime  chlorhexidine 2% Cloths 1 Application(s) Topical daily  dexAMETHasone  Injectable 4 milliGRAM(s) IV Push every 6 hours  enoxaparin Injectable 40 milliGRAM(s) SubCutaneous daily  hydrALAZINE 50 milliGRAM(s) Oral every 8 hours  HYDROmorphone PCA (1 mG/mL) 30 milliLiter(s) PCA Continuous PCA Continuous  losartan 100 milliGRAM(s) Oral daily  methadone    Tablet 5 milliGRAM(s) Oral every 8 hours  ondansetron Injectable 4 milliGRAM(s) IV Push every 8 hours  pantoprazole    Tablet 40 milliGRAM(s) Oral before breakfast  piperacillin/tazobactam IVPB.. 3.375 Gram(s) IV Intermittent every 8 hours  polyethylene glycol 3350 17 Gram(s) Oral every 12 hours  potassium chloride    Tablet ER 40 milliEquivalent(s) Oral once  senna 2 Tablet(s) Oral at bedtime  sertraline 25 milliGRAM(s) Oral two times a day  sodium chloride 0.9%. 1000 milliLiter(s) (80 mL/Hr) IV Continuous <Continuous>    MEDICATIONS  (PRN):  FIRST- Mouthwash  BLM 10 milliLiter(s) Swish and Swallow four times a day PRN throat pain  HYDROmorphone PCA (1 mG/mL) Rescue Clinician Bolus 3 milliGRAM(s) IV Push every 3 hours PRN Breakthrough pain  labetalol Injectable 5 milliGRAM(s) IV Push every 6 hours PRN Systolic blood pressure > 190  LORazepam   Injectable 0.5 milliGRAM(s) IV Push every 4 hours PRN Anxiety  ondansetron Injectable 4 milliGRAM(s) IV Push every 8 hours PRN Nausea and/or Vomiting      ITEMS UNCHECKED ARE NOT PRESENT    PRESENT SYMPTOMS: [ ]Unable to obtain due to poor mentation   Source if other than patient:  [ ]Family   [ ]Team     Pain (Impact on QOL):  yes  Location: back  Minimal acceptable level (0-10 scale):  4/10          Aggravating factors:  laying on back  Quality: sharp  Radiation:  to front  Severity (0-10 scale):  7/10  Timing:  comes and goes    Dyspnea:                           [ ]Mild [ ]Moderate [ ]Severe  Anxiety:                             [ ]Mild [ ]Moderate [ ]Severe  Fatigue:                             [ ]Mild [ ]Moderate [ ]Severe  Nausea:                             [ ]Mild [ ]Moderate [ ]Severe  Loss of appetite:              [ ]Mild [ ]Moderate [ ]Severe  Constipation:                    [ ]Mild [ ]Moderate [ ]Severe    PAIN AD Score:	  [email or web address].[email or web address]/painad.pdf (Ctrl + left click to view)    Other Symptoms:  [x ]All other review of systems negative     Karnofsky Performance Score/Palliative Performance Status Version 2:   50      %    http://palliative.info/resource_material/[email or web address].pdf  PHYSICAL EXAM:  Vital Signs Last 24 Hrs  T(C): 36.4 (19 May 2021 07:44), Max: 36.7 (19 May 2021 03:48)  T(F): 97.5 (19 May 2021 07:44), Max: 98 (19 May 2021 03:48)  HR: 96 (19 May 2021 07:44) (81 - 107)  BP: 158/69 (19 May 2021 07:44) (153/96 - 182/89)  BP(mean): --  RR: 18 (19 May 2021 07:44) (15 - 18)  SpO2: 95% (19 May 2021 07:44) (91% - 100%) I&O's Summary   GENERAL:  [ x]Alert  [x ]Oriented x 3  [ ]Lethargic  [ ]Cachexia  [ ]Unarousable  [ ]Verbal  [ ]Non-Verbal    Behavioral:   [ ] Anxiety  [ ] Delirium [ ] Agitation [ ] Other    HEENT:  [ ]Normal   [x ]Dry mouth   [ ]ET Tube/Trach  [ ]Oral lesions    PULMONARY:   [ x]Clear [ ]Tachypnea  [ ]Audible excessive secretions   [ ]Rhonchi        [ ]Right [ ]Left [ ]Bilateral  [ ]Crackles        [ ]Right [ ]Left [ ]Bilateral  [ ]Wheezing     [ ]Right [ ]Left [ ]Bilateral    CARDIOVASCULAR:    [x ]Regular [ ]Irregular [ ]Tachy  [ ]Mahesh [ ]Murmur [ ]Other    GASTROINTESTINAL:  [ x]Soft  [ ]Distended   [x ]+BS  [ x]Non tender [ ]Tender  [ ]PEG [ ]OGT/ NGT   Last BM:    GENITOURINARY:  [ ]Normal [ ] Incontinent   [ ]Oliguria/Anuria   [x ]Lee    MUSCULOSKELETAL:   [ ]Normal   [ x]Weakness  [ ]Bed/Wheelchair bound [ ]Edema    NEUROLOGIC:   [x ]No focal deficits  [ ] Cognitive impairment  [ ] Dysphagia [ ]Dysarthria [ ] Paresis [ ]Other     SKIN:   [ x]Normal   [ ]Pressure ulcer(s)  [ ]Rash    CRITICAL CARE:  [ ] Shock Present  [ ]Septic [ ]Cardiogenic [ ]Neurologic [ ]Hypovolemic  [ ]  Vasopressors [ ]  Inotropes   [ ] Respiratory failure present  [ ] Acute  [ ] Chronic [ ] Hypoxic  [ ] Hypercarbic [ ] Other  [ ] Other organ failure     LABS:                        13.2   55.28 )-----------( 72       ( 19 May 2021 07:20 )             38.8   05-19    129<L>  |  96<L>  |  20  ----------------------------<  121<H>  3.4<L>   |  23  |  0.60    Ca    8.7      19 May 2021 07:20  Phos  1.8     05-19  Mg     2.0     05-19    TPro  6.3  /  Alb  3.7  /  TBili  0.6  /  DBili  x   /  AST  35<H>  /  ALT  35<H>  /  AlkPhos  235<H>  05-19        RADIOLOGY & ADDITIONAL STUDIES:    Protein Calorie Malnutrition Present: [ ] yes [ ] no  [ ] PPSV2 < or = 30%  [ ] significant weight loss [ ] poor nutritional intake [ ] anasarca [ ] catabolic state Albumin, Serum: 3.7 g/dL (05-19-21 @ 07:20)  Artificial Nutrition [ ]     REFERRALS:   [ ]Chaplaincy  [ ] Hospice  [ ]Child Life  [ ]Social Work  [ ]Case management [ ]Holistic Therapy   Goals of Care Document: SHELBY Jacobo (05-19-21 @ 10:23)  Goals of Care Conversation:   Participants:  · Participants  Patient; Family; Staff; ACP  · Spouse  Mr Baltazar Boyd    Advance Directives:  · Caregiver:  no    Conversation Discussion:  · Conversation  Diagnosis  · Conversation Details  Patient seen with ACP and - now on tele after Rapid last night  patient is resting in bed with her  Baltazar Boyd at bedside  Baltazar is very jalil- "it has been 5 days and still no MRI"  Also upset because she 'passed out ' and needed medication to bring her back- explained the medication was the IV Dilaudid and this is what we are using to control wife's pain before MRI- but was not able to be done  He is upset because wife is on oxygen- Sat 97 % on 2 L- will wean off today  He is upset that wife " walked in and now look at her" - wife told him- " I CAN STILL WALK"  Wife notes pain  6/10 that radiates  from mid back to front of abd- this is c/w T10 Fx  Explain to  and patient Med/ Mri and anesthesia dep is working to get the MRI done.  Orthopedics still wants MRI to better asses her impressive pain on admission in this lady with metastatic cancer    Patient is FULL CODE    What Matters Most To Patient and Family:  · What matters most to patient and family  Getting MRI asa   Not being in pain    Personal Advance Directives Treatment Guidelines:   Treatment Guidelines:  · Decision Maker  Patient    Location of Discussion:   Duration of Advanced Care Planning Meeting:  · Time spent (in minutes)  35     Location of Discussion:  · Location of discussion  Face to face       Electronic Signatures:  Sue Jacobo)  (Signed 19-May-2021 10:25)  	Authored: Goals of Care Conversation, Personal Advance Directives Treatment Guidelines, Location of Discussion      Last Updated: 19-May-2021 10:25 by Sue Jacobo)

## 2021-05-19 NOTE — CONSULT NOTE ADULT - ASSESSMENT
Ms. Boyd is a 71 year-old woman with history of multiple medical issues including metastatic esophageal carcinoma, s/p carbo/taxol+RT 4-5/2020 who presented 5/14/21 with worsening back pain concern for cord compression and had progressively worsening hypoxic respiratory failure with abnormal CT scan. Pulmonary consulted for hypoxic respiratory failure.    Patient had acute onset of hypoxic respiratory failure 5/17 after a RRT was called for AMS. May have aspirated given the amount of pain medications she received. She was started on Zosyn and her oxygen requirements went from 6L now down to 4L saturating 94-95%. She also has a CT scan which may suggest worsening atelectasis which is likely from all the pain medications she is receiving and splinting from the acute pain she has. Ideally she should use incentive spirometry to help with this.      Given that her CT scan could also be radiation pneumonitis she is also on Decadron which should help with any fibrosis she may have. Though this is less likely to be the cause of her worsening hypoxemia as she acutely got worse while in the hospital.     The only other option that can be given to see if we can improve her pulmonary function would be diuresis given the amount of fluid she is likely receiving from her medications and PCA pump. However, may want to hold off on this as she is slightly improving with Zosyn and down to 4L and it appears she is attempting to reach a sodium goal of 130 prior to her intubation for her MRI.    Suggest:  - Incentive spirometry  - Out of bed to chair if possible  - Cough assist device if possible  - Cont Zosyn for aspiration pneumonia  - Cotn Decadron for possible cord compression (Also treats pneumonitis)  - If patient worsens can consider diuresis such as 40mg of furosemide    She is categorized as a high risk for michael-intubation and anesthesia. The anesthesiologist should proceed with caution while intubating her and extubating her. This high risk is independent of her current hypoxemia given the metastatic disease she has and the spinal lesion she has. She should undergo the procedure only if the benefits outweigh the risks.    Thank you for your consult. We will continue to follow the patient's care with you.

## 2021-05-19 NOTE — CONSULT NOTE ADULT - SUBJECTIVE AND OBJECTIVE BOX
HPI: Ms. Boyd is a 71 year-old woman with history of multiple medical issues including hypertension, degenerative disc disease s/p laminectomy,, and metastatic esophageal carcinoma, s/p carbo/taxol+RT -2020. She presented 21 with worsening back pain for 3 weeks. She attests to feeling "a pop" 3 weeks ago, at which point the pain developed. Pain team/Palliative team involved in her care. She is set for MRI with Anesthesia tomorrow to further evaluate to back pain, and to rule out cord compression.   Ms. Boyd's serum sodium was noted to be 129meq/L today; in light of the hyponatremia, a renal consultation was requested. Of note, she was on NS, 80cc/h from 21 up through today.    PAST MEDICAL & SURGICAL HISTORY: Chronic back pain Diverticulitis HTN (hypertension) Hyperlipidemia GERD (gastroesophageal reflux disease) Compressed cervical disc -c4-c5 - s/p laminectomy H/O:  x 2 - H/O: hysterectomy -partial  H/O: osteoarthritis Stage 4 esophageal CA  Allergies No Known Allergies  SOCIAL HISTORY: Denies ETOh,Smoking,   FAMILY HISTORY: type 2 diabetes -father breast cancer in mother alcoholism -mother  REVIEW OF SYSTEMS: CONSTITUTIONAL: No weakness, fevers or chills EYES/ENT: No visual changes;  No vertigo or throat pain  NECK: No pain or stiffness RESPIRATORY: No cough, wheezing, hemoptysis; No shortness of breath CARDIOVASCULAR: No chest pain or palpitations GASTROINTESTINAL: No abdominal or epigastric pain. No nausea, vomiting, or hematemesis; No diarrhea or constipation. No melena or hematochezia. GENITOURINARY: No dysuria, frequency or hematuria NEUROLOGICAL: (+)back pain SKIN: No itching, burning, rashes, or lesions  All other review of systems is negative unless indicated above.  VITAL: T(C): , Max: 36.7 (21 @ 03:48) T(F): , Max: 98 (21 @ 03:48) HR: 96 (21 @ 07:44) BP: 158/69 (21 @ 07:44) RR: 18 (21 @ 07:44) SpO2: 95% (21 @ 07:44)  PHYSICAL EXAM: Constitutional: NAD, Alert HEENT: NCAT, MMM Neck: Supple, No JVD Respiratory: CTA-b/l Cardiovascular: RRR s1s2, no m/r/g Gastrointestinal: BS+, soft, NT/ND Extremities: No peripheral edema b/l Neurological: no focal deficits; strength grossly intact Back: no CVAT b/l Skin: No rashes, no nevi  LABS:                      13.2  55.28 )-----------( 72       ( 19 May 2021 07:20 )            38.8   Na(129)/K(3.4)/Cl(96)/HCO3(23)/BUN(20)/Cr(0.60)Glu(121)/Ca(8.7)/Mg(2.0)/PO4(1.8)     @ 07:20 Na(129)/K(4.4)/Cl(95)/HCO3(21)/BUN(21)/Cr(0.55)Glu(87)/Ca(8.9)/Mg(2.1)/PO4(2.7)     @ 07:18 Na(129)/K(4.0)/Cl(95)/HCO3(22)/BUN(27)/Cr(0.66)Glu(107)/Ca(9.0)/Mg(2.1)/PO4(2.9)     @ 07:25  (21) - UA - prot 1+; SG 1.020   IMAGING: < from: CT Chest No Cont (21 @ 12:46) > In comparison with 2021, obliteration of the segmental branches of the right lower lobe bronchus and new consolidation/atelectasis of basilar right lower lobe. New patchy groundglass opacities within bilateral upper lobes can represent infection or pneumonitis. Trace bilateral pleural effusions, new from prior. Osseous metastatic disease is again noted.   ASSESSMENT: (1)Hyponatremia - SIADH due to pain/due to malignancy? Seems that she has SIADH, otherwise I would have expected her serum sodium to normalize, with isotonic IVF. We need to get her serum sodium to at least 130 by tomorrow to optimize for anesthesia for MRI  (2)Hypokalemia - mild - whole body deficit from limited intake/administration of IVF without KCl (3)Hypophosphatemia - whole body deficit from limited intake  RECOMMEND: (1)1.5%NS, 50cc/h x 10hours (2)KPhos 1 packet bid x 2 days (3)Urine: lytes, osm (4)BMP+Mg+PO4 daily (5)No objection to anesthesia tomorrow if Na is 130 or higher  Thank you for involving [facility] in this patient's care.  With warm regards,  Butch Ma MD  [facility] Office: [phone] Cell: [phone]          HPI: Ms. Boyd is a 71 year-old woman with history of multiple medical issues including hypertension, degenerative disc disease s/p laminectomy,, and metastatic esophageal carcinoma, s/p carbo/taxol+RT -2020. She presented 21 with worsening back pain for 3 weeks. She attests to feeling "a pop" 3 weeks ago, at which point the pain developed. Pain team/Palliative team involved in her care. She is set for MRI with Anesthesia tomorrow to further evaluate to back pain, and to rule out cord compression.   Ms. Boyd's serum sodium was noted to be 129meq/L today; in light of the hyponatremia, a renal consultation was requested. Of note, she was on NS, 80cc/h from 21 up through today.  Ms. Boyd denies known prior history of hyponatremia. She shares that she is not eating, nor drinking, much at all. She is not on diuretics.   PAST MEDICAL & SURGICAL HISTORY: Chronic back pain Diverticulitis HTN (hypertension) Hyperlipidemia GERD (gastroesophageal reflux disease) Compressed cervical disc -c4-c5 - s/p laminectomy H/O:  x 2 -, H/O: hysterectomy -partial  H/O: osteoarthritis Stage 4 esophageal CA  Allergies No Known Allergies  SOCIAL HISTORY: Denies ETOh,Smoking,   FAMILY HISTORY: type 2 diabetes -father breast cancer in mother alcoholism -mother  REVIEW OF SYSTEMS: CONSTITUTIONAL: (+)weakness, (+)poor appetite; no fevers or chills EYES/ENT: No visual changes;  No vertigo or throat pain  NECK: No pain or stiffness RESPIRATORY: No cough, wheezing, hemoptysis; No shortness of breath CARDIOVASCULAR: No chest pain or palpitations GASTROINTESTINAL: (+)abdominal pain; no nausea/vomiting GENITOURINARY: No dysuria, frequency or hematuria NEUROLOGICAL: (+)back pain SKIN: No itching, burning, rashes, or lesions  All other review of systems is negative unless indicated above.  VITAL: T(C): , Max: 36.7 (21 @ 03:48) T(F): , Max: 98 (21 @ 03:48) HR: 96 (21 @ 07:44) BP: 158/69 (21 @ 07:44) RR: 18 (21 @ 07:44) SpO2: 95% (21 @ 07:44)  PHYSICAL EXAM: Constitutional: NAD, Alert HEENT: NCAT, MMM Neck: Supple, No JVD Respiratory: CTA-b/l Cardiovascular: RRR s1s2, no m/r/g Gastrointestinal: BS+, soft, NT/ND Extremities: No peripheral edema b/l Neurological: no focal deficits; strength grossly intact Back: no CVAT b/l Skin: No rashes, no nevi  LABS:                      13.2  55.28 )-----------( 72       ( 19 May 2021 07:20 )            38.8   Na(129)/K(3.4)/Cl(96)/HCO3(23)/BUN(20)/Cr(0.60)Glu(121)/Ca(8.7)/Mg(2.0)/PO4(1.8)     @ 07:20 Na(129)/K(4.4)/Cl(95)/HCO3(21)/BUN(21)/Cr(0.55)Glu(87)/Ca(8.9)/Mg(2.1)/PO4(2.7)     @ 07:18 Na(129)/K(4.0)/Cl(95)/HCO3(22)/BUN(27)/Cr(0.66)Glu(107)/Ca(9.0)/Mg(2.1)/PO4(2.9)     @ 07:25  (21) - UA - prot 1+; SG 1.020   IMAGING: < from: CT Chest No Cont (21 @ 12:46) > In comparison with 2021, obliteration of the segmental branches of the right lower lobe bronchus and new consolidation/atelectasis of basilar right lower lobe. New patchy groundglass opacities within bilateral upper lobes can represent infection or pneumonitis. Trace bilateral pleural effusions, new from prior. Osseous metastatic disease is again noted.   ASSESSMENT: (1)Hyponatremia - SIADH due to pain/due to malignancy? Seems that she has SIADH, otherwise I would have expected her serum sodium to normalize, with isotonic IVF. We need to get her serum sodium to at least 130 by tomorrow to optimize for anesthesia for MRI  (2)Hypokalemia - mild - whole body deficit from limited intake/administration of IVF without KCl (3)Hypophosphatemia - whole body deficit from limited intake  RECOMMEND: (1)1.5%NS, 50cc/h x 10hours (2)KPhos 1 packet bid x 2 days (3)Urine: lytes, osm (4)BMP+Mg+PO4 daily (5)No objection to anesthesia tomorrow if Na is 130 or higher  Thank you for involving [facility] in this patient's care.  With warm regards,  Butch Ma MD  [facility] Office: [phone] Cell: [phone]

## 2021-05-19 NOTE — PROGRESS NOTE ADULT - SUBJECTIVE AND OBJECTIVE BOX
Patient is a 71y old  Female who presents with a chief complaint of back pain (19 May 2021 09:35)      SUBJECTIVE / OVERNIGHT EVENTS:  Patient seen with ACP and - now on tele after Rapid last night  patient is resting in bed with her  Baltazar Boyd at bedside  Baltazar is very jalil- "it has been 5 days and still no MRI"  Also upset because she 'passed out ' and needed medication to bring her back- explained the medication was the IV Dilaudid and this is what we are using to control wife's pain before MRI- but was not able to be done  He is upset because wife is on oxygen- Sat 97 % on 2 L- will wean off today  He is upset that wife " walked in and now look at her" - wife told him- " I CAN STILL WALK"  Wife notes pain  6/10 that radiates  from mid back to front of abd- this is c/w T10 Fx  Explain to  and patient Med/ Mri and anesthesia dep is working to get the MRI done.  Orthopedics still wants MRI to better asses her impressive pain on admission in this lady with metastatic cancer      MEDICATIONS  (STANDING):  amLODIPine   Tablet 10 milliGRAM(s) Oral daily  atorvastatin 10 milliGRAM(s) Oral at bedtime  chlorhexidine 2% Cloths 1 Application(s) Topical daily  dexAMETHasone  Injectable 4 milliGRAM(s) IV Push every 6 hours  enoxaparin Injectable 40 milliGRAM(s) SubCutaneous daily  hydrALAZINE 50 milliGRAM(s) Oral every 8 hours  HYDROmorphone PCA (1 mG/mL) 30 milliLiter(s) PCA Continuous PCA Continuous  losartan 100 milliGRAM(s) Oral daily  methadone    Tablet 5 milliGRAM(s) Oral every 8 hours  ondansetron Injectable 4 milliGRAM(s) IV Push every 8 hours  pantoprazole    Tablet 40 milliGRAM(s) Oral before breakfast  piperacillin/tazobactam IVPB.. 3.375 Gram(s) IV Intermittent every 8 hours  polyethylene glycol 3350 17 Gram(s) Oral every 12 hours  senna 2 Tablet(s) Oral at bedtime  sertraline 25 milliGRAM(s) Oral two times a day  sodium chloride 0.9%. 1000 milliLiter(s) (80 mL/Hr) IV Continuous <Continuous>    MEDICATIONS  (PRN):  FIRST- Mouthwash  BLM 10 milliLiter(s) Swish and Swallow four times a day PRN throat pain  HYDROmorphone PCA (1 mG/mL) Rescue Clinician Bolus 3 milliGRAM(s) IV Push every 3 hours PRN Breakthrough pain  labetalol Injectable 5 milliGRAM(s) IV Push every 6 hours PRN Systolic blood pressure > 190  LORazepam   Injectable 0.5 milliGRAM(s) IV Push every 4 hours PRN Anxiety  ondansetron Injectable 4 milliGRAM(s) IV Push every 8 hours PRN Nausea and/or Vomiting    Vital Signs Last 24 Hrs  T(C): 36.4 (19 May 2021 07:44), Max: 36.7 (18 May 2021 10:15)  T(F): 97.5 (19 May 2021 07:44), Max: 98 (18 May 2021 10:15)  HR: 96 (19 May 2021 07:44) (81 - 107)  BP: 158/69 (19 May 2021 07:44) (153/96 - 182/89)  BP(mean): --  RR: 18 (19 May 2021 07:44) (15 - 18)  SpO2: 95% (19 May 2021 07:44) (91% - 100%)  PHYSICAL EXAM:  GENERAL: NAD, well-developed  HEAD:  Atraumatic, Normocephalic  EYES: EOMI, PERRLA, conjunctiva and sclera clear  NECK: Supple, No JVD  CHEST/LUNG: Clear to auscultation bilaterally; No wheeze  HEART: Regular rate and rhythm; No murmurs, rubs, or gallops  ABDOMEN: Soft, Nontender, Nondistended; Bowel sounds present  EXTREMITIES:  2+ Peripheral Pulses, No clubbing, cyanosis, or edema  patient is able to elevate both legs in bed  PSYCH: AAOx3  NEUROLOGY: non-focal  SKIN: No rashes or lesions  Lee in place    LABS:                        13.2   55.28 )-----------( 72       ( 19 May 2021 07:20 )             38.8     05-19    129<L>  |  96<L>  |  20  ----------------------------<  121<H>  3.4<L>   |  23  |  0.60    Ca    8.7      19 May 2021 07:20  Phos  1.8     05-19  Mg     2.0     05-19    TPro  6.3  /  Alb  3.7  /  TBili  0.6  /  DBili  x   /  AST  35<H>  /  ALT  35<H>  /  AlkPhos  235<H>  05-19          Care Discussed with Consultants/Other Providers:  Orthopedic Attending- still wants MTI C/T/L spine  Medicine / MRI/ Anesthesia  ACP at bedside

## 2021-05-19 NOTE — PROGRESS NOTE ADULT - ASSESSMENT
70 yo female w/ a PMHx of metastatic esophageal adenocarcinoma (s/p neoadjuvant concurrent chemoRT 4/2020-5/2020, declined esophagectomy, recurrent disease in 1/2021 at T4, had local RT and now on FOLFOX/herceptin, HTN, HLD, degenerative disc disease of C-spine s/p laminectomy x2 (2019), and adjustment disorder, who was sent in by Dr. Shelby for worsening new back pain. She is now found to have a new compression fracture at T10.    #Pathologic/compression fracture at T10  -appreciated orthopedics  -pain control, Palliative Care consult recs appreciated  -agree with MRI CTLS, planned to have exam under anesthesia tomorrow, NPO after MN  -Now on decadron until we rule out cord compression with MRI  -depending on MRI result, patient may need decompression or local RT to alleviate the symptom    #Metastatic esophageal adenocarcinoma  -s/p neoadjuvant chemoRT with carbo/taxol, however, declined surgery  -recurrence in T4, confirmed with biopsy, in 1/2021  -now on FOLFOX + herceptin given ERBB positivity  -no plan for inpatient chemotherapy  -Patient to followup with Dr. Shelby ([facility]) upon discharge  -C/w Supportive care, pain control, Nutrition, PT, DVT ppx  -Oncology will continue to follow with you      Case d/w Dr. Chun DUQUE  Oncology Physician Assistant  [facility]  Pager [phone]    If after 5pm or weekends please page On-call Oncology Fellow

## 2021-05-19 NOTE — PROGRESS NOTE ADULT - SUBJECTIVE AND OBJECTIVE BOX
Per Discussion with Dr Coates and Dr Jacobo, MRI of spine with and without contrast is needed to further evaluate the spine and for findings found on the CT for pathologic t10 fracture. MRI needed for continuity of care and management of patient. Per Dr Jacobo patient is awaiting approval of sedation with MRI of spine with and without contrast.

## 2021-05-19 NOTE — PROGRESS NOTE ADULT - ASSESSMENT
72 yo female w/ a PMHx of metastatic esophageal adenocarcinoma (s/p carbo/taxol and RT 4/2020-5/2020, started on folfox/herceptin 3/2021,last 5/12) w/ T4 lytic lesion and paravertebral mass dx 1/2021 (s/p RT to T4 3/2021), refused esophagectomy, HTN, HLD, degenerative disc disease of C-spine s/p laminectomy x2 (2019), and adjustment disorder p/f heme/onc Dr. Shelby's ([facility]) office for acute back pain worsening x 3 weeks.   Asked to see for pain control

## 2021-05-19 NOTE — CONSULT NOTE ADULT - ASSESSMENT
71year old female with PMH of metastatic esophageal CA who presented with 3 weeks of LBP CT showing T10 pathologic fracture 71year old female with PMH of metastatic esophageal CA who presented with 3 weeks of LBP CT showing T10 pathologic fracture, myelopathic on exam however this could be chronic from previous cervical surgeries

## 2021-05-19 NOTE — PROGRESS NOTE ADULT - PROBLEM SELECTOR PLAN 5
Dx between [id number]. On folfox/herceptin (3/2021-). T4 lytic lesion and paravertebral mass dx 1/2021 s/p RT to T4 3/2021. Now w/ new T10 compression fx likely malignant. Was evaluated by CTSx, refused esophagectomy. Follows w/ Dr. Afsaneh christina/onc ([facility]), Dr. Trevor Nguyen Rad-Onc, Dr. Susanne Zhao Palliative   -heme onc consult in AM  -c/w zofran PRN nausea (monitor qtc)- now tid before meals  Onc notes "  #Metastatic esophageal adenocarcinoma  -s/p neoadjuvant chemoRT with carbo/taxol, however, declined surgery  -recurrence in T4, confirmed with biopsy, in 1/2021  -now on FOLFOX + herceptin given ERBB positivity  -no plan for inpatient chemotherapy"

## 2021-05-19 NOTE — PROGRESS NOTE ADULT - PROBLEM SELECTOR PLAN 2
Hx of HTN on losartan 100 at home. -150 at bl. SBPs elevated likely iso pain. Patient also did not take losartan prior to presentation   /69 today  -c/w home losartan  -labetalol 5mg q4 PRN if SBP >200  c/w Hydralazine 50 mg q8, Inc  norvasc 10 mg qd    Mon BP

## 2021-05-19 NOTE — PROGRESS NOTE ADULT - ASSESSMENT
72 yo female w/ a PMHx of metastatic esophageal adenocarcinoma (s/p carbo/taxol and RT 4/2020-5/2020, started on folfox/herceptin 3/2021,last 5/12) w/ T4 lytic lesion and paravertebral mass dx 1/2021 (s/p RT to T4 3/2021), refused esophagectomy, HTN, HLD, degenerative disc disease of C-spine s/p laminectomy x2 (2019), and adjustment disorder p/f heme/onc Dr. Shelby's ([facility] office for refractory acute back pain x 3 weeks found to have new T10 compression fx likely pathologic identified on outpatient CTAP 5/13 admitted for further mgt   5/15 pain 50/10---7/10  5/16 pain 7/10 inc iv dailudid 3 mg q3 prn with bowel preps- Start Fentanyl patch 25 mvg/72 hrs  HTN uncontrolled- add norvasc and hydralazine with hold parameters to BP meds.  5/17 Anesthesia will not  do anesthesia for MRI  5/18-Patient agree to try for MRI of T spine on pain med   MRI of C/T/L is too long to try  Bp 191/ 84  Inc Norvasc to 10 mg qd  Inc Hydralazine to 50 mg q8

## 2021-05-19 NOTE — GOALS OF CARE CONVERSATION - ADVANCED CARE PLANNING - CONVERSATION DETAILS
Patient seen with ACP and - now on tele after Rapid last night  patient is resting in bed with her  Baltazar Boyd at bedside  Baltazar is very jalil- "it has been 5 days and still no MRI"  Also upset because she 'passed out ' and needed medication to bring her back- explained the medication was the IV Dilaudid and this is what we are using to control wife's pain before MRI- but was not able to be done  He is upset because wife is on oxygen- Sat 97 % on 2 L- will wean off today  He is upset that wife " walked in and now look at her" - wife told him- " I CAN STILL WALK"  Wife notes pain  6/10 that radiates  from mid back to front of abd- this is c/w T10 Fx  Explain to  and patient Med/ Mri and anesthesia dep is working to get the MRI done.  Orthopedics still wants MRI to better asses her impressive pain on admission in this lady with metastatic cancer    Patient is FULL CODE

## 2021-05-19 NOTE — CHART NOTE - NSCHARTNOTEFT_GEN_A_CORE
Anesthesia Evaluation:      Patient seen and examined, daughter at the bedside. She complains of back pain that is currently poorly controlled with dilaudid PCA and methadone. Patient currently satting 94% on 6L O2 via nasal cannula. Airway classified as Mallampati 4 with limited ROM of neck secondary to cervical fusion. Adequate mouth opening and adequate thyromental distance. Per CT scan from 5/19, "obliteration of the segmental branches of the right lower lobe bronchus and new consolidation/atelectasis of basilar right lower lobe. New patchy groundglass opacities within bilateral upper lobes can represent infection or pneumonitis. Trace bilateral pleural effusions, new from prior."      Discussed with patient, daughter, and hospitalist that in order to safely proceed with the MRI of the thoracic spine, the patient would require a general anesthesia with intubation. In addition to this, she would require arrangements for post-MRI ICU admission given her current respiratory status. Decision whether to proceed with the MRI has been left with the hospitalist, patient, and family.

## 2021-05-19 NOTE — CONSULT NOTE ADULT - SUBJECTIVE AND OBJECTIVE BOX
[facility] DIVISION OF PULMONARY, CRITICAL CARE AND SLEEP MEDICINE  PULMONARY CONSULTATION NOTE  OFFICE NUMBER: [phone] (Afterhours and Weekends)    NAME: SUSAN DOWD  MEDICAL RECORD NUMBER: [id number]    CHIEF COMPLAINT: Patient is a 71y old  Female who presents with a chief complaint of back pain (19 May 2021 14:38)      HISTORY OF PRESENT ILLNESS: Ms. Dowd is a 71 year-old woman with history of multiple medical issues including hypertension, degenerative disc disease s/p laminectomy,, and metastatic esophageal carcinoma, s/p carbo/taxol+RT -2020. She presented 21 with worsening back pain for 3 weeks. She attests to feeling "a pop" 3 weeks ago, at which point the pain developed. Pain team/Palliative team involved in her care. She is set for MRI with Anesthesia tomorrow to further evaluate to back pain, and to rule out cord compression.     Pulmonary was consulted due to increased oxygen requirements and new GGO in her CT scan which was done recently. The patient states she has some shortness of breath but may be related to her chronic pain she has from her malignancy. She does not a productive cough with white sputum production but admits she is not always able to remove her sputum from her lung.       ====================BACKGROUND INFORMATION============================    FAMILY HISTORY: FAMILY HISTORY:  FH: type 2 diabetes  father-     Family history of breast cancer in mother  mother-     Family hx of alcoholism  mother -        PAST MEDICAL AND SURGICAL HISTORY: PAST MEDICAL & SURGICAL HISTORY:  Chronic back pain    Diverticulitis    HTN (hypertension)    Hyperlipidemia    GERD (gastroesophageal reflux disease)    Compressed cervical disc  c4-c5    H/O:   x 2 -,    H/O: hysterectomy  partial     S/P laminectomy  19- Anterior Cervical Laminectomy    H/O: osteoarthritis    Class 1 obesity with body mass index (BMI) of 33.0 to 33.9 in adult        ALLERGIES:Allergies    No Known Allergies    Intolerances        HOME MEDICATIONS:     OUTPATIENT PULMONARY DOCTOR:     SOCIAL HISTORY:  TOBACCO USE:  ALCOHOL:  DRUGS:  MARITAL STATUS:  EMPLOYMENT:  EXPOSURES:  RECENT TRAVELS:  PETS:  CODE STATUS:    ====================REVIEW OF SYSTEMS=====================================  CONSTITUTIONAL:  CARDIOVASCULAR:  PULMONARY:  GASTROINTESTINAL:  [] ALL OTHER REVIEW OF SYSTEMS ARE NEGATIVE   [] UNABLE TO OBTAIN REVIEW OF SYSTEMS DUE TO ______________      ====================PHYSICAL EXAM=========================================    VITALS: ICU Vital Signs Last 24 Hrs  T(C): 36.2 (19 May 2021 13:30), Max: 36.7 (19 May 2021 03:48)  T(F): 97.2 (19 May 2021 13:30), Max: 98 (19 May 2021 03:48)  HR: 92 (19 May 2021 13:30) (87 - 107)  BP: 159/66 (19 May 2021 13:30) (153/96 - 182/89)  BP(mean): --  ABP: --  ABP(mean): --  RR: 16 (19 May 2021 13:30) (15 - 18)  SpO2: 96% (19 May 2021 13:30) (91% - 100%)      INTAKE and OUTPUT: I&O's Summary      WEIGHT: Daily Height in cm: 160 (18 May 2021 23:38)    Daily     GLUCOSE: CAPILLARY BLOOD GLUCOSE          VENTILATOR SETTINGS:     GENERAL:  HEENT:  NECK:   LYMPH NODES:  CARDIOVASCULAR:  PULMONARY:  ABDOMEN:  SKIN:  EXTREMITIES:  NEUROLOGIC:  PSYCHIATRIC:    ====================MEDICATIONS===========================================  MEDICATIONS  (STANDING):  amLODIPine   Tablet 10 milliGRAM(s) Oral daily  atorvastatin 10 milliGRAM(s) Oral at bedtime  chlorhexidine 2% Cloths 1 Application(s) Topical daily  dexAMETHasone  Injectable 4 milliGRAM(s) IV Push every 6 hours  enoxaparin Injectable 40 milliGRAM(s) SubCutaneous daily  hydrALAZINE 50 milliGRAM(s) Oral every 8 hours  HYDROmorphone PCA (1 mG/mL) 30 milliLiter(s) PCA Continuous PCA Continuous  losartan 100 milliGRAM(s) Oral daily  methadone    Tablet 5 milliGRAM(s) Oral every 8 hours  ondansetron Injectable 4 milliGRAM(s) IV Push every 8 hours  pantoprazole    Tablet 40 milliGRAM(s) Oral before breakfast  piperacillin/tazobactam IVPB.. 3.375 Gram(s) IV Intermittent every 8 hours  polyethylene glycol 3350 17 Gram(s) Oral every 12 hours  potassium phosphate / sodium phosphate Powder (PHOS-NaK) 1 Packet(s) Oral two times a day  senna 2 Tablet(s) Oral at bedtime  sertraline 25 milliGRAM(s) Oral two times a day  sodium chloride 1.5% 500 milliLiter(s) (50 mL/Hr) IV Continuous <Continuous>      MEDICATIONS  (PRN):  FIRST- Mouthwash  BLM 10 milliLiter(s) Swish and Swallow four times a day PRN throat pain  HYDROmorphone PCA (1 mG/mL) Rescue Clinician Bolus 3 milliGRAM(s) IV Push every 3 hours PRN Breakthrough pain  labetalol Injectable 5 milliGRAM(s) IV Push every 6 hours PRN Systolic blood pressure > 190  LORazepam   Injectable 0.5 milliGRAM(s) IV Push every 4 hours PRN Anxiety  ondansetron Injectable 4 milliGRAM(s) IV Push every 8 hours PRN Nausea and/or Vomiting      ====================LABORATORY RESULTS====================================  CBC Full  -  ( 19 May 2021 07:20 )  WBC Count : 55.28 K/uL  RBC Count : 4.39 M/uL  Hemoglobin : 13.2 g/dL  Hematocrit : 38.8 %  Platelet Count - Automated : 72 K/uL  Mean Cell Volume : 88.4 fL  Mean Cell Hemoglobin : 30.1 pg  Mean Cell Hemoglobin Concentration : 34.0 gm/dL  Auto Neutrophil # : 48.92 K/uL  Auto Lymphocyte # : 0.48 K/uL  Auto Monocyte # : 1.70 K/uL  Auto Eosinophil # : 0.00 K/uL  Auto Basophil # : 0.03 K/uL  Auto Neutrophil % : 88.4 %  Auto Lymphocyte % : 0.9 %  Auto Monocyte % : 3.1 %  Auto Eosinophil % : 0.0 %  Auto Basophil % : 0.1 %                                        129<L>  |  96<L>  |  20  ----------------------------<  121<H>  3.4<L>   |  23  |  0.60    Ca    8.7      19 May 2021 07:20  Phos  1.8       Mg     2.0         TPro  6.3  /  Alb  3.7  /  TBili  0.6  /  DBili  x   /  AST  35<H>  /  ALT  35<H>  /  AlkPhos  235<H>              Creatinine Trend: 0.60<--, 0.55<--, 0.66<--, 0.62<--, 0.59<--, 0.70<--      ====================RADIOLOGY and ECHOCARDIOGRAPHY=======================    CXR:    CT CHEST:    ECHOCARDIOGRAPHY:    POINT OF CARE ULTRASOUND: [facility] DIVISION OF PULMONARY, CRITICAL CARE AND SLEEP MEDICINE  PULMONARY CONSULTATION NOTE  OFFICE NUMBER: [phone] (Afterhours and Weekends)    NAME: SUSAN DOWD  MEDICAL RECORD NUMBER: [id number]    CHIEF COMPLAINT: Patient is a 71y old  Female who presents with a chief complaint of back pain (19 May 2021 14:38)      HISTORY OF PRESENT ILLNESS: Ms. Dowd is a 71 year-old woman with history of multiple medical issues including hypertension, degenerative disc disease s/p laminectomy,, and metastatic esophageal carcinoma, s/p carbo/taxol+RT -2020. She presented 21 with worsening back pain for 3 weeks. She attests to feeling "a pop" 3 weeks ago, at which point the pain developed. Pain team/Palliative team involved in her care. She is set for MRI with Anesthesia tomorrow to further evaluate to back pain, and to rule out cord compression.     Pulmonary was consulted due to increased oxygen requirements and new GGO in her CT scan which was done recently. The patient states she has some shortness of breath but may be related to her chronic pain she has from her malignancy. She does not a productive cough with white sputum production but admits she is not always able to remove her sputum from her lung.       ====================BACKGROUND INFORMATION============================    FAMILY HISTORY: FAMILY HISTORY:  FH: type 2 diabetes  father-     Family history of breast cancer in mother  mother-     Family hx of alcoholism  mother -        PAST MEDICAL AND SURGICAL HISTORY: PAST MEDICAL & SURGICAL HISTORY:  Chronic back pain    Diverticulitis    HTN (hypertension)    Hyperlipidemia    GERD (gastroesophageal reflux disease)    Compressed cervical disc  c4-c5    H/O:   x 2 -,    H/O: hysterectomy  partial     S/P laminectomy  19- Anterior Cervical Laminectomy    H/O: osteoarthritis    Class 1 obesity with body mass index (BMI) of 33.0 to 33.9 in adult        ALLERGIES:Allergies    No Known Allergies    Intolerances        HOME MEDICATIONS: as per Med Rec    SOCIAL HISTORY:  TOBACCO USE: Denies  ALCOHOL: Denies  DRUGS: Denies  CODE STATUS: Full Code    ====================REVIEW OF SYSTEMS=====================================  CONSTITUTIONAL: In pain (chest and back pain)  CARDIOVASCULAR: Tachycardia with chest pain  PULMONARY: No difficulty taking a deep breath in. + Cough with sputum production  GASTROINTESTINAL: Pain in abdomen from malignancy  [x] ALL OTHER REVIEW OF SYSTEMS ARE NEGATIVE   [] UNABLE TO OBTAIN REVIEW OF SYSTEMS DUE TO ______________      ====================PHYSICAL EXAM=========================================    VITALS: ICU Vital Signs Last 24 Hrs  T(C): 36.2 (19 May 2021 13:30), Max: 36.7 (19 May 2021 03:48)  T(F): 97.2 (19 May 2021 13:30), Max: 98 (19 May 2021 03:48)  HR: 92 (19 May 2021 13:30) (87 - 107)  BP: 159/66 (19 May 2021 13:30) (153/96 - 182/89)  BP(mean): --  ABP: --  ABP(mean): --  RR: 16 (19 May 2021 13:30) (15 - 18)  SpO2: 96% (19 May 2021 13:30) (91% - 100%)      INTAKE and OUTPUT: I&O's Summary      WEIGHT: Daily Height in cm: 160 (18 May 2021 23:38)    Daily     GLUCOSE: CAPILLARY BLOOD GLUCOSE    VENTILATOR SETTINGS: 4 L of oxygen saturating 94-95%    GENERAL: Appears in moderate amount of pain with PCA pump  NECK:  Supple neck  CARDIOVASCULAR: Tachycardic normal S1 and S2  PULMONARY: Minimal crackles bilaterally. No wheezing noted. Decrease BS in R base  ABDOMEN: Tender to light palpation in epigastric area  EXTREMITIES: No clubbing cyanosis edema  PSYCHIATRIC: AAOx3, anxious appearing with pain    ====================MEDICATIONS===========================================  MEDICATIONS  (STANDING):  amLODIPine   Tablet 10 milliGRAM(s) Oral daily  atorvastatin 10 milliGRAM(s) Oral at bedtime  chlorhexidine 2% Cloths 1 Application(s) Topical daily  dexAMETHasone  Injectable 4 milliGRAM(s) IV Push every 6 hours  enoxaparin Injectable 40 milliGRAM(s) SubCutaneous daily  hydrALAZINE 50 milliGRAM(s) Oral every 8 hours  HYDROmorphone PCA (1 mG/mL) 30 milliLiter(s) PCA Continuous PCA Continuous  losartan 100 milliGRAM(s) Oral daily  methadone    Tablet 5 milliGRAM(s) Oral every 8 hours  ondansetron Injectable 4 milliGRAM(s) IV Push every 8 hours  pantoprazole    Tablet 40 milliGRAM(s) Oral before breakfast  piperacillin/tazobactam IVPB.. 3.375 Gram(s) IV Intermittent every 8 hours  polyethylene glycol 3350 17 Gram(s) Oral every 12 hours  potassium phosphate / sodium phosphate Powder (PHOS-NaK) 1 Packet(s) Oral two times a day  senna 2 Tablet(s) Oral at bedtime  sertraline 25 milliGRAM(s) Oral two times a day  sodium chloride 1.5% 500 milliLiter(s) (50 mL/Hr) IV Continuous <Continuous>      MEDICATIONS  (PRN):  FIRST- Mouthwash  BLM 10 milliLiter(s) Swish and Swallow four times a day PRN throat pain  HYDROmorphone PCA (1 mG/mL) Rescue Clinician Bolus 3 milliGRAM(s) IV Push every 3 hours PRN Breakthrough pain  labetalol Injectable 5 milliGRAM(s) IV Push every 6 hours PRN Systolic blood pressure > 190  LORazepam   Injectable 0.5 milliGRAM(s) IV Push every 4 hours PRN Anxiety  ondansetron Injectable 4 milliGRAM(s) IV Push every 8 hours PRN Nausea and/or Vomiting      ====================LABORATORY RESULTS====================================  CBC Full  -  ( 19 May 2021 07:20 )  WBC Count : 55.28 K/uL  RBC Count : 4.39 M/uL  Hemoglobin : 13.2 g/dL  Hematocrit : 38.8 %  Platelet Count - Automated : 72 K/uL  Mean Cell Volume : 88.4 fL  Mean Cell Hemoglobin : 30.1 pg  Mean Cell Hemoglobin Concentration : 34.0 gm/dL  Auto Neutrophil # : 48.92 K/uL  Auto Lymphocyte # : 0.48 K/uL  Auto Monocyte # : 1.70 K/uL  Auto Eosinophil # : 0.00 K/uL  Auto Basophil # : 0.03 K/uL  Auto Neutrophil % : 88.4 %  Auto Lymphocyte % : 0.9 %  Auto Monocyte % : 3.1 %  Auto Eosinophil % : 0.0 %  Auto Basophil % : 0.1 %                                        129<L>  |  96<L>  |  20  ----------------------------<  121<H>  3.4<L>   |  23  |  0.60    Ca    8.7      19 May 2021 07:20  Phos  1.8       Mg     2.0         TPro  6.3  /  Alb  3.7  /  TBili  0.6  /  DBili  x   /  AST  35<H>  /  ALT  35<H>  /  AlkPhos  235<H>              Creatinine Trend: 0.60<--, 0.55<--, 0.66<--, 0.62<--, 0.59<--, 0.70<--      ====================RADIOLOGY and ECHOCARDIOGRAPHY=======================    CT CHEST: < from: CT Chest No Cont (21 @ 12:46) >    FINDINGS:    AIRWAYS, LUNGS and PLEURA: The segmental branches of right lower lobe bronchus are obliterated and there is new consolidation/atelectasis of basilar right lower lobe. New patchy groundglass opacities within bilateral upper lobes. Trace bilateral pleural effusions, new from prior.    MEDIASTINUM AND SHANTELL: No lymphadenopathy. Esophageal wall thickening. Small hiatal hernia.    HEART AND VESSELS: Heart size is normal. No pericardial effusion. Thoracic aorta normal in diameter. Main pulmonary artery is mildly dilated measuring 3.5 cm. Coronary and mitral annulus calcifications. Mediport catheter within SVC.    VISUALIZED UPPER ABDOMEN: Within normal limits.    CHEST WALL AND BONES: Lytic/blastic lesion within T4 vertebral body is unchanged. Compression fracture of T11 vertebral body with a lytic lesion is unchanged. Although it was previously mentioned T10 vertebral body in the prior report.    LOWER NECK: Within normallimits.    IMPRESSION:    In comparison with 2021, obliteration of the segmental branches of the right lower lobe bronchus and new consolidation/atelectasis of basilar right lower lobe.    New patchy groundglass opacities within bilateral upper lobes can represent infection or pneumonitis.    Trace bilateral pleural effusions, new from prior.    Osseous metastatic disease is again noted.    < end of copied text >      ECHOCARDIOGRAPHY: < from: TTE Echo Complete w/o Contrast w/ Doppler (03.15.21 @ 15:51) >   1. Left atrial enlargement   2. Left ventricular ejection fraction, by visual estimation, is 50 to 55%.   3. Normal global left ventricular systolic function.   4. Spectral Doppler shows impaired relaxation pattern of left ventricular myocardial filling (Grade I diastolic dysfunction).   5. There is moderate septal left ventricular hypertrophy.   6. Moderate mitral annular calcification.   7. Structurally normal pulmonic valve, with normal leaflet excursion.    < end of copied text >

## 2021-05-19 NOTE — CHART NOTE - NSCHARTNOTEFT_GEN_A_CORE
Per pharmacy, NaCl 1.5% with potassium additive was ordered by Dr. Ma during the day, is not able to be verified due to limited data on compatibility. NaCl 1.5% at 55 cc/hr x10 hours ordered and Potassium 40 mEq PO ordered separately to increase sodium and potassium, respectively. Patient to be optimized for anesthesia tomorrow. Nephrology on-call service contacted with no reply. Will follow up with any further recommendations.    05-19    129<L>  |  96<L>  |  20  ----------------------------<  121<H>  3.4<L>   |  23  |  0.60    Ca    8.7      19 May 2021 07:20  Phos  1.8     05-19  Mg     2.0     05-19    TPro  6.3  /  Alb  3.7  /  TBili  0.6  /  DBili  x   /  AST  35<H>  /  ALT  35<H>  /  AlkPhos  235<H>  05-19 Per pharmacy, NaCl 1.5% with potassium additive was ordered by Dr. Ma during the day, is not able to be verified due to limited data on compatibility. NaCl 1.5% at 50 cc/hr x10 hours ordered and Potassium 40 mEq PO ordered separately to increase sodium and potassium, respectively. Patient to be optimized for anesthesia tomorrow. Nephrology on-call service contacted with no reply. Will follow up with any further recommendations.    05-19    129<L>  |  96<L>  |  20  ----------------------------<  121<H>  3.4<L>   |  23  |  0.60    Ca    8.7      19 May 2021 07:20  Phos  1.8     05-19  Mg     2.0     05-19    TPro  6.3  /  Alb  3.7  /  TBili  0.6  /  DBili  x   /  AST  35<H>  /  ALT  35<H>  /  AlkPhos  235<H>  05-19

## 2021-05-19 NOTE — PROGRESS NOTE ADULT - SUBJECTIVE AND OBJECTIVE BOX
INTERVAL HPI/OVERNIGHT EVENTS:  Patient seen at bedside.  Patient accompanied by her daughter Noy  As per chart review, patient with eventful night  with AMS s/p premeds for MRI  now s/p Narcan with increased pain  Patient was resting comfortably at time of interview      VITAL SIGNS:  T(F): 97.5 (05-19-21 @ 07:44)  HR: 96 (05-19-21 @ 07:44)  BP: 158/69 (05-19-21 @ 07:44)  RR: 18 (05-19-21 @ 07:44)  SpO2: 95% (05-19-21 @ 07:44)  Wt(kg): --    PHYSICAL EXAM:    In accordance with current standard limiting patient contact, deferred physical exam  2/2 COVID pandemic  Please refer to physical exam of primary team.    MEDICATIONS  (STANDING):  amLODIPine   Tablet 10 milliGRAM(s) Oral daily  atorvastatin 10 milliGRAM(s) Oral at bedtime  chlorhexidine 2% Cloths 1 Application(s) Topical daily  dexAMETHasone  Injectable 4 milliGRAM(s) IV Push every 6 hours  enoxaparin Injectable 40 milliGRAM(s) SubCutaneous daily  hydrALAZINE 50 milliGRAM(s) Oral every 8 hours  HYDROmorphone PCA (1 mG/mL) 30 milliLiter(s) PCA Continuous PCA Continuous  losartan 100 milliGRAM(s) Oral daily  methadone    Tablet 5 milliGRAM(s) Oral every 8 hours  ondansetron Injectable 4 milliGRAM(s) IV Push every 8 hours  pantoprazole    Tablet 40 milliGRAM(s) Oral before breakfast  piperacillin/tazobactam IVPB.. 3.375 Gram(s) IV Intermittent every 8 hours  polyethylene glycol 3350 17 Gram(s) Oral every 12 hours  potassium chloride    Tablet ER 40 milliEquivalent(s) Oral once  senna 2 Tablet(s) Oral at bedtime  sertraline 25 milliGRAM(s) Oral two times a day  sodium chloride 0.9%. 1000 milliLiter(s) (80 mL/Hr) IV Continuous <Continuous>    MEDICATIONS  (PRN):  FIRST- Mouthwash  BLM 10 milliLiter(s) Swish and Swallow four times a day PRN throat pain  HYDROmorphone PCA (1 mG/mL) Rescue Clinician Bolus 3 milliGRAM(s) IV Push every 3 hours PRN Breakthrough pain  labetalol Injectable 5 milliGRAM(s) IV Push every 6 hours PRN Systolic blood pressure > 190  LORazepam   Injectable 0.5 milliGRAM(s) IV Push every 4 hours PRN Anxiety  ondansetron Injectable 4 milliGRAM(s) IV Push every 8 hours PRN Nausea and/or Vomiting      Allergies    No Known Allergies    Intolerances        LABS:                        13.2   55.28 )-----------( 72       ( 19 May 2021 07:20 )             38.8     05-19    129<L>  |  96<L>  |  20  ----------------------------<  121<H>  3.4<L>   |  23  |  0.60    Ca    8.7      19 May 2021 07:20  Phos  1.8     05-19  Mg     2.0     05-19    TPro  6.3  /  Alb  3.7  /  TBili  0.6  /  DBili  x   /  AST  35<H>  /  ALT  35<H>  /  AlkPhos  235<H>  05-19          RADIOLOGY & ADDITIONAL TESTS:  Studies reviewed.

## 2021-05-20 NOTE — CONSULT NOTE ADULT - ASSESSMENT
71F w/ metastatic esophageal adenocarcinoma (s/p carbo/taxol and RT 4/2020-5/2020, started on folfox/herceptin 3/2021, last 5/12) w/ T4 lytic lesion and paravertebral mass dx 1/2021 (s/p RT to T4 3/2021), previously refused esophagectomy, HTN, HLD, degenerative disc disease of C-spine s/p laminectomy x2 (2019), and adjustment disorder p/f heme/onc Dr. Shelby's ([facility] [place]) office for refractory acute back pain x 3 weeks found to have new T10 compression fx likely pathologic identified on outpatient CTAP 5/13, admitted to medicine floors for further mgmt. Now pending MRI for further eval of thoracic compression fx. MICU consulted for consideration of intubation prior to MRI given difficulty laying flat from pain.    - Currently, neuro exam does not appear to be concerning for acute cord compression. If MRI still clinically warranted, would consider shortening duration of study from 2hr (ie obtaining thoracic views only). Pt currently able to lay flat ~25-30min while on PCA pump. She would benefit from having PCA pump while in MRI study for pain control as it seems to be work while laying flat in her hospital bed. If possible, having her  there would help with endurance throughout the study. We discussed that given current aspiration PNA and less monitored setting of MRI suite, if intubated, it is hard to predict when she could be safely extubated. Given that it is not a risk-free procedure, we should consider safer routes for obtaining an MRI at this time, if still warranted.  71F w/ metastatic esophageal adenocarcinoma (s/p carbo/taxol and RT 4/2020-5/2020, started on folfox/herceptin 3/2021, last 5/12) w/ T4 lytic lesion and paravertebral mass dx 1/2021 (s/p RT to T4 3/2021), previously refused esophagectomy, HTN, HLD, degenerative disc disease of C-spine s/p laminectomy x2 (2019), and adjustment disorder p/f heme/onc Dr. Shelby's ([facility] [place]) office for refractory acute back pain x 3 weeks found to have new T10 compression fx likely pathologic identified on outpatient CTAP 5/13, admitted to medicine floors for further mgmt. Now pending MRI for further eval of thoracic compression fx. MICU consulted for consideration of intubation prior to MRI given difficulty laying flat from pain.    - Currently, neuro exam does not appear to be concerning for acute cord compression. If MRI still clinically warranted, would consider shortening duration of study from 2hr (ie obtaining thoracic views only). Pt currently able to lay flat ~25-30min while on PCA pump. She would benefit from having PCA pump while in MRI study for pain control as it seems to be work while laying flat in her hospital bed. If possible, having her  there would help with endurance throughout the study. We discussed that given current aspiration PNA and less monitored setting of MRI suite, if intubated, it is hard to predict when she could be safely extubated. Given that it is not a risk-free procedure, we should consider safer routes for obtaining an MRI at this time, if still warranted.     Pt is not a candidate for MICU at this time. Please reconsult as needed. Case d/w attending.

## 2021-05-20 NOTE — PROGRESS NOTE ADULT - SUBJECTIVE AND OBJECTIVE BOX
INTERVAL HPI/OVERNIGHT EVENTS:  Patient seen at bedside resting in bed.  Patient appearing drowsy but complaining of 10/10  No events reported overnight    VITAL SIGNS:  T(F): 97.6 (05-20-21 @ 09:46)  HR: 103 (05-20-21 @ 11:16)  BP: 151/81 (05-20-21 @ 11:16)  RR: 17 (05-20-21 @ 11:16)  SpO2: 95% (05-20-21 @ 11:16)  Wt(kg): --    PHYSICAL EXAM:  In accordance with current standard limiting patient contact, deferred physical exam  2/2 COVID pandemic  Please refer to physical exam of primary team.    MEDICATIONS  (STANDING):  amLODIPine   Tablet 10 milliGRAM(s) Oral daily  atorvastatin 10 milliGRAM(s) Oral at bedtime  chlorhexidine 2% Cloths 1 Application(s) Topical daily  dexAMETHasone  Injectable 4 milliGRAM(s) IV Push every 6 hours  enoxaparin Injectable 40 milliGRAM(s) SubCutaneous daily  hydrALAZINE 50 milliGRAM(s) Oral every 8 hours  HYDROmorphone PCA (1 mG/mL) 30 milliLiter(s) PCA Continuous PCA Continuous  losartan 100 milliGRAM(s) Oral daily  methadone    Tablet 5 milliGRAM(s) Oral every 8 hours  ondansetron Injectable 4 milliGRAM(s) IV Push every 8 hours  pantoprazole    Tablet 40 milliGRAM(s) Oral before breakfast  piperacillin/tazobactam IVPB.. 3.375 Gram(s) IV Intermittent every 8 hours  polyethylene glycol 3350 17 Gram(s) Oral every 12 hours  potassium phosphate / sodium phosphate Powder (PHOS-NaK) 1 Packet(s) Oral two times a day  senna 2 Tablet(s) Oral at bedtime  sertraline 25 milliGRAM(s) Oral two times a day  sodium chloride 1.5%. 500 milliLiter(s) (50 mL/Hr) IV Continuous <Continuous>    MEDICATIONS  (PRN):  FIRST- Mouthwash  BLM 10 milliLiter(s) Swish and Swallow four times a day PRN throat pain  HYDROmorphone PCA (1 mG/mL) Rescue Clinician Bolus 3 milliGRAM(s) IV Push every 3 hours PRN Breakthrough pain  labetalol Injectable 5 milliGRAM(s) IV Push every 6 hours PRN Systolic blood pressure > 190  LORazepam   Injectable 0.5 milliGRAM(s) IV Push every 4 hours PRN Anxiety  ondansetron Injectable 4 milliGRAM(s) IV Push every 8 hours PRN Nausea and/or Vomiting      Allergies    No Known Allergies    Intolerances        LABS:                        13.4   46.15 )-----------( 71       ( 20 May 2021 06:48 )             39.0     05-20    128<L>  |  94<L>  |  19  ----------------------------<  132<H>  3.6   |  22  |  0.59    Ca    8.9      20 May 2021 06:48  Phos  1.9     05-20  Mg     1.8     05-20    TPro  6.3  /  Alb  3.7  /  TBili  0.6  /  DBili  x   /  AST  35<H>  /  ALT  35<H>  /  AlkPhos  235<H>  05-19          RADIOLOGY & ADDITIONAL TESTS:  Studies reviewed.

## 2021-05-20 NOTE — CONSULT NOTE ADULT - CONSULT REQUESTED DATE/TIME
15-May-2021 09:06
19-May-2021 16:46
20-May-2021 13:33
19-May-2021 14:39
19-May-2021 15:45
17-May-2021 14:32
15-May-2021 00:57

## 2021-05-20 NOTE — PROGRESS NOTE ADULT - SUBJECTIVE AND OBJECTIVE BOX
SUBJECTIVE AND OBJECTIVE:  pt laying on her back.  now complaining of her left chest bothering her.  +constipation.   at bedside.    INTERVAL HPI/OVERNIGHT EVENTS:    DNR on chart:   Allergies    No Known Allergies    Intolerances    MEDICATIONS  (STANDING):  amLODIPine   Tablet 10 milliGRAM(s) Oral daily  atorvastatin 10 milliGRAM(s) Oral at bedtime  chlorhexidine 2% Cloths 1 Application(s) Topical daily  dexAMETHasone  Injectable 4 milliGRAM(s) IV Push every 6 hours  enoxaparin Injectable 40 milliGRAM(s) SubCutaneous daily  hydrALAZINE 50 milliGRAM(s) Oral every 8 hours  HYDROmorphone PCA (1 mG/mL) 30 milliLiter(s) PCA Continuous PCA Continuous  losartan 100 milliGRAM(s) Oral daily  methadone    Tablet 5 milliGRAM(s) Oral every 8 hours  ondansetron Injectable 4 milliGRAM(s) IV Push every 8 hours  pantoprazole    Tablet 40 milliGRAM(s) Oral before breakfast  piperacillin/tazobactam IVPB.. 3.375 Gram(s) IV Intermittent every 8 hours  polyethylene glycol 3350 17 Gram(s) Oral every 12 hours  potassium phosphate / sodium phosphate Powder (PHOS-NaK) 1 Packet(s) Oral two times a day  senna 2 Tablet(s) Oral at bedtime  sertraline 25 milliGRAM(s) Oral two times a day  sodium chloride 1.5%. 500 milliLiter(s) (50 mL/Hr) IV Continuous <Continuous>    MEDICATIONS  (PRN):  bisacodyl Suppository 10 milliGRAM(s) Rectal daily PRN Constipation  FIRST- Mouthwash  BLM 10 milliLiter(s) Swish and Swallow four times a day PRN throat pain  HYDROmorphone PCA (1 mG/mL) Rescue Clinician Bolus 3 milliGRAM(s) IV Push every 3 hours PRN Breakthrough pain  labetalol Injectable 5 milliGRAM(s) IV Push every 6 hours PRN Systolic blood pressure > 190  LORazepam   Injectable 0.5 milliGRAM(s) IV Push every 4 hours PRN Anxiety  ondansetron Injectable 4 milliGRAM(s) IV Push every 8 hours PRN Nausea and/or Vomiting      ITEMS UNCHECKED ARE NOT PRESENT    PRESENT SYMPTOMS: [ ]Unable to obtain due to poor mentation   Source if other than patient:  [ ]Family   [ ]Team     Pain (Impact on QOL): yes   Location: back and chest   Minimal acceptable level (0-10 scale):   3/10         Aggravating factors:  movement  Quality:  dull  Radiation: none  Severity (0-10 scale): 7/10    Timing:  constant as per pt    Dyspnea:                           [ ]Mild [ ]Moderate [ ]Severe  Anxiety:                             [ ]Mild [ ]Moderate [ ]Severe  Fatigue:                             [ ]Mild [ ]Moderate [ x]Severe  Nausea:                             [ ]Mild [ ]Moderate [ ]Severe  Loss of appetite:              [ ]Mild [ ]Moderate [x ]Severe  Constipation:                    [ ]Mild [ x]Moderate [ ]Severe    PAIN AD Score:	  [email or web address] (Ctrl + left click to view)    Other Symptoms:  [x ]All other review of systems negative     Karnofsky Performance Score/Palliative Performance Status Version 2:    40     %    http://palliative.info/resource_material/[email or web address].pdf  PHYSICAL EXAM:  Vital Signs Last 24 Hrs  T(C): 36.4 (20 May 2021 09:46), Max: 36.4 (20 May 2021 01:30)  T(F): 97.6 (20 May 2021 09:46), Max: 97.6 (20 May 2021 09:46)  HR: 103 (20 May 2021 11:16) (98 - 103)  BP: 151/81 (20 May 2021 11:16) (150/74 - 170/86)  BP(mean): --  RR: 17 (20 May 2021 11:16) (16 - 20)  SpO2: 95% (20 May 2021 11:16) (95% - 100%) I&O's Summary   GENERAL:  [x ]Alert  [x ]Oriented x 3  [x ]Lethargic  [ ]Cachexia  [ ]Unarousable  [ ]Verbal  [ ]Non-Verbal    Behavioral:   [ ] Anxiety  [ ] Delirium [ ] Agitation [ ] Other    HEENT:  [ ]Normal   [x ]Dry mouth   [ ]ET Tube/Trach  [ ]Oral lesions    PULMONARY:   [ x]Clear [ ]Tachypnea  [ ]Audible excessive secretions   [ ]Rhonchi        [ ]Right [ ]Left [ ]Bilateral  [ ]Crackles        [ ]Right [ ]Left [ ]Bilateral  [ ]Wheezing     [ ]Right [ ]Left [ ]Bilateral    CARDIOVASCULAR:    [ ]Regular [ ]Irregular [x ]Tachy  [ ]Mahesh [ ]Murmur [ ]Other    GASTROINTESTINAL:  [x ]Soft  [ ]Distended   [ x]+BS  [x ]Non tender [ ]Tender  [ ]PEG [ ]OGT/ NGT   Last BM:    GENITOURINARY:  [ ]Normal [ ] Incontinent   [ ]Oliguria/Anuria   [ x]Lee    MUSCULOSKELETAL:   [ ]Normal   [ x]Weakness  [ ]Bed/Wheelchair bound [ ]Edema    NEUROLOGIC:   [x ]No focal deficits  [ ] Cognitive impairment  [ ] Dysphagia [ ]Dysarthria [ ] Paresis [ ]Other     SKIN:   [x ]Normal   [ ]Pressure ulcer(s)  [ ]Rash    CRITICAL CARE:  [ ] Shock Present  [ ]Septic [ ]Cardiogenic [ ]Neurologic [ ]Hypovolemic  [ ]  Vasopressors [ ]  Inotropes   [ ] Respiratory failure present  [ ] Acute  [ ] Chronic [ ] Hypoxic  [ ] Hypercarbic [ ] Other  [ ] Other organ failure     LABS:                        13.4   46.15 )-----------( 71       ( 20 May 2021 06:48 )             39.0   05-20    128<L>  |  94<L>  |  19  ----------------------------<  132<H>  3.6   |  22  |  0.59    Ca    8.9      20 May 2021 06:48  Phos  1.9     05-20  Mg     1.8     05-20    TPro  6.3  /  Alb  3.7  /  TBili  0.6  /  DBili  x   /  AST  35<H>  /  ALT  35<H>  /  AlkPhos  235<H>  05-19        RADIOLOGY & ADDITIONAL STUDIES:    Protein Calorie Malnutrition Present: [ ] yes [ ] no  [ ] PPSV2 < or = 30%  [ ] significant weight loss [ ] poor nutritional intake [ ] anasarca [ ] catabolic state Albumin, Serum: 3.7 g/dL (05-19-21 @ 07:20)  Artificial Nutrition [ ]     REFERRALS:   [ ]Chaplaincy  [ ] Hospice  [ ]Child Life  [ ]Social Work  [ ]Case management [ ]Holistic Therapy   Goals of Care Document: SHELBY Jacobo (05-19-21 @ 10:23)  Goals of Care Conversation:   Participants:  · Participants  Patient; Family; Staff; ACP  · Spouse  Mr Baltazar Boyd    Advance Directives:  · Caregiver:  no    Conversation Discussion:  · Conversation  Diagnosis  · Conversation Details  Patient seen with ACP and - now on tele after Rapid last night  patient is resting in bed with her  Baltazar Boyd at bedside  Baltazar is very jalil- "it has been 5 days and still no MRI"  Also upset because she 'passed out ' and needed medication to bring her back- explained the medication was the IV Dilaudid and this is what we are using to control wife's pain before MRI- but was not able to be done  He is upset because wife is on oxygen- Sat 97 % on 2 L- will wean off today  He is upset that wife " walked in and now look at her" - wife told him- " I CAN STILL WALK"  Wife notes pain  6/10 that radiates  from mid back to front of abd- this is c/w T10 Fx  Explain to  and patient Med/ Mri and anesthesia dep is working to get the MRI done.  Orthopedics still wants MRI to better asses her impressive pain on admission in this lady with metastatic cancer    Patient is FULL CODE    What Matters Most To Patient and Family:  · What matters most to patient and family  Getting MRI asa   Not being in pain    Personal Advance Directives Treatment Guidelines:   Treatment Guidelines:  · Decision Maker  Patient    Location of Discussion:   Duration of Advanced Care Planning Meeting:  · Time spent (in minutes)  35     Location of Discussion:  · Location of discussion  Face to face       Electronic Signatures:  Sue Jacobo)  (Signed 19-May-2021 10:25)  	Authored: Goals of Care Conversation, Personal Advance Directives Treatment Guidelines, Location of Discussion      Last Updated: 19-May-2021 10:25 by Sue Jacobo)

## 2021-05-20 NOTE — PROGRESS NOTE ADULT - PROBLEM SELECTOR PLAN 5
Dx between 3837-1846. On folfox/herceptin (3/2021-). T4 lytic lesion and paravertebral mass dx 1/2021 s/p RT to T4 3/2021. Now w/ new T10 compression fx likely malignant. Was evaluated by CTSx, refused esophagectomy. [place] Afsaneh christ[place] [place]. Trevor Nguyen Rad-Onc, Dr. Susanne Zhao Palliative   -heme onc consult in AM  -c/w zofran PRN nausea (monitor qtc)- now tid before meals  Onc notes "  #Metastatic esophageal adenocarcinoma  -s/p neoadjuvant chemoRT with carbo/taxol, however, declined surgery  -recurrence in T4, confirmed with biopsy, in 1/2021  -now on FOLFOX + herceptin given ERBB positivity  -no plan for inpatient chemotherapy" Dx between 3630-1348. On folfox/herceptin (3/2021-). T4 lytic lesion and paravertebral mass dx 1/2021 s/p RT to T4 3/2021. Now w/ new T10 compression fx likely malignant. Was evaluated by CTSx, refused esophagectomy. [place]s w/ Dr. Afsaneh christina[place] [place] Dr. Trevor Nguyen Rad-Onc, Dr. Susanne Zhao Palliative   -heme onc consult in AM  -c/w zofran PRN nausea (monitor qtc)- now tid before meals  Onc notes "  #Metastatic esophageal adenocarcinoma  -s/p neoadjuvant chemoRT with carbo/taxol, however, declined surgery  -recurrence in T4, confirmed with biopsy, in 1/2021  -now on FOLFOX + herceptin given ERBB positivity  -no plan for inpatient chemotherapy"  Oncology will help with platelet of 71 today

## 2021-05-20 NOTE — PROGRESS NOTE ADULT - ASSESSMENT
70 yo female w/ a PMHx of metastatic esophageal adenocarcinoma (s/p neoadjuvant concurrent chemoRT 4/2020-5/2020, declined esophagectomy, recurrent disease in 1/2021 at T4, had local RT and now on FOLFOX/herceptin, HTN, HLD, degenerative disc disease of C-spine s/p laminectomy x2 (2019), and adjustment disorder, who was sent in by Dr. Shelby for worsening new back pain. She is now found to have a new compression fracture at T10.    #Pathologic/compression fracture at T10  -appreciated orthopedics  -pain control, Palliative Care consult recs appreciated, patient currently on PCA  -agree with MRI CTLS, planned to have exam under anesthesia today, as per chart review prior to exam will need to be intubated prior in ICU  -Now on decadron until we rule out cord compression with MRI  -depending on MRI result, patient may need decompression or local RT to alleviate the symptom      #Metastatic esophageal adenocarcinoma  -s/p neoadjuvant chemoRT with carbo/taxol, however, declined surgery  -recurrence in T4, confirmed with biopsy, in 1/2021  -now on FOLFOX + herceptin given ERBB positivity  -CT chest done on 5/19: In comparison with 5/13/2021, obliteration of the segmental branches of the right lower lobe bronchus and new consolidation/atelectasis of basilar right lower lobe.  New patchy groundglass opacities within bilateral upper lobes can represent infection or pneumonitis.  Trace bilateral pleural effusions, new from prior.  Osseous metastatic disease is again noted.  -Pulmonary consult- appreciate recs, on Zosyn for possible aspiration PNA  -no plan for inpatient chemotherapy  -Patient to followup with Dr. Shelby ([facility]) upon discharge  -C/w Supportive care, pain control, Nutrition, PT, DVT ppx  -Oncology will continue to follow with you      Case d/w Dr. Behzad DUQUE  Oncology Physician Assistant  [facility]  Pager [phone]    If after 5pm or weekends please page On-call Oncology Fellow

## 2021-05-20 NOTE — PROGRESS NOTE ADULT - PROBLEM SELECTOR PLAN 4
w/ hesitancy x 1 day, no dysuria or hx of UTI  -UA neg  -f/u UCx  Now on iv Zosyn w/ hesitancy x 1 day, no dysuria or hx of UTI  Was started on Cefepime  -UA neg, urine culture 5/16-- no growth  Now on iv Zosyn for Asp PNA

## 2021-05-20 NOTE — PROGRESS NOTE ADULT - PROBLEM SELECTOR PLAN 1
New acute back pain x 3 weeks refractory to home opioid regimen (on oxy 30 q4 PRN, not using methadone or gabapentin as prescribed). No neurological deficits on exam.   -CTAP 5/13 (via [facility]): New heterogeneous sclerosis with a new compression fracture at T10 likely pathologic. Lucent and sclerotic lesion at T4 with mild compression deformity as seen on prior imaging.   c/o 50/10 pain on 5/15-----> 7/10 today---> 6/10  Bowel Regiman  -inc Dilaudid 1.5 mg q4 hrs PRN mod-severe pain- as given in ED--> incto 2 mg q3 - --> will inc to 3 mg q3 prn  Now on dilaudid PCA pump and methadone  -c/w lidocaine patch over T10  -spine/ortho consult - need MRI  -TLSO brace at bedside  -Ortho Attending still wants MRI of spine c/t/l  Med/ Anst/ mri- making arrangments New acute back pain x 3 weeks refractory to home opioid regimen (on oxy 30 q4 PRN, not using methadone or gabapentin as prescribed). No neurological deficits on exam.   -CTAP 5/13 (via [facility]): New heterogeneous sclerosis with a new compression fracture at T10 likely pathologic. Lucent and sclerotic lesion at T4 with mild compression deformity as seen on prior imaging.   c/o 50/10 pain on 5/15-----> 7/10 today---> 6/10  Bowel Regiman  -inc Dilaudid 1.5 mg q4 hrs PRN mod-severe pain- as given in ED--> incto 2 mg q3 - --> will inc to 3 mg q3 prn  Now on dilaudid PCA pump and methadone  -c/w lidocaine patch over T10  -spine/ortho consult - need MRI  -TLSO brace at bedside  -Ortho Attending still wants MRI of spine c/t/l, patient and family stated they will want surgery if MRI done and surgery is recommended  Med/ Anesthesia / mri- making arrangements

## 2021-05-20 NOTE — PROVIDER CONTACT NOTE (CRITICAL VALUE NOTIFICATION) - BACKGROUND
Pt admitted for spinal fx
PMH: metastatic esophageal adenocarcinoma
Patient p/w vertebra fracture and cord compression

## 2021-05-20 NOTE — PROGRESS NOTE ADULT - SUBJECTIVE AND OBJECTIVE BOX
Patient is a 71y old  Female who presents with a chief complaint of back pain (20 May 2021 08:51)      SUBJECTIVE / OVERNIGHT EVENTS:  patient seen with RN in room- explained we will do tolvaptan x1 and bmp at 2 pm  Resting in bed  notes 8/10 upper abd pain- same pain as before on admission  Noted no BM x 2 days- will give lactulose once MRI is done  Patient still on PCA pump      MEDICATIONS  (STANDING):  ALBUTerol   0.5% 2.5 milliGRAM(s) Nebulizer once  amLODIPine   Tablet 10 milliGRAM(s) Oral daily  atorvastatin 10 milliGRAM(s) Oral at bedtime  chlorhexidine 2% Cloths 1 Application(s) Topical daily  dexAMETHasone  Injectable 4 milliGRAM(s) IV Push every 6 hours  enoxaparin Injectable 40 milliGRAM(s) SubCutaneous daily  hydrALAZINE 50 milliGRAM(s) Oral every 8 hours  HYDROmorphone PCA (1 mG/mL) 30 milliLiter(s) PCA Continuous PCA Continuous  losartan 100 milliGRAM(s) Oral daily  methadone    Tablet 5 milliGRAM(s) Oral every 8 hours  ondansetron Injectable 4 milliGRAM(s) IV Push every 8 hours  pantoprazole    Tablet 40 milliGRAM(s) Oral before breakfast  piperacillin/tazobactam IVPB.. 3.375 Gram(s) IV Intermittent every 8 hours  polyethylene glycol 3350 17 Gram(s) Oral every 12 hours  potassium phosphate / sodium phosphate Powder (PHOS-NaK) 1 Packet(s) Oral two times a day  senna 2 Tablet(s) Oral at bedtime  sertraline 25 milliGRAM(s) Oral two times a day  sodium chloride 1.5%. 500 milliLiter(s) (50 mL/Hr) IV Continuous <Continuous>  sodium phosphate IVPB 30 milliMole(s) IV Intermittent once  tolvaptan 30 milliGRAM(s) Oral once    MEDICATIONS  (PRN):  FIRST- Mouthwash  BLM 10 milliLiter(s) Swish and Swallow four times a day PRN throat pain  HYDROmorphone PCA (1 mG/mL) Rescue Clinician Bolus 3 milliGRAM(s) IV Push every 3 hours PRN Breakthrough pain  labetalol Injectable 5 milliGRAM(s) IV Push every 6 hours PRN Systolic blood pressure > 190  LORazepam   Injectable 0.5 milliGRAM(s) IV Push every 4 hours PRN Anxiety  ondansetron Injectable 4 milliGRAM(s) IV Push every 8 hours PRN Nausea and/or Vomiting    Vital Signs Last 24 Hrs  T(C): 36.2 (20 May 2021 05:26), Max: 36.4 (20 May 2021 01:30)  T(F): 97.2 (20 May 2021 05:26), Max: 97.5 (20 May 2021 01:30)  HR: 100 (20 May 2021 05:26) (92 - 100)  BP: 150/74 (20 May 2021 05:26) (150/74 - 170/86)  BP(mean): --  RR: 20 (20 May 2021 05:26) (16 - 20)  SpO2: 100% (20 May 2021 05:26) (95% - 100%)  PHYSICAL EXAM:  GENERAL: NAD, well-developed  HEAD:  Atraumatic, Normocephalic  EYES: EOMI, PERRLA, conjunctiva and sclera clear  NECK: Supple, No JVD  CHEST/LUNG: Clear to auscultation bilaterally; No wheeze  HEART: Regular rate and rhythm; No murmurs, rubs, or gallops  ABDOMEN: Soft, Bowel sounds present  mild upper abd pain on palp - no rebound or guarding   EXTREMITIES:  2+ Peripheral Pulses, No clubbing, cyanosis, or edema  PSYCH: AAOx3  NEUROLOGY: non-focal  SKIN: No rashes or lesions    LABS:                        13.4   46.15 )-----------( 71       ( 20 May 2021 06:48 )             39.0     05-20    128<L>  |  94<L>  |  19  ----------------------------<  132<H>  3.6   |  22  |  0.59    Ca    8.9      20 May 2021 06:48  Phos  1.9     05-20  Mg     1.8     05-20    TPro  6.3  /  Alb  3.7  /  TBili  0.6  /  DBili  x   /  AST  35<H>  /  ALT  35<H>  /  AlkPhos  235<H>  05-19              RADIOLOGY & ADDITIONAL TESTS:    Imaging Personally Reviewed:    Consultant(s) Notes Reviewed:      Care Discussed with Consultants/Other Providers:   Patient is a 71y old  Female who presents with a chief complaint of back pain (20 May 2021 08:51)      SUBJECTIVE / OVERNIGHT EVENTS:  patient seen with RN in room- explained we will do tolvaptan x1 and bmp at 2 pm  Resting in bed  notes 8/10 upper abd pain- same pain as before on admission  Noted no BM x 2 days- will give lactulose once MRI is done  Patient still on PCA pump      MEDICATIONS  (STANDING):  ALBUTerol   0.5% 2.5 milliGRAM(s) Nebulizer once  amLODIPine   Tablet 10 milliGRAM(s) Oral daily  atorvastatin 10 milliGRAM(s) Oral at bedtime  chlorhexidine 2% Cloths 1 Application(s) Topical daily  dexAMETHasone  Injectable 4 milliGRAM(s) IV Push every 6 hours  enoxaparin Injectable 40 milliGRAM(s) SubCutaneous daily  hydrALAZINE 50 milliGRAM(s) Oral every 8 hours  HYDROmorphone PCA (1 mG/mL) 30 milliLiter(s) PCA Continuous PCA Continuous  losartan 100 milliGRAM(s) Oral daily  methadone    Tablet 5 milliGRAM(s) Oral every 8 hours  ondansetron Injectable 4 milliGRAM(s) IV Push every 8 hours  pantoprazole    Tablet 40 milliGRAM(s) Oral before breakfast  piperacillin/tazobactam IVPB.. 3.375 Gram(s) IV Intermittent every 8 hours  polyethylene glycol 3350 17 Gram(s) Oral every 12 hours  potassium phosphate / sodium phosphate Powder (PHOS-NaK) 1 Packet(s) Oral two times a day  senna 2 Tablet(s) Oral at bedtime  sertraline 25 milliGRAM(s) Oral two times a day  sodium chloride 1.5%. 500 milliLiter(s) (50 mL/Hr) IV Continuous <Continuous>  sodium phosphate IVPB 30 milliMole(s) IV Intermittent once  tolvaptan 30 milliGRAM(s) Oral once    MEDICATIONS  (PRN):  FIRST- Mouthwash  BLM 10 milliLiter(s) Swish and Swallow four times a day PRN throat pain  HYDROmorphone PCA (1 mG/mL) Rescue Clinician Bolus 3 milliGRAM(s) IV Push every 3 hours PRN Breakthrough pain  labetalol Injectable 5 milliGRAM(s) IV Push every 6 hours PRN Systolic blood pressure > 190  LORazepam   Injectable 0.5 milliGRAM(s) IV Push every 4 hours PRN Anxiety  ondansetron Injectable 4 milliGRAM(s) IV Push every 8 hours PRN Nausea and/or Vomiting    Vital Signs Last 24 Hrs  T(C): 36.2 (20 May 2021 05:26), Max: 36.4 (20 May 2021 01:30)  T(F): 97.2 (20 May 2021 05:26), Max: 97.5 (20 May 2021 01:30)  HR: 100 (20 May 2021 05:26) (92 - 100)  BP: 150/74 (20 May 2021 05:26) (150/74 - 170/86)  BP(mean): --  RR: 20 (20 May 2021 05:26) (16 - 20)  SpO2: 100% (20 May 2021 05:26) (95% - 100%)  PHYSICAL EXAM:  GENERAL: NAD, well-developed  HEAD:  Atraumatic, Normocephalic  EYES: EOMI, PERRLA, conjunctiva and sclera clear  NECK: Supple, No JVD  CHEST/LUNG: few crackles b/l- neb treatement ord- patient also showed how to do INC spir- and encouraged to keep doing it.  HEART: Regular rate and rhythm; No murmurs, rubs, or gallops  ABDOMEN: Soft, Bowel sounds present  mild upper abd pain on palp - no rebound or guarding   EXTREMITIES:  2+ Peripheral Pulses, No clubbing, cyanosis, or edema  PSYCH: AAOx3  NEUROLOGY: non-focal  SKIN: No rashes or lesions    LABS:                        13.4   46.15 )-----------( 71       ( 20 May 2021 06:48 )             39.0     05-20    128<L>  |  94<L>  |  19  ----------------------------<  132<H>  3.6   |  22  |  0.59    Ca    8.9      20 May 2021 06:48  Phos  1.9     05-20  Mg     1.8     05-20    TPro  6.3  /  Alb  3.7  /  TBili  0.6  /  DBili  x   /  AST  35<H>  /  ALT  35<H>  /  AlkPhos  235<H>  05-19              RADIOLOGY & ADDITIONAL TESTS:    Imaging Personally Reviewed:    Consultant(s) Notes Reviewed:      Care Discussed with Consultants/Other Providers:

## 2021-05-20 NOTE — PROGRESS NOTE ADULT - PROBLEM SELECTOR PLAN 8
Patient had Neurlast PTA and now is on decadron  Got imp Cefepime now Inc to Iv Zosyn- Observe Patient had Neulasta PTA and now is on decadron  was on  Cefepime now Inc to Iv Zosyn- Due to PNA on CTof chest 5/19  WBC 76-----> 46  most likely multifactorial with Neulasta/ decadron/ infection, Lee in place  TOV once ambulating

## 2021-05-20 NOTE — CONSULT NOTE ADULT - CONSULT REASON
back pain
Hyponatremia
consideration of intubation prior to MRI
T10 pathologic fracture
Abnormal CT scan and Hypoxemia
T10 VCF
pain control

## 2021-05-20 NOTE — PROGRESS NOTE ADULT - PROBLEM SELECTOR PLAN 1
continue methadone 5mg tid.  pt states she did start it prior to admission but wasn't taking it properly  with , agreed to continue while in hospital.   continue dilaudid pca 2mg q 15 min.    await results to understand pt's need of surgery vs RT

## 2021-05-20 NOTE — PROGRESS NOTE ADULT - PROBLEM SELECTOR PLAN 10
Lovenox sq c/w IV zosyn started 5/19 c/w IV zosyn started 5/19- was on Cefepime before  Pulmonary helping to opt lungs  Alb x1 now

## 2021-05-20 NOTE — PROGRESS NOTE ADULT - ASSESSMENT
72 yo female w/ a PMHx of metastatic esophageal adenocarcinoma (s/p carbo/taxol and RT 4/2020-5/2020, started on folfox/herceptin 3/2021,last 5/12) w/ T4 lytic lesion and paravertebral mass dx 1/2021 (s/p RT to T4 3/2021), refused esophagectomy, HTN, HLD, degenerative disc disease of C-spine s/p laminectomy x2 (2019), and adjustment disorder p/f heme/onc Dr. Shelby's ([facility] [place]) office for acute back pain worsening x 3 weeks.   Asked to see for pain control

## 2021-05-20 NOTE — PROGRESS NOTE ADULT - PROBLEM SELECTOR PLAN 4
family updated by myself and primary team  very upset about pt's overall medical condition- explained pt has stage 4 esophageal ca and has risk of significant decline   emotional support provided  patient and family centered care following for support  awaiting MRI for further treatment  please call [phone] with any questions

## 2021-05-20 NOTE — CHART NOTE - NSCHARTNOTEFT_GEN_A_CORE
Mri with Anesthesia today at for Jannjona noris this afternoon- d/w Rodrigo in MRI at [id number]  d/w Pulmonary -39296- Patient is at high risk for remaining intubated due to h/o Esophageal Ca , with mets already on steroids and Zosyn forr PNA- may not get anymore optimized- agree with lasix 20 mg iv x1, afree with neb albuterol c1  Also Spoke with Anesthesia Dr Mcfarland- she will only get T spime mRI / intubation- he will check  ICU aware patient may need bed after MRI Mri with Anesthesia today at for Sally hamm this afternoon- d/w Rodrigo in MRI at [id number]  d/w Pulmonary -21492- Patient is at high risk for remaining intubated due to h/o Esophageal Ca , with mets already on steroids and Zosyn forr PNA- may not get anymore optimized- agree with lasix 20 mg iv x1, agree with neb albuterol x1  Also Spoke with Anesthesia Dr Mcfarland- she will only get T spime mRI / intubation- he will check  ICU aware patient may need bed after MRI      12 noon -called back icu  d/w Dr Mcfarland - anesthesiology, and ICU - [place]-all communicating as to how best to proceed to get the MRI- Anesthesia is rec patient gets Intubated in ICU and then go for MRI- ICU will call me back  Keeping patient still NPO. Mri with Anesthesia today at for Sally hamm this afternoon- d/w Rodrigo in MRI at [id number]  d/w Pulmonary -84283- Patient is at high risk for remaining intubated due to h/o Esophageal Ca , with mets already on steroids and Zosyn forr PNA- may not get anymore optimized- agree with lasix 20 mg iv x1, agree with neb albuterol x1  Also Spoke with Anesthesia Dr Mcfarland- she will only get T spime mRI / intubation- he will check  ICU aware patient may need bed after MRI      12 noon -called back icu  d/w Dr Mcfarland - anesthesiology, and ICU - [place]-all communicating as to how best to proceed to get the MRI- Anesthesia is rec patient gets Intubated in ICU and then go for MRI- ICU will call me back  Keeping patient still NPO.    1:18 pm  Patient seen in her rex with  and ICU fellow and attending.  She is able to lay flat after push on PCA pump aftr d/w  and patient and Anesthesia it is decided that patient will have tube extension to PCa pump and go for MRI with ACP pushing her PCA pump about q15 minutes as needed.  will also go to keep patient calm.  Adult MRI will take her at about 3:30 PM  RN/ ACP/ ICU/ Anesthesia/ MRI all aware of the plan.    Internal medicine   Sue Jacobo   [id number] Mri with Anesthesia today at for Sally hamm this afternoon- d/w Rodrigo in MRI at [id number]  d/w Pulmonary -94186- Patient is at high risk for remaining intubated due to h/o Esophageal Ca , with mets already on steroids and Zosyn forr PNA- may not get anymore optimized- agree with lasix 20 mg iv x1, agree with neb albuterol x1  Also Spoke with Anesthesia Dr Mcfarland- she will only get T spime mRI / intubation- he will check  ICU aware patient may need bed after MRI      12 noon -called back icu  d/w Dr Mcfarland - anesthesiology, and ICU - [place]-all communicating as to how best to proceed to get the MRI- Anesthesia is rec patient gets Intubated in ICU and then go for MRI- ICU will call me back  Keeping patient still NPO.    1:18 pm  Patient seen in her rex with  and ICU fellow and attending.  She is able to lay flat after push on PCA pump aftr d/w  and patient and Anesthesia it is decided that patient will have tube extension to PCa pump and go for MRI with ACP pushing her PCA pump about q15 minutes as needed.  will also go to keep patient calm.  Adult MRI will take her at about 3:30 PM  RN/ ACP/ ICU/ Anesthesia/ MRI all aware of the plan.    2:08 pm  ICU rec only MRI of T spine to min patient time at MRI- she will go with PCA pump  ICU rec Ortho f/u exam as patient is able to move all extremities and as per ICU - has a rectal tone.     Internal medicine   Sue Jacobo   [id number] Mri with Anesthesia today at for Sally hamm this afternoon- d/w Rodrigo in MRI at [id number]  d/w Pulmonary -67675- Patient is at high risk for remaining intubated due to h/o Esophageal Ca , with mets already on steroids and Zosyn forr PNA- may not get anymore optimized- agree with lasix 20 mg iv x1, agree with neb albuterol x1  Also Spoke with Anesthesia Dr Mcfarland- she will only get T spime mRI / intubation- he will check  ICU aware patient may need bed after MRI      12 noon -called back icu  d/w Dr Mcfarland - anesthesiology, and ICU - [place]-all communicating as to how best to proceed to get the MRI- Anesthesia is rec patient gets Intubated in ICU and then go for MRI- ICU will call me back  Keeping patient still NPO.    1:18 pm  Patient seen in her rex with  and ICU fellow and attending.  She is able to lay flat after push on PCA pump aftr d/w  and patient and Anesthesia it is decided that patient will have tube extension to PCa pump and go for MRI with ACP pushing her PCA pump about q15 minutes as needed.  will also go to keep patient calm.  Adult MRI will take her at about 3:30 PM  RN/ ACP/ ICU/ Anesthesia/ MRI all aware of the plan.    2:08 pm  ICU rec only MRI of T spine to min patient time at MRI- she will go with PCA pump  ICU rec Ortho f/u exam as patient is able to move all extremities. Called ortho PA to update    Internal medicine   Sue Jacobo   [id number]

## 2021-05-20 NOTE — PROGRESS NOTE ADULT - ASSESSMENT
70 yo female w/ a PMHx of metastatic esophageal adenocarcinoma (s/p carbo/taxol and RT 4/2020-5/2020, started on folfox/herceptin 3/2021,last 5/12) w/ T4 lytic lesion and paravertebral mass dx 1/2021 (s/p RT to T4 3/2021), refused esophagectomy, HTN, HLD, degenerative disc disease of C-spine s/p laminectomy x2 (2019), and adjustment disorder p/f heme/onc Dr. Shelby's ([facility] [place]) office for refractory acute back pain x 3 weeks found to have new T10 compression fx likely pathologic identified on outpatient CTAP 5/13 admitted for further mgt   5/15 pain 50/10---7/10  5/16 pain 7/10 inc iv dailudid 3 mg q3 prn with bowel preps- Start Fentanyl patch 25 mvg/72 hrs  HTN uncontrolled- add norvasc and hydralazine with hold parameters to BP meds.  5/17 Anesthesia will not  do anesthesia for MRI  5/18-Patient agree to try for MRI of T spine on pain med    5/20 Trying for MRI with anesthesia na 128 renal rec tolvaptan 30 mg po x1 and recheck na  70 yo female w/ a PMHx of metastatic esophageal adenocarcinoma (s/p carbo/taxol and RT 4/2020-5/2020, started on folfox/herceptin 3/2021,last 5/12) w/ T4 lytic lesion and paravertebral mass dx 1/2021 (s/p RT to T4 3/2021), refused esophagectomy, HTN, HLD, degenerative disc disease of C-spine s/p laminectomy x2 (2019), and adjustment disorder p/f heme/onc Dr. Shelby's ([facility] [place]) office for refractory acute back pain x 3 weeks found to have new T10 compression fx likely pathologic identified on outpatient CTAP 5/13 admitted for further mgt   5/15 pain 50/10---7/10  5/16 pain 7/10 inc iv dailudid 3 mg q3 prn with bowel preps- Start Fentanyl patch 25 mvg/72 hrs  HTN uncontrolled- add norvasc and hydralazine with hold parameters to BP meds.  5/17 Anesthesia will not  do anesthesia for MRI  5/18-Patient agree to try for MRI of T spine on pain med    5/20 Trying for MRI with anesthesia na 128 renal rec tolvaptan 30 mg po x1 and recheck na at 2pm  platelet 71- will ask input from Onc  c/o abd pain and constipation to RN- not new complaints- will get Xray of abd and lactulose q6 untill bm if no mri planned

## 2021-05-20 NOTE — PROGRESS NOTE ADULT - PROBLEM SELECTOR PLAN 7
-c/w home sertraline 25 mg BID  -restart ativan as needed Patient had Neulasta PTA and now is on decadron  was on  Cefepime now Inc to Iv Zosyn- Due to PNA on CTof chest 5/19  WBC 76-----> 46  most likely multifactorial with Neulasta/ decadron/ infection,    platelet 71- observe await onc f/u Patient had Neulasta PTA and now is on decadron  was on  Cefepime now Inc to Iv Zosyn- Due to PNA on CTof chest 5/19  WBC 76-----> 46  most likely multifactorial with Neulasta/ decadron/ infection,    platelet 71- d/w oncology- this is most likely post chemo

## 2021-05-20 NOTE — CONSULT NOTE ADULT - SUBJECTIVE AND OBJECTIVE BOX
CHIEF COMPLAINT: T10 compression fracture    HPI:  71F w/ metastatic esophageal adenocarcinoma (s/p carbo/taxol and RT 2020-2020, started on folfox/herceptin 3/2021, last ) w/ T4 lytic lesion and paravertebral mass dx 2021 (s/p RT to T4 3/2021), previously refused esophagectomy, HTN, HLD, degenerative disc disease of C-spine s/p laminectomy x2 (), and adjustment disorder p/f heme/onc Dr. Shelby's ([facility] [place]) office for refractory acute back pain x 3 weeks found to have new T10 compression fx likely pathologic identified on outpatient CTAP  admitted for further mgt     PAST MEDICAL & SURGICAL HISTORY:  Chronic back pain    Diverticulitis    HTN (hypertension)    Hyperlipidemia    GERD (gastroesophageal reflux disease)    Compressed cervical disc  c4-c5    H/O:   x 2 -,    H/O: hysterectomy  partial     S/P laminectomy  19- Anterior Cervical Laminectomy    H/O: osteoarthritis    Class 1 obesity with body mass index (BMI) of 33.0 to 33.9 in adult        FAMILY HISTORY:  FH: type 2 diabetes  father-     Family history of breast cancer in mother  mother-     Family hx of alcoholism  mother -        SOCIAL HISTORY:  Smoking: __ packs x ___ years  EtOH Use:  Marital Status:  Occupation:  Recent Travel:  Country of Birth:  Advance Directives:    Allergies    No Known Allergies    Intolerances        HOME MEDICATIONS:    REVIEW OF SYSTEMS:  Constitutional:   Eyes:  ENT:  CV:  Resp:  GI:  :  MSK:  Integumentary:  Neurological:  Psychiatric:  Endocrine:  Hematologic/Lymphatic:  Allergic/Immunologic:  [ ] All other systems negative  [ ] Unable to assess ROS because ________    OBJECTIVE:  ICU Vital Signs Last 24 Hrs  T(C): 36.4 (20 May 2021 09:46), Max: 36.4 (20 May 2021 01:30)  T(F): 97.6 (20 May 2021 09:46), Max: 97.6 (20 May 2021 09:46)  HR: 103 (20 May 2021 11:16) (98 - 103)  BP: 151/81 (20 May 2021 11:16) (150/74 - 170/86)  BP(mean): --  ABP: --  ABP(mean): --  RR: 17 (20 May 2021 11:16) (16 - 20)  SpO2: 95% (20 May 2021 11:16) (95% - 100%)        CAPILLARY BLOOD GLUCOSE          PHYSICAL EXAM:  General:   HEENT:   Lymph Nodes:  Neck:   Respiratory:   Cardiovascular:   Abdomen:   Extremities:   Skin:   Neurological:  Psychiatry:    HOSPITAL MEDICATIONS:  MEDICATIONS  (STANDING):  amLODIPine   Tablet 10 milliGRAM(s) Oral daily  atorvastatin 10 milliGRAM(s) Oral at bedtime  chlorhexidine 2% Cloths 1 Application(s) Topical daily  dexAMETHasone  Injectable 4 milliGRAM(s) IV Push every 6 hours  enoxaparin Injectable 40 milliGRAM(s) SubCutaneous daily  hydrALAZINE 50 milliGRAM(s) Oral every 8 hours  HYDROmorphone PCA (1 mG/mL) 30 milliLiter(s) PCA Continuous PCA Continuous  losartan 100 milliGRAM(s) Oral daily  methadone    Tablet 5 milliGRAM(s) Oral every 8 hours  ondansetron Injectable 4 milliGRAM(s) IV Push every 8 hours  pantoprazole    Tablet 40 milliGRAM(s) Oral before breakfast  piperacillin/tazobactam IVPB.. 3.375 Gram(s) IV Intermittent every 8 hours  polyethylene glycol 3350 17 Gram(s) Oral every 12 hours  potassium phosphate / sodium phosphate Powder (PHOS-NaK) 1 Packet(s) Oral two times a day  senna 2 Tablet(s) Oral at bedtime  sertraline 25 milliGRAM(s) Oral two times a day  sodium chloride 1.5%. 500 milliLiter(s) (50 mL/Hr) IV Continuous <Continuous>    MEDICATIONS  (PRN):  bisacodyl Suppository 10 milliGRAM(s) Rectal daily PRN Constipation  FIRST- Mouthwash  BLM 10 milliLiter(s) Swish and Swallow four times a day PRN throat pain  HYDROmorphone PCA (1 mG/mL) Rescue Clinician Bolus 3 milliGRAM(s) IV Push every 3 hours PRN Breakthrough pain  labetalol Injectable 5 milliGRAM(s) IV Push every 6 hours PRN Systolic blood pressure > 190  LORazepam   Injectable 0.5 milliGRAM(s) IV Push every 4 hours PRN Anxiety  ondansetron Injectable 4 milliGRAM(s) IV Push every 8 hours PRN Nausea and/or Vomiting      LABS:                        13.4   46.15 )-----------( 71       ( 20 May 2021 06:48 )             39.0     05-20    128<L>  |  94<L>  |  19  ----------------------------<  132<H>  3.6   |  22  |  0.59    Ca    8.9      20 May 2021 06:48  Phos  1.9     05-20  Mg     1.8         TPro  6.3  /  Alb  3.7  /  TBili  0.6  /  DBili  x   /  AST  35<H>  /  ALT  35<H>  /  AlkPhos  235<H>                MICROBIOLOGY:     RADIOLOGY:  [ ] Reviewed and interpreted by me    EKG: CHIEF COMPLAINT: T10 compression fracture    HPI:  71F w/ metastatic esophageal adenocarcinoma (s/p carbo/taxol and RT 2020-2020, started on folfox/herceptin 3/2021, last ) w/ T4 lytic lesion and paravertebral mass dx 2021 (s/p RT to T4 3/2021), previously refused esophagectomy, HTN, HLD, degenerative disc disease of C-spine s/p laminectomy x2 (), and adjustment disorder p/f heme/onc Dr. Shelby's ([facility] [place]) office for refractory acute back pain x 3 weeks found to have new T10 compression fx likely pathologic identified on outpatient CTAP , admitted to medicine floors for further mgmt. Evaluated by ortho team. Pt was rec'd for MR spine for further eval. MRIs have been attempted, first over the weekend (however, pt was unable to lay flat 2/2 pain) and then again on Tu (however after pt received 4mg dilaudid IVP and 1mg ativan IVP, pt found to be oversedated and required narcan); however, unable to successfully obtain. Pt has been on PCA and is requiring 2mg IVP dilaudid nearly every 15min. Anesthesia following. MICU consulted to consider intubation prior to MRI.     Pt examined at bedside with . Reports pain that is resposn    PAST MEDICAL & SURGICAL HISTORY:  Chronic back pain    Diverticulitis    HTN (hypertension)    Hyperlipidemia    GERD (gastroesophageal reflux disease)    Compressed cervical disc  c4-c5    H/O:   x 2 -,    H/O: hysterectomy  partial     S/P laminectomy  19- Anterior Cervical Laminectomy    H/O: osteoarthritis    Class 1 obesity with body mass index (BMI) of 33.0 to 33.9 in adult        FAMILY HISTORY:  FH: type 2 diabetes  father-     Family history of breast cancer in mother  mother-     Family hx of alcoholism  mother -        SOCIAL HISTORY:  Smoking: __ packs x ___ years  EtOH Use:  Marital Status:  Occupation:  Recent Travel:  Country of Birth:  Advance Directives:    Allergies    No Known Allergies    Intolerances        HOME MEDICATIONS:    REVIEW OF SYSTEMS:  Constitutional:   Eyes:  ENT:  CV:  Resp:  GI:  :  MSK:  Integumentary:  Neurological:  Psychiatric:  Endocrine:  Hematologic/Lymphatic:  Allergic/Immunologic:  [ ] All other systems negative  [ ] Unable to assess ROS because ________    OBJECTIVE:  ICU Vital Signs Last 24 Hrs  T(C): 36.4 (20 May 2021 09:46), Max: 36.4 (20 May 2021 01:30)  T(F): 97.6 (20 May 2021 09:46), Max: 97.6 (20 May 2021 09:46)  HR: 103 (20 May 2021 11:16) (98 - 103)  BP: 151/81 (20 May 2021 11:16) (150/74 - 170/86)  BP(mean): --  ABP: --  ABP(mean): --  RR: 17 (20 May 2021 11:16) (16 - 20)  SpO2: 95% (20 May 2021 11:16) (95% - 100%)        CAPILLARY BLOOD GLUCOSE          PHYSICAL EXAM:  General:   HEENT:   Lymph Nodes:  Neck:   Respiratory:   Cardiovascular:   Abdomen:   Extremities:   Skin:   Neurological:  Psychiatry:    HOSPITAL MEDICATIONS:  MEDICATIONS  (STANDING):  amLODIPine   Tablet 10 milliGRAM(s) Oral daily  atorvastatin 10 milliGRAM(s) Oral at bedtime  chlorhexidine 2% Cloths 1 Application(s) Topical daily  dexAMETHasone  Injectable 4 milliGRAM(s) IV Push every 6 hours  enoxaparin Injectable 40 milliGRAM(s) SubCutaneous daily  hydrALAZINE 50 milliGRAM(s) Oral every 8 hours  HYDROmorphone PCA (1 mG/mL) 30 milliLiter(s) PCA Continuous PCA Continuous  losartan 100 milliGRAM(s) Oral daily  methadone    Tablet 5 milliGRAM(s) Oral every 8 hours  ondansetron Injectable 4 milliGRAM(s) IV Push every 8 hours  pantoprazole    Tablet 40 milliGRAM(s) Oral before breakfast  piperacillin/tazobactam IVPB.. 3.375 Gram(s) IV Intermittent every 8 hours  polyethylene glycol 3350 17 Gram(s) Oral every 12 hours  potassium phosphate / sodium phosphate Powder (PHOS-NaK) 1 Packet(s) Oral two times a day  senna 2 Tablet(s) Oral at bedtime  sertraline 25 milliGRAM(s) Oral two times a day  sodium chloride 1.5%. 500 milliLiter(s) (50 mL/Hr) IV Continuous <Continuous>    MEDICATIONS  (PRN):  bisacodyl Suppository 10 milliGRAM(s) Rectal daily PRN Constipation  FIRST- Mouthwash  BLM 10 milliLiter(s) Swish and Swallow four times a day PRN throat pain  HYDROmorphone PCA (1 mG/mL) Rescue Clinician Bolus 3 milliGRAM(s) IV Push every 3 hours PRN Breakthrough pain  labetalol Injectable 5 milliGRAM(s) IV Push every 6 hours PRN Systolic blood pressure > 190  LORazepam   Injectable 0.5 milliGRAM(s) IV Push every 4 hours PRN Anxiety  ondansetron Injectable 4 milliGRAM(s) IV Push every 8 hours PRN Nausea and/or Vomiting      LABS:                        13.4   46.15 )-----------( 71       ( 20 May 2021 06:48 )             39.0     -    128<L>  |  94<L>  |  19  ----------------------------<  132<H>  3.6   |  22  |  0.59    Ca    8.9      20 May 2021 06:48  Phos  1.9       Mg     1.8         TPro  6.3  /  Alb  3.7  /  TBili  0.6  /  DBili  x   /  AST  35<H>  /  ALT  35<H>  /  AlkPhos  235<H>                MICROBIOLOGY:     RADIOLOGY:  [ ] Reviewed and interpreted by me    EKG: CHIEF COMPLAINT: T10 compression fracture    HPI:  71F w/ metastatic esophageal adenocarcinoma (s/p carbo/taxol and RT 2020-2020, started on folfox/herceptin 3/2021, last ) w/ T4 lytic lesion and paravertebral mass dx 2021 (s/p RT to T4 3/2021), previously refused esophagectomy, HTN, HLD, degenerative disc disease of C-spine s/p laminectomy x2 (), and adjustment disorder p/f heme/onc Dr. Shelby's ([facility] [place]) office for refractory acute back pain x 3 weeks found to have new T10 compression fx likely pathologic identified on outpatient CTAP , admitted to medicine floors for further mgmt. Evaluated by ortho team. Pt was rec'd for MR spine for further eval. MRIs have been attempted, first over the weekend (however, pt was unable to lay flat 2/2 pain) and then again on Tues (however after pt received 4mg dilaudid IVP and 1mg ativan IVP, pt found to be oversedated and required narcan); however, unable to successfully obtain. Pt has been on PCA and is requiring 2mg IVP dilaudid nearly every 15min. Anesthesia following. MICU consulted to consider intubation prior to MRI.     Pt examined at bedside with . Reports pain improves w/ PCA pump.  feels frustrated having to wait for MRI. Explained risks of intubation in unmonitored setting. Pt was able to lay flat for ~25min minute    PAST MEDICAL & SURGICAL HISTORY:  Chronic back pain    Diverticulitis    HTN (hypertension)    Hyperlipidemia    GERD (gastroesophageal reflux disease)    Compressed cervical disc  c4-c5    H/O:   x 2 -,    H/O: hysterectomy  partial     S/P laminectomy  19- Anterior Cervical Laminectomy    H/O: osteoarthritis    Class 1 obesity with body mass index (BMI) of 33.0 to 33.9 in adult        FAMILY HISTORY:  FH: type 2 diabetes  father-     Family history of breast cancer in mother  mother-     Family hx of alcoholism  mother -        SOCIAL HISTORY:  Smoking: __ packs x ___ years  EtOH Use:  Marital Status:  Occupation:  Recent Travel:  Country of Birth:  Advance Directives:    Allergies    No Known Allergies    Intolerances        HOME MEDICATIONS:    REVIEW OF SYSTEMS:  Constitutional:   Eyes:  ENT:  CV:  Resp:  GI:  :  MSK:  Integumentary:  Neurological:  Psychiatric:  Endocrine:  Hematologic/Lymphatic:  Allergic/Immunologic:  [ ] All other systems negative  [ ] Unable to assess ROS because ________    OBJECTIVE:  ICU Vital Signs Last 24 Hrs  T(C): 36.4 (20 May 2021 09:46), Max: 36.4 (20 May 2021 01:30)  T(F): 97.6 (20 May 2021 09:46), Max: 97.6 (20 May 2021 09:46)  HR: 103 (20 May 2021 11:16) (98 - 103)  BP: 151/81 (20 May 2021 11:16) (150/74 - 170/86)  BP(mean): --  ABP: --  ABP(mean): --  RR: 17 (20 May 2021 11:16) (16 - 20)  SpO2: 95% (20 May 2021 11:16) (95% - 100%)        CAPILLARY BLOOD GLUCOSE          PHYSICAL EXAM:  General:   HEENT:   Lymph Nodes:  Neck:   Respiratory:   Cardiovascular:   Abdomen:   Extremities:   Skin:   Neurological:  Psychiatry:    HOSPITAL MEDICATIONS:  MEDICATIONS  (STANDING):  amLODIPine   Tablet 10 milliGRAM(s) Oral daily  atorvastatin 10 milliGRAM(s) Oral at bedtime  chlorhexidine 2% Cloths 1 Application(s) Topical daily  dexAMETHasone  Injectable 4 milliGRAM(s) IV Push every 6 hours  enoxaparin Injectable 40 milliGRAM(s) SubCutaneous daily  hydrALAZINE 50 milliGRAM(s) Oral every 8 hours  HYDROmorphone PCA (1 mG/mL) 30 milliLiter(s) PCA Continuous PCA Continuous  losartan 100 milliGRAM(s) Oral daily  methadone    Tablet 5 milliGRAM(s) Oral every 8 hours  ondansetron Injectable 4 milliGRAM(s) IV Push every 8 hours  pantoprazole    Tablet 40 milliGRAM(s) Oral before breakfast  piperacillin/tazobactam IVPB.. 3.375 Gram(s) IV Intermittent every 8 hours  polyethylene glycol 3350 17 Gram(s) Oral every 12 hours  potassium phosphate / sodium phosphate Powder (PHOS-NaK) 1 Packet(s) Oral two times a day  senna 2 Tablet(s) Oral at bedtime  sertraline 25 milliGRAM(s) Oral two times a day  sodium chloride 1.5%. 500 milliLiter(s) (50 mL/Hr) IV Continuous <Continuous>    MEDICATIONS  (PRN):  bisacodyl Suppository 10 milliGRAM(s) Rectal daily PRN Constipation  FIRST- Mouthwash  BLM 10 milliLiter(s) Swish and Swallow four times a day PRN throat pain  HYDROmorphone PCA (1 mG/mL) Rescue Clinician Bolus 3 milliGRAM(s) IV Push every 3 hours PRN Breakthrough pain  labetalol Injectable 5 milliGRAM(s) IV Push every 6 hours PRN Systolic blood pressure > 190  LORazepam   Injectable 0.5 milliGRAM(s) IV Push every 4 hours PRN Anxiety  ondansetron Injectable 4 milliGRAM(s) IV Push every 8 hours PRN Nausea and/or Vomiting      LABS:                        13.4   46.15 )-----------( 71       ( 20 May 2021 06:48 )             39.0     05-20    128<L>  |  94<L>  |  19  ----------------------------<  132<H>  3.6   |  22  |  0.59    Ca    8.9      20 May 2021 06:48  Phos  1.9     05-20  Mg     1.8     -    TPro  6.3  /  Alb  3.7  /  TBili  0.6  /  DBili  x   /  AST  35<H>  /  ALT  35<H>  /  AlkPhos  235<H>                MICROBIOLOGY:     RADIOLOGY:  [ ] Reviewed and interpreted by me    EKG: CHIEF COMPLAINT: T10 compression fracture    HPI:  71F w/ metastatic esophageal adenocarcinoma (s/p carbo/taxol and RT 2020-2020, started on folfox/herceptin 3/2021, last ) w/ T4 lytic lesion and paravertebral mass dx 2021 (s/p RT to T4 3/2021), previously refused esophagectomy, HTN, HLD, degenerative disc disease of C-spine s/p laminectomy x2 (), and adjustment disorder p/f heme/onc Dr. Shelby's ([facility] [place]) office for refractory acute back pain x 3 weeks found to have new T10 compression fx likely pathologic identified on outpatient CTAP , admitted to medicine floors for further mgmt. Evaluated by ortho team. Pt was rec'd for MR spine for further eval. MRIs have been attempted, first over the weekend (however, pt was unable to lay flat 2/2 pain) and then again on Tues (however after pt received 4mg dilaudid IVP and 1mg ativan IVP, pt found to be oversedated and required narcan); however, unable to successfully obtain. Pt has been on PCA and is requiring 2mg IVP dilaudid nearly every 15min. Anesthesia following. MICU consulted to consider intubation prior to MRI. During her hospital course, she became more hypoxic (now on 3-4L NC) and is being empircally treated for PNA, initially with cefepime, now on zosyn.    Pt examined at bedside with . Reports pain improves w/ PCA pump.  feels frustrated having to wait for MRI. Explained risks of intubation in unmonitored setting. Pt was able to lay flat for ~25min minute with self-administered PCA pump pushes. Reports constipation. Lee placed for urinary retention. Denies leg weakness, loss of sensation including saddle anesthesia.     PAST MEDICAL & SURGICAL HISTORY:  Chronic back pain    Diverticulitis    HTN (hypertension)    Hyperlipidemia    GERD (gastroesophageal reflux disease)    Compressed cervical disc  c4-c5    H/O:   x 2 -,    H/O: hysterectomy  partial     S/P laminectomy  19- Anterior Cervical Laminectomy    H/O: osteoarthritis    Class 1 obesity with body mass index (BMI) of 33.0 to 33.9 in adult        FAMILY HISTORY:  FH: type 2 diabetes  father-     Family history of breast cancer in mother  mother-     Family hx of alcoholism  mother -        SOCIAL HISTORY:  Prior smoker 50 pack years    Allergies    No Known Allergies    Intolerances        HOME MEDICATIONS:    REVIEW OF SYSTEMS:  Constitutional:   Eyes:  ENT:  CV:  Resp:  GI:  :  MSK:  Integumentary:  Neurological:  Psychiatric:  Endocrine:  Hematologic/Lymphatic:  Allergic/Immunologic:  [x ] All other systems negative  [ ] Unable to assess ROS because ________    OBJECTIVE:  ICU Vital Signs Last 24 Hrs  T(C): 36.4 (20 May 2021 09:46), Max: 36.4 (20 May 2021 01:30)  T(F): 97.6 (20 May 2021 09:46), Max: 97.6 (20 May 2021 09:46)  HR: 103 (20 May 2021 11:16) (98 - 103)  BP: 151/81 (20 May 2021 11:16) (150/74 - 170/86)  BP(mean): --  ABP: --  ABP(mean): --  RR: 17 (20 May 2021 11:16) (16 - 20)  SpO2: 95% (20 May 2021 11:16) (95% - 100%)    CAPILLARY BLOOD GLUCOSE      PHYSICAL EXAM:  General: tired appearing, laying in bed  HEENT: PERRLA  Respiratory: anterior lung fields clear to auscultation bilaterally   Cardiovascular:  nl S1 S2, no murmurs detected  Abdomen: nontender, nondistended  Extremities: no lower extremity edema   Neurological: PERRLA, intact sensation bilaterally in lower extremities, motor strength: 4-5/5 bilaterally w/ leg extension, toe extension/flexion; 2+ patellar reflexes bilaterally, intact rectal tone  Psychiatry: AOx name, place    HOSPITAL MEDICATIONS:  MEDICATIONS  (STANDING):  amLODIPine   Tablet 10 milliGRAM(s) Oral daily  atorvastatin 10 milliGRAM(s) Oral at bedtime  chlorhexidine 2% Cloths 1 Application(s) Topical daily  dexAMETHasone  Injectable 4 milliGRAM(s) IV Push every 6 hours  enoxaparin Injectable 40 milliGRAM(s) SubCutaneous daily  hydrALAZINE 50 milliGRAM(s) Oral every 8 hours  HYDROmorphone PCA (1 mG/mL) 30 milliLiter(s) PCA Continuous PCA Continuous  losartan 100 milliGRAM(s) Oral daily  methadone    Tablet 5 milliGRAM(s) Oral every 8 hours  ondansetron Injectable 4 milliGRAM(s) IV Push every 8 hours  pantoprazole    Tablet 40 milliGRAM(s) Oral before breakfast  piperacillin/tazobactam IVPB.. 3.375 Gram(s) IV Intermittent every 8 hours  polyethylene glycol 3350 17 Gram(s) Oral every 12 hours  potassium phosphate / sodium phosphate Powder (PHOS-NaK) 1 Packet(s) Oral two times a day  senna 2 Tablet(s) Oral at bedtime  sertraline 25 milliGRAM(s) Oral two times a day  sodium chloride 1.5%. 500 milliLiter(s) (50 mL/Hr) IV Continuous <Continuous>    MEDICATIONS  (PRN):  bisacodyl Suppository 10 milliGRAM(s) Rectal daily PRN Constipation  FIRST- Mouthwash  BLM 10 milliLiter(s) Swish and Swallow four times a day PRN throat pain  HYDROmorphone PCA (1 mG/mL) Rescue Clinician Bolus 3 milliGRAM(s) IV Push every 3 hours PRN Breakthrough pain  labetalol Injectable 5 milliGRAM(s) IV Push every 6 hours PRN Systolic blood pressure > 190  LORazepam   Injectable 0.5 milliGRAM(s) IV Push every 4 hours PRN Anxiety  ondansetron Injectable 4 milliGRAM(s) IV Push every 8 hours PRN Nausea and/or Vomiting      LABS:                        13.4   46.15 )-----------( 71       ( 20 May 2021 06:48 )             39.0     05-20    128<L>  |  94<L>  |  19  ----------------------------<  132<H>  3.6   |  22  |  0.59    Ca    8.9      20 May 2021 06:48  Phos  1.9     05-20  Mg     1.8     05-20    TPro  6.3  /  Alb  3.7  /  TBili  0.6  /  DBili  x   /  AST  35<H>  /  ALT  35<H>  /  AlkPhos  235<H>  05-19              MICROBIOLOGY:     RADIOLOGY:  [ ] Reviewed and interpreted by me    EKG:

## 2021-05-20 NOTE — PROGRESS NOTE ADULT - SUBJECTIVE AND OBJECTIVE BOX
Overnight events noted   VITAL: T(C): , Max: 36.4 (05-20-21 @ 01:30) T(F): , Max: 97.5 (05-20-21 @ 01:30) HR: 100 (05-20-21 @ 05:26) BP: 150/74 (05-20-21 @ 05:26) BP(mean): -- RR: 20 (05-20-21 @ 05:26) SpO2: 100% (05-20-21 @ 05:26)   PHYSICAL EXAM: Constitutional: NAD, Alert HEENT: NCAT, MMM Neck: Supple, No JVD Respiratory: CTA-b/l Cardiovascular: RRR s1s2, no m/r/g Gastrointestinal: BS+, soft, NT/ND Extremities: No peripheral edema b/l Neurological: no focal deficits; strength grossly intact Back: no CVAT b/l Skin: No rashes, no nevi   LABS:                      13.4  46.15 )-----------( 71       ( 20 May 2021 06:48 )            39.0   Na(128)/K(3.6)/Cl(94)/HCO3(22)/BUN(19)/Cr(0.59)Glu(132)/Ca(8.9)/Mg(1.8)/PO4(1.9)    05-20 @ 06:48 Na(129)/K(3.4)/Cl(96)/HCO3(23)/BUN(20)/Cr(0.60)Glu(121)/Ca(8.7)/Mg(2.0)/PO4(1.8)    05-19 @ 07:20 Na(129)/K(4.4)/Cl(95)/HCO3(21)/BUN(21)/Cr(0.55)Glu(87)/Ca(8.9)/Mg(2.1)/PO4(2.7)    05-18 @ 07:18  Sodium, Random Urine: 119 mmol/L (05-19 @ 21:06) Potassium, Random Urine: 34.9 mmol/L (05-19 @ 21:06) Chloride, Random Urine: 133 mmol/L (05-19 @ 21:06) Osm pending     IMPRESSION: 71F w/ HTN, DDD-laminectomy, stage 4 esophageal CA, 5/14/21 a/w severe back pain; c/b worsening hyponatremia  (1)Hyponatremia - SIADH - either due to pain, or due to malignancy itself. Serum sodium actually down today relative to yesterday, despite administration of hypertonic IVF (1.5%NS+40meq/L KCl). The urine osm is pending; I suspect it is quite high.  (2)Hypokalemia - improved, s/p repletion  (3)Hypophosphatemia - whole body deficit from limited intake - slight improvement as of today, s/p oral repletion   RECOMMEND: (1)If still potentially for MRI with Anesthesia today, we could give Tolvaptan 30mg po x1 this am to optimize serum sodium for procedure. Would recheck serum sodium 4-5 hours post-administration of Tolvaptan.        Butch Ma MD [facility] Office: [phone] Cell: [phone]        (+)back pain; (+)constipation   VITAL: T(C): , Max: 36.4 (05-20-21 @ 01:30) T(F): , Max: 97.5 (05-20-21 @ 01:30) HR: 100 (05-20-21 @ 05:26) BP: 150/74 (05-20-21 @ 05:26) BP(mean): -- RR: 20 (05-20-21 @ 05:26) SpO2: 100% (05-20-21 @ 05:26)   PHYSICAL EXAM: Constitutional: lethargic but alert HEENT: NCAT, DMM Neck: Supple, No JVD Respiratory: CTA-b/l Cardiovascular: RRR s1s2, no m/r/g Gastrointestinal: BS+, soft, NT/ND Extremities: No peripheral edema b/l Neurological: reduced generalized strength Back: no CVAT b/l Skin: No rashes, no nevi   LABS:                      13.4  46.15 )-----------( 71       ( 20 May 2021 06:48 )            39.0   Na(128)/K(3.6)/Cl(94)/HCO3(22)/BUN(19)/Cr(0.59)Glu(132)/Ca(8.9)/Mg(1.8)/PO4(1.9)    05-20 @ 06:48 Na(129)/K(3.4)/Cl(96)/HCO3(23)/BUN(20)/Cr(0.60)Glu(121)/Ca(8.7)/Mg(2.0)/PO4(1.8)    05-19 @ 07:20 Na(129)/K(4.4)/Cl(95)/HCO3(21)/BUN(21)/Cr(0.55)Glu(87)/Ca(8.9)/Mg(2.1)/PO4(2.7)    05-18 @ 07:18  Sodium, Random Urine: 119 mmol/L (05-19 @ 21:06) Potassium, Random Urine: 34.9 mmol/L (05-19 @ 21:06) Chloride, Random Urine: 133 mmol/L (05-19 @ 21:06) Osm pending     IMPRESSION: 71F w/ HTN, DDD-laminectomy, stage 4 esophageal CA, 5/14/21 a/w severe back pain; c/b worsening hyponatremia  (1)Hyponatremia - SIADH - either due to pain, or due to malignancy itself. Serum sodium actually down today relative to yesterday, despite administration of hypertonic IVF (1.5%NS+40meq/L KCl). The urine osm is pending; I suspect it is quite high.  (2)Hypokalemia - improved, s/p repletion  (3)Hypophosphatemia - whole body deficit from limited intake - slight improvement as of today, s/p oral repletion   RECOMMEND: (1)If still potentially for MRI with Anesthesia today, we could give Tolvaptan 30mg po x1 this am to optimize serum sodium for procedure. Would recheck serum sodium 4-5 hours post-administration of Tolvaptan.        Butch Ma MD [facility] Office: [phone] Cell: [phone]

## 2021-05-21 NOTE — PROGRESS NOTE ADULT - ASSESSMENT
70 yo female w/ a PMHx of metastatic esophageal adenocarcinoma (s/p carbo/taxol and RT 4/2020-5/2020, started on folfox/herceptin 3/2021,last 5/12) w/ T4 lytic lesion and paravertebral mass dx 1/2021 (s/p RT to T4 3/2021), refused esophagectomy, HTN, HLD, degenerative disc disease of C-spine s/p laminectomy x2 (2019), and adjustment disorder p/f heme/onc Dr. Shelby's [place] [place] refractory acute back pain x 3 weeks found to have new T10 compression fx likely pathologic identified on outpatient CTAP 5/13 admitted for further mgt   5/15 pain 50/10---7/10  5/16 pain 7/10 inc iv dailudid 3 mg q3 prn with bowel preps- Start Fentanyl patch 25 mvg/72 hrs  HTN uncontrolled- add norvasc and hydralazine with hold parameters to BP meds.  5/17 Anesthesia will not  do anesthesia for MRI  5/18-Patient agree to try for MRI of T spine on pain med    5/20 Trying for MRI with anesthesia na 128 renal rec tolvaptan 30 mg po x1 and recheck na at 2pm, platelet 71- d/w Onc - most likely sec to recent chemo  c/o abd pain and constipation to RN- not new complaints- will get Xray of abd and lactulose q6 until bm if no mri planned  5/21- Prelim MRI of T spine - no cord comp- await ortho f/u 72 yo female w/ a PMHx of metastatic esophageal adenocarcinoma (s/p carbo/taxol and RT 4/2020-5/2020, started on folfox/herceptin 3/2021,last 5/12) w/ T4 lytic lesion and paravertebral mass dx 1/2021 (s/p RT to T4 3/2021), refused esophagectomy, HTN, HLD, degenerative disc disease of C-spine s/p laminectomy x2 (2019), and adjustment disorder p/f heme/onc Dr. Shelby's [place] [place] for refractory acute back pain x 3 weeks found to have new T10 compression fx likely pathologic identified on outpatient CTAP 5/13 admitted for further mgt   5/15 pain 50/10---7/10  5/16 pain 7/10 inc iv dailudid 3 mg q3 prn with bowel preps- Start Fentanyl patch 25 mvg/72 hrs  HTN uncontrolled- add norvasc and hydralazine with hold parameters to BP meds.  5/17 Anesthesia will not  do anesthesia for MRI  5/18-Patient agree to try for MRI of T spine on pain med    5/20 Trying for MRI with anesthesia na 128 renal rec tolvaptan 30 mg po x1 and recheck na at 2pm, platelet 71- d/w Onc - most likely sec to recent chemo  c/o abd pain and constipation to RN- not new complaints- will get Xray of abd and lactulose q6 until bm if no mri planned  5/21- Prelim MRI of T spine - no cord comp- await ortho f/u called [id number] .

## 2021-05-21 NOTE — PROGRESS NOTE ADULT - PROBLEM SELECTOR PLAN 9
c/w IV zosyn started 5/19- was on Cefepime before  Pulmonary helping to opt lungs- pulm greatly appreciated  WBC 76-----> 46--->26.75  most likely multifactorial with Neulasta/ decadron/ asp PNA    platelet 71- d/w oncology- this is most likely post chemo

## 2021-05-21 NOTE — PROGRESS NOTE ADULT - PROBLEM SELECTOR PLAN 1
New acute back pain x 3 weeks refractory to home opioid regimen (on oxy 30 q4 PRN, not using methadone or gabapentin as prescribed). No neurological deficits on exam.   -CTAP 5/13 (via [facility]): New heterogeneous sclerosis with a new compression fracture at T10 likely pathologic. Lucent and sclerotic lesion at T4 with mild compression deformity as seen on prior imaging.   c/o 50/10 pain on 5/15-----> 7/10 today---> 6/10  Bowel Regiman  Now on dilaudid PCA pump and methadone  -c/w lidocaine patch over T10  -spine/ortho consult - need MRI to futher asses- patient was able to tolerate MRI of T spine with PCA pump 5/21  -patient and family stated they will want surgery if MRI done and surgery is recommended- but at [facility]

## 2021-05-21 NOTE — PROGRESS NOTE ADULT - SUPERVISING ATTENDING
Lorri Marx MD
Case reviewed and discussed with DUNIA Mejia. Plan as above. MRI pending, plan is for MRI tomorrow with anesthesia.
cesario malik

## 2021-05-21 NOTE — PROGRESS NOTE ADULT - PROBLEM SELECTOR PLAN 1
continue methadone 5mg tid.   continue dilaudid pca 2mg q 15 min.    mri noted, surgery to eval for possible procedure +/- RT?

## 2021-05-21 NOTE — PROGRESS NOTE ADULT - SUBJECTIVE AND OBJECTIVE BOX
SUBJECTIVE AND OBJECTIVE:  pt still with pain in left side.  was able to take mri and back pain improved. sleepy but arousable and answers questions appropriately.  no bm after dulcolax suppository  INTERVAL HPI/OVERNIGHT EVENTS:    DNR on chart:   Allergies    No Known Allergies    Intolerances    MEDICATIONS  (STANDING):  amLODIPine   Tablet 10 milliGRAM(s) Oral daily  atorvastatin 10 milliGRAM(s) Oral at bedtime  chlorhexidine 2% Cloths 1 Application(s) Topical daily  dexAMETHasone  Injectable 4 milliGRAM(s) IV Push every 6 hours  enoxaparin Injectable 40 milliGRAM(s) SubCutaneous daily  hydrALAZINE 100 milliGRAM(s) Oral every 8 hours  HYDROmorphone PCA (1 mG/mL) 30 milliLiter(s) PCA Continuous PCA Continuous  lactulose Syrup 10 Gram(s) Oral every 6 hours  losartan 100 milliGRAM(s) Oral daily  methadone    Tablet 5 milliGRAM(s) Oral every 8 hours  ondansetron Injectable 4 milliGRAM(s) IV Push every 8 hours  pantoprazole    Tablet 40 milliGRAM(s) Oral before breakfast  piperacillin/tazobactam IVPB.. 3.375 Gram(s) IV Intermittent every 8 hours  polyethylene glycol 3350 17 Gram(s) Oral every 12 hours  senna 2 Tablet(s) Oral at bedtime  sertraline 25 milliGRAM(s) Oral two times a day  tamsulosin 0.4 milliGRAM(s) Oral at bedtime    MEDICATIONS  (PRN):  aluminum hydroxide/magnesium hydroxide/simethicone Suspension 30 milliLiter(s) Oral every 4 hours PRN Dyspepsia  bisacodyl Suppository 10 milliGRAM(s) Rectal daily PRN Constipation  FIRST- Mouthwash  BLM 10 milliLiter(s) Swish and Swallow four times a day PRN throat pain  HYDROmorphone PCA (1 mG/mL) Rescue Clinician Bolus 3 milliGRAM(s) IV Push every 3 hours PRN Breakthrough pain  labetalol Injectable 5 milliGRAM(s) IV Push every 6 hours PRN Systolic blood pressure > 190  LORazepam   Injectable 0.5 milliGRAM(s) IV Push every 4 hours PRN Anxiety  ondansetron Injectable 4 milliGRAM(s) IV Push every 8 hours PRN Nausea and/or Vomiting      ITEMS UNCHECKED ARE NOT PRESENT    PRESENT SYMPTOMS: [ ]Unable to obtain due to poor mentation   Source if other than patient:  [ ]Family   [ ]Team     Pain (Impact on QOL):  yes  Location:  left side  Minimal acceptable level (0-10 scale):       3/10       Aggravating factors: none  Quality: dull  Radiation: to left side  Severity (0-10 scale):  8/10  Timing:  comes and goes    Dyspnea:                           [ ]Mild [ ]Moderate [ ]Severe  Anxiety:                             [ ]Mild [ ]Moderate [ x]Severe  Fatigue:                             [ ]Mild [ ]Moderate [x ]Severe  Nausea:                             [ ]Mild [ ]Moderate [ ]Severe  Loss of appetite:              [ ]Mild [ ]Moderate [ x]Severe  Constipation:                    [ ]Mild [x ]Moderate [ ]Severe    PAIN AD Score:	  http://geriatrictoolkit.[email or web address]/cog/painad.pdf (Ctrl + left click to view)    Other Symptoms:  [x ]All other review of systems negative     Karnofsky Performance Score/Palliative Performance Status Version 2:     50    %    http://palliative.info/resource_material/[email or web address].pdf  PHYSICAL EXAM:  Vital Signs Last 24 Hrs  T(C): 36.4 (21 May 2021 09:30), Max: 36.6 (20 May 2021 18:30)  T(F): 97.6 (21 May 2021 09:30), Max: 97.8 (20 May 2021 18:30)  HR: 102 (21 May 2021 13:12) (93 - 107)  BP: 168/82 (21 May 2021 13:12) (145/67 - 170/90)  BP(mean): --  RR: 18 (21 May 2021 09:30) (17 - 20)  SpO2: 95% (21 May 2021 09:30) (93% - 95%) I&O's Summary    20 May 2021 07:01  -  21 May 2021 07:00  --------------------------------------------------------  IN: 0 mL / OUT: 1800 mL / NET: -1800 mL     GENERAL:  [x ]Alert  [x ]Oriented x 3  [x ]Lethargic  [ ]Cachexia  [ ]Unarousable  [ ]Verbal  [ ]Non-Verbal    Behavioral:   [ ] Anxiety  [ ] Delirium [ ] Agitation [ ] Other    HEENT:  [ ]Normal   [x ]Dry mouth   [ ]ET Tube/Trach  [ ]Oral lesions    PULMONARY:   [ x]Clear [ ]Tachypnea  [ ]Audible excessive secretions   [ ]Rhonchi        [ ]Right [ ]Left [ ]Bilateral  [ ]Crackles        [ ]Right [ ]Left [ ]Bilateral  [ ]Wheezing     [ ]Right [ ]Left [ ]Bilateral    CARDIOVASCULAR:    [ ]Regular [ ]Irregular [x ]Tachy  [ ]Mahesh [ ]Murmur [ ]Other    GASTROINTESTINAL:  [x ]Soft  [ ]Distended   x[ ]+BS  [ x]Non tender [ ]Tender  [ ]PEG [ ]OGT/ NGT   Last BM:    GENITOURINARY:  [ ]Normal [ ] Incontinent   [ ]Oliguria/Anuria   [x ]Lee    MUSCULOSKELETAL:   [ ]Normal   [ x]Weakness  [ ]Bed/Wheelchair bound [ ]Edema    NEUROLOGIC:   [x ]No focal deficits  [ ] Cognitive impairment  [ ] Dysphagia [ ]Dysarthria [ ] Paresis [ ]Other     SKIN:   [ x]Normal   [ ]Pressure ulcer(s)  [ ]Rash    CRITICAL CARE:  [ ] Shock Present  [ ]Septic [ ]Cardiogenic [ ]Neurologic [ ]Hypovolemic  [ ]  Vasopressors [ ]  Inotropes   [ ] Respiratory failure present  [ ] Acute  [ ] Chronic [ ] Hypoxic  [ ] Hypercarbic [ ] Other  [ ] Other organ failure     LABS:                        14.4   26.75 )-----------( 67       ( 21 May 2021 07:18 )             41.7   05-21    131<L>  |  95<L>  |  20  ----------------------------<  179<H>  3.8   |  25  |  0.62    Ca    9.1      21 May 2021 07:18  Phos  2.5     05-21  Mg     1.9     05-21          RADIOLOGY & ADDITIONAL STUDIES:    Protein Calorie Malnutrition Present: [ ] yes [ ] no  [ ] PPSV2 < or = 30%  [ ] significant weight loss [ ] poor nutritional intake [ ] anasarca [ ] catabolic state Albumin, Serum: 3.7 g/dL (05-19-21 @ 07:20)  Artificial Nutrition [ ]     REFERRALS:   [ ]Chaplaincy  [ ] Hospice  [ ]Child Life  [ ]Social Work  [ ]Case management [ ]Holistic Therapy   Goals of Care Document: SHELBY Jacobo (05-19-21 @ 10:23)  Goals of Care Conversation:   Participants:  · Participants  Patient; Family; Staff; ACP  · Spouse  Mr Baltazar Boyd    Advance Directives:  · Caregiver:  no    Conversation Discussion:  · Conversation  Diagnosis  · Conversation Details  Patient seen with ACP and - now on tele after Rapid last night  patient is resting in bed with her  Baltazar Boyd at bedside  Baltazar is very jalil- "it has been 5 days and still no MRI"  Also upset because she 'passed out ' and needed medication to bring her back- explained the medication was the IV Dilaudid and this is what we are using to control wife's pain before MRI- but was not able to be done  He is upset because wife is on oxygen- Sat 97 % on 2 L- will wean off today  He is upset that wife " walked in and now look at her" - wife told him- " I CAN STILL WALK"  Wife notes pain  6/10 that radiates  from mid back to front of abd- this is c/w T10 Fx  Explain to  and patient Med/ Mri and anesthesia dep is working to get the MRI done.  Orthopedics still wants MRI to better asses her impressive pain on admission in this lady with metastatic cancer    Patient is FULL CODE    What Matters Most To Patient and Family:  · What matters most to patient and family  Getting MRI asa   Not being in pain    Personal Advance Directives Treatment Guidelines:   Treatment Guidelines:  · Decision Maker  Patient    Location of Discussion:   Duration of Advanced Care Planning Meeting:  · Time spent (in minutes)  35     Location of Discussion:  · Location of discussion  Face to face       Electronic Signatures:  Sue Jacobo)  (Signed 19-May-2021 10:25)  	Authored: Goals of Care Conversation, Personal Advance Directives Treatment Guidelines, Location of Discussion      Last Updated: 19-May-2021 10:25 by Sue Jacobo)

## 2021-05-21 NOTE — PROGRESS NOTE ADULT - ASSESSMENT
72 yo female w/ a PMHx of metastatic esophageal adenocarcinoma (s/p neoadjuvant concurrent chemoRT 4/2020-5/2020, declined esophagectomy, recurrent disease in 1/2021 at T4, had local RT and now on FOLFOX/herceptin, HTN, HLD, degenerative disc disease of C-spine s/p laminectomy x2 (2019), and adjustment disorder, who was sent in by Dr. Shelby for worsening new back pain. She is now found to have a new compression fracture at T10.    #Pathologic/compression fracture at T10  -appreciated orthopedics  -pain control, Palliative Care consult recs appreciated, patient currently on PCA  -s/p MRI lumbar spine under anesthesia on 5/20, findings consistent with Osseous metastasisSmall area of epidural extension is seen on left side to T3 level as described above.  Partially demonstrated right parasagittal lesion at the L1-2 level.  Radiation changes and Bilateral pleural effusions are identified.  -No spinal cord compression   -orthospine consulted , suggest T10 VCF would benefit from Kyphoplasty with possible radiation therapy  -Please also consult Radiation Oncology    #Metastatic esophageal adenocarcinoma  -s/p neoadjuvant chemoRT with carbo/taxol, however, declined surgery  -recurrence in T4, confirmed with biopsy, in 1/2021  -now on FOLFOX + herceptin given ERBB positivity  -CT chest done on 5/19: In comparison with 5/13/2021, obliteration of the segmental branches of the right lower lobe bronchus and new consolidation/atelectasis of basilar right lower lobe.  New patchy groundglass opacities within bilateral upper lobes can represent infection or pneumonitis.  Trace bilateral pleural effusions, new from prior.  Osseous metastatic disease is again noted.  -Pulmonary consult- appreciate recs, on Zosyn for possible aspiration PNA. As per Pulm may be some concern for pneumonitis. Consider tapering down steroids since patient without cord compression if ok with Pulm.   -no plan for inpatient chemotherapy  -Patient to followup with Dr. Shelby ([facility]) upon discharge  -C/w Supportive care, pain control, Nutrition, PT, DVT ppx  -Oncology will continue to follow with you      Case d/w Dr. Lorri Mejia PA  Oncology Physician Assistant  [facility]  Pager [phone]    If after 5pm or weekends please page On-call Oncology Fellow

## 2021-05-21 NOTE — PROGRESS NOTE ADULT - PROBLEM SELECTOR PLAN 4
w/ hesitancy x 1 day, no dysuria or hx of UTI  Was started on Cefepime  -UA neg, urine culture 5/16-- no growth  Now on iv Zosyn for Asp PNA w/ hesitancy x 1 day, no dysuria or hx of UTI  Was started on Cefepime  -UA neg, urine culture 5/16-- no growth  Now on iv Zosyn for Asp PNA day # 3

## 2021-05-21 NOTE — PROGRESS NOTE ADULT - SUBJECTIVE AND OBJECTIVE BOX
Overnight events noted   VITAL: T(C): , Max: 36.6 (05-20-21 @ 18:30) T(F): , Max: 97.8 (05-20-21 @ 18:30) HR: 100 (05-21-21 @ 05:30) BP: 170/90 (05-21-21 @ 05:30) BP(mean): -- RR: 18 (05-21-21 @ 05:30) SpO2: 95% (05-21-21 @ 05:30)   PHYSICAL EXAM: Constitutional: lethargic but alert HEENT: NCAT, DMM Neck: Supple, No JVD Respiratory: CTA-b/l Cardiovascular: RRR s1s2, no m/r/g Gastrointestinal: BS+, soft, NT/ND Extremities: No peripheral edema b/l Neurological: reduced generalized strength Back: no CVAT b/l Skin: No rashes, no nevi  LABS:                      14.4  26.75 )-----------( 67       ( 21 May 2021 07:18 )            41.7   Na(131)/K(3.8)/Cl(95)/HCO3(25)/BUN(20)/Cr(0.62)Glu(179)/Ca(9.1)/Mg(1.9)/PO4(2.5)    05-21 @ 07:18 Na(130)/K(3.4)/Cl(94)/HCO3(26)/BUN(20)/Cr(0.61)Glu(225)/Ca(8.5)/Mg(--)/PO4(--)    05-20 @ 15:07 Na(128)/K(3.6)/Cl(94)/HCO3(22)/BUN(19)/Cr(0.59)Glu(132)/Ca(8.9)/Mg(1.8)/PO4(1.9)    05-20 @ 06:48 Na(129)/K(3.4)/Cl(96)/HCO3(23)/BUN(20)/Cr(0.60)Glu(121)/Ca(8.7)/Mg(2.0)/PO4(1.8)    05-19 @ 07:20 Osmolality, Random Urine: 594 mosm/Kg (05-20 @ 14:05) Sodium, Random Urine: 119 mmol/L (05-19 @ 21:06) Potassium, Random Urine: 34.9 mmol/L (05-19 @ 21:06) Chloride, Random Urine: 133 mmol/L (05-19 @ 21:06)   IMAGING: < from: MR Thoracic Spine w/wo IV Cont (05.20.21 @ 16:54) > IMPRESSION: Osseous metastasis are identified as described above. Small area of epidural extension is seen on left side to T3 level as described above. Partially demonstrated right parasagittal lesion at the L1-2 level. Radiation changes as described above. Bilateral pleural effusions are identified.     IMPRESSION: 71F w/ HTN, DDD-laminectomy, stage 4 esophageal CA, 5/14/21 a/w severe back pain; c/b worsening hyponatremia  (1)Hyponatremia - SIADH - either due to pain, or due to malignancy itself. Levels improved as of yesterday pm/today, s/p Tolvaptan yesterday   (2)Hypokalemia - improved, s/p repletion  (3)Hypophosphatemia - improved, s/p repletion  (4)Onc/Ortho - s/p MRI yesterday, results pending   RECOMMEND: (1)Ensure TID as ordered/encourage osmotic intake (2)No need for free water restriction for now (3)No objection to use of loop diuretics here; would avoid thiazides (4)Tolvaptan 15mg po prn Na <125     Butch Ma MD [facility] Office: [phone] Cell: [phone]        (+)back pain   VITAL: T(C): , Max: 36.6 (05-20-21 @ 18:30) T(F): , Max: 97.8 (05-20-21 @ 18:30) HR: 100 (05-21-21 @ 05:30) BP: 170/90 (05-21-21 @ 05:30) BP(mean): -- RR: 18 (05-21-21 @ 05:30) SpO2: 95% (05-21-21 @ 05:30)   PHYSICAL EXAM: Constitutional: lethargic but alert HEENT: NCAT, DMM Neck: Supple, No JVD Respiratory: CTA-b/l Cardiovascular: RRR s1s2, no m/r/g Gastrointestinal: BS+, soft, NT/ND Extremities: No peripheral edema b/l Neurological: reduced generalized strength Back: no CVAT b/l Skin: No rashes, no nevi  LABS:                      14.4  26.75 )-----------( 67       ( 21 May 2021 07:18 )            41.7   Na(131)/K(3.8)/Cl(95)/HCO3(25)/BUN(20)/Cr(0.62)Glu(179)/Ca(9.1)/Mg(1.9)/PO4(2.5)    05-21 @ 07:18 Na(130)/K(3.4)/Cl(94)/HCO3(26)/BUN(20)/Cr(0.61)Glu(225)/Ca(8.5)/Mg(--)/PO4(--)    05-20 @ 15:07 Na(128)/K(3.6)/Cl(94)/HCO3(22)/BUN(19)/Cr(0.59)Glu(132)/Ca(8.9)/Mg(1.8)/PO4(1.9)    05-20 @ 06:48 Na(129)/K(3.4)/Cl(96)/HCO3(23)/BUN(20)/Cr(0.60)Glu(121)/Ca(8.7)/Mg(2.0)/PO4(1.8)    05-19 @ 07:20 Osmolality, Random Urine: 594 mosm/Kg (05-20 @ 14:05) Sodium, Random Urine: 119 mmol/L (05-19 @ 21:06) Potassium, Random Urine: 34.9 mmol/L (05-19 @ 21:06) Chloride, Random Urine: 133 mmol/L (05-19 @ 21:06)   IMAGING: < from: MR Thoracic Spine w/wo IV Cont (05.20.21 @ 16:54) > IMPRESSION: Osseous metastasis are identified as described above. Small area of epidural extension is seen on left side to T3 level as described above. Partially demonstrated right parasagittal lesion at the L1-2 level. Radiation changes as described above. Bilateral pleural effusions are identified.     IMPRESSION: 71F w/ HTN, DDD-laminectomy, stage 4 esophageal CA, 5/14/21 a/w severe back pain; c/b worsening hyponatremia  (1)Hyponatremia - SIADH - either due to pain, or due to malignancy itself. Levels improved as of yesterday pm/today, s/p Tolvaptan yesterday   (2)Hypokalemia - improved, s/p repletion  (3)Hypophosphatemia - improved, s/p repletion  (4)Onc/Ortho - s/p MRI yesterday, results pending   RECOMMEND: (1)Ensure TID as ordered/encourage osmotic intake (2)No need for free water restriction for now (3)No objection to use of loop diuretics here; would avoid thiazides (4)Tolvaptan 15mg po prn Na <125     Butch Ma MD [facility] Office: [phone] Cell: [phone]

## 2021-05-21 NOTE — PROGRESS NOTE ADULT - PROBLEM SELECTOR PLAN 10
Na 131 today after tolvaptan 5/20/21  Renal greatly appreciated Na 131 today after tolvaptan 5/20/21  Renal greatly appreciated    Spoke to patient with daughter Noy and  at bedside  They may want patient to go to [facility] for surgery.  If surgery is planned patient may need platelet >100 ? - most check with Onc and off Lovenox.  Await wishes of patient and family after they talk to surgery.  Daughter and  wants PCA pump lowered - palliative will speak to them.

## 2021-05-21 NOTE — CHART NOTE - NSCHARTNOTEFT_GEN_A_CORE
Ortho Chart Note  Dr. Coates spoke with medical attending Dr. Jacobo  pt's MRI reviewed by Dr. Coates  T10 VCF would benefit from Kyphoplasty with possible radiation therapy  Dr. Jacobo aware

## 2021-05-21 NOTE — PROGRESS NOTE ADULT - ASSESSMENT
72 yo female w/ a PMHx of metastatic esophageal adenocarcinoma (s/p carbo/taxol and RT 4/2020-5/2020, started on folfox/herceptin 3/2021,last 5/12) w/ T4 lytic lesion and paravertebral mass dx 1/2021 (s/p RT to T4 3/2021), refused esophagectomy, HTN, HLD, degenerative disc disease of C-spine s/p laminectomy x2 (2019), and adjustment disorder p/f heme[place] [place] pain worsening x 3 weeks.   Asked to see for pain control

## 2021-05-21 NOTE — PROGRESS NOTE ADULT - PROBLEM SELECTOR PLAN 5
Dx between [id number]. On folfox/herceptin (3/2021-). T4 lytic lesion and paravertebral mass dx 1/2021 s/p RT to T4 3/2021. Now w/ new T10 compression fx likely malignant. Was evaluated by [facility], refused esophagectomy. Follows w/ Dr. Afsaneh christina/onc ([facility] [place] Dr. Trevor Nguyen Rad-Onc, Dr. Susanne Zhao Palliative   -heme onc consult in AM  -c/w zofran PRN nausea (monitor qtc)- now tid before meals  Onc notes "  #Metastatic esophageal adenocarcinoma  -s/p neoadjuvant chemoRT with carbo/taxol, however, declined surgery  -recurrence in T4, confirmed with biopsy, in 1/2021  -now on FOLFOX + herceptin given ERBB positivity  -no plan for inpatient chemotherapy"  Oncology will help with platelet of 71 today

## 2021-05-21 NOTE — PROGRESS NOTE ADULT - SUBJECTIVE AND OBJECTIVE BOX
INTERVAL HPI/OVERNIGHT EVENTS:  Patient seen at bedside.  Patient appearing drowsy  Patient describing pain as unbearable  On pain med PCA but doesnt think its helping      VITAL SIGNS:  T(F): 97.6 (05-21-21 @ 09:30)  HR: 102 (05-21-21 @ 13:12)  BP: 168/82 (05-21-21 @ 13:12)  RR: 18 (05-21-21 @ 09:30)  SpO2: 95% (05-21-21 @ 09:30)  Wt(kg): --    PHYSICAL EXAM:  Constitutional: NAD, resting in bed comfortably  Neck: supple, no LAD  Respiratory: CTA b/l,   Cardiovascular: RRR,   Gastrointestinal: soft, mild tender , ND  Extremities: no edema  Neurological: AAOx3, non focal  Skin: Normal temperature    MEDICATIONS  (STANDING):  amLODIPine   Tablet 10 milliGRAM(s) Oral daily  atorvastatin 10 milliGRAM(s) Oral at bedtime  chlorhexidine 2% Cloths 1 Application(s) Topical daily  dexAMETHasone  Injectable 4 milliGRAM(s) IV Push every 6 hours  enoxaparin Injectable 40 milliGRAM(s) SubCutaneous daily  hydrALAZINE 100 milliGRAM(s) Oral every 8 hours  HYDROmorphone PCA (1 mG/mL) 30 milliLiter(s) PCA Continuous PCA Continuous  lactulose Syrup 10 Gram(s) Oral every 6 hours  losartan 100 milliGRAM(s) Oral daily  methadone    Tablet 5 milliGRAM(s) Oral every 8 hours  ondansetron Injectable 4 milliGRAM(s) IV Push every 8 hours  pantoprazole    Tablet 40 milliGRAM(s) Oral before breakfast  piperacillin/tazobactam IVPB.. 3.375 Gram(s) IV Intermittent every 8 hours  polyethylene glycol 3350 17 Gram(s) Oral every 12 hours  senna 2 Tablet(s) Oral at bedtime  sertraline 25 milliGRAM(s) Oral two times a day  tamsulosin 0.4 milliGRAM(s) Oral at bedtime    MEDICATIONS  (PRN):  aluminum hydroxide/magnesium hydroxide/simethicone Suspension 30 milliLiter(s) Oral every 4 hours PRN Dyspepsia  bisacodyl Suppository 10 milliGRAM(s) Rectal daily PRN Constipation  FIRST- Mouthwash  BLM 10 milliLiter(s) Swish and Swallow four times a day PRN throat pain  HYDROmorphone PCA (1 mG/mL) Rescue Clinician Bolus 3 milliGRAM(s) IV Push every 3 hours PRN Breakthrough pain  labetalol Injectable 5 milliGRAM(s) IV Push every 6 hours PRN Systolic blood pressure > 190  LORazepam   Injectable 0.5 milliGRAM(s) IV Push every 4 hours PRN Anxiety  ondansetron Injectable 4 milliGRAM(s) IV Push every 8 hours PRN Nausea and/or Vomiting      Allergies    No Known Allergies    Intolerances        LABS:                        14.4   26.75 )-----------( 67       ( 21 May 2021 07:18 )             41.7     05-21    131<L>  |  95<L>  |  20  ----------------------------<  179<H>  3.8   |  25  |  0.62    Ca    9.1      21 May 2021 07:18  Phos  2.5     05-21  Mg     1.9     05-21            RADIOLOGY & ADDITIONAL TESTS:  Studies reviewed.

## 2021-05-21 NOTE — CHART NOTE - NSCHARTNOTEFT_GEN_A_CORE
rad< from: MR Thoracic Spine w/wo IV Cont (05.20.21 @ 16:54) >    IMPRESSION: Osseous metastasis are identified as described above.  Small area of epidural extension is seen on left side to T3 level as described above.  Partially demonstrated right parasagittal lesion at the L1-2 level.  Radiation changes as described above.  Bilateral pleural effusions are identified.    d/w Ortho  d/w daughter HCP Mrs Villafuerte at [phone]- she is getting  neurosurgery input today    Internal Med  Sue Jacobo   #[id number] rad< from: MR Thoracic Spine w/wo IV Cont (05.20.21 @ 16:54) >    IMPRESSION: Osseous metastasis are identified as described above.  Small area of epidural extension is seen on left side to T3 level as described above.  Partially demonstrated right parasagittal lesion at the L1-2 level.  Radiation changes as described above.  Bilateral pleural effusions are identified.    d/w Ortho- patient may benefit from Kyphoplasty  d/w daughter HCP Mrs Villafuerte at [phone]- she is getting  neurosurgery input today. Explained ortho rec kyphoplasty as an option.  daughter concerned that patient is not eating enough despite ensures- will do zofran iv tid before meals to encourage po intake.  Await patient and family wishes after they talk to ortho/ neurosurgery.    Internal Med  Sue Jacobo   #[id number] rad< from: MR Thoracic Spine w/wo IV Cont (05.20.21 @ 16:54) >    IMPRESSION: Osseous metastasis are identified as described above.  Small area of epidural extension is seen on left side to T3 level as described above.  Partially demonstrated right parasagittal lesion at the L1-2 level.  Radiation changes as described above.  Bilateral pleural effusions are identified.    d/w Ortho- patient may benefit from Kyphoplasty  d/w daughter HCP Mrs Villafuerte at [phone]- she is getting  neurosurgery input today. Explained ortho rec kyphoplasty as an option.  daughter concerned that patient is not eating enough despite ensures- will do zofran iv tid before meals to encourage po intake.  Await patient and family wishes after they talk to ortho/ neurosurgery.    1:40 Pm  Patient again seen with daughter Melissa and  at bedside-  very angry still- wants pCA pump to be lowered- daughter agrees- Palliative will see patient and family about the pain medication via PCA pump. Patient still in pain and expresses that she needs the pump- Palliative will talk to them.    Internal Med  Sue Jacobo   #[id number] rad< from: MR Thoracic Spine w/wo IV Cont (05.20.21 @ 16:54) >    IMPRESSION: Osseous metastasis are identified as described above.  Small area of epidural extension is seen on left side to T3 level as described above.  Partially demonstrated right parasagittal lesion at the L1-2 level.  Radiation changes as described above.  Bilateral pleural effusions are identified.    d/w Ortho- patient may benefit from Kyphoplasty  d/w daughter HCP Mrs Villafuerte at [phone]- she is getting  neurosurgery input today. Explained ortho rec kyphoplasty as an option.  daughter concerned that patient is not eating enough despite ensures- will do zofran iv tid before meals to encourage po intake.  Await patient and family wishes after they talk to ortho/ neurosurgery.    1:40 Pm  Patient again seen with daughter Melissa and  at bedside-  very angry still- wants pCA pump to be lowered- daughter agrees- Palliative will see patient and family about the pain medication via PCA pump. Patient still in pain and expresses that she needs the pump- Palliative will talk to them.    Internal Med  Sue Jacobo   #[id number]    3 pm  Spoke with bryant Villafuerte outside mothers room after surgery and palliative spoke to her- she is understanding the extents of her mothers mets and she is tearful. explained about the para lumbar lesion with need for ? mri of Lspine but daughter is thinking about comfort care- no  MRI to be done over the weekend- this is all new to daughter- patient is still a full code while family is absorbing this. rad< from: MR Thoracic Spine w/wo IV Cont (05.20.21 @ 16:54) >    IMPRESSION: Osseous metastasis are identified as described above.  Small area of epidural extension is seen on left side to T3 level as described above.  Partially demonstrated right parasagittal lesion at the L1-2 level.  Radiation changes as described above.  Bilateral pleural effusions are identified.    d/w Ortho- patient may benefit from Kyphoplasty  d/w daughter HCP Mrs Villafuerte at [phone]- she is getting  neurosurgery input today. Explained ortho rec kyphoplasty as an option.  daughter concerned that patient is not eating enough despite ensures- will do zofran iv tid before meals to encourage po intake.  Await patient and family wishes after they talk to ortho/ neurosurgery.    1:40 Pm  Patient again seen with daughter Melissa and  at bedside-  very angry still- wants pCA pump to be lowered- daughter agrees- Palliative will see patient and family about the pain medication via PCA pump. Patient still in pain and expresses that she needs the pump- Palliative will talk to them.    Internal Med  Sue Jacobo   #[id number]    3 pm  Spoke with bryant Villafuerte outside mothers room after surgery and palliative spoke to her- she is understanding the extents of her mothers mets and she is tearful. explained about the para lumbar lesion with need for ? mri of Lspine but daughter is thinking about comfort care- no  MRI to be done over the weekend- this is all new to daughter- patient is still a full code while family is absorbing this. comfort given to daughter.

## 2021-05-21 NOTE — PROGRESS NOTE ADULT - SUBJECTIVE AND OBJECTIVE BOX
5/20/21 MRI of T spine prelim- no cord comp- await official report   Called Ortho - they will review and call back- no plan for OR today, need to review to det role for surgery Patient is a 71y old  Female who presents with a chief complaint of back pain (21 May 2021 06:17)      SUBJECTIVE / OVERNIGHT EVENTS:  resting in bed  Notes still with pain across upper abd- same pain  Notes Nausea- explained will do zofran iv before breakfast .  Explain D/w ortho at [id number], - ok for patient to eat.- ortho to review MRI and do official f/u    MEDICATIONS  (STANDING):  amLODIPine   Tablet 10 milliGRAM(s) Oral daily  atorvastatin 10 milliGRAM(s) Oral at bedtime  chlorhexidine 2% Cloths 1 Application(s) Topical daily  dexAMETHasone  Injectable 4 milliGRAM(s) IV Push every 6 hours  enoxaparin Injectable 40 milliGRAM(s) SubCutaneous daily  hydrALAZINE 50 milliGRAM(s) Oral every 8 hours  HYDROmorphone PCA (1 mG/mL) 30 milliLiter(s) PCA Continuous PCA Continuous  losartan 100 milliGRAM(s) Oral daily  methadone    Tablet 5 milliGRAM(s) Oral every 8 hours  ondansetron Injectable 4 milliGRAM(s) IV Push once  ondansetron Injectable 4 milliGRAM(s) IV Push every 8 hours  pantoprazole    Tablet 40 milliGRAM(s) Oral before breakfast  piperacillin/tazobactam IVPB.. 3.375 Gram(s) IV Intermittent every 8 hours  polyethylene glycol 3350 17 Gram(s) Oral every 12 hours  senna 2 Tablet(s) Oral at bedtime  sertraline 25 milliGRAM(s) Oral two times a day  sodium chloride 1.5%. 500 milliLiter(s) (50 mL/Hr) IV Continuous <Continuous>    MEDICATIONS  (PRN):  aluminum hydroxide/magnesium hydroxide/simethicone Suspension 30 milliLiter(s) Oral every 4 hours PRN Dyspepsia  bisacodyl Suppository 10 milliGRAM(s) Rectal daily PRN Constipation  FIRST- Mouthwash  BLM 10 milliLiter(s) Swish and Swallow four times a day PRN throat pain  HYDROmorphone PCA (1 mG/mL) Rescue Clinician Bolus 3 milliGRAM(s) IV Push every 3 hours PRN Breakthrough pain  labetalol Injectable 5 milliGRAM(s) IV Push every 6 hours PRN Systolic blood pressure > 190  LORazepam   Injectable 0.5 milliGRAM(s) IV Push every 4 hours PRN Anxiety  ondansetron Injectable 4 milliGRAM(s) IV Push every 8 hours PRN Nausea and/or Vomiting        I&O's Summary    20 May 2021 07:01  -  21 May 2021 07:00  --------------------------------------------------------  IN: 0 mL / OUT: 1800 mL / NET: -1800 mL      Vital Signs Last 24 Hrs  T(C): 36.3 (21 May 2021 05:30), Max: 36.6 (20 May 2021 18:30)  T(F): 97.3 (21 May 2021 05:30), Max: 97.8 (20 May 2021 18:30)  HR: 100 (21 May 2021 05:30) (93 - 103)  BP: 170/90 (21 May 2021 05:30) (145/67 - 170/90)  BP(mean): --  RR: 18 (21 May 2021 05:30) (17 - 20)  SpO2: 95% (21 May 2021 05:30) (93% - 97%)  PHYSICAL EXAM:  GENERAL: NAD, well-developed  HEAD:  Atraumatic, Normocephalic  EYES: EOMI, PERRLA, conjunctiva and sclera clear  NECK: Supple, No JVD  CHEST/LUNG: Clear to auscultation bilaterally; No wheeze  HEART: Regular rate and rhythm; No murmurs, rubs, or gallops  ABDOMEN: Soft, Nontender, Nondistended; Bowel sounds present  EXTREMITIES:  2+ Peripheral Pulses, No clubbing, cyanosis, or edema  PSYCH: AAOx3  NEUROLOGY: non-focal  SKIN: No rashes or lesions    LABS:                        14.4   26.75 )-----------( 67       ( 21 May 2021 07:18 )             41.7     05-21    131<L>  |  95<L>  |  20  ----------------------------<  179<H>  3.8   |  25  |  0.62    Ca    9.1      21 May 2021 07:18  Phos  2.5     05-21  Mg     1.9     05-21        RADIOLOGY & ADDITIONAL TESTS:  abd Xray 5/20- no sbo    Care Discussed with Consultants/Other Providers:  Radiology and Ortho   5/20/21 MRI of T spine prelim- no cord comp- await official report   Called Ortho - they will review and call back- no plan for OR today, need to review to det role for surgery Patient is a 71y old  Female who presents with a chief complaint of back pain (21 May 2021 06:17)      SUBJECTIVE / OVERNIGHT EVENTS:  Patient seen alone today- family very upset that it took us this long to get mRI  apologized to patient that it took us this long. explained we tried or best .  resting in bed  Notes still with pain across upper abd- same pain  Notes Nausea- explained will do zofran iv before breakfast .  Explain D/w ortho at [id number], - ok for patient to eat.- ortho to review MRI and do official f/u    MEDICATIONS  (STANDING):  amLODIPine   Tablet 10 milliGRAM(s) Oral daily  atorvastatin 10 milliGRAM(s) Oral at bedtime  chlorhexidine 2% Cloths 1 Application(s) Topical daily  dexAMETHasone  Injectable 4 milliGRAM(s) IV Push every 6 hours  enoxaparin Injectable 40 milliGRAM(s) SubCutaneous daily  hydrALAZINE 50 milliGRAM(s) Oral every 8 hours  HYDROmorphone PCA (1 mG/mL) 30 milliLiter(s) PCA Continuous PCA Continuous  losartan 100 milliGRAM(s) Oral daily  methadone    Tablet 5 milliGRAM(s) Oral every 8 hours  ondansetron Injectable 4 milliGRAM(s) IV Push once  ondansetron Injectable 4 milliGRAM(s) IV Push every 8 hours  pantoprazole    Tablet 40 milliGRAM(s) Oral before breakfast  piperacillin/tazobactam IVPB.. 3.375 Gram(s) IV Intermittent every 8 hours  polyethylene glycol 3350 17 Gram(s) Oral every 12 hours  senna 2 Tablet(s) Oral at bedtime  sertraline 25 milliGRAM(s) Oral two times a day  sodium chloride 1.5%. 500 milliLiter(s) (50 mL/Hr) IV Continuous <Continuous>    MEDICATIONS  (PRN):  aluminum hydroxide/magnesium hydroxide/simethicone Suspension 30 milliLiter(s) Oral every 4 hours PRN Dyspepsia  bisacodyl Suppository 10 milliGRAM(s) Rectal daily PRN Constipation  FIRST- Mouthwash  BLM 10 milliLiter(s) Swish and Swallow four times a day PRN throat pain  HYDROmorphone PCA (1 mG/mL) Rescue Clinician Bolus 3 milliGRAM(s) IV Push every 3 hours PRN Breakthrough pain  labetalol Injectable 5 milliGRAM(s) IV Push every 6 hours PRN Systolic blood pressure > 190  LORazepam   Injectable 0.5 milliGRAM(s) IV Push every 4 hours PRN Anxiety  ondansetron Injectable 4 milliGRAM(s) IV Push every 8 hours PRN Nausea and/or Vomiting        I&O's Summary    20 May 2021 07:01  -  21 May 2021 07:00  --------------------------------------------------------  IN: 0 mL / OUT: 1800 mL / NET: -1800 mL      Vital Signs Last 24 Hrs  T(C): 36.3 (21 May 2021 05:30), Max: 36.6 (20 May 2021 18:30)  T(F): 97.3 (21 May 2021 05:30), Max: 97.8 (20 May 2021 18:30)  HR: 100 (21 May 2021 05:30) (93 - 103)  BP: 170/90 (21 May 2021 05:30) (145/67 - 170/90)  BP(mean): --  RR: 18 (21 May 2021 05:30) (17 - 20)  SpO2: 95% (21 May 2021 05:30) (93% - 97%)  PHYSICAL EXAM:  GENERAL: NAD, well-developed  HEAD:  Atraumatic, Normocephalic  EYES: EOMI, PERRLA, conjunctiva and sclera clear  NECK: Supple, No JVD  CHEST/LUNG: Clear to auscultation bilaterally; No wheeze  HEART: Regular rate and rhythm; No murmurs, rubs, or gallops  ABDOMEN: Soft, Nontender, Nondistended; Bowel sounds present  EXTREMITIES:  2+ Peripheral Pulses, No clubbing, cyanosis, or edema  PSYCH: AAOx3  NEUROLOGY: non-focal  SKIN: No rashes or lesions    LABS:                        14.4   26.75 )-----------( 67       ( 21 May 2021 07:18 )             41.7     05-21    131<L>  |  95<L>  |  20  ----------------------------<  179<H>  3.8   |  25  |  0.62    Ca    9.1      21 May 2021 07:18  Phos  2.5     05-21  Mg     1.9     05-21        RADIOLOGY & ADDITIONAL TESTS:  abd Xray 5/20- no sbo  5/20/21 MRI of T spine - still awaiting official report    Care Discussed with Consultants/Other Providers:  Radiology and Ortho   5/20/21 MRI of T spine prelim- no cord comp- await official report   Called Ortho - they will review and call back- no plan for OR today, need to review to det role for surgery

## 2021-05-21 NOTE — PROGRESS NOTE ADULT - PROBLEM SELECTOR PLAN 4
family updated by myself and primary team  emotional support provided  patient and family centered care following for support  awaiting surgical eval   please call [phone] with any questions  if pt is transferred to [facility]- we will follow her there and continue to manage her pain

## 2021-05-22 NOTE — PROGRESS NOTE ADULT - PROBLEM SELECTOR PLAN 1
New acute back pain x 3 weeks refractory to home opioid regimen (on oxy 30 q4 PRN, not using methadone or gabapentin as prescribed). No neurological deficits on exam.   -MR T spine showed osseous mets involving T2 T3 T4 T9 T11 T10 with small epidural extension at T3. no cord compression  -ortho following- rec T10 kyphoplasty and radiation. will consult IR and rad-onc   -c/w dilaudid PCA pump and methadone for pain as per palliative care

## 2021-05-22 NOTE — PROGRESS NOTE ADULT - SUBJECTIVE AND OBJECTIVE BOX
[facility] Division of Hospital Medicine  Elizabeth Leon MD  Pager [phone]      Patient is a 71y old  Female who presents with a chief complaint of back pain (21 May 2021 15:17)      SUBJECTIVE / OVERNIGHT EVENTS:    pt noted to have increased o2 requirement o/n, now on 6l O2 via NC satting low 90s. Pt denies sob, chest pain. back pain controlled     ADDITIONAL REVIEW OF SYSTEMS:    RESPIRATORY: No cough, wheezing, chills or hemoptysis; No shortness of breath  CARDIOVASCULAR: No chest pain, palpitations, dizziness, or leg swelling  GASTROINTESTINAL: No abdominal or epigastric pain. No nausea, vomiting, or hematemesis; No diarrhea or constipation. No melena or hematochezia.      MEDICATIONS  (STANDING):  amLODIPine   Tablet 10 milliGRAM(s) Oral daily  atorvastatin 10 milliGRAM(s) Oral at bedtime  chlorhexidine 2% Cloths 1 Application(s) Topical daily  dexAMETHasone  Injectable 4 milliGRAM(s) IV Push every 6 hours  hydrALAZINE 100 milliGRAM(s) Oral every 8 hours  HYDROmorphone PCA (1 mG/mL) 30 milliLiter(s) PCA Continuous PCA Continuous  lactulose Syrup 10 Gram(s) Oral every 6 hours  losartan 100 milliGRAM(s) Oral daily  methadone    Tablet 5 milliGRAM(s) Oral every 8 hours  ondansetron Injectable 4 milliGRAM(s) IV Push every 8 hours  pantoprazole    Tablet 40 milliGRAM(s) Oral before breakfast  piperacillin/tazobactam IVPB.. 3.375 Gram(s) IV Intermittent every 8 hours  polyethylene glycol 3350 17 Gram(s) Oral every 12 hours  potassium phosphate / sodium phosphate Powder (PHOS-NaK) 1 Packet(s) Oral three times a day  senna 2 Tablet(s) Oral at bedtime  sertraline 25 milliGRAM(s) Oral two times a day  tamsulosin 0.4 milliGRAM(s) Oral at bedtime    MEDICATIONS  (PRN):  aluminum hydroxide/magnesium hydroxide/simethicone Suspension 30 milliLiter(s) Oral every 4 hours PRN Dyspepsia  bisacodyl Suppository 10 milliGRAM(s) Rectal daily PRN Constipation  FIRST- Mouthwash  BLM 10 milliLiter(s) Swish and Swallow four times a day PRN throat pain  HYDROmorphone PCA (1 mG/mL) Rescue Clinician Bolus 3 milliGRAM(s) IV Push every 3 hours PRN Breakthrough pain  labetalol Injectable 5 milliGRAM(s) IV Push every 6 hours PRN Systolic blood pressure > 190  LORazepam   Injectable 0.5 milliGRAM(s) IV Push every 4 hours PRN Anxiety  ondansetron Injectable 4 milliGRAM(s) IV Push every 8 hours PRN Nausea and/or Vomiting      CAPILLARY BLOOD GLUCOSE        I&O's Summary    21 May 2021 07:01  -  22 May 2021 07:00  --------------------------------------------------------  IN: 0 mL / OUT: 400 mL / NET: -400 mL        PHYSICAL EXAM:  Vital Signs Last 24 Hrs  T(C): 36.3 (22 May 2021 13:08), Max: 36.9 (22 May 2021 05:30)  T(F): 97.4 (22 May 2021 13:08), Max: 98.5 (22 May 2021 05:30)  HR: 104 (22 May 2021 13:08) (104 - 119)  BP: 143/63 (22 May 2021 13:08) (140/66 - 167/84)  BP(mean): --  RR: 18 (22 May 2021 13:08) (16 - 18)  SpO2: 92% (22 May 2021 13:08) (92% - 94%)    CONSTITUTIONAL: NAD,  EYES: PERRLA; conjunctiva and sclera clear  ENMT: Moist oral mucosa, no pharyngeal injection or exudates;   NECK: Supple, no palpable masses;  RESPIRATORY: Normal respiratory effort; basilar crackles bilaterally  CARDIOVASCULAR: Regular rate and rhythm, normal S1 and S2, no murmur/rub/gallop; No lower extremity edema; Peripheral pulses are 2+ bilaterally  ABDOMEN: Nontender to palpation, normoactive bowel sounds, no rebound/guarding;   MUSCLOSKELETAL:   no clubbing or cyanosis of digits; no joint swelling or tenderness to palpation  PSYCH: A+O to person, place  NEUROLOGY: CN 2-12 are intact and symmetric; no gross sensory deficits;   SKIN: No rashes;     LABS:                        13.7   19.07 )-----------( 53       ( 22 May 2021 08:18 )             39.6     05-22    131<L>  |  97<L>  |  26<H>  ----------------------------<  165<H>  3.7   |  23  |  0.65    Ca    8.9      22 May 2021 08:18  Phos  2.2     05-22  Mg     2.1     05-22                  RADIOLOGY & ADDITIONAL TESTS:  Results Reviewed:   Imaging Personally Reviewed:  Electrocardiogram Personally Reviewed:    COORDINATION OF CARE:  Care Discussed with Consultants/Other Providers [Y/N]:  Prior or Outpatient Records Reviewed [Y/N]:

## 2021-05-22 NOTE — PROGRESS NOTE ADULT - PROBLEM SELECTOR PLAN 7
, AST 34 on presentation. Unclear etiology, patient does endorse back pain radiating to R-sided abdomen  -CTAP 5/13 w/ normal caliber bile ducts and GB, normal kidneys   -likely incidental finding, neg Huff on exam   -continue to trend LFTs

## 2021-05-22 NOTE — PROGRESS NOTE ADULT - PROBLEM SELECTOR PLAN 2
c/w acute hypoxic respiratory failure  -c/w O2 via nC to maintain SpO2>90  -c/w IV zosyn started 5/19- was on Cefepime before  -c/w decadron for possible pneumonitis 2.2 chemo   -c/w incentive spirometry

## 2021-05-23 NOTE — PROGRESS NOTE ADULT - PROBLEM SELECTOR PLAN 2
c/w acute hypoxic respiratory failure. O2 requirement cont to increase  -c/w O2 via NRB to maintain SpO2>90  -c/w IV zosyn started 5/19- was on Cefepime before  -c/w decadron for possible pneumonitis 2.2 chemo   -c/w incentive spirometry

## 2021-05-23 NOTE — PROGRESS NOTE ADULT - PROBLEM SELECTOR PROBLEM 5
Esophageal cancer
HTN (hypertension)
Hyponatremia
Esophageal cancer

## 2021-05-23 NOTE — PROVIDER CONTACT NOTE (OTHER) - NAME OF MD/NP/PA/DO NOTIFIED:
Genoveva Tobar
Genoveva Wade
Precious Cordero NP
DUNIA GIRON, acp
Deb Basurto
Roseann Hitchcock ACP
Anjana Tsang
MARGE Matthews [id number]
Marcela Alberts
Roseann Hitchcock ACP
ASHISH Basurto
Deb Basurto NP
Roseann Hitchcock
Anjana Hall
DUNIA GIRON ACP
DUNIA GIRON, acp
Marcela DUQUE
Marcela DUQUE
DUNIA GIRON ACP

## 2021-05-23 NOTE — PROVIDER CONTACT NOTE (OTHER) - ACTION/TREATMENT ORDERED:
Administer labetalol recheck in 30mins
Repeat BP in 1 hour
Bladder scan for urinary retention between 0.00 - 1.00   Provider aware, continue to monitor
NAD noted
Genoveva Wade notified. Awaiting orders. Will continue to monitor.
Will come to bedside and look at rash. Continue to monitor patient
Will followup with orders
Administer narcan 0.1 mg IVP    Post-narcan VS: HR 92 /81 SpO2 98%  Pt is awake and responding to questions post-narcan administration   ACP at bedside to evaluate patient
NAD noted
OK to resume PCA pump , continue to monitor pt.
Place and venti-mask, continue to monitor patient.
Pt assessed at beside ( pt NC increased to 6liters now 94%)
Genoveva Tobar notified. Okay to give PRN 1mg Dilaudid now. Will continue to monitor.
NAD noted
NO new orders, will follow-up with pulm and attending for further interventions, continue to monitor
OK to hold methadone. Continue to monitor patient.
Place on NRB, will come to bedside to assess patient.
Marcela Alberts notified. Labetalol 5mg IVP ordered. Will continue to monitor.
NAD noted as above

## 2021-05-23 NOTE — PROGRESS NOTE ADULT - PROBLEM SELECTOR PLAN 1
New acute back pain x 3 weeks refractory to home opioid regimen (on oxy 30 q4 PRN, not using methadone or gabapentin as prescribed). No neurological deficits on exam.   -MR T spine showed osseous mets involving T2 T3 T4 T9 T11 T10 with small epidural extension at T3. no cord compression  -ortho following- rec T10 kyphoplasty and radiation. will consult IR and rad-onc   -c/w dilaudid PCA pump for pain as per palliative care. hold methadone for lethargy

## 2021-05-23 NOTE — PROVIDER CONTACT NOTE (OTHER) - ASSESSMENT
A&Ox4, arousable. on 3L NC, breathing through mouth desat to 89 as per tele tech. HR ranging 105-108. No acute distress noted. PCA pump monitored as ordered,  maintained
Patient is alert and oriented with periods of forgetfulness. Pts oxygen ranging from 88%-91% on 6L NC. Additional VSS. No acute distress noted.
Patient on PCA pump, minimal use of 4 hr limit utilized. Patient arousable and oriented. Additional VSS. Pt remaining between 85-87% on 6L NC.
Pt is difficult to arouse - snoring loudly   HR 91 /77 SpO2 93%
Denies headache dizziness
PT pain 10/10 on PCA pump pt encouraged to press button
Denies headache dizziness
Pateint with back discomfort denies headache dizziness
Patient c/o of pain 10/10.
Pt is awake. pt bp 155/77, heart rate 107
Patient /81, other VS stable. Patient c/o pain 9/10
Patient /99 HR 72. Patient in pain 10/10, will give pain medication.
/90 denies dizziness, headache NAD noted
Patient alert and oriented with forgetfulness. VSS. No acute distress noted. Reports rash spots come and go? Reports no pain at site. Spots not visualized on any additional part of body. Flat spots, no vesicles noted.
Pt is asleep comfortably. Pt Vitals stable otherwise. Pt received from [facility] on 2 liters NC
Nausea doesn't want PO meds
Pt resting comfortably Asymptomatic
Patient not tolerating venti-mask settings with any increase from 87% on multiple different settings.

## 2021-05-23 NOTE — PROGRESS NOTE ADULT - SUBJECTIVE AND OBJECTIVE BOX
Patient seen and examined in bed.  Patient noted on NRB dt hypoxia.  o2 sat now 96%.  Patient denies SOB and reports she is comfortable.    REVIEW OF SYSTEMS:  As per HPI, otherwise 8 full 10 ROS were unremarkable    MEDICATIONS  (STANDING):  amLODIPine   Tablet 10 milliGRAM(s) Oral daily  atorvastatin 10 milliGRAM(s) Oral at bedtime  chlorhexidine 2% Cloths 1 Application(s) Topical daily  dexAMETHasone  Injectable 4 milliGRAM(s) IV Push every 6 hours  hydrALAZINE 100 milliGRAM(s) Oral every 8 hours  HYDROmorphone PCA (1 mG/mL) 30 milliLiter(s) PCA Continuous PCA Continuous  lactulose Syrup 10 Gram(s) Oral every 6 hours  losartan 100 milliGRAM(s) Oral daily  methadone    Tablet 5 milliGRAM(s) Oral every 8 hours  ondansetron Injectable 4 milliGRAM(s) IV Push every 8 hours  pantoprazole    Tablet 40 milliGRAM(s) Oral before breakfast  piperacillin/tazobactam IVPB.. 3.375 Gram(s) IV Intermittent every 8 hours  polyethylene glycol 3350 17 Gram(s) Oral every 12 hours  potassium phosphate / sodium phosphate Powder (PHOS-NaK) 1 Packet(s) Oral four times a day  senna 2 Tablet(s) Oral at bedtime  sertraline 25 milliGRAM(s) Oral two times a day  tamsulosin 0.4 milliGRAM(s) Oral at bedtime      VITAL:  T(C): , Max: 36.6 (05-23-21 @ 01:56)  T(F): , Max: 97.8 (05-23-21 @ 01:56)  HR: 104 (05-23-21 @ 05:47)  BP: 114/63 (05-23-21 @ 05:47)  BP(mean): --  RR: 18 (05-23-21 @ 07:10)  SpO2: 98% (05-23-21 @ 07:10)  Wt(kg): --    I and O's:    05-22 @ 07:01  -  05-23 @ 07:00  --------------------------------------------------------  IN: 0 mL / OUT: 400 mL / NET: -400 mL          PHYSICAL EXAM:    Constitutional: lethargic but alert   HEENT: PERRLA, EOMI,  MMM  Neck: No LAD, No JVD  Respiratory: diminished   Cardiovascular: S1 and S2  Gastrointestinal: BS+, soft, NT/ND  Extremities: No peripheral edema  Neurological: lethargic at times   : No Lee  Skin: No rashes  Access: Not applicable    LABS:                        12.8   17.65 )-----------( 32       ( 23 May 2021 07:42 )             38.4     05-23    134<L>  |  98  |  32<H>  ----------------------------<  152<H>  3.5   |  24  |  0.70    Ca    8.7      23 May 2021 07:42  Phos  2.2     05-23  Mg     2.1     05-23          IMPRESSION: 71F w/ HTN, DDD-laminectomy, stage 4 esophageal CA, 5/14/21 a/w severe back pain; c/b worsening hyponatremia    (1)Hyponatremia - SIADH - either due to pain, or due to malignancy itself.  Last Tolvaptan dose 5/21.  Na+ acceptable/improved    (2)Hypokalemia - improved, s/p repletion    (3)Hypophosphatemia - borderline low; ordered for repletion     (4)Onc/Ortho - s/p MRI; IR consulted for kyhoplasty       RECOMMEND:  (1)Ensure TID as ordered/encourage osmotic intake  (2)No need for free water restriction for now  (3)No objection to use of loop diuretics here; would avoid thiazides  (4)No objection to continue Kphos 1 pack QID for now  (5)Would favor repeat CXR & ABG if patient remains NRB dependent.  NAD noted at this time.

## 2021-05-23 NOTE — PROVIDER CONTACT NOTE (OTHER) - REASON
Pt NC at 2liters 85%
bp 174/90
Patient c/o of pain 10/10 unrelieved by 0.5mg IVP dilaudid for breakthrough.
/87 HR 66 electronic  manually 186/92
Patient desatting
/90
/90Natalie
Patient /99 HR 72
Patient desatting
Pt /78 HR 88
bp 199/95
patient due for methadone
Pt. was unable to void since 16.00
Pt complaining 10/10 pain
HR >100
Patient /81
Pt /80
Pt difficult to arouse
Rash noted

## 2021-05-23 NOTE — PROGRESS NOTE ADULT - PROBLEM SELECTOR PROBLEM 2
Anxiety
HTN (hypertension)
HTN (hypertension)
Anxiety
Pneumonia, aspiration
HTN (hypertension)
Pneumonia, aspiration

## 2021-05-23 NOTE — PROGRESS NOTE ADULT - PROBLEM/PLAN-6
Event Note
DISPLAY PLAN FREE TEXT

## 2021-05-23 NOTE — PROVIDER CONTACT NOTE (OTHER) - SITUATION
/.90
Patient /99 HR 72
Patient desatting on 6L NC. No further distress noted. Patient arousable, oriented when awoken. Patient continued on PCA pump. Pt adjusted on multiple ventimask settings not increasing past 87%
Pt NC at 2liters 85%
Patient /81
Pt on PCA pump for pain. Pt also received all morning BP meds at this time.
Patient noted with rash to right lower back
/87  186/92
Pt difficult to arouse
Pt threw up all meds and did not tell RN when reassessed pt BP in 190s
/95
Patient c/o of pain 10/10 unrelieved by 0.5mg IVP dilaudid for breakthrough.
Pt complaining 10/10 pain
bp 174/90
, sat 90% on 3L
Patient desatting on 6L NC. No further distress noted. Patient arousable, oriented when awoken. Patient continued on PCA pump
Patient due for standing methadone, remains on PCA pump. Pt 02 sat on 6L nc teetering between 88-91%. Concern for desaturation with additional methadone.
Pt BP earlier was 170's hydralzaine given and pt was in severe pain. PCA pump adjusted and IV tylenol given. Now Pt is resting comfortably /78

## 2021-05-23 NOTE — PROGRESS NOTE ADULT - PROBLEM SELECTOR PLAN 3
Dr. Shelby is providing pt with dmt as outpatient  await mri to decide on next dmt
spoke with pt's daughter and   they now understand that the cancer has spread to back - daughter did not understand the progression and that it was not curative  we discussed care options including hearing surgical eval, RT and med onc for dmt  she was tearful but understanding
, AST 34 on presentation. Unclear etiology, patient does endorse back pain radiating to R-sided abdomen  -CTAP 5/13 w/ normal caliber bile ducts and GB, normal kidneys   -likely incidental finding, neg Huff on exam   -continue to trend LFTs
, AST 34 on presentation. Unclear etiology, patient does endorse back pain radiating to R-sided abdomen  -CTAP 5/13 w/ normal caliber bile ducts and GB, normal kidneys   -likely incidental finding, neg Huff on exam   -continue to trend LFTs
, AST 34 on presentation. Unclear etiology, patient does endorse back pain radiating to R-sided abdomen  -CTAP 5/13 w/ normal caliber bile ducts and GB, normal kidneys   -likely incidental finding, neg Uhff on exam   -continue to trend LFTs
, AST 34 on presentation. Unclear etiology, patient does endorse back pain radiating to R-sided abdomen  -CTAP 5/13 w/ normal caliber bile ducts and GB, normal kidneys   -likely incidental finding, neg Huff on exam   -continue to trend LFTs
Dr. Shelby is providing pt with dmt as outpatient  await mri to decide on next dmt
Dr. Shelby is providing pt with dmt as outpatient  await mri to decide on next dmt
Dx between 9253-4636. On folfox/herceptin (3/2021-). T4 lytic lesion and paravertebral mass dx 1/2021 s/p RT to T4 3/2021. Now w/ new T10 compression fx likely malignant. Was evaluated by [facility], refused esophagectomy. Follows w/ Dr. Shelby heme/onc ([facility] [place] Dr. Trevor Nguyen Rad-Onc, Dr. Susanne Zhao Palliative   -no plan for inpatient chemotherapy as per onc   -marked WBC on admission d/t Neulasta, now improving   -thrombocytopenia 2/2 chemo. plt still slowly down trending, now 50. lovenox on hold today  -will discuss with onc/ortho goal for plt pending prior to kyphoplasty. likely will need plt transfusion
, AST 34 on presentation. Unclear etiology, patient does endorse back pain radiating to R-sided abdomen  -CTAP 5/13 w/ normal caliber bile ducts and GB, normal kidneys   -likely incidental finding, neg Huff on exam   -continue to trend LFTs
Dx between [id number]. On folfox/herceptin (3/2021-). T4 lytic lesion and paravertebral mass dx 1/2021 s/p RT to T4 3/2021. Now w/ new T10 compression fx likely malignant. Was evaluated by [facility], refused esophagectomy. [place]/ [place]. Shelby [place] [place]. Trevor Nguyen Rad-Onc, Dr. Susanne Zhao Palliative   -no plan for inpatient chemotherapy as per onc   -marked WBC on admission d/t Neulasta, now improving   -thrombocytopenia 2/2 chemo. plt still slowly down trending, now 30. will transfuse 1 unit plt. lovenox on hold today
, AST 34 on presentation. Unclear etiology, patient does endorse back pain radiating to R-sided abdomen  -CTAP 5/13 w/ normal caliber bile ducts and GB, normal kidneys   -likely incidental finding, neg Huff on exam   -continue to trend LFTs
, AST 34 on presentation. Unclear etiology, patient does endorse back pain radiating to R-sided abdomen  -CTAP 5/13 w/ normal caliber bile ducts and GB, normal kidneys   -likely incidental finding, neg Huff on exam   -continue to trend LFTs

## 2021-05-23 NOTE — PROVIDER CONTACT NOTE (OTHER) - RECOMMENDATIONS
labetol 5 mg IVP given
Administer narcan 0.1 mg IVP
Assess rash r/o potential latent shingles at bedside? continue to moniotr patient.
EKG for tachycardia
Labetalol 5mg IVP
Labetalol IVP
Repeat BP in 1 hour
Hold methadone. Continue to monitor patient for desaturation
Place on NRB, come to bedside to assess pt, continue to monitor patient and wean when patient able to tolerate.
Place on venti mask and attempt to wean to patients tolerance. R/a and continue to monitor patient for distress and further desaturation
Antihypertensive as ordered and hydromorphone for mamie

## 2021-05-23 NOTE — PROVIDER CONTACT NOTE (OTHER) - DATE AND TIME:
16-May-2021 23:14
17-May-2021 22:57
23-May-2021 05:02
15-May-2021 02:45
18-May-2021 19:48
19-May-2021 03:48
21-May-2021 18:15
22-May-2021 23:53
08-May-2021 18:30
15-May-2021 17:50
22-May-2021 22:48
15-May-2021 04:20
15-May-2021 08:35
16-May-2021 09:31
23-May-2021 05:00
15-May-2021 13:43
18-May-2021 06:50
19-May-2021 03:48
18-May-2021 07:55

## 2021-05-23 NOTE — PROGRESS NOTE ADULT - SUBJECTIVE AND OBJECTIVE BOX
[facility] Division of Hospital Medicine  Elizabeth Leon MD  Pager [phone]      Patient is a 71y old  Female who presents with a chief complaint of back pain (23 May 2021 10:32)      SUBJECTIVE / OVERNIGHT EVENTS:    pt desat to mid 80s on 6L NC o.n, placed o NRB. noted to be somnolent methadone on hold. Developed a new rash R chest. Plt cont to downtrend. Pt awake but unable to verbalize any complaints     ADDITIONAL REVIEW OF SYSTEMS:    RESPIRATORY: No cough, wheezing, chills or hemoptysis; No shortness of breath  CARDIOVASCULAR: No chest pain, palpitations, dizziness, or leg swelling  GASTROINTESTINAL: No abdominal or epigastric pain. No nausea, vomiting, or hematemesis; No diarrhea or constipation. No melena or hematochezia.      MEDICATIONS  (STANDING):  amLODIPine   Tablet 10 milliGRAM(s) Oral daily  atorvastatin 10 milliGRAM(s) Oral at bedtime  chlorhexidine 2% Cloths 1 Application(s) Topical daily  dexAMETHasone  Injectable 4 milliGRAM(s) IV Push every 6 hours  hydrALAZINE 100 milliGRAM(s) Oral every 8 hours  HYDROmorphone PCA (1 mG/mL) 30 milliLiter(s) PCA Continuous PCA Continuous  lactulose Syrup 10 Gram(s) Oral every 6 hours  losartan 100 milliGRAM(s) Oral daily  methadone    Tablet 5 milliGRAM(s) Oral every 8 hours  ondansetron Injectable 4 milliGRAM(s) IV Push every 8 hours  pantoprazole    Tablet 40 milliGRAM(s) Oral before breakfast  piperacillin/tazobactam IVPB.. 3.375 Gram(s) IV Intermittent every 8 hours  polyethylene glycol 3350 17 Gram(s) Oral every 12 hours  potassium phosphate / sodium phosphate Powder (PHOS-NaK) 1 Packet(s) Oral four times a day  senna 2 Tablet(s) Oral at bedtime  sertraline 25 milliGRAM(s) Oral two times a day  tamsulosin 0.4 milliGRAM(s) Oral at bedtime  valACYclovir 1000 milliGRAM(s) Oral every 8 hours    MEDICATIONS  (PRN):  aluminum hydroxide/magnesium hydroxide/simethicone Suspension 30 milliLiter(s) Oral every 4 hours PRN Dyspepsia  bisacodyl Suppository 10 milliGRAM(s) Rectal daily PRN Constipation  FIRST- Mouthwash  BLM 10 milliLiter(s) Swish and Swallow four times a day PRN throat pain  HYDROmorphone PCA (1 mG/mL) Rescue Clinician Bolus 3 milliGRAM(s) IV Push every 3 hours PRN Breakthrough pain  labetalol Injectable 5 milliGRAM(s) IV Push every 6 hours PRN Systolic blood pressure > 190  LORazepam   Injectable 0.5 milliGRAM(s) IV Push every 4 hours PRN Anxiety  ondansetron Injectable 4 milliGRAM(s) IV Push every 8 hours PRN Nausea and/or Vomiting      CAPILLARY BLOOD GLUCOSE        I&O's Summary    22 May 2021 07:01  -  23 May 2021 07:00  --------------------------------------------------------  IN: 0 mL / OUT: 400 mL / NET: -400 mL        PHYSICAL EXAM:  Vital Signs Last 24 Hrs  T(C): 36.7 (23 May 2021 09:45), Max: 36.7 (23 May 2021 09:45)  T(F): 98 (23 May 2021 09:45), Max: 98 (23 May 2021 09:45)  HR: 90 (23 May 2021 09:45) (90 - 109)  BP: 117/69 (23 May 2021 09:45) (114/63 - 151/84)  BP(mean): --  RR: 18 (23 May 2021 09:45) (18 - 18)  SpO2: 98% (23 May 2021 09:45) (86% - 100%)    CONSTITUTIONAL: NAD,  EYES: PERRLA; conjunctiva and sclera clear  ENMT: Moist oral mucosa, no pharyngeal injection or exudates;   NECK: Supple, no palpable masses;  RESPIRATORY: Normal respiratory effort; basilar crackles bilaterally  CARDIOVASCULAR: Regular rate and rhythm, normal S1 and S2, no murmur/rub/gallop; No lower extremity edema; Peripheral pulses are 2+ bilaterally  ABDOMEN: Nontender to palpation, normoactive bowel sounds, no rebound/guarding;   MUSCLOSKELETAL:   no clubbing or cyanosis of digits; no joint swelling or tenderness to palpation  PSYCH: A+O to person, place  NEUROLOGY: CN 2-12 are intact and symmetric; no gross sensory deficits;   SKIN: vesicular rash with erythematous base T7-8 dermatone     LABS:                        12.8   17.65 )-----------( 32       ( 23 May 2021 07:42 )             38.4     05-23    134<L>  |  98  |  32<H>  ----------------------------<  152<H>  3.5   |  24  |  0.70    Ca    8.7      23 May 2021 07:42  Phos  2.2     05-23  Mg     2.1     05-23                  RADIOLOGY & ADDITIONAL TESTS:  Results Reviewed:   Imaging Personally Reviewed:  Electrocardiogram Personally Reviewed:    COORDINATION OF CARE:  Care Discussed with Consultants/Other Providers [Y/N]:  Prior or Outpatient Records Reviewed [Y/N]:

## 2021-05-23 NOTE — CHART NOTE - NSCHARTNOTEFT_GEN_A_CORE
Had a long discussion with patient's  Baltazar, daughter Asha, and son on 5/23/2021. Family reports     5/23- she understands  pt's overall clinical status is deteriorating. she wish to keep pt full code for now and will discuss with father [facility]. Had a in depth discussion with patient's  Baltazar, daughter Asha, and son on 5/23/2021 while at bedside. Per family after a long discussion between patient's  and kids they would like to make patient DNR/DNI and focus on comfort measures only at this time. Patient's  states "If she going to de, I want her to die comfortably." Family no longer wishes for aggressive measures or frequent blood draws. Family would not like platelet transfusion at this time. Family would like to continue with IV antibiotic treatment for now. Medical attending made aware of family's discission. MOLST filled out.     INCOMPLETE NOTE Had a in depth discussion on 5/23/2021 with patient's  Baltazar, daughter Asha, and son while at patient's bedside. Per family after a long discussion between patient's  and kids they would like to make patient DNR/DNI and focus on comfort measures only at this time. Patient's  states "If she is going to die, I want her to die comfortably." Family no longer wishes for aggressive measures or frequent blood draws. Family would not like platelet transfusion at this time but would like to continue with IV antibiotic treatment for now. Medical attending made aware of family's decision. MOLST form filled out with family. Nurse manager witness to conversation. Discussed pain regimen with covering palliative team. Concern patient more lethargic and unable to participate with Dialudid PCA pump. Ordered Dialudid 2 mg q 2 hrs for increased work of breathing and/or pain per palliative recommendations. Medical attending in agreement with plan. Team to continue to monitor. Had a in-depth discussion on 5/23/2021 with patient's  Baltazar, daughter Asha, and son while at patient's bedside. Per family they would like to make patient DNR/DNI and focus on comfort measures only at this time. Family no longer wishes for aggressive measures or frequent blood draws. Family does not want to proceed with platelet transfusion at this time but would like to continue with IV antibiotic treatment for now. Medical attending Dr. Leon made aware of family's decision and in agreement with discontinuing platelet transfusion. MOLST form filled out with family. Nurse manager witness to conversation. Discussed pain regimen with covering palliative team. Concern patient more lethargic and unable to participate with Dilaudid PCA pump. Ordered Dilaudid 2 mg q 2 hrs for increased work of breathing and/or pain per palliative recommendations. Medical attending in agreement with plan. Team to continue to monitor. Goals of care conversation was started by medical attending with family today due to overall deteriorating clinical status. After discussion with patient's family ( Baltazar, daughter Asha, and son) family expressed wishes to make patient DNR/DNI and focus on comfort measures only at this time. Family no longer wishes for aggressive measures or frequent blood draws. Family does not want to proceed with platelet transfusion at this time. Family would like to continue with IV antibiotic treatment for now. Medical attending Dr. Leon made aware of family's decision. Medical attending in agreement with discontinuing platelet transfusion. MOLST form filled out with family. Nurse manager witness to conversation. Discussed pain regimen with covering palliative team. Concern patient more lethargic and unable to participate with Dilaudid PCA pump. Ordered Dilaudid 2 mg q 2 hrs for increased work of breathing and/or pain per palliative recommendations. Medical attending in agreement with plan. Team to continue to monitor.

## 2021-05-23 NOTE — CHART NOTE - NSCHARTNOTEFT_GEN_A_CORE
Called by primary team for recommendations regarding PCA pump.     Chart Reviewed:   72 yo female w/ a PMHx of metastatic esophageal adenocarcinoma (s/p carbo/taxol and RT 4/2020-5/2020, started on folfox/herceptin 3/2021,last 5/12) w/ T4 lytic lesion and paravertebral mass dx 1/2021 (s/p RT to T4 3/2021), refused esophagectomy, HTN, HLD, degenerative disc disease of C-spine s/p laminectomy x2 (2019), and adjustment disorder p/f heme/onc Dr. Shelby's ([facility] [place] [place]) office for refractory acute back pain x 3 weeks found to have new T10 compression fx likely pathologic identified on outpatient CTAP 5/13 confirmed on MRI     Per primary team, patient decompensated overnight with O2 desaturation to 80s. Per their discussion with family, patient made DNR/DNI , no further aggressive measures or blood draws, and comfort measures only. Patient now lethargic and unable to participate with PCA pump usage. Patient currently without symptoms of pain, dyspnea/ tachypnea. However, patient with O2 sat at 84% on NRB per DUNIA Jenkins.    Patient was on Dilaudid PCA pump with the following settings: Continuous Rate: 0 ; Demand dose- 2mg ; Lock Out- 15min. In past 24 hours (9AM-9AM), patient received 27 Injections = IV Dilaudid 60mg.   Methadone discontinued by primary team yesterday per chart review.    Recommendations:  - Discontinue Dilaudid PCA Pump due to current mental status.  - Switch to IV Dilaudid 2mg q2 PRN for pain/dyspnea   - Start Ativan 0.2mg q2 PRN for anxiety/agitation/ refractory dyspnea  - If develops copious oral secretions: Can consider IV Robinul 0.4 mg q6H PRN  - For fevers: can give Tylenol Suppository q8 PRN  - Continue with bowel regimen while on opiates     In the event of newly developing, evolving, or worsening symptoms, please contact the Palliative Medicine team via pager (if the patient is at [facility] #[phone] or if the patient is at [facility] #[phone]) The Geriatric and Palliative Medicine service has coverage 24 hours a day/ 7 days a week to provide medical recommendations regarding symptom management needs via telephone. Called by primary team for recommendations regarding PCA pump.     Chart Reviewed:   70 yo female w/ a PMHx of metastatic esophageal adenocarcinoma (s/p carbo/taxol and RT 4/2020-5/2020, started on folfox/herceptin 3/2021,last 5/12) w/ T4 lytic lesion and paravertebral mass dx 1/2021 (s/p RT to T4 3/2021), refused esophagectomy, HTN, HLD, degenerative disc disease of C-spine s/p laminectomy x2 (2019), and adjustment disorder p/f heme/onc Dr. Shelby's ([facility] [place]) office for refractory acute back pain x 3 weeks found to have new T10 compression fx likely pathologic identified on outpatient CTAP 5/13 confirmed on MRI     Per primary team, patient decompensated overnight with O2 desaturation to 80s. Per their discussion with family, patient made DNR/DNI , no further aggressive measures or blood draws, and comfort measures only. Patient now lethargic and unable to participate with PCA pump usage. Patient currently without symptoms of pain, dyspnea/ tachypnea. However, patient with O2 sat at 84% on NRB per DUNIA Jenkins.    Patient was on Dilaudid PCA pump with the following settings: Continuous Rate: 0 ; Demand dose- 2mg ; Lock Out- 15min. In past 24 hours (9AM-9AM), patient received 30 Injections = IV Dilaudid 60mg.   Methadone discontinued by primary team yesterday per chart review.    Recommendations:  - Discontinue Dilaudid PCA Pump due to current mental status.  - Switch to IV Dilaudid 2mg q2 PRN for pain/dyspnea   - Start Ativan 0.2mg q2 PRN for anxiety/agitation/ refractory dyspnea  - If develops copious oral secretions: Can consider IV Robinul 0.4 mg q6H PRN  - For fevers: can give Tylenol Suppository q8 PRN  - Continue with bowel regimen while on opiates     In the event of newly developing, evolving, or worsening symptoms, please contact the Palliative Medicine team via pager (if the patient is at [facility] #[phone] or if the patient is at [facility] #[phone]) The Geriatric and Palliative Medicine service has coverage 24 hours a day/ 7 days a week to provide medical recommendations regarding symptom management needs via telephone.

## 2021-05-23 NOTE — PROVIDER CONTACT NOTE (OTHER) - BACKGROUND
Hx back pain
Pt admitted with vertebrae fx/compression. Metastatic ca
Pt arrived to unit from [facility] (PCA pump paused).
T9-t10 fracture
Patient admitted with vertebrae, metastatic CA
Pt hx of HTN
hx fracture vertebrae
Patient admitted with vertebrae fx and metastatic CA
Pt in pain and hx HTN
fracture of t9-t10
h/o metastatic esophageal adenocarcinoma   T9-T10 fracture
Patient admitted with vertebrae fx and metastatic CA
Pt admitted for fracture.
admitted with t9-t10 fracture, on PCA pump
hx of HTN and pain
Pt hx of HTN
fracture of t9-t10 vertebra
Pt is on a PCA pump  Premedicated with IVP dilaudid 4 mg and IVP ativan 1 mg for MRI T-spine

## 2021-05-23 NOTE — PROGRESS NOTE ADULT - PROBLEM SELECTOR PROBLEM 1
Compression fracture of T10 vertebra, initial encounter
Compression fracture of T10 vertebra, initial encounter
Pain
Compression fracture of T10 vertebra, initial encounter
Pain
Compression fracture of T10 vertebra, initial encounter
Compression fracture of T10 vertebra, initial encounter
Pain
Compression fracture of T10 vertebra, initial encounter
Pain
Compression fracture of T10 vertebra, initial encounter

## 2021-05-23 NOTE — PROGRESS NOTE ADULT - PROVIDER SPECIALTY LIST ADULT
Heme/Onc
Hospitalist
Orthopedics
Palliative Care
Heme/Onc
Nephrology
Nephrology
Heme/Onc
Hospitalist
Nephrology
Hospitalist
Hospitalist
Palliative Care
Hospitalist
Hospitalist
Palliative Care
Hospitalist
Palliative Care
Hospitalist

## 2021-05-23 NOTE — CHART NOTE - NSCHARTNOTEFT_GEN_A_CORE
Notified by RN pt with decrease O2 sat down to 80s while on 6LNC. Pt seen at bedside NAD noted, sleeping but arousable, mostly mouth breathing, she was placed on NRB --> doing better on NRB O2 sats improving. On lung exam -> diminished breath sounds germaine given pts poor inspiratory efforts, will c/t monitor and taper of NRB as tolerated, will order CXR to further evaluate hypoxia.  Also notified by RN pt with rash on the lower Right sided back, on exam - appears to be ecchymotic, pt denies having any itching at the site, will howard the site and c/t monitor.   Vital Signs Last 24 Hrs  T(F): 97.6 (23 May 2021 05:47), Max: 98.2 (22 May 2021 09:30)  HR: 104 (23 May 2021 05:47) (104 - 109)  BP: 114/63 (23 May 2021 05:47) (114/63 - 151/84)  RR: 18 (23 May 2021 05:47) (18 - 18)  SpO2: 97% (23 May 2021 05:47) (87% - 100%)

## 2021-05-23 NOTE — CHART NOTE - NSCHARTNOTEFT_GEN_A_CORE
Patient s/p PCA pump for pain control today, appears somewhat lethargic and not awake enough to take oral medications, VS stable, still on NRB Sating 88-96%. pt is now DNR/DNI, comfort care. Will change Valtrex PO to Acyclovir IVPB as per pharmacy recommendations.

## 2021-05-23 NOTE — PROGRESS NOTE ADULT - NSICDXPILOT_GEN_ALL_CORE
[place]

## 2021-05-23 NOTE — PROGRESS NOTE ADULT - REASON FOR ADMISSION
back pain

## 2021-05-23 NOTE — PROGRESS NOTE ADULT - PROBLEM SELECTOR PLAN 10
Lovenox sq ( on hold)    Had long discussion with pt's daughter Asha 5/23- she understands  pt's overall clinical status is deteriorating. she wish to keep pt full code for now and will discuss with father GO

## 2021-05-23 NOTE — PROGRESS NOTE ADULT - ASSESSMENT
72 yo female w/ a PMHx of metastatic esophageal adenocarcinoma (s/p carbo/taxol and RT 4/2020-5/2020, started on folfox/herceptin 3/2021,last 5/12) w/ T4 lytic lesion and paravertebral mass dx 1/2021 (s/p RT to T4 3/2021), refused esophagectomy, HTN, HLD, degenerative disc disease of C-spine s/p laminectomy x2 (2019), and adjustment disorder p/f heme/[place]. Shelby[place] [place] refractory acute [place] pain x 3 weeks found to have new T10 compression fx likely pathologic identified on outpatient CTAP 5/13 confirmed on MRI

## 2021-05-23 NOTE — PROGRESS NOTE ADULT - PROBLEM SELECTOR PROBLEM 3
Malignant neoplasm of esophagus, unspecified location
Transaminitis
Malignant neoplasm of esophagus, unspecified location
Transaminitis
Esophageal cancer
Transaminitis
Esophageal cancer

## 2021-05-24 NOTE — CHART NOTE - NSCHARTNOTESELECT_GEN_ALL_CORE
Event Note
Heme/Onc chart note/Event Note
Hospitalist/Event Note
Palliative Care
Uncontrolled  pain/Event Note
Event Note
Hospitalist/Event Note
Palliative Care
hospitalist/Event Note
hospitalist/Event Note

## 2021-05-24 NOTE — DISCHARGE NOTE FOR THE EXPIRED PATIENT - HOSPITAL COURSE
70 yo female w/ a PMHx of metastatic esophageal adenocarcinoma (s/p carbo/taxol and RT 4/2020-5/2020, started on folfox/herceptin 3/2021,last 5/12) w/ T4 lytic lesion and paravertebral mass dx 1/2021 (s/p RT to T4 3/2021), refused esophagectomy, HTN, HLD, degenerative disc disease of C-spine s/p laminectomy x2 (2019), and adjustment disorder p/f heme/on[place] Dr. Shelby's ([facility] [place] for refractory acute back pain x 3 weeks found to have new T10 compression fx likely pathologic identified on outpatient CTAP 5/13 confirmed on MRI       Problem/Plan - 1:  ·  Problem: Compression fracture of T10 vertebra, initial encounter.  Plan: New acute back pain x 3 weeks refractory to home opioid regimen (on oxy 30 q4 PRN, not using methadone or gabapentin as prescribed). No neurological deficits on exam.   -MR T spine showed osseous mets involving T2 T3 T4 T9 T11 T10 with small epidural extension at T3. no cord compression  -ortho following- rec T10 kyphoplasty and radiation. will consult IR and rad-onc   -c/w dilaudid PCA pump for pain as per palliative care. hold methadone for lethargy.     Problem/Plan - 2:  ·  Problem: Pneumonia, aspiration.  Plan: c/w acute hypoxic respiratory failure. O2 requirement cont to increase  -c/w O2 via NRB to maintain SpO2>90  -c/w IV zosyn started 5/19- was on Cefepime before  -c/w decadron for possible pneumonitis 2.2 chemo   -c/w incentive spirometry.     Problem/Plan - 3:  ·  Problem: Esophageal cancer.  Plan: Dx between [id number]. On folfox/herceptin (3/2021-). T4 lytic lesion and paravertebral mass dx 1/2021 s/p RT to T4 3/2021. Now w/ new T10 compression fx likely malignant. Was evaluated by [facility]TS[facility], refused esophagectomy. Follows w/ Dr. Shelby heme/onc ([facility] [place]), Dr. Trevor Nguyen Rad-Onc, Dr. Susanne Zhao Palliative   -no plan for inpatient chemotherapy as per onc   -marked WBC on admission d/t Neulasta, now improving   -thrombocytopenia 2/2 chemo. plt still slowly down trending, now 30. will transfuse 1 unit plt. lovenox on hold today.     Problem/Plan - 4:  ·  Problem: Herpes zoster without complication.  Plan: new vesicular rash c/w shingles in the setting of immunosuppression   -start valtrex  -contact precaution.     Problem/Plan - 5:  ·  Problem: Hyponatremia.  Plan: Na 131 today after tolvaptan 5/20/21  -cont to monitor   -Renal  appreciated.     Problem/Plan - 6:  Problem: HTN (hypertension). Plan: -C/w norvasc 10 mg qd,  Hydralazine to 100 mg q8 and losartan.    Problem/Plan - 7:  ·  Problem: Urinary retention.  Plan: Lee in place  TOV once ambulating.     Problem/Plan - 8:  ·  Problem: Transaminitis.  Plan: , AST 34 on presentation. Unclear etiology, patient does endorse back pain radiating to R-sided abdomen  -CTAP 5/13 w/ normal caliber bile ducts and GB, normal kidneys   -likely incidental finding, neg Huff on exam   -continue to trend LFTs.     Problem/Plan - 9:  ·  Problem: Adjustment disorder.  Plan: -c/w home sertraline 25 mg BID  -restart ativan as needed.     Problem/Plan - 10:  Problem: Prophylactic measure. Plan; Lovenox sq ( on hold)    Patient was noted with no spontaneous respirations , no heart sounds , pupils fixed and dilated . Time of death 745 am   Attending and family - ( daughter yina ) both notified

## 2021-05-24 NOTE — CHART NOTE - NSCHARTNOTEFT_GEN_A_CORE
Called by RN as patient with no BP or spontaneous respirations   Patient laying in bed on NRB   No spontaneous respirations   No heart sounds   Absent pulse  pupils fixed and dilated .   Time of death 7:45 am   attending notified   Daughter HCP notified

## 2021-05-24 NOTE — DISCHARGE NOTE FOR THE EXPIRED PATIENT - SECONDARY DIAGNOSIS.
Aspiration pneumonia of right lower lobe, unspecified aspiration pneumonia type Malignant neoplasm of esophagus, unspecified location

## 2021-05-26 ENCOUNTER — APPOINTMENT (OUTPATIENT)
Dept: HEMATOLOGY ONCOLOGY | Facility: CLINIC | Age: 71
End: 2021-05-26

## 2021-05-26 ENCOUNTER — APPOINTMENT (OUTPATIENT)
Dept: INFUSION THERAPY | Facility: HOSPITAL | Age: 71
End: 2021-05-26

## 2021-05-28 ENCOUNTER — APPOINTMENT (OUTPATIENT)
Dept: INFUSION THERAPY | Facility: HOSPITAL | Age: 71
End: 2021-05-28

## 2021-06-09 ENCOUNTER — APPOINTMENT (OUTPATIENT)
Dept: INFUSION THERAPY | Facility: HOSPITAL | Age: 71
End: 2021-06-09

## 2021-06-09 ENCOUNTER — APPOINTMENT (OUTPATIENT)
Dept: HEMATOLOGY ONCOLOGY | Facility: CLINIC | Age: 71
End: 2021-06-09

## 2021-06-11 ENCOUNTER — APPOINTMENT (OUTPATIENT)
Dept: INFUSION THERAPY | Facility: HOSPITAL | Age: 71
End: 2021-06-11

## 2021-06-23 ENCOUNTER — APPOINTMENT (OUTPATIENT)
Dept: INFUSION THERAPY | Facility: HOSPITAL | Age: 71
End: 2021-06-23

## 2021-06-23 ENCOUNTER — APPOINTMENT (OUTPATIENT)
Dept: HEMATOLOGY ONCOLOGY | Facility: CLINIC | Age: 71
End: 2021-06-23

## 2021-06-25 ENCOUNTER — APPOINTMENT (OUTPATIENT)
Dept: INFUSION THERAPY | Facility: HOSPITAL | Age: 71
End: 2021-06-25

## 2021-10-23 NOTE — ED PROVIDER NOTE - CARDIAC, MLM
Normal rate, regular rhythm.  Heart sounds S1, S2.  No murmurs, rubs or gallops.
Initial (On Arrival)

## 2021-12-04 NOTE — HISTORY OF PRESENT ILLNESS
[Disease: _____________________] : Disease: [unfilled] [N: ___] : N[unfilled] [M: ___] : M[unfilled] [Cycle: ___] : Cycle: [unfilled] [Day: ___] : Day: [unfilled] [de-identified] : Had C-spine surgeries 6/2019 and 7/2019-soon thereafter had difficulty swallowing which patient attributed to her neck surgeries. However, the feeling of something "sticking in her throat" when she ate or drank, gradually got worse. She had lost 14 pounds in prior few months.\par Upper endoscopy (Dr. Chew) showed a suspicious appearing lesion at the distal esophagus at 38 cm, circumferential, unable to traverse with the scope. Esophageal mass biopsy revealed infiltrating, moderately to poorly differentiated adenocarcinoma. CT scan chest/abdomen/pelvis showed wall thickening of the distal esophagus/hiatal hernia with gastrohepatic and mediastinal lymphadenopathy, highly concerning for malignancy. Asymmetric wall thickening of the right bladder slightly increased since prior exam. PET/CT scan showed FDG avid distal esophageal soft tissue thickening, consistent with known malignancy. FDG avid gastrohepatic ligaments, lymph nodes, probably metastatic. FDG avid subcarinal lymph node, probably metastatic. Minimally FDG avid subtle linear left upper lobe pulmonary infiltrate, nonspecific, new since 3/13/20.\par 4/2020-Began Carboplatin/Taxol along with radiation, and completing 5/2020.\par 6/2020-CT scan chest/abdomen/pelvis–thickened mid to distal esophagus and masslike thickening of the gastric cardia.  Prior mediastinal adenopathy resolved.  Prior perigastric lymph nodes resolved except for point lymph nodes smaller than the prior study.  New patchy groundglass opacities with low-grade airspace disease in the lungs.  Prior subcarinal adenopathy resolved.  Stable asymmetrical right lateral bladder wall thickening.  Asymmetrical thickening of the left levator ani muscle unchanged.  \par 7/2020–PET/CT scan showed considerably decreased extent and FDG avidity of soft tissue thickening distal esophagus.  Persistent mild FDG avidity may represent posttreatment inflammatory changes.  Resolved FDG avid mediastinal and gastrohepatic lymph nodes.  New linear segmental FDG avidity from the mid to distal esophagus, indeterminate.  Minimally FDG avid right lower lobe groundglass infiltrate, probably infectious/inflammatory, minimally improved since 6/2020 CT chest.\par 7/2020–EGD cardia/distal esophagus/proximal esophagus biopsies negative for malignancy.\par Saw Dr. Weaver in thoracic surgical consultation for consideration of esophagectomy-patient refused surgery.\par 10/2020-CT chest/abdomen/pelvis–mid to distal esophageal wall thickening appears less prominent.  Stable 3.2 cm masslike opacity within the gastric cardia.  9 mm perigastric lymph node appears decreased in size.\par \par 1/2021–CT scan chest/abdomen/pelvis–persistent thickening of the wall of the distal esophagus and GE junction unchanged compared to most recent prior study.  Single celiac node slightly smaller.  New lytic sclerotic metastatic disease involving T4 vertebral body with adjacent paravertebral mass.  CT-guided biopsy of thoracic paravertebral mass positive for malignant cells, metastatic adenocarcinoma with cytomorphology compatible with known esophageal adenocarcinoma. [de-identified] : Adenocarcinoma [de-identified] : [facility]-PD-L1 CPS-60.\par [place] PD-L1 TPS-0%. [FreeTextEntry1] : Cycle 2 FOLFOX +HERCEPTIN   3/31/21 \par FOLFOX + HERCEPTIN   3/31/21  [de-identified] : PT states he feels more fatigued with poor PO intake over the past 2-3 days \par Trying to hydrate\par Denies fevers, chills\par No SOB/BARBOSA\par + Arthralgias with good relief with OXY IR/ER    Intermittent back pain after opening the window the other day.  No new paresthesias or gait disturbance. \par No falls, trauma \par Has had good relief with Ondansetron but unable to take in the last few days as she had Aloxi Day 1 chemotherapy \par  <-- Click to add NO pertinent Family History

## 2021-12-06 NOTE — ASU DISCHARGE PLAN (ADULT/PEDIATRIC) - PROCEDURE
Anterior Discectomy and Fusion C4-5 Cheek-To-Nose Interpolation Flap Text: A decision was made to reconstruct the defect utilizing an interpolation axial flap and a staged reconstruction.  A telfa template was made of the defect.  This telfa template was then used to outline the Cheek-To-Nose Interpolation flap.  The donor area for the pedicle flap was then injected with anesthesia.  The flap was excised through the skin and subcutaneous tissue down to the layer of the underlying musculature.  The interpolation flap was carefully excised within this deep plane to maintain its blood supply.  The edges of the donor site were undermined.   The donor site was closed in a primary fashion.  The pedicle was then rotated into position and sutured.  Once the tube was sutured into place, adequate blood supply was confirmed with blanching and refill.  The pedicle was then wrapped with xeroform gauze and dressed appropriately with a telfa and gauze bandage to ensure continued blood supply and protect the attached pedicle.

## 2022-01-25 NOTE — PHYSICAL THERAPY INITIAL EVALUATION ADULT - PATIENT/FAMILY/SIGNIFICANT OTHER GOALS STATEMENT, PT EVAL
How Severe Is Your Condition?: moderate
Additional History: Pt presents with pimple like lesion under her left eye. It comes and goes over the past past year and patient has issues not scratching it off. Her  has the same thing that came back as a basal cell, pt also has basal cell hx so she would like this spot evaluated.
pt wants to go home

## 2022-02-12 NOTE — PROGRESS NOTE ADULT - SUBJECTIVE AND OBJECTIVE BOX
Subjective:  Patient is a 66y old  Female who presents with a chief complaint of Altered Mental Status (10 Feb 2022 01:46)    HPI:  67 y/o F PMHx significant for Hypertension, Hyperlipidemia, and alcohol abuse was BIBA from home for further evaluation and management of altered mental status. As per EMS the patient was found unresponsive at home by the patient's . Of note the patient attempted to stop drinking 2 days ago, but started drinking again today. Upon arrival to triage the patient was only arousable to noxious stimuli. HPI obtained from ED staff, EMS, and patient's  due to the patient's mental status. In the ED Toxicology was consulted. As the patient's mental status improved she admitted to ingesting "rubbing alcohol".  Vital Signs in Triage => /68, HR 77/min, RR 18/min, TMax 97.7'F, SaO2 98%.   (10 Feb 2022 00:38)    REVIEW OF SYSTEMS:  General: NAD, hemodynamically stable, (-)  fever, (-) chills, (-) weakness  HEENT:  Eyes:  No visual loss, blurred vision, double vision or yellow sclerae. Ears, Nose, Throat:  No hearing loss, sneezing, congestion, runny nose or sore throat.  SKIN:  No rash or itching.  CARDIOVASCULAR:  No chest pain, chest pressure or chest discomfort. No palpitations or edema.  RESPIRATORY:  No shortness of breath, cough or sputum.  GASTROINTESTINAL:  No anorexia, nausea, vomiting or diarrhea. No abdominal pain or blood.  NEUROLOGICAL:  No headache, dizziness, syncope, paralysis, ataxia, numbness or tingling in the extremities. No change in bowel or bladder control.  MUSCULOSKELETAL:  No muscle, back pain, joint pain or stiffness.  HEMATOLOGIC:  No anemia, bleeding or bruising.  LYMPHATICS:  No enlarged nodes. No history of splenectomy.  ENDOCRINOLOGIC:  No reports of sweating, cold or heat intolerance. No polyuria or polydipsia.  ALLERGIES:  No history of asthma, hives, eczema or rhinitis.    Physical Exam:   GENERAL APPEARANCE:  NAD, hemodynamically stable  T(C): 37 (22 @ 14:02), Max: 37 (22 @ 14:02)  HR: 72 (22 @ 16:36) (61 - 73)  BP: 144/67 (22 @ 16:36) (129/77 - 156/80)  RR: 18 (22 @ 16:36) (18 - 18)  SpO2: 98% (22 @ 16:36) (98% - 100%)  Wt(kg): --  HEENT:  Head is normocephalic    Skin:  Warm and dry without any rash   NECK:  Supple without lymphadenopathy.   HEART:  Regular rate and rhythm. normal S1 and S2, No M/R/G  LUNGS:  Good ins/exp effort, no W/R/R/C  ABDOMEN:  Soft, nontender, nondistended with good bowel sounds heard  EXTREMITIES:  Without cyanosis, clubbing or edema.   NEUROLOGICAL:  Gross nonfocal       CBC Full  -  ( 2022 07:24 )  WBC Count : 5.38 K/uL  RBC Count : 3.63 M/uL  Hemoglobin : 11.7 g/dL  Hematocrit : 34.9 %  Platelet Count - Automated : 152 K/uL  Mean Cell Volume : 96.1 fl  Mean Cell Hemoglobin : 32.2 pg  Mean Cell Hemoglobin Concentration : 33.5 gm/dL  Auto Neutrophil # : x  Auto Lymphocyte # : x  Auto Monocyte # : x  Auto Eosinophil # : x  Auto Basophil # : x  Auto Neutrophil % : x  Auto Lymphocyte % : x  Auto Monocyte % : x  Auto Eosinophil % : x  Auto Basophil % : x      Urinalysis Basic - ( 2022 18:27 )    Color: Yellow / Appearance: Clear / S.015 / pH: x  Gluc: x / Ketone: Small  / Bili: Negative / Urobili: Negative   Blood: x / Protein: See Note / Nitrite: Negative   Leuk Esterase: Negative / RBC: x / WBC x   Sq Epi: x / Non Sq Epi: x / Bacteria: x          139  |  108  |  5<L>  ----------------------------<  123<H>  4.0   |  26  |  1.01    Ca    9.3      2022 07:24  Phos  3.3       Mg     1.7         TPro  6.2  /  Alb  3.0<L>  /  TBili  0.8  /  DBili  x   /  AST  6<L>  /  ALT  32  /  AlkPhos  35<L>        67 y/o F PMHx significant for Hypertension, Hyperlipidemia, and alcohol abuse was BIBA from home for further evaluation and management of altered mental status. As per EMS the patient was found unresponsive at home by the patient's . Of note the patient attempted to stop drinking 2 days ago, but started drinking again today. Upon arrival to triage the patient was only arousable to noxious stimuli. HPI obtained from ED staff, EMS, and patient's  due to the patient's mental status. In the ED Toxicology was consulted. As the patient's mental status improved she admitted to ingesting "rubbing alcohol".  Vital Signs in Triage => /68, HR 77/min, RR 18/min, TMax 97.7'F, SaO2 98%.    # Acute Isopropyl Alcohol Toxicity  # Ethyl Alcohol Withdrawal  - patient has not received fomipazole  - per Toxicology administer Thiamine 500mg IV q8h + Folic acid 1mg/kg IV q6h x 1 more day and then dcl    # Toxic Metabolic encephalopathy   - Improved     # ALEJANDRO improving  - resolved    #Hypertension  ~cont. Losartan 100mg po daily    #Hyperlipidemia  ~cont. Atorvastatin 40mg po qhs    #Depression  ~cont. Escitalopram 20mg po daily    #Vte ppx  ~IMPROVE Vte Risk Score is 1       INTERVAL HPI/OVERNIGHT EVENTS:   Patient seen and examined.  Pain in left groin better, comes on with minimal weight bearing, up to 9/10.  No fevers, chills, sweats, dizziness, HA, changes in vision, cp, palpitations, sob, persistent cough, n/v/d, abd pain, dysuria, or calf pain.   Has been urinating and having BMs regularly since her c-spine surgery last Thurs.       REVIEW OF SYSTEMS:  See HPI,  all others negative    PHYSICAL EXAM:  Vital Signs Last 24 Hrs  T(C): 36.7 (2019 07:33), Max: 37 (2019 14:30)  T(F): 98 (2019 07:33), Max: 98.6 (2019 14:30)  HR: 77 (2019 07:33) (77 - 94)  BP: 100/69 (2019 07:33) (100/69 - 162/95)  BP(mean): --  RR: 17 (2019 07:33) (16 - 18)  SpO2: 97% (2019 07:33) (95% - 98%)    GENERAL: NAD, well-groomed, well-developed, awake, alert, oriented x 3, fluent and coherent speech  EYES: EOMI, PERRLA, conjunctiva and sclera clear  ENMT: No tonsillar erythema, exudates, or enlargement; Moist mucous membranes, Good dentition, No lesions  NECK: Supple, No JVD, No Cervical LAD, No thyromegaly, No thyroid nodules felt, patient put on c-collar before trying to get up out of bed  NERVOUS SYSTEM:  Good concentration; Moving all 4 extremities; No gross sensory deficits, No facial droop  CHEST/LUNG: Clear to auscultation bilaterally; No rales, rhonchi, wheezing, or rubs  HEART: Regular rate and rhythm; No murmurs, rubs, or gallops  ABDOMEN: Soft, Nontender, obese, Bowel sounds present, No palpable masses or organomegaly, No bruits  EXTREMITIES:  2+ Peripheral Pulses, No clubbing, cyanosis, or edema  INCISION: dressing c/d/i on ant neck and LLQ (bone graft punch site)    Diagnostic Testin.8   8.43  )-----------( 294      ( 2019 19:30 )             37.9     2019 05:58    129    |  95     |  16     ----------------------------<  120    3.7     |  32     |  0.64     Ca    9.5        2019 05:58    TPro  7.7    /  Alb  3.3    /  TBili  0.5    /  DBili  x      /  AST  28     /  ALT  27     /  AlkPhos  76     2019 19:30    PT/INR - ( 2019 19:30 )   PT: 11.4 sec;   INR: 1.02 ratio         PTT - ( 2019 19:30 )  PTT:30.3 sec   Subjective:  Patient is a 66y old  Female who presents with a chief complaint of Altered Mental Status (10 Feb 2022 01:46)    Patient is a 65 y/o F PMHx significant for Hypertension, Hyperlipidemia, and alcohol abuse was BIBA from home for further evaluation and management of altered mental status. As per EMS the patient was found unresponsive at home by the patient's . Of note the patient attempted to stop drinking 2 days ago, but started drinking again today. Upon arrival to triage the patient was only arousable to noxious stimuli. HPI obtained from ED staff, EMS, and patient's  due to the patient's mental status. In the ED Toxicology was consulted. As the patient's mental status improved she admitted to ingesting "rubbing alcohol".  Vital Signs in Triage => /68, HR 77/min, RR 18/min, TMax 97.7'F, SaO2 98%.    Feels great. Denies any HA, CP, SOB. still with some shakes.     REVIEW OF SYSTEMS:  General: NAD, hemodynamically stable, (-)  fever, (-) chills, (-) weakness  HEENT:  Eyes:  No visual loss, blurred vision, double vision or yellow sclerae. Ears, Nose, Throat:  No hearing loss, sneezing, congestion, runny nose or sore throat.  SKIN:  No rash or itching.  CARDIOVASCULAR:  No chest pain, chest pressure or chest discomfort. No palpitations or edema.  RESPIRATORY:  No shortness of breath, cough or sputum.  GASTROINTESTINAL:  No anorexia, nausea, vomiting or diarrhea. No abdominal pain or blood.  NEUROLOGICAL:  No headache, dizziness, syncope, paralysis, ataxia, numbness or tingling in the extremities. No change in bowel or bladder control.  MUSCULOSKELETAL:  No muscle, back pain, joint pain or stiffness.  HEMATOLOGIC:  No anemia, bleeding or bruising.  LYMPHATICS:  No enlarged nodes. No history of splenectomy.  ENDOCRINOLOGIC:  No reports of sweating, cold or heat intolerance. No polyuria or polydipsia.  ALLERGIES:  No history of asthma, hives, eczema or rhinitis.    Physical Exam:   GENERAL APPEARANCE:  NAD, hemodynamically stable  T(C): 37 (22 @ 14:02), Max: 37 (22 @ 14:02)  HR: 72 (22 @ 16:36) (61 - 73)  BP: 144/67 (22 @ 16:36) (129/77 - 156/80)  RR: 18 (22 @ 16:36) (18 - 18)  SpO2: 98% (22 @ 16:36) (98% - 100%)  Wt(kg): --  HEENT:  Head is normocephalic    Skin:  Warm and dry without any rash   NECK:  Supple without lymphadenopathy.   HEART:  Regular rate and rhythm. normal S1 and S2, No M/R/G  LUNGS:  Good ins/exp effort, no W/R/R/C  ABDOMEN:  Soft, nontender, nondistended with good bowel sounds heard  EXTREMITIES:  Without cyanosis, clubbing or edema.   NEUROLOGICAL:  Gross nonfocal       CBC Full  -  ( 2022 07:24 )  WBC Count : 5.38 K/uL  RBC Count : 3.63 M/uL  Hemoglobin : 11.7 g/dL  Hematocrit : 34.9 %  Platelet Count - Automated : 152 K/uL  Mean Cell Volume : 96.1 fl  Mean Cell Hemoglobin : 32.2 pg  Mean Cell Hemoglobin Concentration : 33.5 gm/dL  Auto Neutrophil # : x  Auto Lymphocyte # : x  Auto Monocyte # : x  Auto Eosinophil # : x  Auto Basophil # : x  Auto Neutrophil % : x  Auto Lymphocyte % : x  Auto Monocyte % : x  Auto Eosinophil % : x  Auto Basophil % : x      Urinalysis Basic - ( 2022 18:27 )    Color: Yellow / Appearance: Clear / S.015 / pH: x  Gluc: x / Ketone: Small  / Bili: Negative / Urobili: Negative   Blood: x / Protein: See Note / Nitrite: Negative   Leuk Esterase: Negative / RBC: x / WBC x   Sq Epi: x / Non Sq Epi: x / Bacteria: x          139  |  108  |  5<L>  ----------------------------<  123<H>  4.0   |  26  |  1.01    Ca    9.3      2022 07:24  Phos  3.3       Mg     1.7         TPro  6.2  /  Alb  3.0<L>  /  TBili  0.8  /  DBili  x   /  AST  6<L>  /  ALT  32  /  AlkPhos  35<L>        65 y/o F PMHx significant for Hypertension, Hyperlipidemia, and alcohol abuse was BIBA from home for further evaluation and management of altered mental status. As per EMS the patient was found unresponsive at home by the patient's . Of note the patient attempted to stop drinking 2 days ago, but started drinking again today. Upon arrival to triage the patient was only arousable to noxious stimuli. HPI obtained from ED staff, EMS, and patient's  due to the patient's mental status. In the ED Toxicology was consulted. As the patient's mental status improved she admitted to ingesting "rubbing alcohol".  Vital Signs in Triage => /68, HR 77/min, RR 18/min, TMax 97.7'F, SaO2 98%.    # Acute Isopropyl Alcohol Toxicity  # Ethyl Alcohol Withdrawal  - patient has not received fomipazole  - per Toxicology administer Thiamine 500mg IV q8h + Folic acid 1mg/kg IV q6h x 1 more day and then dcl    # Toxic Metabolic encephalopathy   - Improved     # ALEJANDRO improving  - resolved    #Hypertension  ~cont. Losartan 100mg po daily    #Hyperlipidemia  ~cont. Atorvastatin 40mg po qhs    #Depression  ~cont. Escitalopram 20mg po daily    #Vte ppx  ~IMPROVE Vte Risk Score is 1         Subjective:  Patient is a 66y old  Female who presents with a chief complaint of Altered Mental Status (10 Feb 2022 01:46)    Patient is a 67 y/o F PMHx significant for Hypertension, Hyperlipidemia, and alcohol abuse was BIBA from home for further evaluation and management of altered mental status. As per EMS the patient was found unresponsive at home by the patient's . Of note the patient attempted to stop drinking 2 days ago, but started drinking again today. Upon arrival to triage the patient was only arousable to noxious stimuli. HPI obtained from ED staff, EMS, and patient's  due to the patient's mental status. In the ED Toxicology was consulted. As the patient's mental status improved she admitted to ingesting "rubbing alcohol".    Feels great. Denies any HA, CP, SOB. Still with some shakes.     REVIEW OF SYSTEMS:  General: NAD, hemodynamically stable   HEENT: No visual loss  SKIN:  No rash or itching.  CARDIOVASCULAR:  No chest pain  RESPIRATORY:  No shortness of breath   GASTROINTESTINAL:  No anorexia, nausea, vomiting or diarrhea. No abdominal pain or blood.  NEUROLOGICAL:  No headache, dizziness, syncope, paralysis, ataxia, numbness or tingling in the extremities. No change in bowel or bladder control.  MUSCULOSKELETAL:  No muscle, back pain, joint pain or stiffness.  HEMATOLOGIC:  No anemia, bleeding or bruising.  LYMPHATICS:  No enlarged nodes. No history of splenectomy.  ENDOCRINOLOGIC:  No reports of sweating, cold or heat intolerance. No polyuria or polydipsia.  ALLERGIES:  No history of asthma, hives, eczema or rhinitis.    Physical Exam:   GENERAL APPEARANCE:  NAD, hemodynamically stable  T(C): 37 (22 @ 14:02), Max: 37 (22 @ 14:02)  HR: 72 (22 @ 16:36) (61 - 73)  BP: 144/67 (22 @ 16:36) (129/77 - 156/80)  RR: 18 (22 @ 16:36) (18 - 18)  SpO2: 98% (22 @ 16:36) (98% - 100%)  HEENT:  Head is normocephalic    Skin:  Warm and dry without any rash   NECK:  Supple without lymphadenopathy.   HEART:  Regular rate and rhythm. normal S1 and S2, No M/R/G  LUNGS:  Good ins/exp effort, no W/R/R/C  ABDOMEN:  Soft, nontender, nondistended with good bowel sounds heard  EXTREMITIES:  Without cyanosis, clubbing or edema.   NEUROLOGICAL:  Gross nonfocal     Labs:       CBC Full  -  ( 2022 06:33 )  WBC Count : 4.80 K/uL  RBC Count : 3.40 M/uL  Hemoglobin : 11.2 g/dL  Hematocrit : 32.9 %  Platelet Count - Automated : 122 K/uL    Urinalysis Basic - ( 2022 18:27 )    Color: Yellow / Appearance: Clear / S.015 / pH: x  Gluc: x / Ketone: Small  / Bili: Negative / Urobili: Negative   Blood: x / Protein: See Note / Nitrite: Negative   Leuk Esterase: Negative / RBC: x / WBC x   Sq Epi: x / Non Sq Epi: x / Bacteria: x    137  |  102  |  8   ----------------------------<  96  4.3   |  29  |  0.90    Ca    8.9      2022 06:33  Phos  3.3     02-12  Mg     1.7     02-12    TPro  6.0  /  Alb  3.0<L>  /  TBili  0.7  /  DBili  x   /  AST  6<L>  /  ALT  28  /  AlkPhos  36<L>  02-13        67 y/o F PMHx significant for Hypertension, Hyperlipidemia, and alcohol abuse was BIBA from home for further evaluation and management of altered mental status. As per EMS the patient was found unresponsive at home by the patient's . Of note the patient attempted to stop drinking 2 days ago, but started drinking again today. Upon arrival to triage the patient was only arousable to noxious stimuli. HPI obtained from ED staff, EMS, and patient's  due to the patient's mental status. In the ED Toxicology was consulted. As the patient's mental status improved she admitted to ingesting "rubbing alcohol".  Vital Signs in Triage => /68, HR 77/min, RR 18/min, TMax 97.7'F, SaO2 98%.    # Acute Isopropyl Alcohol Toxicity  # Ethyl Alcohol Withdrawal  - patient has not received fomipazole  - per Toxicology administer Thiamine 500mg IV q8h + Folic acid 1mg/kg IV q6h x 1 more day and then dcl    # Toxic Metabolic encephalopathy   - Improved     # ALEJANDRO improving  - resolved    #Hypertension  ~cont. Losartan 100mg po daily    #Hyperlipidemia  ~cont. Atorvastatin 40mg po qhs    #Depression  ~cont. Escitalopram 20mg po daily    #Vte ppx  ~IMPROVE Vte Risk Score is 1

## 2022-02-16 NOTE — PROGRESS NOTE ADULT - PROBLEM SELECTOR PLAN 6
-c/w home atorvastatin 10mg Arava Counseling:  Patient counseled regarding adverse effects of Arava including but not limited to nausea, vomiting, abnormalities in liver function tests. Patients may develop mouth sores, rash, diarrhea, and abnormalities in blood counts. The patient understands that monitoring is required including LFTs and blood counts.  There is a rare possibility of scarring of the liver and lung problems that can occur when taking methotrexate. Persistent nausea, loss of appetite, pale stools, dark urine, cough, and shortness of breath should be reported immediately. Patient advised to discontinue Arava treatment and consult with a physician prior to attempting conception. The patient will have to undergo a treatment to eliminate Arava from the body prior to conception.

## 2022-08-08 NOTE — PROGRESS NOTE ADULT - ASSESSMENT
Patient was notified of recommendations. Patient understood and has no further questions at this time.   72 yo female w/ a PMHx of metastatic esophageal adenocarcinoma (s/p carbo/taxol and RT 4/2020-5/2020, started on folfox/herceptin 3/2021,last 5/12) w/ T4 lytic lesion and paravertebral mass dx 1/2021 (s/p RT to T4 3/2021), refused esophagectomy, HTN, HLD, degenerative disc disease of C-spine s/p laminectomy x2 (2019), and adjustment disorder p/f heme/onc Dr. Shelby's ([facility]) office for refractory acute back pain x 3 weeks found to have new T10 compression fx likely pathologic identified on outpatient CTAP 5/13 admitted for further mgt

## 2022-09-22 NOTE — H&P ADULT - PROBLEM SELECTOR PLAN 2
MEDICARE WELLNESS VISIT + NOTE    CHIEF COMPLAINT:  Gina Dallas presents for her Subsequent Annual Medicare Wellness Visit.   Her additional complaints or concerns are addressed below.      Patient Care Team:  Mohan Ledesma MD as PCP - General  Xiao Dsouza DO as Gynecologist (Obstetrics & Gynecology)  Clifton Chavez MD as Orthopedic Surgeon (Orthopedic Surgery)  Ariane Villanueva MD as Dermatology (Dermatology)  Dony Mcclendon DPM as Podiatry (Podiatry)        Patient Active Problem List   Diagnosis   • Pre-diabetes   • Acid reflux   • Anemia   • Gout   • Dyslipidemia   • Overweight   • Vitamin D deficiency   • History of abnormal mammogram   • Fluid level behind tympanic membrane of both ears   • Impacted cerumen of right ear   • Obesity (BMI 30-39.9)   • Multinodular goiter   • Postmenopausal         History reviewed. No pertinent past medical history.      History reviewed. No pertinent surgical history.      Social History     Tobacco Use   • Smoking status: Never Smoker   • Smokeless tobacco: Never Used   Vaping Use   • Vaping Use: never used   Substance Use Topics   • Alcohol use: Never   • Drug use: Never     Family History   Problem Relation Age of Onset   • Dementia/Alzheimers Mother    • Heart disease Father    • Diabetes Father    • Cancer Maternal Grandmother          Current Outpatient Medications   Medication Sig Dispense Refill   • erythromycin (ILOTYCIN) ophthalmic ointment APPLY THREE TIMES DAILY TO THE RIGHT EYE FOR 1 WEEK     • Cholecalciferol (Vitamin D3) 10 mcg (400 units) capsule      • atorvastatin (LIPITOR) 10 MG tablet TAKE ONE TABLET BY MOUTH ONE TIME DAILY 30 tablet 0   • omeprazole (PrilOSEC) 20 MG capsule TAKE ONE CAPSULE BY MOUTH ONE TIME DAILY 90 capsule 0   • CALCIUM CARBONATE PO        No current facility-administered medications for this visit.        The following items on the Medicare Health Risk Assessment were found to be positive      Vision and Hearing screens:     Hearing Screening    125Hz 250Hz 500Hz 1000Hz 2000Hz 3000Hz 4000Hz 6000Hz 8000Hz   Right ear:            Left ear:               Visual Acuity Screening    Right eye Left eye Both eyes   Without correction:      With correction: 20/40 20/70 20/40       Advance Directive:   The patient has the following documents:  Power of  for Health Care    Cognitive/Functional Status: Mini-Cog performed with score of 5    Opioid Review: Gina is not taking opioid medications.    Recent PHQ 2/9 Score:    PHQ 2:  Date Adult PHQ 2 Score Adult PHQ 2 Interpretation   4/6/2022 0 No further screening needed       PHQ 9:4      DEPRESSION ASSESSMENT/PLAN:  Situational, mild, will follow    Body mass index is 32.06 kg/m².    BMI ASSESSMENT/PLAN:  Overweight.    See Patient Instructions section.   No follow-ups on file.      OUTPATIENT PROGRESS NOTE    Subjective   Chief Complaint Medicare Wellness Visit (Pt states she is here for her annual medicare wellness visit )    Feels well overall.   Recent loss of ex-.  Mom has dementia, pt is main caregiver.    Feels overwhelmed at times.    Supporting adult kids emotionally, looks after mom, mother in law.  Will offer counseling.    L shoulder pain. Elevation limited       Medications  Medications were reviewed and updated today.    Histories  I have personally reviewed and updated the patient's past medical, past surgical, family and social histories during today's visit.    Review of Systems   Constitutional: Positive for unexpected weight change. Negative for activity change, appetite change, chills, fatigue and fever.   HENT: Negative for congestion, ear pain, hearing loss, nosebleeds, postnasal drip, sinus pain, sore throat, trouble swallowing and voice change.    Eyes: Negative for photophobia, pain, discharge, redness, itching and visual disturbance.   Respiratory: Negative for cough, chest tightness, shortness of breath and wheezing.    Cardiovascular: Negative for chest  pain, palpitations and leg swelling.   Gastrointestinal: Negative for abdominal distention, abdominal pain, blood in stool, constipation, diarrhea, nausea, rectal pain and vomiting.   Endocrine: Negative for cold intolerance, polydipsia, polyphagia and polyuria.   Genitourinary: Negative for difficulty urinating, dysuria, flank pain, frequency, hematuria and urgency.   Musculoskeletal: Negative for back pain, gait problem, joint swelling and neck pain.        Shoulder pain   Skin: Negative for color change and rash.   Allergic/Immunologic: Negative for environmental allergies and food allergies.   Neurological: Negative for dizziness, tremors, seizures, syncope, speech difficulty, weakness, light-headedness, numbness and headaches.   Hematological: Does not bruise/bleed easily.   Psychiatric/Behavioral: Negative for decreased concentration, sleep disturbance and suicidal ideas. The patient is nervous/anxious.        Objective   Visit Vitals  /87   Pulse 75   Temp 98 °F (36.7 °C) (Temporal)   Resp 18   Ht 5' 7\" (1.702 m)   Wt 92.9 kg (204 lb 11.2 oz)   LMP  (LMP Unknown)   SpO2 100%   BMI 32.06 kg/m²     Physical Exam  Constitutional:       General: She is not in acute distress.     Appearance: Normal appearance. She is obese. She is not ill-appearing.   HENT:      Right Ear: External ear normal.      Left Ear: External ear normal.      Nose: Nose normal.   Eyes:      Extraocular Movements: Extraocular movements intact.      Conjunctiva/sclera: Conjunctivae normal.   Cardiovascular:      Rate and Rhythm: Normal rate and regular rhythm.      Pulses: Normal pulses.      Heart sounds: Normal heart sounds. No murmur heard.  Pulmonary:      Effort: Pulmonary effort is normal. No respiratory distress.      Breath sounds: Normal breath sounds. No wheezing.   Abdominal:      General: Abdomen is flat. Bowel sounds are normal. There is no distension.      Palpations: Abdomen is soft. There is no mass.      Tenderness:  There is no abdominal tenderness.   Musculoskeletal:         General: Tenderness present. No swelling. Normal range of motion.      Cervical back: Normal range of motion and neck supple.      Comments: L anterior tenderness, elevation   Skin:     General: Skin is warm and dry.   Neurological:      General: No focal deficit present.      Mental Status: She is alert and oriented to person, place, and time.      Cranial Nerves: No cranial nerve deficit.      Sensory: No sensory deficit.      Motor: No weakness.   Psychiatric:         Mood and Affect: Mood normal.         Behavior: Behavior normal.         Thought Content: Thought content normal.         Judgment: Judgment normal.         Laboratory  I have reviewed the pertinent laboratory tests. These are the pertinent findings:  · ordered    Imaging  I have reviewed the pertinent imaging study reports. These are the pertinent findings:  · mamm    Assessment & Plan   Diagnoses and associated orders for this visit:  1. Need for vaccination  -     INFLUENZA QUADRIVALENT HIGH DOSE PRES FREE 0.7 ML VACC,IM  -     Lipid Panel With Reflex  2. Dyslipidemia  -     Lipid Panel With Reflex  3. Pre-diabetes  Assessment & Plan:  Check A1c, low carb diet/exercise  Orders:  -     Comprehensive Metabolic Panel  -     Glycohemoglobin  -     Urinalysis & Reflex Microscopy With Culture If Indicated  4. Overweight  -     Thyroid Stimulating Hormone  5. Vitamin D deficiency  -     Vitamin D -25 Hydroxy  6. Obesity (BMI 30-39.9)  Assessment & Plan:  Exercises more, line dancing, walking, bowling  Orders:  -     Thyroid Stimulating Hormone  7. Multinodular goiter  -     Thyroid Stimulating Hormone  8. Gastroesophageal reflux disease without esophagitis  -     CBC with Automated Differential  9. History of abnormal mammogram  Assessment & Plan:  Mamm 4/22 normal, return to normal screening  10. Anemia, unspecified type  -     CBC with Automated Differential  11. Gout, unspecified cause,  unspecified chronicity, unspecified site  Assessment & Plan:  Asymptomatic at present, check uric acid level  Orders:  -     Uric Acid  12. Acute pain of left shoulder  -     XR SHOULDER 3 VIEWS LEFT  13. Medicare annual wellness visit, subsequent  -     CBC with Automated Differential  -     Comprehensive Metabolic Panel  -     Urinalysis & Reflex Microscopy With Culture If Indicated  -     Vitamin D -25 Hydroxy     Hx of HTN on losartan 100 at home. SBPs elevated likely iso pain  -c/w home losartan  -pain control as above Hx of HTN on losartan 100 at home. SBPs elevated likely iso pain  -c/w home losartan  -labetalol 5mg q4 PRN SBP >200  -pain control as above Hx of HTN on losartan 100 at home. -150 at bl. SBPs elevated likely iso pain. Patient also did not take losartan prior to presentation   -c/w home losartan  -add labetalol 5mg q4 PRN if SBP >200  -pain control as above Hx of HTN on losartan 100 at home. -150 at bl. SBPs elevated likely iso pain. Patient also did not take losartan prior to presentation   -c/w home losartan  -labetalol 5mg q4 PRN if SBP >200  -pain control as above

## 2023-02-22 NOTE — ED PROVIDER NOTE - OBJECTIVE STATEMENT
Pt is a 68 y/o F, with PMHx of HTN, high cholesterol, GERD, spinal stenosis, 5 days post cervical spinal fusion @ [facility] w/ Dr. Adrian, with bone garft from left iliac crest, presenting to the ED with left sided pain and unable to walk. Pt has been well post op for the last 4 days. States she was sitting down watching TV last night, stood up and was suddenly unable to bear weight on the left side. She reports having significant pain since to the left hip/groin/leg area that is only present when attempting to ambulate or bear weight. Pt went to [place] ED regarding sxs, had a full work up including dopplers and repeat CT, and pt was advised to be transferred to the  ED for further management. She denies fever and leg numbness/tingling/weakness. No falls. Denies fever. Eucrisa Counseling: Patient may experience a mild burning sensation during topical application. Eucrisa is not approved in children less than 2 years of age.

## 2023-03-25 NOTE — H&P ADULT - PROBLEM/PLAN-6
[Hearing Problems] : hearing problems [Anemia] : anemia [Negative] : Allergic/Immunologic [FreeTextEntry4] : wears hearing aids [FreeTextEntry1] : beta thalassemia major [de-identified] : autism spectrum DISPLAY PLAN FREE TEXT

## 2023-04-12 NOTE — ED PROVIDER NOTE - NS ED SCRIBE STATEMENT
Attending Island Pedicle Flap With Canthal Suspension Text: The defect edges were debeveled with a #15 scalpel blade.  Given the location of the defect, shape of the defect and the proximity to free margins an island pedicle advancement flap was deemed most appropriate.  Using a sterile surgical marker, an appropriate advancement flap was drawn incorporating the defect, outlining the appropriate donor tissue and placing the expected incisions within the relaxed skin tension lines where possible. The area thus outlined was incised deep to adipose tissue with a #15 scalpel blade.  The skin margins were undermined to an appropriate distance in all directions around the primary defect and laterally outward around the island pedicle utilizing iris scissors.  There was minimal undermining beneath the pedicle flap. A suspension suture was placed in the canthal tendon to prevent tension and prevent ectropion.

## 2023-10-16 NOTE — HISTORY OF PRESENT ILLNESS
Call placed to patient to discuss results and recommendations. Patient expresses understanding and agrees with the plan.    [FreeTextEntry1] : Ms. Boyd is a 70yo woman with a [id number], likely Stage Roxana moderate to poorly differentiated adenocarcinoma with circumferential involvement of the lower esophagus 38cm from the incisors with several gastrohepatic and mediastinal enlarged and FGD avid lymph nodes, causing weight loss and limited oral intake. She was unable to receive complete staging with EUS due to COVID-19 outbreak.  She underwent chemoradiation, completing 45 Gy on 5/19/2020, with intent to consider resection subsequently.  She has since been seen in f/u and has had significant post-treatment esophagitis, requiring chronic narcotics for pain.  Diet has slowly been improving and weight is slightly increasing.  Due to the persistent esophagitis and for f/u staging she had on 7/1/20 endoscopy was done which showed radiation esophagitis.  Biopsy showed no malignancy.\par \par 7/2020–EGD cardia/distal esophagus/proximal esophagus biopsies negative for malignancy.\par Saw Dr. Weaver in thoracic surgical consultation for consideration of esophagectomy-patient refused surgery.\par \par 10/2020-CT chest/abdomen/pelvis–mid to distal esophageal wall thickening appears less prominent. Stable 3.2 cm masslike opacity within the gastric cardia. 9 mm perigastric lymph node appears decreased in size.\par \par 1/2021–CT scan chest/abdomen/pelvis–persistent thickening of the wall of the distal esophagus and GE junction unchanged compared to most recent prior study. Single celiac node slightly smaller. New lytic sclerotic metastatic disease involving T4 vertebral body with adjacent paravertebral mass. \par \par 2/16/21:\par PARASPINAL, CT GUIDED CORE BIOPSY\par POSITIVE FOR MALIGNANT CELLS.\par Metastatic adenocarcinoma. Cytomorphology is compatible with\par known esophageal adenocarcinoma \par \par States pain 10/10 without oxy, 4/10 with at level of lesion.

## 2024-02-23 NOTE — PHYSICAL THERAPY INITIAL EVALUATION ADULT - RANGE OF MOTION EXAMINATION, REHAB EVAL
-- DO NOT REPLY / DO NOT REPLY ALL --  -- Message is from Engagement Center Operations (ECO) --    General Patient Message: Jessica BEATTY with [facility] calling with results.  POC A1c 5.8%; no previous history of diabetes or pre-diabetes. Patient had complaints of dizziness, flop sweats, nasal drip and on going vertigo.  Recommend use of famotidine vs singular vs iopromide for nasal issue.  Recommend vestibular and instrumental mobilization therapy for vertigo.  Caller states it is not necessary to call back.      Caller Information         Type Contact Phone/Fax    02/23/2024 02:39 PM CST Phone (Incoming) Jessica BEATTY / [facility] [phone]          Alternative phone number: no    Can a detailed message be left? Yes    Message Turnaround:                no ROM deficits were identified

## 2024-04-11 NOTE — ED ADULT NURSE NOTE - NS ED NURSE AMBULANCES2
Pre injection questionnaire was reviewed.  1. Have you had increased asthma symptoms (chest tightness, increased cough, wheezing or felt short of breath) in the past week? no  2. Have you had a marked increase in allergy symptoms(itchy eyes or nose, sneezing, runny nose, post nasal drip or throat clearing) in the past week?  no  3. Do you have a cold or respiratory tract infection, or flu-like symptoms? no  4. Did you have any problems such as increased allergy or asthma symptoms, hives or generalized itching within 12 hours of receiving your allergy injection or swelling that persisted into the next day? no  5.Are you on any new medications? Any new eye drops? Any new blood pressure or migraine medications? no  If yes,please specify____________________________________   6. Are you taking your allergy medicine as prescribed? yes   [facility]

## 2024-05-22 NOTE — CHART NOTE - NSCHARTNOTEFT_GEN_A_CORE
[address] [address]  Dept Phone: [phone]    Simulation Note    Patient Name:  Weston Pappas  YOB: 1959  MRN:  [id number]  Account Number:  [id number]  Patient passed this AM  Called daughter Melissa Mello at [phone] and expressed condolence.  daughter very understanding.  Daughter noted mother is no longer in pain, mother went fast and is no longer in pain and she is thankful for that.

## 2024-12-19 NOTE — PROGRESS NOTE ADULT - PROBLEM SELECTOR PROBLEM 4
Hyponatremia
Palliative care encounter
Palliative care encounter
Urinary frequency
Urinary frequency
Palliative care encounter
Palliative care encounter
Urinary frequency
Urinary frequency
Herpes zoster without complication
Urinary frequency
This was a shared visit with the ISHAN. I reviewed and verified the documentation.

## 2025-02-13 NOTE — ED PROVIDER NOTE - CPE EDP GASTRO NORM
PROGRESS NOTE     This visit is being performed virtually via Video visit using [facility].   Clinical Location: [facility] Katey Del Toro Naveed Simon is physically present in the [facility] at the time of this visit.        Subjective     Aurora is a 62 year old here for Video Visit (Medication Mgmt.)   Patient's PA indicated that phentermine could be used if the heart monitor did not reveal any atrial fibrillation. She has not received any updates regarding the heart monitor results. She was diagnosed with a small aneurysm and she saw her neurosurgeon, who assured her that the aneurysm would not cause any future complications. A follow-up scan is scheduled for 12/2025 to monitor any changes. She suspects a pinched nerve as the cause of her symptoms, as she experiences neck pain during sleep and recalls a similar episode 23 years ago when she experienced numbness on her left side while working. This was later attributed to a pinched nerve following a hospital stay. She reports no symptoms indicative of a mini stroke. Her blood pressure and cholesterol levels have been reported as normal. She has a blood pressure cuff at home for monitoring. Her blood pressure was recorded as 119/80 during a recent surgery at the [facility] 13 days ago. She underwent trigger finger surgery and had her stitches removed yesterday.      Review of Systems  As documented above.    SDOH Former Smoker (Quit 01/01/2025) Tobacco Pack Years 7       Objective   There were no vitals filed for this visit.  Physical Exam  General: Alert in no acute distress.  Respiratory: Normal respiratory effort and verbally interactive.  Psychiatric: Mood is appropriate for situation          ASSESSMENT AND PLAN        1. Blood pressure management: Elevated at prior visit  -  schedule a nurse visit for a blood pressure check.  - reviewed the heart monitor results. No findings of a fib no runs of SVT no runs of  vtach. NSR.   - If blood pressure is within the normal range, issue a new prescription for phentermine 37.5 mg for weight loss management.      1. Medication management      Return in about 6 months (around 8/13/2025).         normal...

## 2025-05-14 NOTE — PROGRESS NOTE ADULT - SUBJECTIVE AND OBJECTIVE BOX
What Is The Reason For Today's Visit?: Full Body Skin Examination Patient is a 69y old  Female who presents with a chief complaint of Cervical Laminectomy and Fusion (24 Jul 2019 07:41)      INTERVAL HPI/OVERNIGHT EVENTS:  Pt is seen and examined.  Pain is controlled.  no new complaints.    Pain Location & Control:     MEDICATIONS  (STANDING):  acetaminophen   Tablet .. 1000 milliGRAM(s) Oral every 8 hours  acetaminophen  IVPB .. 1000 milliGRAM(s) IV Intermittent every 6 hours  atorvastatin 10 milliGRAM(s) Oral at bedtime  docusate sodium 100 milliGRAM(s) Oral three times a day  gabapentin 300 milliGRAM(s) Oral at bedtime  lactated ringers. 1000 milliLiter(s) (100 mL/Hr) IV Continuous <Continuous>  pantoprazole    Tablet 40 milliGRAM(s) Oral before breakfast  senna 2 Tablet(s) Oral at bedtime    MEDICATIONS  (PRN):  diazepam    Tablet 5 milliGRAM(s) Oral every 8 hours PRN muscle spasms  HYDROmorphone  Injectable 0.5 milliGRAM(s) IV Push every 3 hours PRN Severe Pain (7 - 10)  magnesium hydroxide Suspension 30 milliLiter(s) Oral every 12 hours PRN Constipation  oxyCODONE    IR 5 milliGRAM(s) Oral every 3 hours PRN Mild Pain (1 - 3)  oxyCODONE    IR 10 milliGRAM(s) Oral every 3 hours PRN Moderate Pain (4 - 6)      Allergies    No Known Allergies    Intolerances      Vital Signs Last 24 Hrs  T(C): 36.6 (24 Jul 2019 07:35), Max: 36.9 (23 Jul 2019 21:20)  T(F): 97.8 (24 Jul 2019 07:35), Max: 98.4 (23 Jul 2019 21:20)  HR: 71 (24 Jul 2019 07:35) (65 - 84)  BP: 144/85 (24 Jul 2019 07:35) (144/85 - 161/89)  BP(mean): --  RR: 16 (24 Jul 2019 07:35) (10 - 24)  SpO2: 99% (24 Jul 2019 07:35) (96% - 100%)        LABS:                        10.8   9.57  )-----------( 352      ( 24 Jul 2019 07:17 )             32.5     24 Jul 2019 07:17    129    |  96     |  16     ----------------------------<  131    4.6     |  28     |  0.86     Ca    9.0        24 Jul 2019 07:17        RADIOLOGY & ADDITIONAL TESTS:    Imaging Personally Reviewed:  [ ] YES  [ ] NO    Consultant(s) Notes Reviewed:  [ ] YES  [ ] NO    Care Discussed with Consultants/Other Providers [x ] YES  [ ] NO What Is The Reason For Today's Visit? (Being Monitored For X): concerning skin lesions on a periodic basis